# Patient Record
Sex: FEMALE | Race: BLACK OR AFRICAN AMERICAN | NOT HISPANIC OR LATINO | Employment: FULL TIME
[De-identification: names, ages, dates, MRNs, and addresses within clinical notes are randomized per-mention and may not be internally consistent; named-entity substitution may affect disease eponyms.]

---

## 2017-06-10 ENCOUNTER — HEALTH MAINTENANCE LETTER (OUTPATIENT)
Age: 47
End: 2017-06-10

## 2018-03-27 ENCOUNTER — HOSPITAL ENCOUNTER (INPATIENT)
Facility: CLINIC | Age: 48
LOS: 2 days | Discharge: HOME | End: 2018-03-29
Attending: PSYCHIATRY & NEUROLOGY | Admitting: PSYCHIATRY & NEUROLOGY
Payer: COMMERCIAL

## 2018-03-27 ENCOUNTER — HOSPITAL ENCOUNTER (EMERGENCY)
Facility: CLINIC | Age: 48
Discharge: PSYCHIATRIC OR SUBSTANCE USE FACILITY | End: 2018-03-27
Attending: EMERGENCY MEDICINE | Admitting: EMERGENCY MEDICINE
Payer: COMMERCIAL

## 2018-03-27 VITALS
SYSTOLIC BLOOD PRESSURE: 160 MMHG | HEIGHT: 67 IN | OXYGEN SATURATION: 99 % | BODY MASS INDEX: 28.88 KG/M2 | WEIGHT: 184 LBS | RESPIRATION RATE: 16 BRPM | HEART RATE: 101 BPM | TEMPERATURE: 98.3 F | DIASTOLIC BLOOD PRESSURE: 98 MMHG

## 2018-03-27 DIAGNOSIS — F32.A DEPRESSION, UNSPECIFIED DEPRESSION TYPE: ICD-10-CM

## 2018-03-27 DIAGNOSIS — R45.851 SUICIDAL IDEATION: ICD-10-CM

## 2018-03-27 DIAGNOSIS — F41.9 ANXIETY: Primary | ICD-10-CM

## 2018-03-27 DIAGNOSIS — F19.10 POLYSUBSTANCE ABUSE (H): ICD-10-CM

## 2018-03-27 LAB
AMPHETAMINES UR QL SCN: POSITIVE
BARBITURATES UR QL: NEGATIVE
BENZODIAZ UR QL: NEGATIVE
CANNABINOIDS UR QL SCN: NEGATIVE
COCAINE UR QL: POSITIVE
HCG UR QL: NEGATIVE
OPIATES UR QL SCN: NEGATIVE
PCP UR QL SCN: NEGATIVE

## 2018-03-27 PROCEDURE — 25000132 ZZH RX MED GY IP 250 OP 250 PS 637: Performed by: NURSE PRACTITIONER

## 2018-03-27 PROCEDURE — 12400007 ZZH R&B MH INTERMEDIATE UMMC

## 2018-03-27 PROCEDURE — 80307 DRUG TEST PRSMV CHEM ANLYZR: CPT | Performed by: EMERGENCY MEDICINE

## 2018-03-27 PROCEDURE — 25000132 ZZH RX MED GY IP 250 OP 250 PS 637: Mod: GY | Performed by: EMERGENCY MEDICINE

## 2018-03-27 PROCEDURE — A9270 NON-COVERED ITEM OR SERVICE: HCPCS | Mod: GY | Performed by: EMERGENCY MEDICINE

## 2018-03-27 PROCEDURE — 90791 PSYCH DIAGNOSTIC EVALUATION: CPT

## 2018-03-27 PROCEDURE — 99285 EMERGENCY DEPT VISIT HI MDM: CPT | Mod: 25

## 2018-03-27 PROCEDURE — 81025 URINE PREGNANCY TEST: CPT | Performed by: EMERGENCY MEDICINE

## 2018-03-27 RX ORDER — OLANZAPINE 10 MG/2ML
10 INJECTION, POWDER, FOR SOLUTION INTRAMUSCULAR
Status: DISCONTINUED | OUTPATIENT
Start: 2018-03-27 | End: 2018-03-29 | Stop reason: HOSPADM

## 2018-03-27 RX ORDER — B-COMPLEX WITH VITAMIN C
1 TABLET ORAL DAILY
Status: ON HOLD | COMMUNITY
End: 2021-12-13

## 2018-03-27 RX ORDER — ACETAMINOPHEN 325 MG/1
650 TABLET ORAL EVERY 4 HOURS PRN
Status: DISCONTINUED | OUTPATIENT
Start: 2018-03-27 | End: 2018-03-29 | Stop reason: HOSPADM

## 2018-03-27 RX ORDER — HYDROCHLOROTHIAZIDE 25 MG/1
25 TABLET ORAL DAILY
Status: DISCONTINUED | OUTPATIENT
Start: 2018-03-28 | End: 2018-03-29 | Stop reason: HOSPADM

## 2018-03-27 RX ORDER — LORATADINE 10 MG/1
10 TABLET ORAL DAILY
Status: DISCONTINUED | OUTPATIENT
Start: 2018-03-28 | End: 2018-03-29 | Stop reason: HOSPADM

## 2018-03-27 RX ORDER — ALUMINA, MAGNESIA, AND SIMETHICONE 2400; 2400; 240 MG/30ML; MG/30ML; MG/30ML
30 SUSPENSION ORAL EVERY 4 HOURS PRN
Status: DISCONTINUED | OUTPATIENT
Start: 2018-03-27 | End: 2018-03-29 | Stop reason: HOSPADM

## 2018-03-27 RX ORDER — BUPROPION HYDROCHLORIDE 300 MG/1
300 TABLET ORAL DAILY
Status: DISCONTINUED | OUTPATIENT
Start: 2018-03-27 | End: 2018-03-27 | Stop reason: HOSPADM

## 2018-03-27 RX ORDER — LORATADINE 10 MG/1
10 TABLET ORAL DAILY
Status: ON HOLD | COMMUNITY
End: 2018-04-12

## 2018-03-27 RX ORDER — MULTIVITAMIN,THERAPEUTIC
1 TABLET ORAL DAILY
Status: DISCONTINUED | OUTPATIENT
Start: 2018-03-28 | End: 2018-03-29 | Stop reason: HOSPADM

## 2018-03-27 RX ORDER — TRAZODONE HYDROCHLORIDE 50 MG/1
50 TABLET, FILM COATED ORAL
Status: DISCONTINUED | OUTPATIENT
Start: 2018-03-27 | End: 2018-03-29 | Stop reason: HOSPADM

## 2018-03-27 RX ORDER — BISACODYL 10 MG
10 SUPPOSITORY, RECTAL RECTAL DAILY PRN
Status: DISCONTINUED | OUTPATIENT
Start: 2018-03-27 | End: 2018-03-29 | Stop reason: HOSPADM

## 2018-03-27 RX ORDER — BUPROPION HYDROCHLORIDE 150 MG/1
150 TABLET ORAL EVERY MORNING
Status: DISCONTINUED | OUTPATIENT
Start: 2018-03-28 | End: 2018-03-29 | Stop reason: HOSPADM

## 2018-03-27 RX ORDER — FERROUS SULFATE 325(65) MG
325 TABLET ORAL
Status: ON HOLD | COMMUNITY
End: 2018-04-12

## 2018-03-27 RX ORDER — OLANZAPINE 10 MG/1
10 TABLET ORAL
Status: DISCONTINUED | OUTPATIENT
Start: 2018-03-27 | End: 2018-03-29 | Stop reason: HOSPADM

## 2018-03-27 RX ORDER — FERROUS SULFATE 325(65) MG
325 TABLET ORAL
Status: DISCONTINUED | OUTPATIENT
Start: 2018-03-28 | End: 2018-03-29 | Stop reason: HOSPADM

## 2018-03-27 RX ORDER — ACETAMINOPHEN 500 MG
1000 TABLET ORAL ONCE
Status: COMPLETED | OUTPATIENT
Start: 2018-03-27 | End: 2018-03-27

## 2018-03-27 RX ORDER — HYDROXYZINE HYDROCHLORIDE 25 MG/1
25-50 TABLET, FILM COATED ORAL EVERY 4 HOURS PRN
Status: DISCONTINUED | OUTPATIENT
Start: 2018-03-27 | End: 2018-03-29 | Stop reason: HOSPADM

## 2018-03-27 RX ADMIN — BUPROPION HYDROCHLORIDE 300 MG: 300 TABLET, FILM COATED, EXTENDED RELEASE ORAL at 13:55

## 2018-03-27 RX ADMIN — FLUOXETINE 20 MG: 20 CAPSULE ORAL at 13:55

## 2018-03-27 RX ADMIN — HYDROXYZINE HYDROCHLORIDE 50 MG: 25 TABLET ORAL at 20:50

## 2018-03-27 RX ADMIN — ACETAMINOPHEN 1000 MG: 500 TABLET, FILM COATED ORAL at 12:55

## 2018-03-27 ASSESSMENT — ACTIVITIES OF DAILY LIVING (ADL)
BATHING: 0 - INDEPENDENT
FALL_HISTORY_WITHIN_LAST_SIX_MONTHS: NO
COGNITION: 0 - NO COGNITION ISSUES REPORTED
DRESS: 0 - INDEPENDENT
AMBULATION: 0-->INDEPENDENT
TOILETING: 0 - INDEPENDENT
TRANSFERRING: 0-->INDEPENDENT
TOILETING: 0-->INDEPENDENT
DRESS: 0-->INDEPENDENT
BATHING: 0-->INDEPENDENT
COMMUNICATION: 0 - UNDERSTANDS/COMMUNICATES WITHOUT DIFFICULTY
RETIRED_COMMUNICATION: 0-->UNDERSTANDS/COMMUNICATES WITHOUT DIFFICULTY
AMBULATION: 0 - INDEPENDENT
SWALLOWING: 0-->SWALLOWS FOODS/LIQUIDS WITHOUT DIFFICULTY
RETIRED_EATING: 0-->INDEPENDENT
EATING: 0 - INDEPENDENT
TRANSFERRING: 0 - INDEPENDENT
SWALLOWING: 0 - SWALLOWS FOODS/LIQUIDS WITHOUT DIFFICULTY

## 2018-03-27 ASSESSMENT — ENCOUNTER SYMPTOMS
NERVOUS/ANXIOUS: 1
DYSPHORIC MOOD: 1
HEADACHES: 1

## 2018-03-27 NOTE — PHARMACY-ADMISSION MEDICATION HISTORY
Admission medication history interview status for the 3/27/2018  admission is complete. See EPIC admission navigator for prior to admission medications     Medication history source reliability:Moderate    Actions taken by pharmacist (provider contacted, etc):None     Additional medication history information not noted on PTA med list :None    Medication reconciliation/reorder completed by provider prior to medication history? No    Time spent in this activity: 25 min    Prior to Admission medications    Medication Sig Last Dose Taking? Auth Provider   VITAMIN D, CHOLECALCIFEROL, PO Take 5,000 Units by mouth daily Past Week at Unknown time Yes Unknown, Entered By History   ferrous sulfate (IRON) 325 (65 FE) MG tablet Take 325 mg by mouth daily (with breakfast) Past Week at Unknown time Yes Unknown, Entered By History   B Complex-Biotin-FA (VITAMIN B COMPLEX) TABS Take 1 tablet by mouth daily Past Week at Unknown time Yes Unknown, Entered By History   Multiple Vitamins-Minerals (ONE-A-DAY 50 PLUS PO) Take 1 tablet by mouth daily Past Week at Unknown time Yes Unknown, Entered By History   WHEY PROTEIN PO Take 60 g by mouth daily Past Week at Unknown time Yes Unknown, Entered By History   beclomethasone (QVAR) 40 MCG/ACT Inhaler Inhale 2 puffs into the lungs 2 times daily 3/26/2018 at Unknown time Yes Unknown, Entered By History   loratadine (CLARITIN) 10 MG tablet Take 10 mg by mouth daily Past Week at Unknown time Yes Unknown, Entered By History   HYDROCHLOROTHIAZIDE PO Take 25 mg by mouth daily Past Week at Unknown time Yes Unknown, Entered By History   Phentermine HCl (ADIPEX-P PO) Take 19 mg by mouth every morning (before breakfast) 1/2 of 37.5mg tablet Past Week at Unknown time Yes Unknown, Entered By History   FLUoxetine (PROZAC) 20 MG capsule Take 80 mg by mouth daily  Past Week at Unknown time Yes Rupinder Seymour, NP   acetaminophen (TYLENOL) 325 MG tablet Take 2 tablets by mouth. 650 mg q 8 hours as needed  max 4000 mg in 24 hours Past Week at Unknown time Yes Rupinder Seymour, NP   buPROPion (WELLBUTRIN XL) 300 MG 24 hr tablet Take 450 mg by mouth every morning  Past Week at Unknown time Yes Rupinder Seymour, NP   EPINEPHrine (EPIPEN) 0.3 MG/0.3ML injection Inject 0.3 mLs into the muscle once as needed for anaphylaxis. More than a month at Unknown time  Rupinder Seymour, NP

## 2018-03-27 NOTE — IP AVS SNAPSHOT
30    [address]    [place] [address]    Phone:  [phone]                                       After Visit Summary   3/27/2018    Winifred Bashir    MRN: [id number]           After Visit Summary Signature Page     I have received my discharge instructions, and my questions have been answered. I have discussed any challenges I see with this plan with the nurse or doctor.    ..........................................................................................................................................  Patient/Patient Representative Signature      ..........................................................................................................................................  Patient Representative Print Name and Relationship to Patient    ..................................................               ................................................  Date                                            Time    ..........................................................................................................................................  Reviewed by Signature/Title    ...................................................              ..............................................  Date                                                            Time

## 2018-03-27 NOTE — IP AVS SNAPSHOT
MRN:[id number]                      After Visit Summary   3/27/2018    Winifred Bashir    MRN: [id number]           Thank you!     Thank you for choosing [facility] for your care. Our goal is always to provide you with excellent care.        Patient Information     Date Of Birth          1970        Designated Caregiver       Most Recent Value    Caregiver    Will someone help with your care after discharge? no      About your hospital stay     You were admitted on:  March 27, 2018 You last received care in the:  [facility] 30[facility]    You were discharged on:  March 29, 2018       Who to Call     For medical emergencies, please call 911.  For non-urgent questions about your medical care, please call your primary care provider or clinic, [phone]          Attending Provider     Provider Osei Miguel MD Psychiatry       Primary Care Provider Office Phone # Fax #    Angela Guillen [phone]      Further instructions from your care team        Behavioral Discharge Planning and Instructions      Summary:  You were admitted on 3/27/2018  due to Depression, Anxiety and suicidal ideation.  You were treated by Dr. Osei Tristan MD and discharged on 03/29/2018 from [facility].      Principal Diagnosis:   1.  Major depressive disorder, recurrent, severe.   2.  Generalized anxiety disorder.   3.  Hypertension.     Health Care Follow-up Appointments:   1. Medication Management Appointment:  Date/Time: Thursday, April 12th, 2018 @ 10:20am with 9:20am arrival time.  Provider: Ana Paula Gonzalez CNP    2. Individual Therapy Appointment:  Date/Time: Tuesday, April 24th, 2018 @ 1:00pm  Provider: Radha    The location for your upcoming appointments:  [facility]  [place] [facility] [address], [address]    [place], [place]  Phone: [phone]                                                                                                                                                                                                                                                            The Health Unit Coordinator has faxed these discharge instructions to fax: [phone].    3. Gambling Addiction Treatment:  You reported that you completed your assessment with Christopher from [facility] about 2 weeks ago.  You contacted [facility] today and asked that Christopher give you a follow-up call regarding inpatient treatment opportunity.    [facility]  Phone: [phone]    4. Health Professionals Service Program:  You self-reported on 3/29/18 to hospitals regarding your current hospitalization, gambling addiction and substances found in your utox.  You also completed an KATY for hospitals and [facility] prior to your discharge.  You have arranged a plan with the  at hospitals who you will continue to work with.    Health Professionals Service Program (hospitals)  [address]   [place] [address]   Phone: [phone]   Fax: [phone]  Attend all scheduled appointments with your outpatient providers. Call at least 24 hours in advance if you need to reschedule an appointment to ensure continued access to your outpatient providers.   Major Treatments, Procedures and Findings:  You were provided with: a psychiatric assessment, assessed for medical stability, medication evaluation and/or management, group therapy, individual therapy, milieu management and medical interventions.  You also self reported to hospitals regarding your most recent substance use and gambling addiction.  You have created a plan with them and will continue to work with them per recommendation.      Symptoms to Report: feeling more aggressive, increased confusion, losing more sleep, mood getting worse or thoughts of suicide    Early warning signs can include: increased depression or anxiety sleep disturbances increased thoughts or behaviors of suicide or self-harm  increased unusual thinking, such as paranoia or  hearing voices    Safety and Wellness:  Take all medicines as directed.  Make no changes unless your doctor suggests them.      Follow treatment recommendations.  Refrain from alcohol and non-prescribed drugs.  If there is a concern for safety, call 911.    Resources:   [facility] Mental Health Crisis Response [facility]: - [phone]  Crisis Stabilization Services are available for [place], [place] at this phone number. - [phone]  Urgent Care for Adult Mental Health ([facility] and [facility])   [address] [place] [address] - [facility] [place] - [phone], also is a 24/7 Mobile Crisis Team and Crisis Phone Line   Monday - Friday 8:00 a.m. to 9:00 p.m.   Saturday - Sunday 11:00 a.m. to 3:00 p.m.   [facility] provides a safe, supportive environment for crisis stabilization and promoting personal responsibility for [facility] and [facility] residents with mental illness.  [address] [place] [address]  Phone: [phone]  Fax: [phone]  [facility] Emergency Room: Phone:[phone].    Crisis Connection 24/7 Community Call Center: [phone]  Phone: [phone] outside the [facility]: [phone]  www.crisis.org   Hours: 24 hours/day, 7 days/week  Services: Free and confidential telephone counseling provided by trained volunteers supervised by professional staff. Early intervention and brief supportive counseling for callers with issues of depression, stress and anxiety, domestic violence, parent/child conflicts, family issues, loneliness, grief and loss, suicidal ideation and chronic mental illness.    [employer]  You and your family would benefit from the support groups at [employer]- [place] Chapter.   www.namihelps.org  www.namihelpsyouth.org    The [employer] - [facility] has a parent support and educator  "staff - Noman Lee - that can be a support for your parents. There are also many classes that are available to you and your family.  Noman can be reached at [phone] xt 106 or through e-mail at [email or web address].    The treatment team has appreciated the opportunity to work with you.     Winifred,  please take care and make your recovery a daily recovery.   If you have any questions or concerns our unit number is [phone].  You may be receiving a follow-up phone call within the next three days from a representative from behavioral [employer].    You have identified the best phone number to reach you as [phone].        Pending Results     No orders found from 3/25/2018 to 3/28/2018.            Admission Information     Date & Time Provider Department Dept. Phone    3/27/2018 Osei Tristan MD UR 30NR [phone]      Your Vitals Were     Blood Pressure Pulse Temperature Respirations Height Weight    149/91 (BP Location: Right arm) 85 97.8  F (36.6  C) (Oral) 16 1.702 m (5' 7\") 89.8 kg (198 lb)    BMI (Body Mass Index)                   31.01 kg/m2           MyChart Information     [facility] gives you secure access to your electronic health record. If you see a primary care provider, you can also send messages to your care team and make appointments. If you have questions, please call your primary care clinic.  If you do not have a primary care provider, please call [phone] and they will assist you.        Care EveryWhere ID     This is your Care EveryWhere ID. This could be used by other organizations to access your [facility] medical records  [id number]        Equal Access to Services     [facility] Center[facility]MY : Hadruby Flores, teresa barcenas, brendan persaud. So [facility] [phone].    ATENCIÓN: Si habla español, tiene a kimbrough disposición servicios gratuitos de asistencia lingüística. Llame al [phone].    We comply with applicable federal " civil rights laws and [place] laws. We do not discriminate on the basis of race, color, national origin, age, disability, sex, sexual orientation, or gender identity.               Review of your medicines      START taking        Dose / Directions    hydrOXYzine 25 MG tablet   Commonly known as:  ATARAX   Used for:  Anxiety        Dose:  25-50 mg   Take 1-2 tablets (25-50 mg) by mouth 3 times daily as needed for anxiety   Quantity:  90 tablet   Refills:  0         CONTINUE these medicines which have NOT CHANGED        Dose / Directions    beclomethasone 40 MCG/ACT Inhaler   Commonly known as:  QVAR        Dose:  2 puff   Inhale 2 puffs into the lungs 2 times daily   Refills:  0       buPROPion 300 MG 24 hr tablet   Commonly known as:  WELLBUTRIN XL        Dose:  450 mg   Take 450 mg by mouth every morning   Quantity:  30 tablet   Refills:  1       EPINEPHrine 0.3 MG/0.3ML injection   Commonly known as:  EPIPEN        Dose:  0.3 mg   Inject 0.3 mLs into the muscle once as needed for anaphylaxis.   Quantity:  2 each   Refills:  3       ferrous sulfate 325 (65 FE) MG tablet   Commonly known as:  IRON        Dose:  325 mg   Take 325 mg by mouth daily (with breakfast)   Refills:  0       HYDROCHLOROTHIAZIDE PO        Dose:  25 mg   Take 25 mg by mouth daily   Refills:  0       loratadine 10 MG tablet   Commonly known as:  CLARITIN        Dose:  10 mg   Take 10 mg by mouth daily   Refills:  0       ONE-A-DAY 50 PLUS PO        Dose:  1 tablet   Take 1 tablet by mouth daily   Refills:  0       PROZAC 20 MG capsule   Generic drug:  FLUoxetine        Dose:  80 mg   Take 80 mg by mouth daily   Quantity:  90 capsule   Refills:  0       TYLENOL 325 MG tablet   Generic drug:  acetaminophen        Dose:  2 tablet   Take 2 tablets by mouth. 650 mg q 8 hours as needed max 4000 mg in 24 hours   Quantity:  100 tablet   Refills:  0       vitamin B complex Tabs        Dose:  1 tablet   Take 1 tablet by mouth daily   Refills:  0        VITAMIN D (CHOLECALCIFEROL) PO        Dose:  5000 Units   Take 5,000 Units by mouth daily   Refills:  0            Where to get your medicines      These medications were sent to [facility]/pharmacy #[id number] - [place], [place] - [address]  [address], [facility] [address]     Phone:  [phone]     hydrOXYzine 25 MG tablet                Protect others around you: Learn how to safely use, store and throw away your medicines at www.disposemymeds.org.             Medication List: This is a list of all your medications and when to take them. Check marks below indicate your daily home schedule. Keep this list as a reference.      Medications           Morning Afternoon Evening Bedtime As Needed    beclomethasone 40 MCG/ACT Inhaler   Commonly known as:  QVAR   Inhale 2 puffs into the lungs 2 times daily                                buPROPion 300 MG 24 hr tablet   Commonly known as:  WELLBUTRIN XL   Take 450 mg by mouth every morning   Last time this was given:  150 mg on 3/29/2018  8:30 AM                                EPINEPHrine 0.3 MG/0.3ML injection   Commonly known as:  EPIPEN   Inject 0.3 mLs into the muscle once as needed for anaphylaxis.                                ferrous sulfate 325 (65 FE) MG tablet   Commonly known as:  IRON   Take 325 mg by mouth daily (with breakfast)   Last time this was given:  325 mg on 3/29/2018  8:29 AM                                HYDROCHLOROTHIAZIDE PO   Take 25 mg by mouth daily   Last time this was given:  25 mg on 3/29/2018  8:30 AM                                hydrOXYzine 25 MG tablet   Commonly known as:  ATARAX   Take 1-2 tablets (25-50 mg) by mouth 3 times daily as needed for anxiety   Last time this was given:  50 mg on 3/29/2018  1:40 PM                                loratadine 10 MG tablet   Commonly known as:  CLARITIN   Take 10 mg by mouth daily   Last time this was given:  10 mg on 3/29/2018  8:30 AM                                ONE-A-DAY 50  PLUS PO   Take 1 tablet by mouth daily                                PROZAC 20 MG capsule   Take 80 mg by mouth daily   Last time this was given:  40 mg on 3/29/2018  8:30 AM   Generic drug:  FLUoxetine                                TYLENOL 325 MG tablet   Take 2 tablets by mouth. 650 mg q 8 hours as needed max 4000 mg in 24 hours   Generic drug:  acetaminophen                                vitamin B complex Tabs   Take 1 tablet by mouth daily                                VITAMIN D (CHOLECALCIFEROL) PO   Take 5,000 Units by mouth daily   Last time this was given:  5,000 Units on 3/29/2018  8:29 AM
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
## 2018-03-27 NOTE — ED PROVIDER NOTES
Addendum Note: Bed placed for the patient prior to sign out to my colleague. Findings and plan explained to the patient. Patient will be transferred to [facility] via EMS under the care of Dr. Tristan, who will admit the patient to a psychatric bed for further monitoring, evaluation, and treatment.        Sheila Muhammad MD  03/27/18 3907

## 2018-03-28 LAB
ALBUMIN SERPL-MCNC: 3.1 G/DL (ref 3.4–5)
ALP SERPL-CCNC: 93 U/L (ref 40–150)
ALT SERPL W P-5'-P-CCNC: 26 U/L (ref 0–50)
ANION GAP SERPL CALCULATED.3IONS-SCNC: 9 MMOL/L (ref 3–14)
AST SERPL W P-5'-P-CCNC: 18 U/L (ref 0–45)
BASOPHILS # BLD AUTO: 0 10E9/L (ref 0–0.2)
BASOPHILS NFR BLD AUTO: 0.3 %
BILIRUB SERPL-MCNC: 0.6 MG/DL (ref 0.2–1.3)
BUN SERPL-MCNC: 12 MG/DL (ref 7–30)
CALCIUM SERPL-MCNC: 8.5 MG/DL (ref 8.5–10.1)
CHLORIDE SERPL-SCNC: 105 MMOL/L (ref 94–109)
CHOLEST SERPL-MCNC: 216 MG/DL
CO2 SERPL-SCNC: 26 MMOL/L (ref 20–32)
CREAT SERPL-MCNC: 0.7 MG/DL (ref 0.52–1.04)
DIFFERENTIAL METHOD BLD: ABNORMAL
EOSINOPHIL # BLD AUTO: 0.1 10E9/L (ref 0–0.7)
EOSINOPHIL NFR BLD AUTO: 1.8 %
ERYTHROCYTE [DISTWIDTH] IN BLOOD BY AUTOMATED COUNT: 15.1 % (ref 10–15)
GFR SERPL CREATININE-BSD FRML MDRD: 90 ML/MIN/1.7M2
GLUCOSE SERPL-MCNC: 83 MG/DL (ref 70–99)
HCT VFR BLD AUTO: 37.6 % (ref 35–47)
HDLC SERPL-MCNC: 46 MG/DL
HGB BLD-MCNC: 12 G/DL (ref 11.7–15.7)
IMM GRANULOCYTES # BLD: 0 10E9/L (ref 0–0.4)
IMM GRANULOCYTES NFR BLD: 0.1 %
LDLC SERPL CALC-MCNC: 112 MG/DL
LYMPHOCYTES # BLD AUTO: 2.5 10E9/L (ref 0.8–5.3)
LYMPHOCYTES NFR BLD AUTO: 34.9 %
MCH RBC QN AUTO: 26.7 PG (ref 26.5–33)
MCHC RBC AUTO-ENTMCNC: 31.9 G/DL (ref 31.5–36.5)
MCV RBC AUTO: 84 FL (ref 78–100)
MONOCYTES # BLD AUTO: 0.5 10E9/L (ref 0–1.3)
MONOCYTES NFR BLD AUTO: 7.3 %
NEUTROPHILS # BLD AUTO: 4 10E9/L (ref 1.6–8.3)
NEUTROPHILS NFR BLD AUTO: 55.6 %
NONHDLC SERPL-MCNC: 170 MG/DL
NRBC # BLD AUTO: 0 10*3/UL
NRBC BLD AUTO-RTO: 0 /100
PLATELET # BLD AUTO: 327 10E9/L (ref 150–450)
POTASSIUM SERPL-SCNC: 3.5 MMOL/L (ref 3.4–5.3)
PROT SERPL-MCNC: 7.2 G/DL (ref 6.8–8.8)
RBC # BLD AUTO: 4.49 10E12/L (ref 3.8–5.2)
SODIUM SERPL-SCNC: 140 MMOL/L (ref 133–144)
TRIGL SERPL-MCNC: 288 MG/DL
TSH SERPL DL<=0.005 MIU/L-ACNC: 0.56 MU/L (ref 0.4–4)
WBC # BLD AUTO: 7.2 10E9/L (ref 4–11)

## 2018-03-28 PROCEDURE — 80053 COMPREHEN METABOLIC PANEL: CPT | Performed by: NURSE PRACTITIONER

## 2018-03-28 PROCEDURE — 84443 ASSAY THYROID STIM HORMONE: CPT | Performed by: NURSE PRACTITIONER

## 2018-03-28 PROCEDURE — 36415 COLL VENOUS BLD VENIPUNCTURE: CPT | Performed by: NURSE PRACTITIONER

## 2018-03-28 PROCEDURE — 85025 COMPLETE CBC W/AUTO DIFF WBC: CPT | Performed by: NURSE PRACTITIONER

## 2018-03-28 PROCEDURE — 25000132 ZZH RX MED GY IP 250 OP 250 PS 637: Performed by: NURSE PRACTITIONER

## 2018-03-28 PROCEDURE — 80061 LIPID PANEL: CPT | Performed by: NURSE PRACTITIONER

## 2018-03-28 PROCEDURE — 12400007 ZZH R&B MH INTERMEDIATE UMMC

## 2018-03-28 PROCEDURE — 99223 1ST HOSP IP/OBS HIGH 75: CPT | Mod: AI | Performed by: PSYCHIATRY & NEUROLOGY

## 2018-03-28 RX ADMIN — LORATADINE 10 MG: 10 TABLET ORAL at 08:22

## 2018-03-28 RX ADMIN — HYDROXYZINE HYDROCHLORIDE 50 MG: 25 TABLET ORAL at 16:32

## 2018-03-28 RX ADMIN — HYDROXYZINE HYDROCHLORIDE 50 MG: 25 TABLET ORAL at 08:39

## 2018-03-28 RX ADMIN — VITAMIN D, TAB 1000IU (100/BT) 5000 UNITS: 25 TAB at 08:21

## 2018-03-28 RX ADMIN — FLUOXETINE 40 MG: 20 CAPSULE ORAL at 08:21

## 2018-03-28 RX ADMIN — THERA TABS 1 TABLET: TAB at 08:21

## 2018-03-28 RX ADMIN — FERROUS SULFATE TAB 325 MG (65 MG ELEMENTAL FE) 325 MG: 325 (65 FE) TAB at 08:22

## 2018-03-28 RX ADMIN — BUPROPION HYDROCHLORIDE 150 MG: 150 TABLET, FILM COATED, EXTENDED RELEASE ORAL at 08:22

## 2018-03-28 RX ADMIN — B-COMPLEX W/ C & FOLIC ACID TAB 1 TABLET: TAB at 08:22

## 2018-03-28 RX ADMIN — FLUTICASONE FUROATE 1 PUFF: 100 POWDER RESPIRATORY (INHALATION) at 08:23

## 2018-03-28 RX ADMIN — HYDROCHLOROTHIAZIDE 25 MG: 25 TABLET ORAL at 08:22

## 2018-03-28 ASSESSMENT — ACTIVITIES OF DAILY LIVING (ADL)
DRESS: INDEPENDENT
ORAL_HYGIENE: INDEPENDENT
DRESS: INDEPENDENT
LAUNDRY: WITH SUPERVISION
ORAL_HYGIENE: INDEPENDENT
GROOMING: INDEPENDENT
LAUNDRY: UNABLE TO COMPLETE
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
## 2018-03-28 NOTE — PROGRESS NOTES
SPIRITUAL HEALTH SERVICES  SPIRITUAL ASSESSMENT Progress Note  [facility] ([facility] Station 30    REFERRAL SOURCE: I tried to visit pt Winifred per [facility]  but on my both visit attempts pt was asleep. Pt is new to the unit and so tired. I let the pt sleep and left the room without disturbing. However, I informed my visit attempts to the pt nurse.    PLAN: I will remain open to provide spiritual care for the pt as needed.    Darell Hassan M.Div. ([place]), M.Th., D.Min., [facility]  Staff   Pager [phone]

## 2018-03-28 NOTE — PLAN OF CARE
"Problem: Overarching Goals (Adult)  Goal: Adheres to Safety Considerations for Self and Others    Intervention: Develop/Maintain Individualized Safety Plan  Patient denied active suicidal thoughts at this time, but she still wishes to be dead, described as \"wishing to just disappear. Stated: \"I feel numb\", \"I can't make really any smart decisions now\". Patient contracted for safety on the unit. Mood is depressed and anxious. Thoughts are linear and logical. Speech clear and organized. Patient denied hallucinations or delusions/paranoia, no history reported.        "
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
## 2018-03-28 NOTE — PROGRESS NOTES
03/28/18 1505   Behavioral Health   Hallucinations denies / not responding to hallucinations   Thinking poor concentration   Orientation person: oriented;place: oriented;date: oriented;time: oriented   Memory baseline memory   Insight poor   Judgement impaired   Eye Contact at examiner   Affect blunted, flat   Mood mood is calm   Physical Appearance/Attire attire appropriate to age and situation   Hygiene well groomed   Suicidality other (see comments)  (None stated or observed)   Change in Protective Factors? No   Enviromental Risk Factors None   Self Injury other (see comment)  (None stated or observed)   Activity isolative;withdrawn   Speech clear;coherent   Medication Sensitivity no stated side effects;no observed side effects   Psychomotor / Gait balanced;steady   Safety   Suicidality Status 15   Activities of Daily Living   Hygiene/Grooming independent   Oral Hygiene independent   Dress independent   Laundry unable to complete   Room Organization independent     Pt. Was isolative and withdrawn to her room for most of the shift. She came out for meals and would linger around for few moments afterwards before leaving. She was quiet and mostly kept to herself but was nice when engaged.

## 2018-03-28 NOTE — PROGRESS NOTES
Behavioral Health  Note   Behavioral Health  Spirituality Group Note     Unit 30    Name: Winifred Bashir    YOB: 1970   MRN: [id number]    Age: 47 year old     Patient attended -led group, which included discussion of spirituality, coping with illness and building resilience.   Patient attended group for 1.0 hrs.   The patient actively participated in group discussion     Darell [facility]  Staff    Page [phone]

## 2018-03-28 NOTE — PROGRESS NOTES
"SPIRITUAL HEALTH SERVICES Progress Note  [facility] ([facility]) Station 30       DATA:    I tried again to visit pt [facility] for the third time and this time I get a chance to talk with the pt. Pt came to attend the spirituality group and she was active during the group discussions. Pt said, \"I have some gambling problem and that will cost me my marriage to be divorce. I didn't speak with my  for a week. I want to call him and will tell him where I am right now. I want to save my marriage by changing my behavior, if I get a chente from my  but I don't think so will give me my chente. I get depressed and confused, so my friend brought me here to gather myself and calm down physically, emotionally and spiritually. I do have hope things might be ok after that, if don't I am ready even for the worst.\" I tried to calm down the pt through group meditation and individual meditation. We talked about her past attitude and character towards gambling. I share some method of coping mechanism.        INTERVENTION:    I reflectively listen the pt what she have in her mind and give her some comfort through the word of God. At the end of our conversation I offered her a prayer.       OUTCOME:    Pt appreciated by being here and felt saving her life, before something bad happen into her life. Pt was active in the group discussion. Pt regretted and take responsibly for the things she had done, both in her marriage and her personal life. Pt wanted to fix the problem she created into her life and the marriage she live in until a week ago.       PLAN:    I will remain open to provide spiritual care for the pt as needed.                                                                                                                                             Darell Hassan M.Div. ([place]), M.Th., D.Min., [facility]  Staff   Pager [phone]    "

## 2018-03-28 NOTE — PROGRESS NOTES
03/27/18 2002   Patient Belongings   Did you bring any home meds/supplements to the hospital?  Yes   Patient Belongings bracelet;earings   Disposition of Belongings [facility];Sent to security per site process   Belongings Search Yes   Clothing Search Yes   Second Staff Phyllis   Security envelope [id number]  Visa debit [employer]  Debit visa [employer]  Check book [employer] [phone]    Security Envelope [id number]: x4 $100, x1 $20     Locker 8  Cell phone  One plastic bag - phone case - pants with string - red bag contents bractlet and ear rings- black wallet with .35 and m[place] id  One plastic bag - jacket  Multi color suit case - 1.00, apple watch with ,  Ipad, flat iron, brush,toiletries bag- contents metal finger nail kit, hair accessories, deodorant, jacket with strings, brown wallet with insurance cards and  .50. Black toiletries bag hair accessories electric toothbrush and deodorant.      .A               Admission:  I am responsible for any personal items that are not sent to the safe or pharmacy.  [facility] is not responsible for loss, theft or damage of any property in my possession.    Signature:  _________________________________ Date: _______  Time: _____                                              Staff Signature:  ____________________________ Date: ________  Time: _____      2nd Staff person, if patient is unable/unwilling to sign:    Signature: ________________________________ Date: ________  Time: _____     Discharge:  [facility] has returned all of my personal belongings:    Signature: _________________________________ Date: ________  Time: _____                                          Staff Signature:  ____________________________ Date: ________  Time: _____

## 2018-03-29 VITALS
HEART RATE: 85 BPM | TEMPERATURE: 97.8 F | DIASTOLIC BLOOD PRESSURE: 91 MMHG | RESPIRATION RATE: 16 BRPM | HEIGHT: 67 IN | WEIGHT: 198 LBS | BODY MASS INDEX: 31.08 KG/M2 | SYSTOLIC BLOOD PRESSURE: 149 MMHG

## 2018-03-29 PROCEDURE — 90853 GROUP PSYCHOTHERAPY: CPT

## 2018-03-29 PROCEDURE — 99239 HOSP IP/OBS DSCHRG MGMT >30: CPT | Performed by: PSYCHIATRY & NEUROLOGY

## 2018-03-29 PROCEDURE — 25000132 ZZH RX MED GY IP 250 OP 250 PS 637: Performed by: NURSE PRACTITIONER

## 2018-03-29 RX ORDER — HYDROXYZINE HYDROCHLORIDE 25 MG/1
25-50 TABLET, FILM COATED ORAL 3 TIMES DAILY PRN
Qty: 90 TABLET | Refills: 0 | Status: ON HOLD | OUTPATIENT
Start: 2018-03-29 | End: 2018-04-12

## 2018-03-29 RX ADMIN — FLUTICASONE FUROATE 1 PUFF: 100 POWDER RESPIRATORY (INHALATION) at 08:30

## 2018-03-29 RX ADMIN — HYDROCHLOROTHIAZIDE 25 MG: 25 TABLET ORAL at 08:30

## 2018-03-29 RX ADMIN — FERROUS SULFATE TAB 325 MG (65 MG ELEMENTAL FE) 325 MG: 325 (65 FE) TAB at 08:29

## 2018-03-29 RX ADMIN — THERA TABS 1 TABLET: TAB at 08:29

## 2018-03-29 RX ADMIN — B-COMPLEX W/ C & FOLIC ACID TAB 1 TABLET: TAB at 08:30

## 2018-03-29 RX ADMIN — HYDROXYZINE HYDROCHLORIDE 50 MG: 25 TABLET ORAL at 08:50

## 2018-03-29 RX ADMIN — LORATADINE 10 MG: 10 TABLET ORAL at 08:30

## 2018-03-29 RX ADMIN — HYDROXYZINE HYDROCHLORIDE 50 MG: 25 TABLET ORAL at 13:40

## 2018-03-29 RX ADMIN — FLUOXETINE 40 MG: 20 CAPSULE ORAL at 08:30

## 2018-03-29 RX ADMIN — VITAMIN D, TAB 1000IU (100/BT) 5000 UNITS: 25 TAB at 08:29

## 2018-03-29 RX ADMIN — BUPROPION HYDROCHLORIDE 150 MG: 150 TABLET, FILM COATED, EXTENDED RELEASE ORAL at 08:30

## 2018-03-29 NOTE — PROGRESS NOTES
[facility] and gambling addiction.  Pt also signed a release of Info prior to discharging.  Pt has follow-up appts scheduled at [facility] [place].  Informed pt to rescheduled the appts if she gets admitted into treatment prior to they are scheduled.       Pt reported that her  had left her children and neighbor's children at home as he was planning to attend work.  Pt felt very uncomfortable about this plan and requested to discharge today.  Pt did have a friend  her children and bring them to her home so that they would not be left in the home alone.

## 2018-03-29 NOTE — DISCHARGE SUMMARY
[facility], [place]  Department of Psychiatry    DATE OF ADMISSION:  3/27/2018    DATE OF DISCHARGE:  March 29, 2018    DISCHARGE DIAGNOSES:   1.  Major depressive disorder, recurrent, severe.   2.  Generalized anxiety disorder.   3.  Hypertension.     HOSPITAL COURSE: (Refer to H&P, progress notes, and consult notes for details)    The patient was admitted to our Behavioral Health Unit under a 72 hour hold for depressed mood and suicidal thoughts.  She was willing to continue treatment voluntarily and so her 72 hour hold was discontinued.   Recent noncompliance with prior effective medications was noted as a component of what precipitated her recent mood decompensation.  Prozac and Wellbutrin were both restarted to address mood and anxiety symptoms.  She utilize Vistaril as needed for anxiety.  Her mood gradually improved and she no longer reported experiencing suicidal thoughts.  During her hospital course, she attended groups and was appropriately social with peers and staff.  She ate her meals, took her medications, slept well, attended to self-care needs and hygiene appropriately, and did not display any significant neurovegetative symptoms of a depressive disorder.  Noting these improvements, she requested to be discharged home from the hospital.  She met with our  to obtain outpatient referrals to help maintain mood stability.  The patient was also interested in receiving resources for treatment of a possible gambling disorder.  Her care was then transitioned to outpatient providers.    Noting that her initial urine drug screen was positive for cocaine and amphetamines, a chemical health assessment was considered however the patient was not interested reporting that she did not have clear memory of her recent usage of illicit substances and attributed any likelihood of a 1 time use as being related to a moment of alcohol intoxication and poor judgment.  She did not  display any alcohol withdrawal symptoms during the course of her hospitalization.  Her liver enzymes were normal and chronic heavy usage of alcohol was not suspected or reported.    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  The patient was encouraged to self-report to her licensing board prior to discharge from the hospital.  Refer to our 's noes for additional details.     Other interventions received during his hospitalization included:   Psychosocial treatments were addressed with groups, social work consult, and supportive milieu provided by staff.    CONDITION AT DISCHARGE:  Improved.  The patients acute suicide risk is low due to the following factors:  improved mood/anxiety symptoms.  Denies suicidal ideations. Denies psychotic symptoms.  Not actively intoxicated and plans to abstain from illicit substances and alcohol.  Denies access to guns.  Denies feeling hopeless or helpless. At the time of discharge Winifred Bashir was determined to not be an immediate danger to herself or others. The patient's acute risk will be higher if noncompliant with treatment plan, medications, follow-up or using illicit substances or alcohol.  These findings along with the risks of noncompliance with medications and treatment plan, which could potentially cause decompensation and increase the risk for suicide, were discussed with the patient.  The patients chronic suicide risk is moderate given the following factors:diagnosis of MDD, Denied a family history of suicide.  Preventative factors include: social supports, stable housing, employment, children     MENTAL STATUS EXAMINATION AT TIME OF DISCHARGE:  The patient is 47 year old  female who appears their  stated age and is appropriately dressed with good hygiene.  Calm and cooperative with the interview questions.  No psychomotor abnormalities are noted. Eye contact is appropriate. Speech has normal rate, tone, latency and volume and is not pushed or pressured. Mood is euthymic and affect is full and appropriate.  The patient does not seem overtly depressed, anxious, manic or irritable.  Thought process is linear, logical and future oriented.  Thought process is not tangential, circumstantial or disorganized.  Thought content is not significant for apperant paranoia, delusions, ideas of reference or grandiosity.  The patient denies suicidal and homicidal ideations as well as auditory and visual hallucinations.  Insight and judgment are fair.  Cognition appears intact to interviewing including orientation, recent and remote memory, fund of knowledge, use of language, attention span and concentration.  Muscle strength, tone and gait appear normal on visual inspection.      DISPOSITION:  The patient is discharged home     FOLLOWUP APPOINTMENTS:  ( per social workers notes and after visit summary)  1.  Medication management through [facility] on April 12  2.  Individual psychotherapy through [facility] April 24  3.  The patient self-reported on March 29 to the health professional service program  4.  She was provided with resources for gambling addiction treatment    DISCHARGE MEDICATIONS:   Current Discharge Medication List      START taking these medications    Details   hydrOXYzine (ATARAX) 25 MG tablet Take 1-2 tablets (25-50 mg) by mouth 3 times daily as needed for anxiety  Qty: 90 tablet, Refills: 0    Associated Diagnoses: Anxiety         CONTINUE these medications which have NOT CHANGED    Details   VITAMIN D, CHOLECALCIFEROL, PO Take 5,000 Units by mouth daily      ferrous sulfate (IRON) 325 (65 FE) MG tablet Take 325 mg by mouth daily (with breakfast)      B Complex-Biotin-FA (VITAMIN B  COMPLEX) TABS Take 1 tablet by mouth daily      Multiple Vitamins-Minerals (ONE-A-DAY 50 PLUS PO) Take 1 tablet by mouth daily      beclomethasone (QVAR) 40 MCG/ACT Inhaler Inhale 2 puffs into the lungs 2 times daily      loratadine (CLARITIN) 10 MG tablet Take 10 mg by mouth daily      HYDROCHLOROTHIAZIDE PO Take 25 mg by mouth daily      FLUoxetine (PROZAC) 20 MG capsule Take 80 mg by mouth daily   Qty: 90 capsule      acetaminophen (TYLENOL) 325 MG tablet Take 2 tablets by mouth. 650 mg q 8 hours as needed max 4000 mg in 24 hours  Qty: 100 tablet, Refills: 0      EPINEPHrine (EPIPEN) 0.3 MG/0.3ML injection Inject 0.3 mLs into the muscle once as needed for anaphylaxis.  Qty: 2 each, Refills: 3      buPROPion (WELLBUTRIN XL) 300 MG 24 hr tablet Take 450 mg by mouth every morning   Qty: 30 tablet, Refills: 1              LABORATORY RESULTS: (past 14 days)  Recent Results (from the past 336 hour(s))   Drug abuse screen urine    Collection Time: 03/27/18  3:23 PM   Result Value Ref Range    Amphetamine Qual Urine Positive (A) NEG^Negative    Barbiturates Qual Urine Negative NEG^Negative    Benzodiazepine Qual Urine Negative NEG^Negative    Cannabinoids Qual Urine Negative NEG^Negative    Cocaine Qual Urine Positive (A) NEG^Negative    Opiates Qualitative Urine Negative NEG^Negative    PCP Qual Urine Negative NEG^Negative   HCG qualitative urine    Collection Time: 03/27/18  3:23 PM   Result Value Ref Range    HCG Qual Urine Negative NEG^Negative   CBC with platelets differential    Collection Time: 03/28/18  7:47 AM   Result Value Ref Range    WBC 7.2 4.0 - 11.0 10e9/L    RBC Count 4.49 3.8 - 5.2 10e12/L    Hemoglobin 12.0 11.7 - 15.7 g/dL    Hematocrit 37.6 35.0 - 47.0 %    MCV 84 78 - 100 fl    MCH 26.7 26.5 - 33.0 pg    MCHC 31.9 31.5 - 36.5 g/dL    RDW 15.1 (H) 10.0 - 15.0 %    Platelet Count 327 150 - 450 10e9/L    Diff Method Automated Method     % Neutrophils 55.6 %    % Lymphocytes 34.9 %    % Monocytes 7.3 %     % Eosinophils 1.8 %    % Basophils 0.3 %    % Immature Granulocytes 0.1 %    Nucleated RBCs 0 0 /100    Absolute Neutrophil 4.0 1.6 - 8.3 10e9/L    Absolute Lymphocytes 2.5 0.8 - 5.3 10e9/L    Absolute Monocytes 0.5 0.0 - 1.3 10e9/L    Absolute Eosinophils 0.1 0.0 - 0.7 10e9/L    Absolute Basophils 0.0 0.0 - 0.2 10e9/L    Abs Immature Granulocytes 0.0 0 - 0.4 10e9/L    Absolute Nucleated RBC 0.0    Comprehensive metabolic panel    Collection Time: 03/28/18  7:47 AM   Result Value Ref Range    Sodium 140 133 - 144 mmol/L    Potassium 3.5 3.4 - 5.3 mmol/L    Chloride 105 94 - 109 mmol/L    Carbon Dioxide 26 20 - 32 mmol/L    Anion Gap 9 3 - 14 mmol/L    Glucose 83 70 - 99 mg/dL    Urea Nitrogen 12 7 - 30 mg/dL    Creatinine 0.70 0.52 - 1.04 mg/dL    GFR Estimate 90 >60 mL/min/1.7m2    GFR Estimate If Black >90 >60 mL/min/1.7m2    Calcium 8.5 8.5 - 10.1 mg/dL    Bilirubin Total 0.6 0.2 - 1.3 mg/dL    Albumin 3.1 (L) 3.4 - 5.0 g/dL    Protein Total 7.2 6.8 - 8.8 g/dL    Alkaline Phosphatase 93 40 - 150 U/L    ALT 26 0 - 50 U/L    AST 18 0 - 45 U/L   TSH with free T4 reflex and/or T3 as indicated    Collection Time: 03/28/18  7:47 AM   Result Value Ref Range    TSH 0.56 0.40 - 4.00 mU/L   Lipid panel    Collection Time: 03/28/18  7:47 AM   Result Value Ref Range    Cholesterol 216 (H) <200 mg/dL    Triglycerides 288 (H) <150 mg/dL    HDL Cholesterol 46 (L) >49 mg/dL    LDL Cholesterol Calculated 112 (H) <100 mg/dL    Non HDL Cholesterol 170 (H) <130 mg/dL       >30 minutes was spent on this discharge to allow for reviewing the patient's response to treatment, reviewing plan of care, education on medications and diagnosis, and conducting a risk assessment.

## 2018-03-29 NOTE — DISCHARGE INSTRUCTIONS
Behavioral Discharge Planning and Instructions      Summary:  You were admitted on 3/27/2018  due to Depression, Anxiety and suicidal ideation.  You were treated by Dr. Osei Tristan MD and discharged on 03/29/2018 from [facility].      Principal Diagnosis:   1.  Major depressive disorder, recurrent, severe.   2.  Generalized anxiety disorder.   3.  Hypertension.     Health Care Follow-up Appointments:   1. Medication Management Appointment:  Date/Time: Thursday, April 12th, 2018 @ 10:20am with 9:20am arrival time.  Provider: Ana Paula Gonzalez CNP    2. Individual Therapy Appointment:  Date/Time: Tuesday, April 24th, 2018 @ 1:00pm  Provider: Radha    The location for your upcoming appointments:  [facility]  [place] [facility] [place]  [address] [place] [address], [address]    [place], [place]  Phone: [phone]                                                                                                                                                                                                                                                           The Health Unit Coordinator has faxed these discharge instructions to fax: [phone].    3. Gambling Addiction Treatment:  You reported that you completed your assessment with Christopher from [facility] about 2 weeks ago.  You contacted [facility] today and asked that Christopher give you a follow-up call regarding inpatient treatment opportunity.    [facility]  Phone: [phone]    4. Health Professionals Service Program:  You self-reported on 3/29/18 to [facility] regarding your current hospitalization, gambling addiction and substances found in your utox.  You also completed an KATY for [facility] and [facility] prior to your discharge.  You have arranged a plan with the  at [facility] who you will continue to work with.    Health Professionals Service Program ([facility])  [address]   [place] [address]   Phone: [phone]   Fax: [phone]  Attend  all scheduled appointments with your outpatient providers. Call at least 24 hours in advance if you need to reschedule an appointment to ensure continued access to your outpatient providers.   Major Treatments, Procedures and Findings:  You were provided with: a psychiatric assessment, assessed for medical stability, medication evaluation and/or management, group therapy, individual therapy, milieu management and medical interventions.  You also self reported to [facility] regarding your most recent substance use and gambling addiction.  You have created a plan with them and will continue to work with them per recommendation.      Symptoms to Report: feeling more aggressive, increased confusion, losing more sleep, mood getting worse or thoughts of suicide    Early warning signs can include: increased depression or anxiety sleep disturbances increased thoughts or behaviors of suicide or self-harm  increased unusual thinking, such as paranoia or hearing voices    Safety and Wellness:  Take all medicines as directed.  Make no changes unless your doctor suggests them.      Follow treatment recommendations.  Refrain from alcohol and non-prescribed drugs.  If there is a concern for safety, call 911.    Resources:   [facility] Mental Health Crisis Response [facility]: - [phone]  Crisis Stabilization Services are available for [facility] [place] at this phone number. - [phone]  Urgent Care for Adult Mental Health ([facility] and [facility])   [address] [place] [address] - [facility] [place] - [phone], also is a 24/7 Mobile Crisis Team and Crisis Phone Line   Monday - Friday 8:00 a.m. to 9:00 p.m.   Saturday - Sunday 11:00 a.m. to 3:00 p.m.   [facility] provides a safe, supportive environment for crisis stabilization and promoting personal responsibility for [facility] and [facility] residents with mental illness.  [address] [place]  [address]  Phone: [phone]  Fax: [phone]  [facility] Emergency Room: Phone:[phone].    Crisis Connection 24/7 [facility] Call Center: [phone]  Phone: [phone] outside the [facility] area: [phone]  [email or web address]   Hours: 24 hours/day, 7 days/week  Services: Free and confidential telephone counseling provided by trained volunteers supervised by professional staff. Early intervention and brief supportive counseling for callers with issues of depression, stress and anxiety, domestic violence, parent/child conflicts, family issues, loneliness, grief and loss, suicidal ideation and chronic mental illness.    [facility]  You and your family would benefit from the support groups at [facility].   www.[facility].org  www.[facility].org    The [place] [facility] has a parent support and educator staff - Noman Mullins - that can be a support for your parents. There are also many classes that are available to you and your family.  Noman can be reached at [phone] xt 106 or through e-mail at [email or web address].    The treatment team has appreciated the opportunity to work with you.     Winifred,  please take care and make your recovery a daily recovery.   If you have any questions or concerns our unit number is [phone].  You may be receiving a follow-up phone call within the next three days from a representative from behavioral health.    You have identified the best phone number to reach you as [phone].

## 2018-03-29 NOTE — PLAN OF CARE
Problem: Mood Impairment (Depressive Signs/Symptoms) (Adult)  Goal: Improved Mood Symptoms (Depressive Signs/Symptoms)  Outcome: Improving  Patient states feeling ready to move on.  She is feeling optimistic that she will be able  pull it together. Brighter affect mood less anxious.  She denies feeling suicidal and states feeling safe for discharge.  She will be discharged today with a discharge plan in place.

## 2018-03-30 NOTE — PROGRESS NOTES
Late Entry:    Occupational Therapy Initial Note:    Pt attended 1/3 occupational therapy groups today.  During group, pt initiated interaction with staff and peers and was appropriate.  Pt's affect was appropriate to group and pt was willing to accept feedback from group.Pt was able to clearly identify current stressors for her ( depression, marital issues, ) and reports she is ready for things for her to change.  She reports feeling better rested after th hospitalization and ready to deal with issues outside of the hospital.  Pt demonstrated good insight and some aprpriate coping strategies.  Pt reports she will be discharged today.

## 2018-04-08 ENCOUNTER — APPOINTMENT (OUTPATIENT)
Dept: CT IMAGING | Facility: CLINIC | Age: 48
End: 2018-04-08
Attending: EMERGENCY MEDICINE
Payer: COMMERCIAL

## 2018-04-08 ENCOUNTER — TELEPHONE (OUTPATIENT)
Dept: BEHAVIORAL HEALTH | Facility: CLINIC | Age: 48
End: 2018-04-08

## 2018-04-08 ENCOUNTER — HOSPITAL ENCOUNTER (INPATIENT)
Facility: CLINIC | Age: 48
LOS: 5 days | Discharge: PSYCHIATRIC OR SUBSTANCE USE FACILITY | End: 2018-04-13
Attending: EMERGENCY MEDICINE | Admitting: PSYCHIATRY & NEUROLOGY
Payer: COMMERCIAL

## 2018-04-08 DIAGNOSIS — J45.909 ASTHMA, UNSPECIFIED ASTHMA SEVERITY, UNSPECIFIED WHETHER COMPLICATED, UNSPECIFIED WHETHER PERSISTENT: Primary | ICD-10-CM

## 2018-04-08 DIAGNOSIS — E61.1 IRON DEFICIENCY: ICD-10-CM

## 2018-04-08 DIAGNOSIS — G47.00 INSOMNIA, UNSPECIFIED TYPE: ICD-10-CM
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
## 2018-04-08 DIAGNOSIS — E55.9 VITAMIN D DEFICIENCY: ICD-10-CM

## 2018-04-08 DIAGNOSIS — F33.1 MODERATE EPISODE OF RECURRENT MAJOR DEPRESSIVE DISORDER (H): ICD-10-CM

## 2018-04-08 DIAGNOSIS — I10 BENIGN ESSENTIAL HYPERTENSION: ICD-10-CM

## 2018-04-08 DIAGNOSIS — K59.00 CONSTIPATION, UNSPECIFIED CONSTIPATION TYPE: ICD-10-CM

## 2018-04-08 DIAGNOSIS — E56.9 VITAMIN DEFICIENCY: ICD-10-CM

## 2018-04-08 DIAGNOSIS — T78.40XD ALLERGIC REACTION, SUBSEQUENT ENCOUNTER: ICD-10-CM

## 2018-04-08 DIAGNOSIS — R45.851 SUICIDAL IDEATION: ICD-10-CM

## 2018-04-08 DIAGNOSIS — M62.838 SPASM OF MUSCLE: ICD-10-CM

## 2018-04-08 DIAGNOSIS — S00.03XA SCALP HEMATOMA, INITIAL ENCOUNTER: ICD-10-CM

## 2018-04-08 DIAGNOSIS — F41.9 ANXIETY: ICD-10-CM

## 2018-04-08 PROBLEM — F32.A DEPRESSION: Status: ACTIVE | Noted: 2018-04-08

## 2018-04-08 LAB
ALCOHOL BREATH TEST: 0 (ref 0–0.01)
AMPHETAMINES UR QL SCN: NEGATIVE
BARBITURATES UR QL: NEGATIVE
BENZODIAZ UR QL: NEGATIVE
CANNABINOIDS UR QL SCN: NEGATIVE
COCAINE UR QL: POSITIVE
ETHANOL UR QL SCN: POSITIVE
HCG UR QL: NEGATIVE
OPIATES UR QL SCN: NEGATIVE

## 2018-04-08 PROCEDURE — 12400007 ZZH R&B MH INTERMEDIATE UMMC

## 2018-04-08 PROCEDURE — 80307 DRUG TEST PRSMV CHEM ANLYZR: CPT | Performed by: FAMILY MEDICINE

## 2018-04-08 PROCEDURE — 90791 PSYCH DIAGNOSTIC EVALUATION: CPT

## 2018-04-08 PROCEDURE — 99285 EMERGENCY DEPT VISIT HI MDM: CPT | Mod: 25 | Performed by: EMERGENCY MEDICINE

## 2018-04-08 PROCEDURE — 82075 ASSAY OF BREATH ETHANOL: CPT | Performed by: EMERGENCY MEDICINE

## 2018-04-08 PROCEDURE — 25000132 ZZH RX MED GY IP 250 OP 250 PS 637: Performed by: EMERGENCY MEDICINE

## 2018-04-08 PROCEDURE — 81025 URINE PREGNANCY TEST: CPT | Performed by: FAMILY MEDICINE

## 2018-04-08 PROCEDURE — 99285 EMERGENCY DEPT VISIT HI MDM: CPT | Mod: Z6 | Performed by: EMERGENCY MEDICINE

## 2018-04-08 PROCEDURE — 25000132 ZZH RX MED GY IP 250 OP 250 PS 637: Performed by: PSYCHIATRY & NEUROLOGY

## 2018-04-08 PROCEDURE — 80320 DRUG SCREEN QUANTALCOHOLS: CPT | Performed by: FAMILY MEDICINE

## 2018-04-08 PROCEDURE — 70450 CT HEAD/BRAIN W/O DYE: CPT

## 2018-04-08 RX ORDER — EPINEPHRINE 0.3 MG/.3ML
0.3 INJECTION SUBCUTANEOUS
Status: DISCONTINUED | OUTPATIENT
Start: 2018-04-08 | End: 2018-04-08

## 2018-04-08 RX ORDER — HYDROCHLOROTHIAZIDE 12.5 MG/1
25 CAPSULE ORAL DAILY
Status: DISCONTINUED | OUTPATIENT
Start: 2018-04-08 | End: 2018-04-13 | Stop reason: HOSPADM

## 2018-04-08 RX ORDER — HYDROXYZINE HYDROCHLORIDE 50 MG/1
50 TABLET, FILM COATED ORAL 3 TIMES DAILY PRN
Status: DISCONTINUED | OUTPATIENT
Start: 2018-04-08 | End: 2018-04-09

## 2018-04-08 RX ORDER — HYDROXYZINE HYDROCHLORIDE 50 MG/1
50 TABLET, FILM COATED ORAL 3 TIMES DAILY PRN
Status: DISCONTINUED | OUTPATIENT
Start: 2018-04-08 | End: 2018-04-08

## 2018-04-08 RX ORDER — FERROUS SULFATE 325(65) MG
325 TABLET ORAL DAILY
Status: DISCONTINUED | OUTPATIENT
Start: 2018-04-08 | End: 2018-04-13 | Stop reason: HOSPADM

## 2018-04-08 RX ORDER — LORATADINE 10 MG/1
10 TABLET ORAL DAILY
Status: DISCONTINUED | OUTPATIENT
Start: 2018-04-08 | End: 2018-04-13 | Stop reason: HOSPADM

## 2018-04-08 RX ORDER — ALUMINA, MAGNESIA, AND SIMETHICONE 2400; 2400; 240 MG/30ML; MG/30ML; MG/30ML
30 SUSPENSION ORAL EVERY 4 HOURS PRN
Status: DISCONTINUED | OUTPATIENT
Start: 2018-04-08 | End: 2018-04-13 | Stop reason: HOSPADM

## 2018-04-08 RX ORDER — ACETAMINOPHEN 325 MG/1
650 TABLET ORAL EVERY 4 HOURS PRN
Status: DISCONTINUED | OUTPATIENT
Start: 2018-04-08 | End: 2018-04-13 | Stop reason: HOSPADM

## 2018-04-08 RX ORDER — TRAZODONE HYDROCHLORIDE 50 MG/1
50 TABLET, FILM COATED ORAL
Status: DISCONTINUED | OUTPATIENT
Start: 2018-04-08 | End: 2018-04-13 | Stop reason: HOSPADM

## 2018-04-08 RX ORDER — BISACODYL 10 MG
10 SUPPOSITORY, RECTAL RECTAL DAILY PRN
Status: DISCONTINUED | OUTPATIENT
Start: 2018-04-08 | End: 2018-04-13 | Stop reason: HOSPADM

## 2018-04-08 RX ORDER — MULTIPLE VITAMINS W/ MINERALS TAB 9MG-400MCG
1 TAB ORAL DAILY
Status: DISCONTINUED | OUTPATIENT
Start: 2018-04-08 | End: 2018-04-13 | Stop reason: HOSPADM

## 2018-04-08 RX ORDER — ACETAMINOPHEN 500 MG
1000 TABLET ORAL ONCE
Status: COMPLETED | OUTPATIENT
Start: 2018-04-08 | End: 2018-04-08

## 2018-04-08 RX ORDER — CYCLOBENZAPRINE HCL 10 MG
10 TABLET ORAL 3 TIMES DAILY PRN
Status: DISCONTINUED | OUTPATIENT
Start: 2018-04-08 | End: 2018-04-13 | Stop reason: HOSPADM

## 2018-04-08 RX ORDER — DIAZEPAM 5 MG
5 TABLET ORAL ONCE
Status: COMPLETED | OUTPATIENT
Start: 2018-04-08 | End: 2018-04-08

## 2018-04-08 RX ADMIN — HYDROCHLOROTHIAZIDE 25 MG: 12.5 CAPSULE ORAL at 14:27

## 2018-04-08 RX ADMIN — FLUTICASONE FUROATE 1 PUFF: 100 POWDER RESPIRATORY (INHALATION) at 14:40

## 2018-04-08 RX ADMIN — VITAMIN D, TAB 1000IU (100/BT) 5000 UNITS: 25 TAB at 14:27

## 2018-04-08 RX ADMIN — FLUOXETINE 80 MG: 20 CAPSULE ORAL at 14:27

## 2018-04-08 RX ADMIN — DIAZEPAM 5 MG: 5 TABLET ORAL at 10:26

## 2018-04-08 RX ADMIN — B-COMPLEX W/ C & FOLIC ACID TAB 1 TABLET: TAB at 14:27

## 2018-04-08 RX ADMIN — ACETAMINOPHEN 1000 MG: 500 TABLET, FILM COATED ORAL at 10:26

## 2018-04-08 RX ADMIN — MULTIPLE VITAMINS W/ MINERALS TAB 1 TABLET: TAB at 14:27

## 2018-04-08 RX ADMIN — HYDROXYZINE HYDROCHLORIDE 50 MG: 50 TABLET, FILM COATED ORAL at 20:04

## 2018-04-08 RX ADMIN — BUPROPION HYDROCHLORIDE 450 MG: 150 TABLET, EXTENDED RELEASE ORAL at 14:27

## 2018-04-08 RX ADMIN — FERROUS SULFATE TAB 325 MG (65 MG ELEMENTAL FE) 325 MG: 325 (65 FE) TAB at 14:27

## 2018-04-08 RX ADMIN — HYDROXYZINE HYDROCHLORIDE 50 MG: 50 TABLET, FILM COATED ORAL at 14:27

## 2018-04-08 RX ADMIN — LORATADINE 10 MG: 10 TABLET ORAL at 14:27

## 2018-04-08 ASSESSMENT — ENCOUNTER SYMPTOMS
WOUND: 1
VOMITING: 0
NUMBNESS: 0
SHORTNESS OF BREATH: 0
FEVER: 0
DYSURIA: 0
DYSPHORIC MOOD: 1
HEADACHES: 1
WEAKNESS: 0
ABDOMINAL PAIN: 0

## 2018-04-08 ASSESSMENT — ACTIVITIES OF DAILY LIVING (ADL)
RETIRED_COMMUNICATION: 0-->UNDERSTANDS/COMMUNICATES WITHOUT DIFFICULTY
DRESS: 0-->INDEPENDENT
TRANSFERRING: 0-->INDEPENDENT
RETIRED_EATING: 0-->INDEPENDENT
TOILETING: 0-->INDEPENDENT
FALL_HISTORY_WITHIN_LAST_SIX_MONTHS: NO
COGNITION: 0 - NO COGNITION ISSUES REPORTED
LAUNDRY: WITH SUPERVISION
AMBULATION: 0-->INDEPENDENT
DRESS: SCRUBS (BEHAVIORAL HEALTH);INDEPENDENT
SWALLOWING: 0-->SWALLOWS FOODS/LIQUIDS WITHOUT DIFFICULTY
ORAL_HYGIENE: INDEPENDENT
BATHING: 0-->INDEPENDENT
GROOMING: INDEPENDENT

## 2018-04-08 NOTE — IP AVS SNAPSHOT
MRN:[id number]                      After Visit Summary   4/8/2018    Winifred Bashir    MRN: [id number]           Thank you!     Thank you for choosing [facility] for your care. Our goal is always to provide you with excellent care.        Patient Information     Date Of Birth          1970        Designated Caregiver       Most Recent Value    Caregiver    Will someone help with your care after discharge? yes    Name of designated caregiver     Phone number of caregiver [phone]    Caregiver address [address] [place] [address]      About your hospital stay     You were admitted on:  April 8, 2018 You last received care in the:  [facility]    You were discharged on:  April 13, 2018       Who to Call     For medical emergencies, please call 911.  For non-urgent questions about your medical care, please call your primary care provider or clinic, [phone]          Attending Provider     Provider Specialty    Jerri Mckeon MD Emergency Medicine    Tono Flores MD Psychiatry       Primary Care Provider Office Phone # Fax #    Angela Guillen [phone]      Follow-up Appointments     Follow Up ([facility])       Follow up with primary care provider, Angela Guillen, within 7 days of discharge for hospital follow- up, continued management of dyslipidemia.  The following labs/tests are recommended: Lipid panel once abstinent from alcohol.      Appointments on [facility] and/or [facility] (with [facility] or [facility] provider or service). Call [phone] if you haven't heard regarding these appointments within 7 days of discharge.                  Further instructions from your care team        Behavioral Discharge Planning and Instructions      Summary:  You were admitted on 4/8/2018 due to .  You were treated by Dr. Tono Flores MD and discharged on  from [facility] to      Principal Diagnosis: Major Depressive Disorder        Health Care Follow-up  Appointments:   1. Medication Management Appointment: [phone]  Date/Time: Please call Muna and Yahaira to schedule an appointment when you get closer to discharging from [facility].  Provider: Ana Paula Gonzalez CNP     2. Individual Therapy Appointment: [phone]  Date/Time: Tuesday, April 24th, 2018 @ 1:00pm  Provider: Radha     The location for your upcoming appointments:  [facility]  [place] Office [address], [address]    [place], [place]  Phone: [phone]                                                                                                                                                                                                                                                           The Health Unit Coordinator has faxed these discharge instructions to fax: [phone].     3. Gambling Addiction Treatment:  You reported that you completed your assessment with Christopher from [facility] about 2 weeks ago.  You contacted [facility] today and asked that Christopher give you a follow-up call regarding inpatient treatment opportunity.     [facility]  Phone: [phone]     4. Health Professionals Service Program:  You self-reported on 3/29/18 to hospitals regarding your current hospitalization, gambling addiction and substances found in your utox.  You also completed an KATY for hospitals and [facility] prior to your discharge.  You have arranged a plan with the  at hospitals who you will continue to work with.     Health Professionals Service Program (hospitals)  [address], [facility] [address]   [place] [address]   Phone: [phone]   Fax: [phone]    Attend all scheduled appointments with your outpatient providers. Call at least 24 hours in advance if you need to reschedule an appointment to ensure continued access to your outpatient providers.   Major Treatments, Procedures and Findings:  You were provided with: a psychiatric assessment and medication evaluation  "and/or management    Symptoms to Report: feeling more aggressive, increased confusion, losing more sleep, mood getting worse or thoughts of suicide    Early warning signs can include: increased depression or anxiety sleep disturbances increased thoughts or behaviors of suicide or self-harm  increased unusual thinking, such as paranoia or hearing voices    Safety and Wellness:  Take all medicines as directed.  Make no changes unless your doctor suggests them.      Follow treatment recommendations.  Refrain from alcohol and non-prescribed drugs.  If there is a concern for safety, call 911.    Resources:   Crisis Intervention: 455.846.9186 or 467-108-4377 (TTY: 525.882.8938).  Call anytime for help.  National [place] on Mental Illness (www.[place].desirae.org): 214.128.2085 or 216-868-9076.  [facility] Crisis Response - Adult [phone]    The treatment team has appreciated the opportunity to work with you.     If you have any questions or concerns our unit number is [phone].        Pending Results     No orders found from 4/6/2018 to 4/9/2018.            Admission Information     Date & Time Provider Department Dept. Phone    4/8/2018 Tono Flores MD [facility] [phone]      Your Vitals Were     Blood Pressure Pulse Temperature Respirations Height Weight    130/73 89 98.2  F (36.8  C) (Oral) 18 1.702 m (5' 7\") 88 kg (194 lb)    Last Period Pulse Oximetry BMI (Body Mass Index)             03/27/2018 99% 30.38 kg/m2         [facility] Information     [facility] gives you secure access to your electronic health record. If you see a primary care provider, you can also send messages to your care team and make appointments. If you have questions, please call your primary care clinic.  If you do not have a primary care provider, please call [phone] and they will assist you.        Care EveryWhere ID     This is your Care EveryWhere ID. This could be used by other organizations to access your [facility] medical " records  [id number]        Equal Access to Services     NAYLETICIA BARAK : Hadii tarik frye carlinepaco Sojudeali, waaxda luqadaha, qaybta kafengranjit jones, brendan cooperjaironsilviano seymour. So [facility] [phone].    ATENCIÓN: Si habla español, tiene a kimbrough disposición servicios gratuitos de asistencia lingüística. Llame al [phone].    We comply with applicable federal civil rights laws and [place] laws. We do not discriminate on the basis of race, color, national origin, age, disability, sex, sexual orientation, or gender identity.               Review of your medicines      START taking        Dose / Directions    bisacodyl 10 MG Suppository   Commonly known as:  DULCOLAX   Used for:  Constipation, unspecified constipation type        Dose:  10 mg   Place 1 suppository (10 mg) rectally daily as needed for constipation   Quantity:  30 suppository   Refills:  0       fluticasone furoate 100 MCG/ACT Aepb inhalation powder   Commonly known as:  ARNUITY ELLIPTA   Used for:  Asthma, unspecified asthma severity, unspecified whether complicated, unspecified whether persistent        Dose:  1 puff   Inhale 1 puff into the lungs daily   Quantity:  14 each   Refills:  0       magnesium hydroxide 400 MG/5ML suspension   Commonly known as:  MILK OF MAGNESIA   Used for:  Constipation, unspecified constipation type        Dose:  30 mL   Take 30 mLs by mouth nightly as needed for constipation   Quantity:  105 mL   Refills:  0       traZODone 50 MG tablet   Commonly known as:  DESYREL   Used for:  Insomnia, unspecified type        Dose:  50 mg   Take 1 tablet (50 mg) by mouth nightly as needed for sleep   Quantity:  30 tablet   Refills:  1       vitamin B complex with vitamin C Tabs tablet   Used for:  Vitamin deficiency        Dose:  1 tablet   Take 1 tablet by mouth daily   Quantity:  30 tablet   Refills:  0         CONTINUE these medicines which may have CHANGED, or have new prescriptions. If we are uncertain of the size of  tablets/capsules you have at home, strength may be listed as something that might have changed.        Dose / Directions    BuPROPion HCl ER (XL) 450 MG Tb24   This may have changed:    - medication strength  - when to take this        Dose:  450 mg   Take 450 mg by mouth daily   Quantity:  30 tablet   Refills:  1       Cholecalciferol 5000 units Tabs   This may have changed:  medication strength   Used for:  Vitamin D deficiency        Dose:  5000 Units   Take 5,000 Units by mouth daily   Quantity:  30 tablet   Refills:  1       cyclobenzaprine 10 MG tablet   Commonly known as:  FLEXERIL   This may have changed:  medication strength   Used for:  Spasm of muscle        Dose:  10 mg   Take 1 tablet (10 mg) by mouth 3 times daily as needed for muscle spasms   Quantity:  90 tablet   Refills:  0       hydrochlorothiazide 12.5 MG capsule   Commonly known as:  MICROZIDE   This may have changed:  medication strength   Used for:  Benign essential hypertension        Dose:  25 mg   Take 2 capsules (25 mg) by mouth daily   Quantity:  30 capsule   Refills:  1         CONTINUE these medicines which have NOT CHANGED        Dose / Directions    beclomethasone 40 MCG/ACT Inhaler   Commonly known as:  QVAR   Used for:  Asthma, unspecified asthma severity, unspecified whether complicated, unspecified whether persistent        Dose:  2 puff   Inhale 2 puffs into the lungs 2 times daily   Quantity:  8.7 g   Refills:  1       EPINEPHrine 0.3 MG/0.3ML injection   Commonly known as:  EPIPEN        Dose:  0.3 mg   Inject 0.3 mLs into the muscle once as needed for anaphylaxis.   Quantity:  2 each   Refills:  3       ferrous sulfate 325 (65 Fe) MG tablet   Commonly known as:  IRON   Used for:  Iron deficiency        Dose:  325 mg   Take 1 tablet (325 mg) by mouth daily (with breakfast)   Quantity:  30 tablet   Refills:  1       FLUoxetine 20 MG capsule   Commonly known as:  PROZAC        Dose:  80 mg   Take 4 capsules (80 mg) by mouth daily    Quantity:  120 capsule   Refills:  1       hydrOXYzine 25 MG tablet   Commonly known as:  ATARAX   Used for:  Anxiety        Dose:  25-50 mg   Take 1-2 tablets (25-50 mg) by mouth 3 times daily as needed for anxiety   Quantity:  90 tablet   Refills:  1       loratadine 10 MG tablet   Commonly known as:  CLARITIN   Used for:  Allergic reaction, subsequent encounter        Dose:  10 mg   Take 1 tablet (10 mg) by mouth daily   Quantity:  30 tablet   Refills:  1       ONE-A-DAY 50 PLUS PO        Dose:  1 tablet   Take 1 tablet by mouth daily   Refills:  0       TYLENOL 325 MG tablet   Generic drug:  acetaminophen        Dose:  2 tablet   Take 2 tablets by mouth. 650 mg q 8 hours as needed max 4000 mg in 24 hours   Quantity:  100 tablet   Refills:  0       vitamin B complex Tabs        Dose:  1 tablet   Take 1 tablet by mouth daily   Refills:  0            Where to get your medicines      These medications were sent to [facility] [place] [address]  [address] [address]     Phone:  [phone]     beclomethasone 40 MCG/ACT Inhaler    bisacodyl 10 MG Suppository    BuPROPion HCl ER (XL) 450 MG Tb24    Cholecalciferol 5000 units Tabs    cyclobenzaprine 10 MG tablet    ferrous sulfate 325 (65 Fe) MG tablet    FLUoxetine 20 MG capsule    fluticasone furoate 100 MCG/ACT Aepb inhalation powder    hydrochlorothiazide 12.5 MG capsule    hydrOXYzine 25 MG tablet    loratadine 10 MG tablet    magnesium hydroxide 400 MG/5ML suspension    traZODone 50 MG tablet    vitamin B complex with vitamin C Tabs tablet                Protect others around you: Learn how to safely use, store and throw away your medicines at www.disposemymeds.org.             Medication List: This is a list of all your medications and when to take them. Check marks below indicate your daily home schedule. Keep this list as a reference.      Medications           Morning Afternoon Evening Bedtime As  Needed    beclomethasone 40 MCG/ACT Inhaler   Commonly known as:  QVAR   Inhale 2 puffs into the lungs 2 times daily                                bisacodyl 10 MG Suppository   Commonly known as:  DULCOLAX   Place 1 suppository (10 mg) rectally daily as needed for constipation                                BuPROPion HCl ER (XL) 450 MG Tb24   Take 450 mg by mouth daily   Last time this was given:  450 mg on 4/13/2018  8:53 AM                                Cholecalciferol 5000 units Tabs   Take 5,000 Units by mouth daily   Last time this was given:  5,000 Units on 4/13/2018  8:53 AM                                cyclobenzaprine 10 MG tablet   Commonly known as:  FLEXERIL   Take 1 tablet (10 mg) by mouth 3 times daily as needed for muscle spasms   Last time this was given:  10 mg on 4/13/2018  5:05 AM                                EPINEPHrine 0.3 MG/0.3ML injection   Commonly known as:  EPIPEN   Inject 0.3 mLs into the muscle once as needed for anaphylaxis.                                ferrous sulfate 325 (65 Fe) MG tablet   Commonly known as:  IRON   Take 1 tablet (325 mg) by mouth daily (with breakfast)   Last time this was given:  325 mg on 4/13/2018  8:53 AM                                FLUoxetine 20 MG capsule   Commonly known as:  PROZAC   Take 4 capsules (80 mg) by mouth daily   Last time this was given:  80 mg on 4/13/2018  8:53 AM                                fluticasone furoate 100 MCG/ACT Aepb inhalation powder   Commonly known as:  ARNUITY ELLIPTA   Inhale 1 puff into the lungs daily   Last time this was given:  1 puff on 4/13/2018  8:56 AM                                hydrochlorothiazide 12.5 MG capsule   Commonly known as:  MICROZIDE   Take 2 capsules (25 mg) by mouth daily   Last time this was given:  25 mg on 4/12/2018 10:58 AM                                hydrOXYzine 25 MG tablet   Commonly known as:  ATARAX   Take 1-2 tablets (25-50 mg) by mouth 3 times daily as needed for anxiety    Last time this was given:  50 mg on 4/13/2018  5:05 AM                                loratadine 10 MG tablet   Commonly known as:  CLARITIN   Take 1 tablet (10 mg) by mouth daily   Last time this was given:  10 mg on 4/13/2018  8:53 AM                                magnesium hydroxide 400 MG/5ML suspension   Commonly known as:  MILK OF MAGNESIA   Take 30 mLs by mouth nightly as needed for constipation                                ONE-A-DAY 50 PLUS PO   Take 1 tablet by mouth daily   Last time this was given:  1 tablet on 4/13/2018  8:53 AM                                traZODone 50 MG tablet   Commonly known as:  DESYREL   Take 1 tablet (50 mg) by mouth nightly as needed for sleep   Last time this was given:  50 mg on 4/12/2018  6:33 PM                                TYLENOL 325 MG tablet   Take 2 tablets by mouth. 650 mg q 8 hours as needed max 4000 mg in 24 hours   Last time this was given:  650 mg on 4/13/2018  5:05 AM   Generic drug:  acetaminophen                                vitamin B complex Tabs   Take 1 tablet by mouth daily                                vitamin B complex with vitamin C Tabs tablet   Take 1 tablet by mouth daily   Last time this was given:  1 tablet on 4/13/2018  8:53 AM
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
## 2018-04-08 NOTE — IP AVS SNAPSHOT
[address] [address]    Phone:  [phone]                                       After Visit Summary   4/8/2018    Winifred Bashir    MRN: [id number]           After Visit Summary Signature Page     I have received my discharge instructions, and my questions have been answered. I have discussed any challenges I see with this plan with the nurse or doctor.    ..........................................................................................................................................  Patient/Patient Representative Signature      ..........................................................................................................................................  Patient Representative Print Name and Relationship to Patient    ..................................................               ................................................  Date                                            Time    ..........................................................................................................................................  Reviewed by Signature/Title    ...................................................              ..............................................  Date                                                            Time
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
## 2018-04-08 NOTE — PROGRESS NOTES
04/08/18 1550   Patient Belongings   Did you bring any home meds/supplements to the hospital?  No   Patient Belongings cell phone/electronics;clothing;purse;ring;suitcase;earings;glasses   Disposition of Belongings Kept with patient;Locker   Belongings Search Yes   Clothing Search Yes   Second Staff Leslee, RN   suitcase- 3 bras, 5 pairs of socks, 7 pants, 5  Pairs of underwear, deodorant, 2 lotions, 9 shirts, 3 sweatshirts    Locker-iphone, black jacket, socks, black t shirt, braclet    On patient- underwear, earrings, rings    Purse- brush, comb, sunglasses in case, , wallet, smart watch, lighter, lip gloss, coins    SECURITY- [id number]- check book, 13.00 cash, [employer] 3662    A               Admission:  I am responsible for any personal items that are not sent to the safe or pharmacy.  [facility] is not responsible for loss, theft or damage of any property in my possession.    Signature:  _________________________________ Date: _______  Time: _____                                              Staff Signature:  ____________________________ Date: ________  Time: _____      2nd Staff person, if patient is unable/unwilling to sign:    Signature: ________________________________ Date: ________  Time: _____     Discharge:  [facility] has returned all of my personal belongings:    Signature: _________________________________ Date: ________  Time: _____                                          Staff Signature:  ____________________________ Date: ________  Time: _____

## 2018-04-09 LAB — TSH SERPL DL<=0.005 MIU/L-ACNC: 1.04 MU/L (ref 0.4–4)

## 2018-04-09 PROCEDURE — 25000132 ZZH RX MED GY IP 250 OP 250 PS 637: Performed by: PHYSICIAN ASSISTANT

## 2018-04-09 PROCEDURE — 84443 ASSAY THYROID STIM HORMONE: CPT | Performed by: PSYCHIATRY & NEUROLOGY

## 2018-04-09 PROCEDURE — 25000132 ZZH RX MED GY IP 250 OP 250 PS 637: Performed by: PSYCHIATRY & NEUROLOGY

## 2018-04-09 PROCEDURE — 99231 SBSQ HOSP IP/OBS SF/LOW 25: CPT | Performed by: PHYSICIAN ASSISTANT

## 2018-04-09 PROCEDURE — 36415 COLL VENOUS BLD VENIPUNCTURE: CPT | Performed by: PSYCHIATRY & NEUROLOGY

## 2018-04-09 PROCEDURE — 99223 1ST HOSP IP/OBS HIGH 75: CPT | Mod: AI | Performed by: PSYCHIATRY & NEUROLOGY

## 2018-04-09 PROCEDURE — 12400007 ZZH R&B MH INTERMEDIATE UMMC

## 2018-04-09 RX ORDER — LORATADINE 10 MG/1
10 TABLET ORAL DAILY
Status: DISCONTINUED | OUTPATIENT
Start: 2018-04-09 | End: 2018-04-09

## 2018-04-09 RX ORDER — B-COMPLEX WITH VITAMIN C
1 TABLET ORAL DAILY
Status: DISCONTINUED | OUTPATIENT
Start: 2018-04-09 | End: 2018-04-09

## 2018-04-09 RX ORDER — HYDROXYZINE HYDROCHLORIDE 25 MG/1
25-50 TABLET, FILM COATED ORAL 3 TIMES DAILY PRN
Status: DISCONTINUED | OUTPATIENT
Start: 2018-04-09 | End: 2018-04-13 | Stop reason: HOSPADM

## 2018-04-09 RX ORDER — ACETAMINOPHEN 325 MG/1
650 TABLET ORAL EVERY 6 HOURS PRN
Status: DISCONTINUED | OUTPATIENT
Start: 2018-04-09 | End: 2018-04-09

## 2018-04-09 RX ORDER — HYDROCHLOROTHIAZIDE 25 MG/1
25 TABLET ORAL DAILY
Status: DISCONTINUED | OUTPATIENT
Start: 2018-04-09 | End: 2018-04-09

## 2018-04-09 RX ORDER — DOCOSANOL 100 MG/G
CREAM TOPICAL
Status: DISCONTINUED | OUTPATIENT
Start: 2018-04-09 | End: 2018-04-09

## 2018-04-09 RX ORDER — FERROUS SULFATE 325(65) MG
325 TABLET ORAL
Status: DISCONTINUED | OUTPATIENT
Start: 2018-04-10 | End: 2018-04-09

## 2018-04-09 RX ORDER — DOCOSANOL 100 MG/G
CREAM TOPICAL
Status: DISCONTINUED | OUTPATIENT
Start: 2018-04-09 | End: 2018-04-13 | Stop reason: HOSPADM

## 2018-04-09 RX ORDER — CYCLOBENZAPRINE HCL 10 MG
10 TABLET ORAL 3 TIMES DAILY PRN
Status: DISCONTINUED | OUTPATIENT
Start: 2018-04-09 | End: 2018-04-09

## 2018-04-09 RX ADMIN — DOCOSANOL: 100 CREAM TOPICAL at 18:15

## 2018-04-09 RX ADMIN — HYDROXYZINE HYDROCHLORIDE 50 MG: 50 TABLET, FILM COATED ORAL at 09:37

## 2018-04-09 RX ADMIN — FLUOXETINE 80 MG: 20 CAPSULE ORAL at 09:26

## 2018-04-09 RX ADMIN — HYDROXYZINE HYDROCHLORIDE 50 MG: 50 TABLET, FILM COATED ORAL at 18:15

## 2018-04-09 RX ADMIN — B-COMPLEX W/ C & FOLIC ACID TAB 1 TABLET: TAB at 09:27

## 2018-04-09 RX ADMIN — VITAMIN D, TAB 1000IU (100/BT) 5000 UNITS: 25 TAB at 09:26

## 2018-04-09 RX ADMIN — HYDROCHLOROTHIAZIDE 25 MG: 12.5 CAPSULE ORAL at 09:26

## 2018-04-09 RX ADMIN — MULTIPLE VITAMINS W/ MINERALS TAB 1 TABLET: TAB at 09:26

## 2018-04-09 RX ADMIN — FERROUS SULFATE TAB 325 MG (65 MG ELEMENTAL FE) 325 MG: 325 (65 FE) TAB at 09:26

## 2018-04-09 RX ADMIN — LORATADINE 10 MG: 10 TABLET ORAL at 09:27

## 2018-04-09 RX ADMIN — BUPROPION HYDROCHLORIDE 450 MG: 150 TABLET, EXTENDED RELEASE ORAL at 09:26

## 2018-04-09 RX ADMIN — FLUTICASONE FUROATE 1 PUFF: 100 POWDER RESPIRATORY (INHALATION) at 09:25

## 2018-04-09 ASSESSMENT — ACTIVITIES OF DAILY LIVING (ADL)
LAUNDRY: WITH SUPERVISION
GROOMING: INDEPENDENT
ORAL_HYGIENE: INDEPENDENT
GROOMING: INDEPENDENT
LAUNDRY: WITH SUPERVISION
DRESS: INDEPENDENT
DRESS: INDEPENDENT
ORAL_HYGIENE: INDEPENDENT
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
## 2018-04-09 NOTE — CONSULTS
"Brief medicine note:    In brief, Winifred Bashir is a 47 year old female with a past medical history of bipolar disorder, PTSD, Anxiety, Depression admitted to station 20N for suicidal ideation, medicine was consulted for lesion on right superior oral labia w/ concern for HSV.    The patient notes she has never had an HSV lesion in the past. She notes she has been  12 years, had unprotected oral sex approximately 6 days ago, next day noted irritation, itching and burning of right upper lip and developed a bump. She notes she has been rubbing at it, it was initially quite painful, but now she notes it has scabbed over and is less painful. She doesn't recall seeing any pustular fluid or clear fluid in the lesion, but notes she wasn't looking at it. She denies any lesions elsewhere, no genital lesions. No h/o HSV per report. She endorses recent life stress to include divorce & marital issues, gambling addiction. She denies any recent URI symptoms (fevers, chills, rhinorrhea, sore throat, nasal congestion). No lesions on hands or feet. She denies IVDU.     /76 (BP Location: Left arm)  Pulse 93  Temp 98.8  F (37.1  C)  Resp 16  Ht 1.702 m (5' 7\")  Wt 88 kg (194 lb)  LMP 03/27/2018  SpO2 99%  Breastfeeding? No  BMI 30.38 kg/m2  GEN: Awake, alert, sitting comfortably in mileu  HEENT: Right superolateral oral labia w/ encrusted vesicular appearing lesions, no surrounding edema or e/o infection, no e/o oral ulcerations or lesions    #Herpes labialis: Outside of treatment window, improved pain, but prefers topical ointment over oral acyclovir.   -Abreva 5 times daily for 5 days or until lesion healed  -Counseled if recurrence, PCP may consider oral anti virals for acute flares or for suppression if multiple recurrences    #Dyslipidemia:  (175 at OP appt 10/9/2017), Tchol 216 (264),  (216). TF likely increased from alcohol abuse.   -Repeat w/ PCP on DC once abstinent from alcohol, continue " weight loss clinic visits and OP f/u    RENÉE Jordan    Time Spent on this Encounter   I spent 15 minutes on the unit/floor managing the care of Winifred Bashir. Over 50% of my time was spent counseling the patient and/or coordinating care regarding services listed in this note.
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
## 2018-04-10 PROCEDURE — 25000132 ZZH RX MED GY IP 250 OP 250 PS 637: Performed by: PSYCHIATRY & NEUROLOGY

## 2018-04-10 PROCEDURE — 12400007 ZZH R&B MH INTERMEDIATE UMMC

## 2018-04-10 RX ADMIN — CYCLOBENZAPRINE HYDROCHLORIDE 10 MG: 10 TABLET, FILM COATED ORAL at 13:03

## 2018-04-10 RX ADMIN — HYDROXYZINE HYDROCHLORIDE 50 MG: 25 TABLET, FILM COATED ORAL at 16:07

## 2018-04-10 RX ADMIN — LORATADINE 10 MG: 10 TABLET ORAL at 09:07

## 2018-04-10 RX ADMIN — FERROUS SULFATE TAB 325 MG (65 MG ELEMENTAL FE) 325 MG: 325 (65 FE) TAB at 09:11

## 2018-04-10 RX ADMIN — HYDROXYZINE HYDROCHLORIDE 50 MG: 25 TABLET, FILM COATED ORAL at 20:50

## 2018-04-10 RX ADMIN — HYDROXYZINE HYDROCHLORIDE 50 MG: 25 TABLET, FILM COATED ORAL at 09:07

## 2018-04-10 RX ADMIN — DOCOSANOL: 100 CREAM TOPICAL at 13:52

## 2018-04-10 RX ADMIN — DOCOSANOL: 100 CREAM TOPICAL at 11:00

## 2018-04-10 RX ADMIN — DOCOSANOL: 100 CREAM TOPICAL at 09:06

## 2018-04-10 RX ADMIN — VITAMIN D, TAB 1000IU (100/BT) 5000 UNITS: 25 TAB at 08:58

## 2018-04-10 RX ADMIN — CYCLOBENZAPRINE HYDROCHLORIDE 10 MG: 10 TABLET, FILM COATED ORAL at 20:50

## 2018-04-10 RX ADMIN — FLUOXETINE 80 MG: 20 CAPSULE ORAL at 09:08

## 2018-04-10 RX ADMIN — MULTIPLE VITAMINS W/ MINERALS TAB 1 TABLET: TAB at 09:09

## 2018-04-10 RX ADMIN — B-COMPLEX W/ C & FOLIC ACID TAB 1 TABLET: TAB at 08:58

## 2018-04-10 RX ADMIN — HYDROCHLOROTHIAZIDE 25 MG: 12.5 CAPSULE ORAL at 09:10

## 2018-04-10 RX ADMIN — FLUTICASONE FUROATE 1 PUFF: 100 POWDER RESPIRATORY (INHALATION) at 09:02

## 2018-04-10 RX ADMIN — BUPROPION HYDROCHLORIDE 450 MG: 150 TABLET, EXTENDED RELEASE ORAL at 09:02

## 2018-04-10 ASSESSMENT — ACTIVITIES OF DAILY LIVING (ADL)
ORAL_HYGIENE: INDEPENDENT
ORAL_HYGIENE: INDEPENDENT
LAUNDRY: WITH SUPERVISION
GROOMING: INDEPENDENT
HYGIENE/GROOMING: HANDWASHING;SHOWER;INDEPENDENT
DRESS: INDEPENDENT
DRESS: STREET CLOTHES;INDEPENDENT
LAUNDRY: WITH SUPERVISION

## 2018-04-10 NOTE — PROGRESS NOTES
"   04/10/18 1200   Behavioral Health   Hallucinations denies / not responding to hallucinations   Thinking distractable   Orientation person: oriented;place: oriented   Memory baseline memory   Insight poor   Judgement impaired   Eye Contact at examiner   Affect full range affect   Mood mood is calm   Physical Appearance/Attire neat   Hygiene well groomed   Suicidality other (see comments)  (refused to check in with this staff )   Self Injury other (see comment)  (refused to check in with this staff )   Elopement (none reported or observed )   Activity other (see comment)  (visible in the milieu and socialized with others )   Speech clear;coherent   Activities of Daily Living   Hygiene/Grooming independent   Oral Hygiene independent   Dress independent   Laundry with supervision   Room Organization independent       Pt refused to check in with this staff \" no not right now thank you.\"   Two hours later this writer asked pt if she would like to check in now \" I'll just say I'm good that's my check in.\"  "

## 2018-04-10 NOTE — PROGRESS NOTES
Writer spoke with pt regarding discharge planning. She is willing to have a R25 assessment for placement. We discussed [facility] and [facility].     Writer left message with Mackenzie reyes [facility] for R25 assessment after pt signed KATY.     [facility]isi will be out at 10:00am to complete R25.
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
## 2018-04-10 NOTE — PROGRESS NOTES
Patient was in milieu much of shift and was pleasant and social with peers and staff.   Patient was dressed in street clothes and had showered.  Patient denies SI/SIB.  Patient continues to be a little guarded with writer about her feeling and emotions.  Patient states she feels depressed but unable/unwilling to state where she is at on a scale of 1-10.  Patient went to [facility] and appeared to be sleeping at approximately 2115.      04/09/18 2202   Behavioral Health   Hallucinations denies / not responding to hallucinations   Thinking distractable   Orientation person: oriented;place: oriented;date: oriented;time: oriented   Memory baseline memory   Insight poor   Judgement impaired   Eye Contact at examiner   Affect full range affect   Mood mood is calm   Physical Appearance/Attire attire appropriate to age and situation   Hygiene well groomed   Suicidality other (see comments)  (denies)   1. Wish to be Dead No   2. Non-Specific Active Suicidal Thoughts  No   Self Injury other (see comment)  (denies)   Elopement (none)   Activity other (see comment)  (in milieu and social with select peers)   Speech clear;coherent   Medication Sensitivity no stated side effects   Psychomotor / Gait balanced;steady   Psycho Education   Type of Intervention 1:1 intervention   Response participates, initiates socially appropriate   Hours 0.5   Activities of Daily Living   Hygiene/Grooming independent   Oral Hygiene independent   Dress independent   Laundry with supervision   Room Organization independent   Behavioral Health Interventions   Depression maintain safe secure environment   Social and Therapeutic Interventions (Depression) encourage socialization with peers

## 2018-04-10 NOTE — PROGRESS NOTES
"/76 (BP Location: Left arm)  Pulse 93  Temp 98.8  F (37.1  C)  Resp 16  Ht 1.702 m (5' 7\")  Wt 88 kg (194 lb)  LMP 03/27/2018  SpO2 99%  Breastfeeding? No  BMI 30.38 kg/m2    Pt isolated for the majority of this shift. She spent most of her time sleeping in her room. When this writer checked-in with pt, she stated, \"I need something for anxiety.\" Pt given prn hydroxyzine. Pt went to her room to nap, so unable to assess if this medication relieved her anxiety. When visible, pt displays a flat and blunted affect, answering with very short sentences.    No additional concerns at this time. Will continue to assess and offer support.  "

## 2018-04-11 PROCEDURE — 25000132 ZZH RX MED GY IP 250 OP 250 PS 637: Performed by: PSYCHIATRY & NEUROLOGY

## 2018-04-11 PROCEDURE — 12400007 ZZH R&B MH INTERMEDIATE UMMC

## 2018-04-11 PROCEDURE — 99231 SBSQ HOSP IP/OBS SF/LOW 25: CPT | Performed by: PSYCHIATRY & NEUROLOGY

## 2018-04-11 RX ADMIN — HYDROCHLOROTHIAZIDE 25 MG: 12.5 CAPSULE ORAL at 09:06

## 2018-04-11 RX ADMIN — FLUOXETINE 80 MG: 20 CAPSULE ORAL at 09:06

## 2018-04-11 RX ADMIN — BUPROPION HYDROCHLORIDE 450 MG: 150 TABLET, EXTENDED RELEASE ORAL at 09:05

## 2018-04-11 RX ADMIN — FERROUS SULFATE TAB 325 MG (65 MG ELEMENTAL FE) 325 MG: 325 (65 FE) TAB at 09:06

## 2018-04-11 RX ADMIN — MULTIPLE VITAMINS W/ MINERALS TAB 1 TABLET: TAB at 09:06

## 2018-04-11 RX ADMIN — VITAMIN D, TAB 1000IU (100/BT) 5000 UNITS: 25 TAB at 09:06

## 2018-04-11 RX ADMIN — B-COMPLEX W/ C & FOLIC ACID TAB 1 TABLET: TAB at 09:05

## 2018-04-11 RX ADMIN — FLUTICASONE FUROATE 1 PUFF: 100 POWDER RESPIRATORY (INHALATION) at 09:06

## 2018-04-11 RX ADMIN — HYDROXYZINE HYDROCHLORIDE 50 MG: 25 TABLET, FILM COATED ORAL at 08:11

## 2018-04-11 RX ADMIN — DOCOSANOL: 100 CREAM TOPICAL at 17:44

## 2018-04-11 RX ADMIN — CYCLOBENZAPRINE HYDROCHLORIDE 10 MG: 10 TABLET, FILM COATED ORAL at 08:11

## 2018-04-11 RX ADMIN — LORATADINE 10 MG: 10 TABLET ORAL at 09:06

## 2018-04-11 RX ADMIN — HYDROXYZINE HYDROCHLORIDE 50 MG: 25 TABLET, FILM COATED ORAL at 17:46

## 2018-04-11 RX ADMIN — DOCOSANOL: 100 CREAM TOPICAL at 21:25

## 2018-04-11 RX ADMIN — TRAZODONE HYDROCHLORIDE 50 MG: 50 TABLET ORAL at 20:32

## 2018-04-11 ASSESSMENT — ACTIVITIES OF DAILY LIVING (ADL)
GROOMING: INDEPENDENT
DRESS: INDEPENDENT
LAUNDRY: WITH SUPERVISION
ORAL_HYGIENE: INDEPENDENT
ORAL_HYGIENE: INDEPENDENT
GROOMING: INDEPENDENT
DRESS: INDEPENDENT

## 2018-04-11 NOTE — PROGRESS NOTES
04/11/18 1334   Behavioral Health   Hallucinations denies / not responding to hallucinations   Thinking poor concentration   Orientation person, disoriented;place, disoriented;date, disoriented   Memory baseline memory   Insight insight appropriate to situation   Judgement intact   Eye Contact at examiner   Affect blunted, flat   Mood depressed;mood is calm   Physical Appearance/Attire attire appropriate to age and situation   Hygiene well groomed   Suicidality other (see comments)  (Denies)   1. Wish to be Dead No   2. Non-Specific Active Suicidal Thoughts  No   Self Injury other (see comment)  (Denies)   Elopement (Nothing to report)   Activity isolative;withdrawn  (Social with peers in morning and gunnar;s)   Speech clear;coherent   Medication Sensitivity no observed side effects   Psychomotor / Gait steady;balanced   Psycho Education   Type of Intervention 1:1 intervention   Response participates, initiates socially appropriate   Hours 0.5   Treatment Detail (Check In)   Activities of Daily Living   Hygiene/Grooming independent   Oral Hygiene independent   Dress independent   Room Organization independent   Behavioral Health Interventions   Depression maintain safe secure environment;provide emotional support;establish therapeutic relationship   Social and Therapeutic Interventions (Depression) encourage participation in therapeutic groups and milieu activities;encourage socialization with peers   Patient was out of her room for community meeting and attended the first topic group of the morning.  She was an active participant and although a flat affect, she was pleasant to work with.  The patient then when to her room and took a nap until lunch time and was isolating in her room.  The patient reports she was feeling tired and needed some time to her self.  The patient had a goal to make some phone calls during this shift and completed that goal.  The patient denies SI and SiB, but report prior to coming to the  [facility] she was having chronic SI.

## 2018-04-11 NOTE — PROGRESS NOTES
"INITIAL OT ATTENDANCE:   Attended 1.0  Of 3 OT groups today.   Pt self initiated idea for group session - specifically a topic group focused on \"fear\".    OTR facilitated session with emphasis on reframing fear experiences.   Pt identified fear (loss of lifestyle due to divorce) and opportunity to grow from the experience in a positive way (enjoy a new home without husbands \"passive aggressiveness\" and tense environment)  - she also accepted feedback from peers.    Overall, pt presented as guarded, but once she began talking she opened up re: details of her life and application of group topics to life circumstances.     For note taking, self exploration and mental health management pt was provided with blank journal and loaned a unit copy of:  \"Don't Let your Emotions Run Your Life\" by Ankur Ortega MA, a workbook focused on DBT skills to improve identification and expression of feelings and self management of depression and anxiety.  \"     Pt has been given a Self Assessment form, explained the purpose of including them in their treatment plan and offered options for meeting their needs. Pt identified reason for hospitalization (drugs, alcohol, gambling, marital problems), minimal coping skills (talking to someone, relaxation), GOOD supports outside the hospital (family, friends).    Pt verbalized understanding of symptoms on function. Pt identified experiencing the following symptoms, selected from checklist provided:     Feelings - sadness, anxiety / fear, despair, overwhelmed,      Thoughts -  suicidal,    Behaviors -  compulsive behaviors,  victim of abuse or crime,      Pt identifies as need area:    Improve self-esteem/confidence  Improve decision making  Self care / personal health  Improve time management  Improve management of anxiety   Manage stress     OT PLAN:  Encourage ongoing attendance to support pt self-stated goals. Provide opportunities for education, reinforcement and practice of positive " coping skills, engage in graded opportunities for success, and provide ongoing assessment as needed.

## 2018-04-11 NOTE — PROGRESS NOTES
"[facility], [place]   Psychiatric Progress Note        Interim History:   The patient's care was discussed with the treatment team during the daily team meeting and/or staff's chart notes were reviewed.  Staff report patient reported feeling more stable, went to groups and socialized with some peers. She denied presence of Suicidal ideation, had Rule 25 today am and said that she had made a decision to go to [facility]  treatment program. There were no symptoms of alcohol withdrawal.          Medications:       docosanol   Topical 5x Daily     cholecalciferol  5,000 Units Oral Daily     ferrous sulfate  325 mg Oral Daily     multivitamin, therapeutic with minerals  1 tablet Oral Daily     loratadine  10 mg Oral Daily     fluticasone furoate  1 puff Inhalation Daily     FLUoxetine  80 mg Oral Daily     vitamin B complex with vitamin C  1 tablet Oral Daily     buPROPion  450 mg Oral Daily     hydrochlorothiazide  25 mg Oral Daily          Allergies:     Allergies   Allergen Reactions     Fish-Derived Products Shortness Of Breath and Nausea and Vomiting     Nkda [No Known Drug Allergies]           Labs:   No results found for this or any previous visit (from the past 24 hour(s)).       Psychiatric Examination:     /78  Pulse 91  Temp 98.4  F (36.9  C) (Oral)  Resp 18  Ht 1.702 m (5' 7\")  Wt 87.5 kg (193 lb)  LMP 03/27/2018  SpO2 99%  Breastfeeding? No  BMI 30.23 kg/m2  Weight is 193 lbs 0 oz  Body mass index is 30.23 kg/(m^2).  Orthostatic Vitals       Most Recent      Sitting Orthostatic /68 04/11 0805    Sitting Orthostatic Pulse (bpm) 84 04/11 0805    Standing Orthostatic /65 04/11 0805    Standing Orthostatic Pulse (bpm) 87 04/11 0805            Appearance: awake, alert, adequately groomed and dressed in hospital scrubs  Attitude:  cooperative  Eye Contact:  good  Mood:  anxious  Affect:  appropriate and in normal range  Speech:  clear, coherent  Psychomotor " Behavior:  no evidence of tardive dyskinesia, dystonia, or tics  Throught Process:  logical and linear  Associations:  no loose associations  Thought Content:  no evidence of suicidal ideation or homicidal ideation and no evidence of psychotic thought  Insight:  fair  Judgement:  fair  Oriented to:  time, person, and place  Attention Span and Concentration:  intact  Recent and Remote Memory:  intact    Clinical Global Impressions  First:     Most recent:       # Pain Assessment:  Current Pain Score 4/11/2018   Patient currently in pain? yes   Pain score (0-10) -   Pain location -   Pain descriptors -   \[facility] [place] [facility]\"" pain level was assessed and she currently denies pain.             Precautions:     Behavioral Orders   Procedures     Code 1 - Restrict to Unit     Routine Programming     As clinically indicated     Status 15     Every 15 minutes.     Suicide precautions     Patients on Suicide Precautions should have a Combination Diet ordered that includes a Diet selection(s) AND a Behavioral Tray selection for Safe Tray - with utensils, or Safe Tray - NO utensils       Withdrawal precautions          DIagnoses:   1.  Major depressive disorder, recurrent, moderate severity.   2.  Generalized anxiety disorder.   3.  Hypertension.     4.  Unclear if at this point in time the patient already meets criteria for alcohol use disorder and stimulant use disorder.  See discussion above.  She reported relapsing after a long period of sobriety.          Plan:   Patient tolerates her meds well, reports more stable mood. Will discontinue alcohol withdrawal protocol. Will continue to work on placement to [facility].

## 2018-04-11 NOTE — PROGRESS NOTES
04/10/18 2242   Behavioral Health   Hallucinations denies / not responding to hallucinations   Thinking poor concentration   Orientation person: oriented;place: oriented;date: oriented;time: oriented   Memory baseline memory   Insight poor   Judgement impaired   Eye Contact at examiner   Affect blunted, flat   Mood depressed;anxious;mood is calm   Physical Appearance/Attire attire appropriate to age and situation   Hygiene neglected grooming - unclean body, hair, teeth   Suicidality other (see comments)  (Denies)   1. Wish to be Dead No   2. Non-Specific Active Suicidal Thoughts  No   3. Active Sucidal Ideation with any Methods (Not Plan) Without Intent to Act  No   4. Active Suicidal Ideation with Some Intent to Act, Without Specific Plan  No   5. Active Suicidal Ideation with Specific Plan and Intent  No   Self Injury other (see comment)  (None Observed)   Elopement (Denies)   Activity isolative;withdrawn   Speech clear;coherent   Medication Sensitivity no stated side effects;no observed side effects   Psychomotor / Gait balanced;steady   Coping/Psychosocial   Verbalized Emotional State anxiety;depression   Activities of Daily Living   Hygiene/Grooming handwashing;shower;independent   Oral Hygiene independent   Dress street clothes;independent   Laundry with supervision   Room Organization independent   Behavioral Health Interventions   Depression maintain safe secure environment;assist patient in developing safety plan;assist patient in following safety plan;encourage participation / independence with adls;provide emotional support;establish therapeutic relationship;assist with developing and utilizing healthy coping strategies;build upon strengths   Social and Therapeutic Interventions (Depression) encourage participation in therapeutic groups and milieu activities;encourage effective boundaries with peers;encourage socialization with peers

## 2018-04-12 PROCEDURE — 99231 SBSQ HOSP IP/OBS SF/LOW 25: CPT | Performed by: PSYCHIATRY & NEUROLOGY

## 2018-04-12 PROCEDURE — 90853 GROUP PSYCHOTHERAPY: CPT

## 2018-04-12 PROCEDURE — 25000132 ZZH RX MED GY IP 250 OP 250 PS 637: Performed by: PSYCHIATRY & NEUROLOGY

## 2018-04-12 PROCEDURE — 12400007 ZZH R&B MH INTERMEDIATE UMMC

## 2018-04-12 RX ORDER — TRAZODONE HYDROCHLORIDE 50 MG/1
50 TABLET, FILM COATED ORAL
Qty: 30 TABLET | Refills: 1 | Status: ON HOLD | OUTPATIENT
Start: 2018-04-12 | End: 2018-06-13

## 2018-04-12 RX ORDER — HYDROCHLOROTHIAZIDE 12.5 MG/1
25 CAPSULE ORAL DAILY
Qty: 30 CAPSULE | Refills: 1 | Status: ON HOLD | OUTPATIENT
Start: 2018-04-13 | End: 2018-06-13

## 2018-04-12 RX ORDER — FERROUS SULFATE 325(65) MG
325 TABLET ORAL
Qty: 30 TABLET | Refills: 1 | Status: ON HOLD | OUTPATIENT
Start: 2018-04-12 | End: 2018-06-13

## 2018-04-12 RX ORDER — BUPROPION HYDROCHLORIDE 450 MG/1
450 TABLET, FILM COATED, EXTENDED RELEASE ORAL DAILY
Qty: 30 TABLET | Refills: 1 | Status: ON HOLD | OUTPATIENT
Start: 2018-04-13 | End: 2018-06-13

## 2018-04-12 RX ORDER — LORATADINE 10 MG/1
10 TABLET ORAL DAILY
Qty: 30 TABLET | Refills: 1 | Status: ON HOLD | OUTPATIENT
Start: 2018-04-12 | End: 2018-06-13

## 2018-04-12 RX ORDER — BISACODYL 10 MG
10 SUPPOSITORY, RECTAL RECTAL DAILY PRN
Qty: 30 SUPPOSITORY | Refills: 0 | Status: SHIPPED | OUTPATIENT
Start: 2018-04-12 | End: 2018-06-07

## 2018-04-12 RX ORDER — CYCLOBENZAPRINE HCL 10 MG
10 TABLET ORAL 3 TIMES DAILY PRN
Qty: 90 TABLET | Refills: 0 | Status: ON HOLD | OUTPATIENT
Start: 2018-04-12 | End: 2018-06-13

## 2018-04-12 RX ORDER — HYDROXYZINE HYDROCHLORIDE 25 MG/1
25-50 TABLET, FILM COATED ORAL 3 TIMES DAILY PRN
Qty: 90 TABLET | Refills: 1 | Status: ON HOLD | OUTPATIENT
Start: 2018-04-12 | End: 2018-06-13

## 2018-04-12 RX ADMIN — FLUTICASONE FUROATE 1 PUFF: 100 POWDER RESPIRATORY (INHALATION) at 08:22

## 2018-04-12 RX ADMIN — TRAZODONE HYDROCHLORIDE 50 MG: 50 TABLET ORAL at 18:33

## 2018-04-12 RX ADMIN — HYDROXYZINE HYDROCHLORIDE 50 MG: 25 TABLET, FILM COATED ORAL at 08:22

## 2018-04-12 RX ADMIN — BUPROPION HYDROCHLORIDE 450 MG: 150 TABLET, EXTENDED RELEASE ORAL at 10:57

## 2018-04-12 RX ADMIN — LORATADINE 10 MG: 10 TABLET ORAL at 10:58

## 2018-04-12 RX ADMIN — CYCLOBENZAPRINE HYDROCHLORIDE 10 MG: 10 TABLET, FILM COATED ORAL at 14:58

## 2018-04-12 RX ADMIN — CYCLOBENZAPRINE HYDROCHLORIDE 10 MG: 10 TABLET, FILM COATED ORAL at 18:32

## 2018-04-12 RX ADMIN — B-COMPLEX W/ C & FOLIC ACID TAB 1 TABLET: TAB at 10:58

## 2018-04-12 RX ADMIN — FERROUS SULFATE TAB 325 MG (65 MG ELEMENTAL FE) 325 MG: 325 (65 FE) TAB at 10:58

## 2018-04-12 RX ADMIN — CYCLOBENZAPRINE HYDROCHLORIDE 10 MG: 10 TABLET, FILM COATED ORAL at 08:22

## 2018-04-12 RX ADMIN — MULTIPLE VITAMINS W/ MINERALS TAB 1 TABLET: TAB at 10:58

## 2018-04-12 RX ADMIN — VITAMIN D, TAB 1000IU (100/BT) 5000 UNITS: 25 TAB at 10:57

## 2018-04-12 RX ADMIN — HYDROXYZINE HYDROCHLORIDE 50 MG: 25 TABLET, FILM COATED ORAL at 14:58

## 2018-04-12 RX ADMIN — DOCOSANOL: 100 CREAM TOPICAL at 10:59

## 2018-04-12 RX ADMIN — FLUOXETINE 80 MG: 20 CAPSULE ORAL at 10:58

## 2018-04-12 RX ADMIN — HYDROCHLOROTHIAZIDE 25 MG: 12.5 CAPSULE ORAL at 10:58

## 2018-04-12 RX ADMIN — DOCOSANOL: 100 CREAM TOPICAL at 14:59

## 2018-04-12 ASSESSMENT — ACTIVITIES OF DAILY LIVING (ADL)
GROOMING: INDEPENDENT
ORAL_HYGIENE: INDEPENDENT
DRESS: STREET CLOTHES;INDEPENDENT
LAUNDRY: UNABLE TO COMPLETE

## 2018-04-12 NOTE — PROGRESS NOTES
Sammi from [facility] called for records and then to speak with pt. Sammi needs to check with insurance to see if there is a co-pay. They are looking at a Saturday admission.
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
## 2018-04-12 NOTE — PROGRESS NOTES
04/11/18 2100   Behavioral Health   Hallucinations denies / not responding to hallucinations   Thinking distractable   Orientation person: oriented;place: oriented   Memory baseline memory   Insight poor   Judgement impaired   Eye Contact at examiner   Affect full range affect   Mood mood is calm   Physical Appearance/Attire neat   Hygiene well groomed   Suicidality other (see comments)  (refused to check in with this staff )   Self Injury other (see comment)  (refused to check in with this writer )   Elopement (None reported or observed )   Activity other (see comment)  (attended group and was visible in the milieu)   Speech clear;coherent   Activities of Daily Living   Hygiene/Grooming independent   Oral Hygiene independent   Dress independent   Laundry with supervision   Room Organization independent       Pt refused to check in with this staff.  Pt seems calm, ate supper, attended group and was visible in the milieu.

## 2018-04-12 NOTE — PROGRESS NOTES
"[facility], [place]   Psychiatric Progress Note        Interim History:   The patient's care was discussed with the treatment team during the daily team meeting and/or staff's chart notes were reviewed.  Staff report patient reported feeling more stable, went to groups and socialized with some peers. She denied presence of Suicidal ideation, reported feeling hopeful for future. She was accepted to [facility] and may go there as soon as tomorrow. She has been making preparations for that. She didn't have any symptoms of alcohol withdrawal, medication side effects.         Medications:       docosanol   Topical 5x Daily     cholecalciferol  5,000 Units Oral Daily     ferrous sulfate  325 mg Oral Daily     multivitamin, therapeutic with minerals  1 tablet Oral Daily     loratadine  10 mg Oral Daily     fluticasone furoate  1 puff Inhalation Daily     FLUoxetine  80 mg Oral Daily     vitamin B complex with vitamin C  1 tablet Oral Daily     buPROPion  450 mg Oral Daily     hydrochlorothiazide  25 mg Oral Daily          Allergies:     Allergies   Allergen Reactions     Fish-Derived Products Shortness Of Breath and Nausea and Vomiting     Nkda [No Known Drug Allergies]           Labs:   No results found for this or any previous visit (from the past 24 hour(s)).       Psychiatric Examination:     /73  Pulse 89  Temp 99.5  F (37.5  C) (Oral)  Resp 16  Ht 1.702 m (5' 7\")  Wt 88 kg (194 lb)  LMP 03/27/2018  SpO2 99%  Breastfeeding? No  BMI 30.38 kg/m2  Weight is 194 lbs 0 oz  Body mass index is 30.38 kg/(m^2).                      Sitting Orthostatic BP: 130/73         Standing Orthostatic BP: 117/66       Appearance: awake, alert, adequately groomed and dressed in hospital scrubs  Attitude:  cooperative  Eye Contact:  good  Mood: less anxious  Affect:  appropriate and in normal range  Speech:  clear, coherent  Psychomotor Behavior:  no evidence of tardive dyskinesia, " dystonia, or tics  Throught Process:  logical and linear  Associations:  no loose associations  Thought Content:  no evidence of suicidal ideation or homicidal ideation and no evidence of psychotic thought  Insight:  fair  Judgement:  fair  Oriented to:  time, person, and place  Attention Span and Concentration:  intact  Recent and Remote Memory:  intact    Clinical Global Impressions  First:     Most recent:       # Pain Assessment:  Current Pain Score 4/11/2018   Patient currently in pain? yes   Pain score (0-10) -   Pain location -   Pain descriptors -   Winifred s pain level was assessed and she currently denies pain.             Precautions:     Behavioral Orders   Procedures     Code 1 - Restrict to Unit     Routine Programming     As clinically indicated     Status 15     Every 15 minutes.     Suicide precautions     Patients on Suicide Precautions should have a Combination Diet ordered that includes a Diet selection(s) AND a Behavioral Tray selection for Safe Tray - with utensils, or Safe Tray - NO utensils            DIagnoses:   1.  Major depressive disorder, recurrent, moderate severity.   2.  Generalized anxiety disorder.   3.  Hypertension.     4.  Unclear if at this point in time the patient already meets criteria for alcohol use disorder and stimulant use disorder.  See discussion above.  She reported relapsing after a long period of sobriety.          Plan:   Patient tolerates her meds well, reports more stable mood. Will discontinue alcohol withdrawal protocol. Will continue to work on placement to [facility], she may go there as early as tomorrow.

## 2018-04-13 VITALS
BODY MASS INDEX: 30.45 KG/M2 | RESPIRATION RATE: 18 BRPM | HEART RATE: 89 BPM | HEIGHT: 67 IN | DIASTOLIC BLOOD PRESSURE: 73 MMHG | TEMPERATURE: 98.2 F | WEIGHT: 194 LBS | SYSTOLIC BLOOD PRESSURE: 130 MMHG | OXYGEN SATURATION: 99 %

## 2018-04-13 PROCEDURE — 99238 HOSP IP/OBS DSCHRG MGMT 30/<: CPT | Performed by: PSYCHIATRY & NEUROLOGY

## 2018-04-13 PROCEDURE — 25000132 ZZH RX MED GY IP 250 OP 250 PS 637: Performed by: PSYCHIATRY & NEUROLOGY

## 2018-04-13 PROCEDURE — 99207 ZZC CDG-CODE INCORRECT PER BILLING BASED ON TIME: CPT | Performed by: PSYCHIATRY & NEUROLOGY

## 2018-04-13 RX ADMIN — FLUTICASONE FUROATE 1 PUFF: 100 POWDER RESPIRATORY (INHALATION) at 08:56

## 2018-04-13 RX ADMIN — ACETAMINOPHEN 650 MG: 325 TABLET ORAL at 05:05

## 2018-04-13 RX ADMIN — FLUOXETINE 80 MG: 20 CAPSULE ORAL at 08:53

## 2018-04-13 RX ADMIN — LORATADINE 10 MG: 10 TABLET ORAL at 08:53

## 2018-04-13 RX ADMIN — CYCLOBENZAPRINE HYDROCHLORIDE 10 MG: 10 TABLET, FILM COATED ORAL at 05:05

## 2018-04-13 RX ADMIN — FERROUS SULFATE TAB 325 MG (65 MG ELEMENTAL FE) 325 MG: 325 (65 FE) TAB at 08:53

## 2018-04-13 RX ADMIN — VITAMIN D, TAB 1000IU (100/BT) 5000 UNITS: 25 TAB at 08:53

## 2018-04-13 RX ADMIN — HYDROCHLOROTHIAZIDE 25 MG: 12.5 CAPSULE ORAL at 08:00

## 2018-04-13 RX ADMIN — HYDROXYZINE HYDROCHLORIDE 50 MG: 25 TABLET, FILM COATED ORAL at 05:05

## 2018-04-13 RX ADMIN — BUPROPION HYDROCHLORIDE 450 MG: 150 TABLET, EXTENDED RELEASE ORAL at 08:53

## 2018-04-13 RX ADMIN — CYCLOBENZAPRINE HYDROCHLORIDE 10 MG: 10 TABLET, FILM COATED ORAL at 11:26

## 2018-04-13 RX ADMIN — HYDROXYZINE HYDROCHLORIDE 50 MG: 25 TABLET, FILM COATED ORAL at 11:28

## 2018-04-13 RX ADMIN — MULTIPLE VITAMINS W/ MINERALS TAB 1 TABLET: TAB at 08:53

## 2018-04-13 RX ADMIN — DOCOSANOL: 100 CREAM TOPICAL at 11:27

## 2018-04-13 RX ADMIN — B-COMPLEX W/ C & FOLIC ACID TAB 1 TABLET: TAB at 08:53

## 2018-04-13 ASSESSMENT — ACTIVITIES OF DAILY LIVING (ADL)
ORAL_HYGIENE: INDEPENDENT
DRESS: INDEPENDENT
GROOMING: INDEPENDENT

## 2018-04-13 NOTE — DISCHARGE INSTRUCTIONS
Behavioral Discharge Planning and Instructions      Summary:  You were admitted on 4/8/2018 due to .  You were treated by Dr. Tono Flores MD and discharged on  from [facility] to      Principal Diagnosis: Major Depressive Disorder        Health Care Follow-up Appointments:   1. Medication Management Appointment: [phone]  Date/Time: Please call Muna and Yahaira to schedule an appointment when you get closer to discharging from [facility].  Provider: Ana Paula Gonzalez CNP     2. Individual Therapy Appointment: [phone]  Date/Time: Tuesday, April 24th, 2018 @ 1:00pm  Provider: Radha     The location for your upcoming appointments:  [facility]  [place] [facility] [place]  [address], [address]    [place], [place]  Phone: [phone]                                                                                                                                                                                                                                                           The Health Unit Coordinator has faxed these discharge instructions to fax: [phone].     3. Gambling Addiction Treatment:  You reported that you completed your assessment with Christopher from [facility] about 2 weeks ago.  You contacted [facility] today and asked that Christopher give you a follow-up call regarding inpatient treatment opportunity.     [facility]  Phone: [phone]     4. Health Professionals Service Program:  You self-reported on 3/29/18 to [facility] regarding your current hospitalization, gambling addiction and substances found in your utox.  You also completed an KATY for [facility] and [facility] prior to your discharge.  You have arranged a plan with the  at [facility] who you will continue to work with.     [facility] ([facility])  [address]   [place] [address]   Phone: [phone]   Fax: [phone]    Attend all scheduled appointments with your outpatient  providers. Call at least 24 hours in advance if you need to reschedule an appointment to ensure continued access to your outpatient providers.   Major Treatments, Procedures and Findings:  You were provided with: a psychiatric assessment and medication evaluation and/or management    Symptoms to Report: feeling more aggressive, increased confusion, losing more sleep, mood getting worse or thoughts of suicide    Early warning signs can include: increased depression or anxiety sleep disturbances increased thoughts or behaviors of suicide or self-harm  increased unusual thinking, such as paranoia or hearing voices    Safety and Wellness:  Take all medicines as directed.  Make no changes unless your doctor suggests them.      Follow treatment recommendations.  Refrain from alcohol and non-prescribed drugs.  If there is a concern for safety, call 911.    Resources:   Crisis Intervention: 259.576.4942 or 364-580-2944 (TTY: 311.936.7697).  Call anytime for help.  [place] [employer] [place]ness (www.[place].desirae.org): 953.875.2719 or 702-515-3807.  [facility] Crisis Response - Adult [phone]    The treatment team has appreciated the opportunity to work with you.     If you have any questions or concerns our unit number is [phone].

## 2018-04-13 NOTE — PROGRESS NOTES
04/13/18 1000   Behavioral Health   Hallucinations denies / not responding to hallucinations   Thinking poor concentration   Orientation person: oriented;place: oriented;date: oriented   Memory baseline memory   Insight insight appropriate to situation   Judgement impaired   Eye Contact (In bed not facing staff)   Affect blunted, flat   Mood mood is calm   Physical Appearance/Attire attire appropriate to age and situation   Hygiene well groomed   Suicidality other (see comments)  (Denies)   1. Wish to be Dead No   2. Non-Specific Active Suicidal Thoughts  No   Self Injury other (see comment)  (Denies)   Elopement (Nothing to report)   Activity isolative;withdrawn   Speech clear;coherent   Medication Sensitivity no observed side effects   Psychomotor / Gait balanced;steady   Psycho Education   Type of Intervention 1:1 intervention   Response participates, initiates socially appropriate   Hours 0.5   Treatment Detail (Check In)   Activities of Daily Living   Hygiene/Grooming independent   Oral Hygiene independent   Dress independent   Room Organization independent   Behavioral Health Interventions   Depression maintain safe secure environment;other (see comment)  (Talk about readiness for CD treatment)   Social and Therapeutic Interventions (Depression) (Patient preping for discharge)   Patient was in her room most of the morning.  The patient did come out for breakfast, but was only minimally social with peers.  The patient had several phone calls in the morning that appeared to be with her support systems out of the hospital.  The phone calls appeared to go well, but the patient appeared moderately emotional after the calls.  The patient is set to discharge during this shift for CD treatment and reports feeling ready for that move.  The patient denies any SI or SiB.  The patient was withdrawn, but reports she is tired this morning and wants to be rested for when she goes to treatment.

## 2018-04-13 NOTE — PROGRESS NOTES
Pt discharged at 13:40 to [facility]. Pt picked up by bus and staff from tx center. All belongings and 30 day supply of meds sent with pt. PT denies SI or SIB. PT looking forward to treatment and hopes to return to work.  Signed med list sent with pt also.
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
## 2018-06-07 ENCOUNTER — HOSPITAL ENCOUNTER (INPATIENT)
Facility: CLINIC | Age: 48
LOS: 6 days | Discharge: PSYCHIATRIC OR SUBSTANCE USE FACILITY | End: 2018-06-14
Attending: PSYCHIATRY & NEUROLOGY | Admitting: PSYCHIATRY & NEUROLOGY
Payer: COMMERCIAL

## 2018-06-07 DIAGNOSIS — G47.00 INSOMNIA, UNSPECIFIED TYPE: ICD-10-CM

## 2018-06-07 DIAGNOSIS — T14.91XA SUICIDE ATTEMPT (H): ICD-10-CM

## 2018-06-07 DIAGNOSIS — T78.40XD ALLERGIC REACTION, SUBSEQUENT ENCOUNTER: ICD-10-CM

## 2018-06-07 DIAGNOSIS — F33.1 MODERATE EPISODE OF RECURRENT MAJOR DEPRESSIVE DISORDER (H): ICD-10-CM

## 2018-06-07 DIAGNOSIS — K59.00 CONSTIPATION, UNSPECIFIED CONSTIPATION TYPE: ICD-10-CM

## 2018-06-07 DIAGNOSIS — R45.851 SUICIDAL IDEATION: ICD-10-CM

## 2018-06-07 DIAGNOSIS — M62.838 SPASM OF MUSCLE: ICD-10-CM

## 2018-06-07 DIAGNOSIS — I10 BENIGN ESSENTIAL HYPERTENSION: ICD-10-CM

## 2018-06-07 DIAGNOSIS — F41.9 ANXIETY: ICD-10-CM

## 2018-06-07 DIAGNOSIS — F33.1 MAJOR DEPRESSIVE DISORDER, RECURRENT EPISODE, MODERATE (H): ICD-10-CM

## 2018-06-07 DIAGNOSIS — J45.909 ASTHMA, UNSPECIFIED ASTHMA SEVERITY, UNSPECIFIED WHETHER COMPLICATED, UNSPECIFIED WHETHER PERSISTENT: ICD-10-CM

## 2018-06-07 DIAGNOSIS — E61.1 IRON DEFICIENCY: ICD-10-CM

## 2018-06-07 DIAGNOSIS — E55.9 VITAMIN D DEFICIENCY: ICD-10-CM

## 2018-06-07 LAB
ALBUMIN SERPL-MCNC: 3.5 G/DL (ref 3.4–5)
ALP SERPL-CCNC: 79 U/L (ref 40–150)
ALT SERPL W P-5'-P-CCNC: 38 U/L (ref 0–50)
ANION GAP SERPL CALCULATED.3IONS-SCNC: 12 MMOL/L (ref 3–14)
APAP SERPL-MCNC: <2 MG/L (ref 10–20)
AST SERPL W P-5'-P-CCNC: 17 U/L (ref 0–45)
BASOPHILS # BLD AUTO: 0 10E9/L (ref 0–0.2)
BASOPHILS NFR BLD AUTO: 0.3 %
BILIRUB SERPL-MCNC: 0.2 MG/DL (ref 0.2–1.3)
BUN SERPL-MCNC: 17 MG/DL (ref 7–30)
CALCIUM SERPL-MCNC: 8.6 MG/DL (ref 8.5–10.1)
CHLORIDE SERPL-SCNC: 105 MMOL/L (ref 94–109)
CO2 SERPL-SCNC: 25 MMOL/L (ref 20–32)
CREAT SERPL-MCNC: 0.82 MG/DL (ref 0.52–1.04)
DIFFERENTIAL METHOD BLD: NORMAL
EOSINOPHIL # BLD AUTO: 0.1 10E9/L (ref 0–0.7)
EOSINOPHIL NFR BLD AUTO: 0.7 %
ERYTHROCYTE [DISTWIDTH] IN BLOOD BY AUTOMATED COUNT: 13.9 % (ref 10–15)
GFR SERPL CREATININE-BSD FRML MDRD: 74 ML/MIN/1.7M2
GLUCOSE SERPL-MCNC: 81 MG/DL (ref 70–99)
HCT VFR BLD AUTO: 39 % (ref 35–47)
HGB BLD-MCNC: 12.7 G/DL (ref 11.7–15.7)
IMM GRANULOCYTES # BLD: 0 10E9/L (ref 0–0.4)
IMM GRANULOCYTES NFR BLD: 0.3 %
LYMPHOCYTES # BLD AUTO: 3.7 10E9/L (ref 0.8–5.3)
LYMPHOCYTES NFR BLD AUTO: 52.1 %
MCH RBC QN AUTO: 26.7 PG (ref 26.5–33)
MCHC RBC AUTO-ENTMCNC: 32.6 G/DL (ref 31.5–36.5)
MCV RBC AUTO: 82 FL (ref 78–100)
MONOCYTES # BLD AUTO: 0.5 10E9/L (ref 0–1.3)
MONOCYTES NFR BLD AUTO: 6.7 %
NEUTROPHILS # BLD AUTO: 2.8 10E9/L (ref 1.6–8.3)
NEUTROPHILS NFR BLD AUTO: 39.9 %
NRBC # BLD AUTO: 0 10*3/UL
NRBC BLD AUTO-RTO: 0 /100
PLATELET # BLD AUTO: 325 10E9/L (ref 150–450)
POTASSIUM SERPL-SCNC: 3.2 MMOL/L (ref 3.4–5.3)
PROT SERPL-MCNC: 7.7 G/DL (ref 6.8–8.8)
RBC # BLD AUTO: 4.76 10E12/L (ref 3.8–5.2)
SALICYLATES SERPL-MCNC: 2 MG/DL
SODIUM SERPL-SCNC: 142 MMOL/L (ref 133–144)
TSH SERPL DL<=0.005 MIU/L-ACNC: 1.02 MU/L (ref 0.4–4)
WBC # BLD AUTO: 7 10E9/L (ref 4–11)

## 2018-06-07 PROCEDURE — 80053 COMPREHEN METABOLIC PANEL: CPT | Performed by: PSYCHIATRY & NEUROLOGY

## 2018-06-07 PROCEDURE — 84443 ASSAY THYROID STIM HORMONE: CPT | Performed by: PSYCHIATRY & NEUROLOGY

## 2018-06-07 PROCEDURE — 90791 PSYCH DIAGNOSTIC EVALUATION: CPT

## 2018-06-07 PROCEDURE — 80329 ANALGESICS NON-OPIOID 1 OR 2: CPT | Performed by: PSYCHIATRY & NEUROLOGY

## 2018-06-07 PROCEDURE — 80320 DRUG SCREEN QUANTALCOHOLS: CPT | Performed by: PSYCHIATRY & NEUROLOGY

## 2018-06-07 PROCEDURE — 25000132 ZZH RX MED GY IP 250 OP 250 PS 637: Performed by: PSYCHIATRY & NEUROLOGY

## 2018-06-07 PROCEDURE — 85025 COMPLETE CBC W/AUTO DIFF WBC: CPT | Performed by: PSYCHIATRY & NEUROLOGY

## 2018-06-07 PROCEDURE — 80307 DRUG TEST PRSMV CHEM ANLYZR: CPT | Performed by: PSYCHIATRY & NEUROLOGY

## 2018-06-07 PROCEDURE — 99285 EMERGENCY DEPT VISIT HI MDM: CPT | Mod: 25 | Performed by: PSYCHIATRY & NEUROLOGY

## 2018-06-07 PROCEDURE — 99285 EMERGENCY DEPT VISIT HI MDM: CPT | Mod: Z6 | Performed by: PSYCHIATRY & NEUROLOGY

## 2018-06-07 RX ORDER — CLONIDINE HYDROCHLORIDE 0.1 MG/1
0.1 TABLET ORAL ONCE
Status: COMPLETED | OUTPATIENT
Start: 2018-06-07 | End: 2018-06-07

## 2018-06-07 RX ORDER — QUETIAPINE FUMARATE 25 MG/1
25-50 TABLET, FILM COATED ORAL 3 TIMES DAILY PRN
Status: ON HOLD | COMMUNITY
End: 2018-06-13

## 2018-06-07 RX ORDER — POTASSIUM CHLORIDE 1.5 G/1.58G
20 POWDER, FOR SOLUTION ORAL ONCE
Status: COMPLETED | OUTPATIENT
Start: 2018-06-07 | End: 2018-06-07

## 2018-06-07 RX ADMIN — POTASSIUM CHLORIDE 20 MEQ: 1.5 POWDER, FOR SOLUTION ORAL at 19:07

## 2018-06-07 RX ADMIN — CLONIDINE HYDROCHLORIDE 0.1 MG: 0.1 TABLET ORAL at 18:42

## 2018-06-07 ASSESSMENT — ENCOUNTER SYMPTOMS
EYES NEGATIVE: 1
GASTROINTESTINAL NEGATIVE: 1
ENDOCRINE NEGATIVE: 1
NERVOUS/ANXIOUS: 1
SLEEP DISTURBANCE: 1
ACTIVITY CHANGE: 1
RESPIRATORY NEGATIVE: 1
DECREASED CONCENTRATION: 1
CARDIOVASCULAR NEGATIVE: 1
HALLUCINATIONS: 0
NEUROLOGICAL NEGATIVE: 1
MUSCULOSKELETAL NEGATIVE: 1
HEMATOLOGIC/LYMPHATIC NEGATIVE: 1
HYPERACTIVE: 0

## 2018-06-07 NOTE — IP AVS SNAPSHOT
MRN:[id number]                      After Visit Summary   6/7/2018    Winifred Bashir    MRN: [id number]           Thank you!     Thank you for choosing [facility] for your care. Our goal is always to provide you with excellent care.        Patient Information     Date Of Birth          1970        Designated Caregiver       Most Recent Value    Caregiver    Will someone help with your care after discharge? no      About your hospital stay     You were admitted on:  June 8, 2018 You last received care in the:  [facility]    You were discharged on:  June 14, 2018       Who to Call     For medical emergencies, please call 911.  For non-urgent questions about your medical care, please call your primary care provider or clinic, [phone]          Attending Provider     Provider Specialty    Martin Hsu MD Psychiatry    [facility]       Primary Care Provider Office Phone # Fax #    Angela Guillen [phone]      Additional Services     Medication Therapy Management Referral       MTM referral reason            antidepressants: 3 or more prescribed     This service is designed to help you get the most from your medications.  A specially trained pharmacist will work closely with you and your doctors  to solve any problems related to your medications and to help you get the   best results from taking them.      The Medication Therapy Management staff will call you to schedule an appointment.                  Further instructions from your care team        Behavioral Discharge Planning and Instructions      Summary:  You were admitted on 6/7/2018  due to Depression, Anxiety, substance  Use Disorder and Suicide Attempt.  You were treated by Dr. Tono Flores MD and [facility] on 6/14/[facility]      Principal Diagnosis:   1.  Major depressive disorder, recurrent, moderate severity.   2.  Generalized  "anxiety disorder.     Health Care Follow-up Appointments:   You are being discharged to [facility].     You are voluntarily involved with [facility]. Your  is [facility]. Phone: [phone] Fax: [phone]   Attend all scheduled appointments with your outpatient providers. Call at least 24 hours in advance if you need to reschedule an appointment to ensure continued access to your outpatient providers.   Major Treatments, Procedures and Findings:  You were provided with: a psychiatric assessment, assessed for medical stability, medication evaluation and/or management, CD evaluation/assessment and milieu management    Symptoms to Report: increased confusion, increased depression, urges to use, thoughts of self or other harm    Early warning signs can include: increased depression or anxiety sleep disturbances increased thoughts or behaviors of suicide or self-harm  increased unusual thinking, such as paranoia or hearing voices    Safety and Wellness:  Take all medicines as directed.  Make no changes unless your doctor suggests them.      Follow treatment recommendations.  Refrain from alcohol and non-prescribed drugs.  If there is a concern for safety, call 911.    Resources:   Crisis Intervention: 673.935.2774 or 072-048-7230 (TTY: 702.129.7457).  Call anytime for help.  National [place] on Mental Illness (www.mn.desirae.org): 427.287.2001 or 582-259-5314.  Alcoholics Anonymous (www.alcoholics-anonymous.org): Check your phone book for your local chapter.  Suicide Awareness Voices of Education (SAVE) (www.save.org): 593-079-HMQE (8268)  National Suicide Prevention Line (www.mentalhealthmn.org): 594-192-GWNL (6292)  Mental Health Consumer/Survivor Network of [place] (www.mhcsn.net): 449.357.8443 or 449-408-8298  Self- Management and Recovery Training., SMART-- Toll free: 879.159.9691  www.Cloud.CM.[employer]  Text 4 Life: txt \"LIFE\" to [address] for immediate support and crisis intervention    The " "treatment team has appreciated the opportunity to work with you.     If you have any questions or concerns our unit number is [phone]          Pending Results     No orders found from 6/5/2018 to 6/8/2018.            Statement of Approval     Ordered          06/13/18 1620  I have reviewed and agree with all the recommendations and orders detailed in this document.  EFFECTIVE NOW     Approved and electronically signed by:  Tono Flores MD             Admission Information     Date & Time Provider Department Dept. Phone    6/7/2018 Tono Flores MD [facility] [phone]      Your Vitals Were     Blood Pressure Pulse Temperature Respirations Height Weight    107/74 78 99.2  F (37.3  C) 16 1.702 m (5' 7\") 88.1 kg (194 lb 3.2 oz)    Last Period Pulse Oximetry BMI (Body Mass Index)             05/17/2018 98% 30.42 kg/m2         [email or web address] Information     [facility] gives you secure access to your electronic health record. If you see a primary care provider, you can also send messages to your care team and make appointments. If you have questions, please call your primary care clinic.  If you do not have a primary care provider, please call [phone] and they will assist you.        Care EveryWhere ID     This is your Care EveryWhere ID. This could be used by other organizations to access your [facility] medical records  [id number]        Equal Access to Services     IRIS CANCINO : Alyx Flores, warenetta barcenas, qaybta brendan diop. So [facility] [phone].    ATENCIÓN: Si habla español, tiene a kimbrough disposición servicios gratuitos de asistencia lingüística. Llame al [phone].    We comply with applicable federal civil rights laws and [place] laws. We do not discriminate on the basis of race, color, national origin, age, disability, sex, sexual orientation, or gender identity.               Review of your medicines      START taking     "    Dose / Directions    docusate sodium 100 MG capsule   Commonly known as:  COLACE        Dose:  100 mg   Take 1 capsule (100 mg) by mouth 2 times daily   Quantity:  60 capsule   Refills:  1       polyethylene glycol powder   Commonly known as:  MIRALAX        Dose:  1 capful   Take 17 g (1 capful) by mouth daily   Quantity:  510 g   Refills:  1       sennosides 8.6 MG tablet   Commonly known as:  SENOKOT        Dose:  1 tablet   Take 1 tablet by mouth 2 times daily as needed for constipation   Quantity:  60 each   Refills:  1         CONTINUE these medicines which have NOT CHANGED        Dose / Directions    beclomethasone 40 MCG/ACT Inhaler   Commonly known as:  QVAR   Used for:  Asthma, unspecified asthma severity, unspecified whether complicated, unspecified whether persistent        Dose:  2 puff   Inhale 2 puffs into the lungs 2 times daily   Quantity:  8.7 g   Refills:  1       BuPROPion HCl ER (XL) 450 MG Tb24   Used for:  Moderate episode of recurrent major depressive disorder (H)        Dose:  450 mg   Take 450 mg by mouth daily   Quantity:  30 tablet   Refills:  1       Cholecalciferol 5000 units Tabs   Used for:  Vitamin D deficiency        Dose:  5000 Units   Take 5,000 Units by mouth daily   Quantity:  30 tablet   Refills:  1       cyclobenzaprine 10 MG tablet   Commonly known as:  FLEXERIL   Used for:  Spasm of muscle        Dose:  10 mg   Take 1 tablet (10 mg) by mouth 3 times daily as needed for muscle spasms   Quantity:  90 tablet   Refills:  0       ferrous sulfate 325 (65 Fe) MG tablet   Commonly known as:  IRON   Used for:  Iron deficiency        Dose:  325 mg   Take 1 tablet (325 mg) by mouth daily (with breakfast)   Quantity:  30 tablet   Refills:  1       FLUoxetine 20 MG capsule   Commonly known as:  PROZAC   Used for:  Moderate episode of recurrent major depressive disorder (H)        Dose:  80 mg   Take 4 capsules (80 mg) by mouth daily   Quantity:  120 capsule   Refills:  1        hydrochlorothiazide 12.5 MG capsule   Commonly known as:  MICROZIDE   Used for:  Benign essential hypertension        Dose:  25 mg   Take 2 capsules (25 mg) by mouth daily   Quantity:  60 capsule   Refills:  1       hydrOXYzine 25 MG tablet   Commonly known as:  ATARAX   Used for:  Anxiety        Dose:  25-50 mg   Take 1-2 tablets (25-50 mg) by mouth 3 times daily as needed for anxiety   Quantity:  90 tablet   Refills:  1       loratadine 10 MG tablet   Commonly known as:  CLARITIN   Used for:  Allergic reaction, subsequent encounter        Dose:  10 mg   Take 1 tablet (10 mg) by mouth daily   Quantity:  30 tablet   Refills:  1       magnesium hydroxide 400 MG/5ML suspension   Commonly known as:  MILK OF MAGNESIA   Used for:  Constipation, unspecified constipation type        Dose:  30 mL   Take 30 mLs by mouth nightly as needed for constipation   Quantity:  105 mL   Refills:  0       ONE-A-DAY 50 PLUS PO        Dose:  1 tablet   Take 1 tablet by mouth daily   Refills:  0       QUEtiapine 25 MG tablet   Commonly known as:  SEROquel        Dose:  25-50 mg   Take 1-2 tablets (25-50 mg) by mouth 3 times daily as needed (anxiety)   Quantity:  60 tablet   Refills:  1       traZODone 50 MG tablet   Commonly known as:  DESYREL   Used for:  Insomnia, unspecified type        Dose:  50 mg   Take 1 tablet (50 mg) by mouth nightly as needed for sleep   Quantity:  30 tablet   Refills:  1       TYLENOL 325 MG tablet   Generic drug:  acetaminophen        Dose:  650 mg   Take 650 mg by mouth every 8 hours as needed for mild pain max 4000 mg in 24 hours   Quantity:  100 tablet   Refills:  0       vitamin B complex Tabs        Dose:  1 tablet   Take 1 tablet by mouth daily   Refills:  0         STOP taking     EPINEPHrine 0.3 MG/0.3ML injection   Commonly known as:  EPIPEN                Where to get your medicines      These medications were sent to [place], [place] - [address]  [address], [facility] [address]     Phone:  [phone]     beclomethasone 40 MCG/ACT Inhaler    BuPROPion HCl ER (XL) 450 MG Tb24    Cholecalciferol 5000 units Tabs    cyclobenzaprine 10 MG tablet    docusate sodium 100 MG capsule    ferrous sulfate 325 (65 Fe) MG tablet    FLUoxetine 20 MG capsule    hydrochlorothiazide 12.5 MG capsule    hydrOXYzine 25 MG tablet    loratadine 10 MG tablet    magnesium hydroxide 400 MG/5ML suspension    polyethylene glycol powder    QUEtiapine 25 MG tablet    sennosides 8.6 MG tablet    traZODone 50 MG tablet                Protect others around you: Learn how to safely use, store and throw away your medicines at www.disposemymeds.org.             Medication List: This is a list of all your medications and when to take them. Check marks below indicate your daily home schedule. Keep this list as a reference.      Medications           Morning Afternoon Evening Bedtime As Needed    beclomethasone 40 MCG/ACT Inhaler   Commonly known as:  QVAR   Inhale 2 puffs into the lungs 2 times daily                                BuPROPion HCl ER (XL) 450 MG Tb24   Take 450 mg by mouth daily   Last time this was given:  450 mg on 6/14/2018  8:55 AM                                Cholecalciferol 5000 units Tabs   Take 5,000 Units by mouth daily                                cyclobenzaprine 10 MG tablet   Commonly known as:  FLEXERIL   Take 1 tablet (10 mg) by mouth 3 times daily as needed for muscle spasms   Last time this was given:  10 mg on 6/10/2018  9:45 AM                                docusate sodium 100 MG capsule   Commonly known as:  COLACE   Take 1 capsule (100 mg) by mouth 2 times daily   Last time this was given:  100 mg on 6/14/2018  8:55 AM                                ferrous sulfate 325 (65 Fe) MG tablet   Commonly known as:  IRON   Take 1 tablet (325 mg) by mouth daily (with breakfast)   Last time this was given:  325 mg on 6/14/2018  8:55 AM                                 FLUoxetine 20 MG capsule   Commonly known as:  PROZAC   Take 4 capsules (80 mg) by mouth daily   Last time this was given:  80 mg on 6/14/2018  8:55 AM                                hydrochlorothiazide 12.5 MG capsule   Commonly known as:  MICROZIDE   Take 2 capsules (25 mg) by mouth daily   Last time this was given:  25 mg on 6/14/2018  8:56 AM                                hydrOXYzine 25 MG tablet   Commonly known as:  ATARAX   Take 1-2 tablets (25-50 mg) by mouth 3 times daily as needed for anxiety   Last time this was given:  50 mg on 6/13/2018  7:20 PM                                loratadine 10 MG tablet   Commonly known as:  CLARITIN   Take 1 tablet (10 mg) by mouth daily   Last time this was given:  10 mg on 6/14/2018  8:56 AM                                magnesium hydroxide 400 MG/5ML suspension   Commonly known as:  MILK OF MAGNESIA   Take 30 mLs by mouth nightly as needed for constipation                                ONE-A-DAY 50 PLUS PO   Take 1 tablet by mouth daily                                polyethylene glycol powder   Commonly known as:  MIRALAX   Take 17 g (1 capful) by mouth daily                                QUEtiapine 25 MG tablet   Commonly known as:  SEROquel   Take 1-2 tablets (25-50 mg) by mouth 3 times daily as needed (anxiety)   Last time this was given:  50 mg on 6/13/2018  9:42 PM                                sennosides 8.6 MG tablet   Commonly known as:  SENOKOT   Take 1 tablet by mouth 2 times daily as needed for constipation                                traZODone 50 MG tablet   Commonly known as:  DESYREL   Take 1 tablet (50 mg) by mouth nightly as needed for sleep   Last time this was given:  50 mg on 6/13/2018  9:42 PM                                TYLENOL 325 MG tablet   Take 650 mg by mouth every 8 hours as needed for mild pain max 4000 mg in 24 hours   Generic drug:  acetaminophen                                vitamin B complex Tabs   Take 1 tablet by mouth  daily

## 2018-06-07 NOTE — ED NOTES
I have performed an in person assessment of the patient. Based on this assessment the patient no longer requires a one on one attendant at this point in time.    Martin Hsu MD  6:05 PM  June 7, 2018           Martin Hsu MD  06/07/18 0289

## 2018-06-07 NOTE — ED PROVIDER NOTES
History     Chief Complaint   Patient presents with     Suicide Attempt     took an OD of benadryl, mucinex, beer, wine last Tuesday     The history is provided by the patient.     Winifred Bashir is a 48 year old female who is here via EMS due to continued suicidal thoughts. Patient has history of being hospitalized here 2 months ago. She has history of chemical dependence. She was discharged to treatment where she completed 28 days. She is in the middle of a divorce and the stress of  to one relapse. She managed to find a place to stay with a cousin who was good with her kids but the cousin unexpectedly  1 week ago. She then relapsed again 2 days ago. She felt hopeless, suicidal and had taken 3/4 bottle of benadryl, mucinex, beer and wine. Patient did not seek medical attention. She reports stumbling around in a daze/haze yesterday. She felt she cleared from her ingestion last evening and felt well to attend cousin's  this morning. Patient continued to feel depressed and suicidal after the  and decided to come here. She would like admission for safety and stabilization. Patient denies using other drugs. She has no acute medical concerns presently. There is no thought disorder.    Please see DEC Crisis Assessment on 18 in [facility] for further details.    PERSONAL MEDICAL HISTORY  Past Medical History:   Diagnosis Date     Bipolar disorder, unspecified      Mild intermittent asthma      Osteoarthritis      Posttraumatic stress disorder      Surve[facility]     PAST SURGICAL HISTORY  Past Surgical History:   Procedure Laterality Date     C/SECTION, LOW TRANSVERSE      , Low Transverse     SURGICAL HISTORY OF -       tubal ligation     TUBAL LIGATION       FAMILY HISTORY  Family History   Problem Relation Age of Onset     Hypertension Mother      Hypertension Maternal Grandmother      CEREBROVASCULAR  DISEASE Maternal Grandmother      DIABETES Maternal Aunt      Musculoskeletal Disorder Maternal Aunt      Lupus     Asthma No family hx of      C.A.D. No family hx of      Breast Cancer No family hx of      Cancer - colorectal No family hx of      SOCIAL HISTORY  Social History   Substance Use Topics     Smoking status: Current Some Day Smoker     Smokeless tobacco: Former User      Comment: quit 2003     Alcohol use Yes      Comment: drank last Tuesday     MEDICATIONS  No current facility-administered medications for this encounter.      Current Outpatient Prescriptions   Medication     B Complex-Biotin-FA (VITAMIN B COMPLEX) TABS     beclomethasone (QVAR) 40 MCG/ACT Inhaler     buPROPion 450 MG TB24     cholecalciferol 5000 units TABS     cyclobenzaprine (FLEXERIL) 10 MG tablet     ferrous sulfate (IRON) 325 (65 Fe) MG tablet     FLUoxetine (PROZAC) 20 MG capsule     fluticasone furoate (ARNUITY ELLIPTA) 100 MCG/ACT AEPB inhalation powder     hydrochlorothiazide (MICROZIDE) 12.5 MG capsule     hydrOXYzine (ATARAX) 25 MG tablet     loratadine (CLARITIN) 10 MG tablet     Multiple Vitamins-Minerals (ONE-A-DAY 50 PLUS PO)     vitamin B complex with vitamin C (STRESS TAB) TABS tablet     acetaminophen (TYLENOL) 325 MG tablet     bisacodyl (DULCOLAX) 10 MG Suppository     EPINEPHrine (EPIPEN) 0.3 MG/0.3ML injection     magnesium hydroxide (MILK OF MAGNESIA) 400 MG/5ML suspension     traZODone (DESYREL) 50 MG tablet     ALLERGIES  Allergies   Allergen Reactions     Fish-Derived Products Shortness Of Breath and Nausea and Vomiting     Nkda [No Known Drug Allergies]          I have reviewed the Medications, Allergies, Past Medical and Surgical History, and Social History in the Epic system.    Review of Systems   Constitutional: Positive for activity change.   HENT: Negative.    Eyes: Negative.    Respiratory: Negative.    Cardiovascular: Negative.    Gastrointestinal: Negative.    Endocrine: Negative.    Genitourinary:  Negative.    Musculoskeletal: Negative.    Skin: Negative.    Neurological: Negative.    Hematological: Negative.    Psychiatric/Behavioral: Positive for decreased concentration, sleep disturbance and suicidal ideas. Negative for hallucinations. The patient is nervous/anxious. The patient is not hyperactive.    All other systems reviewed and are negative.      Physical Exam   BP: 143/67  Pulse: 74  Temp: 98.3  F (36.8  C)  Resp: 16  Weight: 88.3 kg (194 lb 9 oz)  SpO2: 98 %      Physical Exam   Constitutional: She appears well-developed and well-nourished.   HENT:   Head: Normocephalic.   Eyes: Pupils are equal, round, and reactive to light.   Neck: Normal range of motion.   Cardiovascular: Normal rate.    Pulmonary/Chest: Effort normal.   Abdominal: Soft.   Musculoskeletal: Normal range of motion.   Neurological: She is alert.   Skin: Skin is warm.   Psychiatric: Her speech is normal. Judgment normal. Her mood appears anxious. She is withdrawn. She is not agitated, not aggressive, not hyperactive, not actively hallucinating and not combative. Thought content is not paranoid and not delusional. Cognition and memory are normal. She exhibits a depressed mood. She expresses suicidal ideation. She expresses no homicidal ideation. She expresses suicidal plans.   Nursing note and vitals reviewed.      ED Course     ED Course     Procedures    Labs Ordered and Resulted from Time of ED Arrival Up to the Time of Departure from the ED - No data to display         Assessments & Plan (with Medical Decision Making)   Patient with recurrent MDD who recently attempted suicide by ingestion and alcohol abuse. She cleared from her ingestion past 24 hours but still feels suicidal. She will be admitted for stabilization. She is voluntary. Patient is medically cleared.    I have reviewed the nursing notes.    I have reviewed the findings, diagnosis, plan and need for follow up with the patient.    New Prescriptions    No medications on  file       Final diagnoses:   Major depressive disorder, recurrent episode, moderate (H)   Suicide attempt   Suicidal ideation       6/7/2018   [facility], [place], EMERGENCY DEPARTMENT     Martin Hsu MD  06/07/18 5308

## 2018-06-07 NOTE — IP AVS SNAPSHOT
[address]    [place] [address]    Phone:  [phone]                                       After Visit Summary   6/7/2018    Winifred Bashir    MRN: [id number]           After Visit Summary Signature Page     I have received my discharge instructions, and my questions have been answered. I have discussed any challenges I see with this plan with the nurse or doctor.    ..........................................................................................................................................  Patient/Patient Representative Signature      ..........................................................................................................................................  Patient Representative Print Name and Relationship to Patient    ..................................................               ................................................  Date                                            Time    ..........................................................................................................................................  Reviewed by Signature/Title    ...................................................              ..............................................  Date                                                            Time

## 2018-06-08 LAB
AMPHETAMINES UR QL SCN: NEGATIVE
BARBITURATES UR QL: NEGATIVE
BENZODIAZ UR QL: NEGATIVE
CANNABINOIDS UR QL SCN: POSITIVE
COCAINE UR QL: POSITIVE
ETHANOL UR QL SCN: NEGATIVE
OPIATES UR QL SCN: NEGATIVE

## 2018-06-08 PROCEDURE — 25000132 ZZH RX MED GY IP 250 OP 250 PS 637: Performed by: PSYCHIATRY & NEUROLOGY

## 2018-06-08 PROCEDURE — 99221 1ST HOSP IP/OBS SF/LOW 40: CPT | Mod: AI | Performed by: PSYCHIATRY & NEUROLOGY

## 2018-06-08 PROCEDURE — 12400007 ZZH R&B MH INTERMEDIATE UMMC

## 2018-06-08 RX ORDER — FERROUS SULFATE 325(65) MG
325 TABLET ORAL
Status: DISCONTINUED | OUTPATIENT
Start: 2018-06-09 | End: 2018-06-14 | Stop reason: HOSPADM

## 2018-06-08 RX ORDER — HYDROXYZINE HYDROCHLORIDE 25 MG/1
25-50 TABLET, FILM COATED ORAL 3 TIMES DAILY PRN
Status: DISCONTINUED | OUTPATIENT
Start: 2018-06-08 | End: 2018-06-08

## 2018-06-08 RX ORDER — ACETAMINOPHEN 325 MG/1
650 TABLET ORAL EVERY 8 HOURS PRN
Status: DISCONTINUED | OUTPATIENT
Start: 2018-06-08 | End: 2018-06-14 | Stop reason: HOSPADM

## 2018-06-08 RX ORDER — OLANZAPINE 10 MG/2ML
10 INJECTION, POWDER, FOR SOLUTION INTRAMUSCULAR
Status: DISCONTINUED | OUTPATIENT
Start: 2018-06-08 | End: 2018-06-14 | Stop reason: HOSPADM

## 2018-06-08 RX ORDER — CYCLOBENZAPRINE HCL 10 MG
10 TABLET ORAL 3 TIMES DAILY PRN
Status: DISCONTINUED | OUTPATIENT
Start: 2018-06-08 | End: 2018-06-14 | Stop reason: HOSPADM

## 2018-06-08 RX ORDER — QUETIAPINE FUMARATE 25 MG/1
25-50 TABLET, FILM COATED ORAL 3 TIMES DAILY PRN
Status: DISCONTINUED | OUTPATIENT
Start: 2018-06-08 | End: 2018-06-14 | Stop reason: HOSPADM

## 2018-06-08 RX ORDER — HYDROCHLOROTHIAZIDE 12.5 MG/1
25 CAPSULE ORAL DAILY
Status: DISCONTINUED | OUTPATIENT
Start: 2018-06-08 | End: 2018-06-14 | Stop reason: HOSPADM

## 2018-06-08 RX ORDER — HYDROXYZINE HYDROCHLORIDE 25 MG/1
25 TABLET, FILM COATED ORAL EVERY 4 HOURS PRN
Status: DISCONTINUED | OUTPATIENT
Start: 2018-06-08 | End: 2018-06-08

## 2018-06-08 RX ORDER — TRAZODONE HYDROCHLORIDE 50 MG/1
50 TABLET, FILM COATED ORAL
Status: DISCONTINUED | OUTPATIENT
Start: 2018-06-08 | End: 2018-06-14 | Stop reason: HOSPADM

## 2018-06-08 RX ORDER — OLANZAPINE 10 MG/1
10 TABLET ORAL
Status: DISCONTINUED | OUTPATIENT
Start: 2018-06-08 | End: 2018-06-14 | Stop reason: HOSPADM

## 2018-06-08 RX ORDER — HYDROXYZINE HYDROCHLORIDE 50 MG/1
50 TABLET, FILM COATED ORAL EVERY 4 HOURS PRN
Status: DISCONTINUED | OUTPATIENT
Start: 2018-06-08 | End: 2018-06-14 | Stop reason: HOSPADM

## 2018-06-08 RX ORDER — LORATADINE 10 MG/1
10 TABLET ORAL DAILY
Status: DISCONTINUED | OUTPATIENT
Start: 2018-06-08 | End: 2018-06-14 | Stop reason: HOSPADM

## 2018-06-08 RX ADMIN — HYDROXYZINE HYDROCHLORIDE 50 MG: 50 TABLET, FILM COATED ORAL at 14:53

## 2018-06-08 RX ADMIN — CYCLOBENZAPRINE HYDROCHLORIDE 10 MG: 10 TABLET, FILM COATED ORAL at 14:53

## 2018-06-08 RX ADMIN — CHOLECALCIFEROL CAP 125 MCG (5000 UNIT) 5000 UNITS: 125 CAP at 14:56

## 2018-06-08 RX ADMIN — LORATADINE 10 MG: 10 TABLET ORAL at 14:57

## 2018-06-08 RX ADMIN — FLUOXETINE 80 MG: 20 CAPSULE ORAL at 14:56

## 2018-06-08 RX ADMIN — B-COMPLEX W/ C & FOLIC ACID TAB 1 TABLET: TAB at 15:01

## 2018-06-08 RX ADMIN — BUPROPION HYDROCHLORIDE 450 MG: 300 TABLET, FILM COATED, EXTENDED RELEASE ORAL at 14:55

## 2018-06-08 RX ADMIN — HYDROXYZINE HYDROCHLORIDE 25 MG: 25 TABLET ORAL at 10:41

## 2018-06-08 ASSESSMENT — ACTIVITIES OF DAILY LIVING (ADL)
LAUNDRY: WITH SUPERVISION
ORAL_HYGIENE: INDEPENDENT
DRESS: SCRUBS (BEHAVIORAL HEALTH);INDEPENDENT
GROOMING: INDEPENDENT
GROOMING: INDEPENDENT
DRESS: INDEPENDENT
ORAL_HYGIENE: INDEPENDENT
LAUNDRY: WITH SUPERVISION
DRESS: INDEPENDENT
LAUNDRY: WITH SUPERVISION
ORAL_HYGIENE: INDEPENDENT
GROOMING: HANDWASHING;INDEPENDENT

## 2018-06-08 NOTE — PROGRESS NOTES
PATIENT BELONGINGS:     Items with Patient:  -dress, slipper shoes, 2 T-shirts, 2 bras, jeans, assorted underwear, stretchy pants, long sleeved shirt with zippers, glasses.    Items in Patient Locker:  -Green Tote Bag: cell phone, charge cord and wall adapter, purple lanyard with metal clips, bracelet and earrings, sandals with long string straps  -large blue tote: smaller mesh tote bag with assorted toiletries, cleansing wipes, electric toothbrush, comb, metal hair clips, thirties.    Items sent to security:  -cash ($40.00)  -assorted medications (see RN's notes)    ---No Cash, No Credit/Debit Cards, and No Medications (other than noted above) at the time of admission.    A               Admission:  I am responsible for any personal items that are not sent to the safe or pharmacy.  [facility] is not responsible for loss, theft or damage of any property in my possession.    Signature:  _________________________________ Date: _______  Time: _____                                              Staff Signature:  ____________________________ Date: ________  Time: _____      2nd Staff person, if patient is unable/unwilling to sign:    Signature: ________________________________ Date: ________  Time: _____     Discharge:  [facility] has returned all of my personal belongings:    Signature: _________________________________ Date: ________  Time: _____                                          Staff Signature:  ____________________________ Date: ________  Time: _____         Medications were sent to Security: Envelopes #[id number], #[id number], #[id number], #[id number], #[id number], #[id number],#[id number].

## 2018-06-08 NOTE — PROGRESS NOTES
Patient took prn Hydroxyzine 25 mg earlier in shift, then took 50 mg at the end of shift when it was increased. She also took Flexeril prn as ordered. Patient reports high anxiety. She currently denies suicidal thoughts.

## 2018-06-08 NOTE — PLAN OF CARE
Problem: Patient Care Overview  Goal: Team Discussion  Team Plan:   BEHAVIORAL TEAM DISCUSSION    Participants: Provider: MD Mary  CTC: Beverly Lowery MS,LP   Nurse: Kimberley Lo RN     Progress: Pt newly admitted secondary to overdose. Pt has relapsed on alcohol, cocaine and pot. Pt is voluntarily involved with HPSP; requested CTC contact them regarding new admission.  Continued Stay Criteria/Rationale: Pt reported SI, has ongoing situational stressors and recent relapse  Medical/Physical: see chart  Precautions:   Behavioral Orders   Procedures     Code 1 - Restrict to Unit     Routine Programming     As clinically indicated     Status 15     Every 15 minutes.     Plan: The patient will continue to meet w/ the treatment team for assessment and treatment planning, CTC will continue to work w/ for discharge planning.    Rationale for change in precautions or plan: pt to remain on unit for observation and stabilization
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
## 2018-06-08 NOTE — PROGRESS NOTES
Pt requested that CTC call [facility]; states her  is Mavis. Signed KATY. CTC called, [phone] out for the day; [facility] spoke with Sheila. Sheila would like the H&P faxed to [phone]

## 2018-06-08 NOTE — PROGRESS NOTES
"Pt was visible all shift meeting with her team, making phone calls and filling out paperwork . Pt was served with divorce paperwork today. Reported feeling \"very depressed and anxious\", rated her depression at a 7 and anxiety at a 10, denies SI/SIB thoughts. No behavioral concerns.        06/08/18 1427   Behavioral Health   Hallucinations denies / not responding to hallucinations   Thinking poor concentration   Orientation time: oriented;place: oriented;date: oriented;person: oriented   Memory baseline memory   Insight insight appropriate to situation   Judgement intact   Eye Contact at examiner   Affect full range affect   Mood depressed;anxious;irritable   Physical Appearance/Attire attire appropriate to age and situation   Hygiene other (see comment)  (adequate)   Suicidality other (see comments)  (Pt denies)   1. Wish to be Dead No   2. Non-Specific Active Suicidal Thoughts  No   Self Injury other (see comment)  (Pt denies)   Activity other (see comment)  (Visible making calls and meeting with team members. )   Speech clear   Medication Sensitivity no stated side effects;no observed side effects   Psychomotor / Gait balanced;steady   Psycho Education   Type of Intervention 1:1 intervention   Response participates, initiates socially appropriate   Hours 0.5   Treatment Detail (check in )   Activities of Daily Living   Hygiene/Grooming independent   Oral Hygiene independent   Dress independent   Laundry with supervision   Room Organization independent   Activity   Activity Assistance Provided independent     "

## 2018-06-08 NOTE — PLAN OF CARE
Problem: Depression (Adult,Obstetrics,Pediatric)  Goal: Identify Related Risk Factors and Signs and Symptoms  Related risk factors and signs and symptoms are identified upon initiation of Human Response Clinical Practice Guideline (CPG).   Patient admitted to [facility] from [facility], ED, Voluntary,sad and  Depressed, cooperative with admission process. Stressors are recent death of cousin, and relationship issues with ,says she is afraid of him and is currently going thru divorce.  She says she was sober for many years and recently relapsed on alcohol, cocaine, and cannabis, (Utox positive cannabis and cocaine). She says she overdosed on Tuesday, taking pills, and cough syrup, which made her sleepy, she reported falling down about 5 times after the overdose. She happened to tell someone about what she had done and was brought in to [place] ED for help, she also has guns in her home. She says she would like to find a place for herself and children. Pt endorses suicidal ideation and says she does not feel safe at home. Pt has had previous psych admissions and treatment at [facility].
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
## 2018-06-08 NOTE — PHARMACY-ADMISSION MEDICATION HISTORY
Admission medication history interview status for the 6/7/2018 admission is complete. See Epic admission navigator for allergy information, pharmacy and prior to admission medications.     Medication history interview sources:  Patient, Epic electronic medical records (discharged from [facility] on 04/13/18), [facility] ([place]; [phone])    Changes made to PTA medication list (reason)  Added: quetiapine (per [facility] pharmacy - on hold and never filled)  Deleted: bisacodyl, Arnuity Ellipta (formulary substitution for Qvar but patient not using at home)  Changed: none    Additional medication history information (including reliability of information, actions taken by pharmacist): The patient was reliable historian and able to discuss her medications without difficulty. She reported she has not taken her medications in approximately one week. The patient reported the only change to her medication was the addition of Seroquel which per the outpatient [facility] pharmacist is on hold and has never been picked up.      Prior to Admission medications    Medication Sig Last Dose Taking? Auth Provider   acetaminophen (TYLENOL) 325 MG tablet Take 650 mg by mouth every 8 hours as needed for mild pain max 4000 mg in 24 hours unknown Yes Rupinder Seymour, NP   B Complex-Biotin-FA (VITAMIN B COMPLEX) TABS Take 1 tablet by mouth daily about 1 week ago at Unknown time Yes Unknown, Entered By History   beclomethasone (QVAR) 40 MCG/ACT Inhaler Inhale 2 puffs into the lungs 2 times daily about 1 week ago at Unknown time Yes Toon Flores MD   buPROPion 450 MG TB24 Take 450 mg by mouth daily about 1 week ago at Unknown time Yes Tono Flores MD   cholecalciferol 5000 units TABS Take 5,000 Units by mouth daily about 1 week ago at Unknown time Yes Tono Flores MD   cyclobenzaprine (FLEXERIL) 10 MG tablet Take 1 tablet (10 mg) by mouth 3 times daily as needed for muscle spasms unknown at Unknown time Yes Sandra  Tono RIZVI MD   EPINEPHrine (EPIPEN) 0.3 MG/0.3ML injection Inject 0.3 mLs into the muscle once as needed for anaphylaxis. unknown Yes Rupinder Seymour, NP   ferrous sulfate (IRON) 325 (65 Fe) MG tablet Take 1 tablet (325 mg) by mouth daily (with breakfast) about 1 week ago at Unknown time Yes Tono Flores MD   FLUoxetine (PROZAC) 20 MG capsule Take 4 capsules (80 mg) by mouth daily about 1 week ago at Unknown time Yes Tono Flores MD   hydrochlorothiazide (MICROZIDE) 12.5 MG capsule Take 2 capsules (25 mg) by mouth daily about 1 week ago at Unknown time Yes Tono Flores MD   hydrOXYzine (ATARAX) 25 MG tablet Take 1-2 tablets (25-50 mg) by mouth 3 times daily as needed for anxiety unknoen at Unknown time Yes Tono Flores MD   loratadine (CLARITIN) 10 MG tablet Take 1 tablet (10 mg) by mouth daily about 1 week ago at Unknown time Yes Tono Flores MD   magnesium hydroxide (MILK OF MAGNESIA) 400 MG/5ML suspension Take 30 mLs by mouth nightly as needed for constipation unknwon Yes Tono Flores MD   Multiple Vitamins-Minerals (ONE-A-DAY 50 PLUS PO) Take 1 tablet by mouth daily about 1 week ago at Unknown time Yes Unknown, Entered By History   QUEtiapine (SEROQUEL) 25 MG tablet Take 25-50 mg by mouth 3 times daily as needed (anxiety)  unknown Yes Unknown, Entered By History   traZODone (DESYREL) 50 MG tablet Take 1 tablet (50 mg) by mouth nightly as needed for sleep unknown Yes Tono Flores MD         Medication history completed by:   Rupinder Rivers, PharmD, [facility]  Behavioral ER Pharmacist  [phone]

## 2018-06-09 PROCEDURE — 12400007 ZZH R&B MH INTERMEDIATE UMMC

## 2018-06-09 PROCEDURE — 25000132 ZZH RX MED GY IP 250 OP 250 PS 637: Performed by: PSYCHIATRY & NEUROLOGY

## 2018-06-09 RX ADMIN — LORATADINE 10 MG: 10 TABLET ORAL at 11:07

## 2018-06-09 RX ADMIN — HYDROCHLOROTHIAZIDE 25 MG: 12.5 CAPSULE ORAL at 11:07

## 2018-06-09 RX ADMIN — HYDROXYZINE HYDROCHLORIDE 50 MG: 50 TABLET, FILM COATED ORAL at 11:06

## 2018-06-09 RX ADMIN — HYDROXYZINE HYDROCHLORIDE 50 MG: 50 TABLET, FILM COATED ORAL at 16:22

## 2018-06-09 RX ADMIN — FLUOXETINE 80 MG: 20 CAPSULE ORAL at 11:06

## 2018-06-09 RX ADMIN — CYCLOBENZAPRINE HYDROCHLORIDE 10 MG: 10 TABLET, FILM COATED ORAL at 13:03

## 2018-06-09 RX ADMIN — QUETIAPINE FUMARATE 50 MG: 25 TABLET ORAL at 17:04

## 2018-06-09 RX ADMIN — FLUTICASONE FUROATE 1 PUFF: 100 POWDER RESPIRATORY (INHALATION) at 11:09

## 2018-06-09 RX ADMIN — BUPROPION HYDROCHLORIDE 450 MG: 300 TABLET, FILM COATED, EXTENDED RELEASE ORAL at 11:06

## 2018-06-09 RX ADMIN — FERROUS SULFATE TAB 325 MG (65 MG ELEMENTAL FE) 325 MG: 325 (65 FE) TAB at 11:07

## 2018-06-09 RX ADMIN — CHOLECALCIFEROL CAP 125 MCG (5000 UNIT) 5000 UNITS: 125 CAP at 11:06

## 2018-06-09 RX ADMIN — QUETIAPINE FUMARATE 50 MG: 25 TABLET ORAL at 03:46

## 2018-06-09 RX ADMIN — B-COMPLEX W/ C & FOLIC ACID TAB 1 TABLET: TAB at 11:07

## 2018-06-09 ASSESSMENT — ACTIVITIES OF DAILY LIVING (ADL)
DRESS: STREET CLOTHES
ORAL_HYGIENE: INDEPENDENT
LAUNDRY: WITH SUPERVISION
ORAL_HYGIENE: INDEPENDENT
DRESS: INDEPENDENT
GROOMING: INDEPENDENT
GROOMING: SHOWER;HANDWASHING

## 2018-06-09 NOTE — PROGRESS NOTES
Pt awakened at approximately 03:45, requested and given Seroquel 50 mg for anxiety, slept approximately 7 hours.

## 2018-06-09 NOTE — PROGRESS NOTES
"   06/09/18 1412   Behavioral Health   Hallucinations denies / not responding to hallucinations   Thinking poor concentration   Orientation person: oriented;place: oriented;date: oriented;time: oriented   Memory baseline memory   Insight poor   Judgement impaired   Eye Contact at examiner   Affect blunted, flat   Mood anxious;depressed   Physical Appearance/Attire neat   Hygiene well groomed   Suicidality other (see comments)  (denied)   1. Wish to be Dead No   2. Non-Specific Active Suicidal Thoughts  No   Self Injury other (see comment)  (denied)   Elopement (none observed)   Activity isolative   Speech coherent;clear   Medication Sensitivity no stated side effects;no observed side effects   Psychomotor / Gait balanced;steady   Psycho Education   Type of Intervention 1:1 intervention   Response participates, initiates socially appropriate   Hours 0.5   Treatment Detail check in    Activities of Daily Living   Hygiene/Grooming shower;handwashing   Oral Hygiene independent   Dress street clothes   Room Organization independent   Activity   Activity Assistance Provided independent     Pt was isolative in her room for most of the shift. Pt goal today is just to \"stay focus and be able to talk with my kids\" Pt admit being depressed and anxious because \"my  not letting me talk to my kids\". Pt denied any SI/SIB this morning. \"Pt stated my mood is way better than before\" Overall, pt was pleasant in the milieu with staff and peers.   "

## 2018-06-09 NOTE — PROGRESS NOTES
Pt spend all the time during this shift sleeping/napping. Pt only came out of her room for super and want back to her room to sleep.      06/08/18 2100   Behavioral Health   Hallucinations denies / not responding to hallucinations   Thinking poor concentration   Orientation person: oriented;place: oriented;date: oriented;time: oriented   Memory baseline memory   Insight insight appropriate to situation   Judgement impaired   Eye Contact at examiner   Affect blunted, flat   Mood depressed   Physical Appearance/Attire attire appropriate to age and situation   Hygiene other (see comment)  (fair)   Suicidality other (see comments)  (non stated)   Self Injury other (see comment)  (none stated)   Activity isolative;withdrawn   Speech clear;coherent   Medication Sensitivity no stated side effects   Psychomotor / Gait balanced;steady   Psycho Education   Type of Intervention 1:1 intervention   Response unavailable  (sleeping/napping)   Activities of Daily Living   Hygiene/Grooming independent   Oral Hygiene independent   Dress independent   Laundry with supervision   Room Organization independent   Activity   Activity Assistance Provided independent

## 2018-06-10 PROCEDURE — 12400007 ZZH R&B MH INTERMEDIATE UMMC

## 2018-06-10 PROCEDURE — 25000132 ZZH RX MED GY IP 250 OP 250 PS 637: Performed by: PSYCHIATRY & NEUROLOGY

## 2018-06-10 RX ADMIN — FLUOXETINE 80 MG: 20 CAPSULE ORAL at 09:45

## 2018-06-10 RX ADMIN — FERROUS SULFATE TAB 325 MG (65 MG ELEMENTAL FE) 325 MG: 325 (65 FE) TAB at 09:45

## 2018-06-10 RX ADMIN — CYCLOBENZAPRINE HYDROCHLORIDE 10 MG: 10 TABLET, FILM COATED ORAL at 09:45

## 2018-06-10 RX ADMIN — CHOLECALCIFEROL CAP 125 MCG (5000 UNIT) 5000 UNITS: 125 CAP at 09:45

## 2018-06-10 RX ADMIN — B-COMPLEX W/ C & FOLIC ACID TAB 1 TABLET: TAB at 09:54

## 2018-06-10 RX ADMIN — BUPROPION HYDROCHLORIDE 450 MG: 300 TABLET, FILM COATED, EXTENDED RELEASE ORAL at 09:45

## 2018-06-10 RX ADMIN — HYDROXYZINE HYDROCHLORIDE 50 MG: 50 TABLET, FILM COATED ORAL at 19:47

## 2018-06-10 RX ADMIN — HYDROCHLOROTHIAZIDE 25 MG: 12.5 CAPSULE ORAL at 09:45

## 2018-06-10 RX ADMIN — QUETIAPINE FUMARATE 50 MG: 25 TABLET ORAL at 09:45

## 2018-06-10 RX ADMIN — LORATADINE 10 MG: 10 TABLET ORAL at 09:46

## 2018-06-10 RX ADMIN — TRAZODONE HYDROCHLORIDE 50 MG: 50 TABLET ORAL at 22:13

## 2018-06-10 RX ADMIN — CYCLOBENZAPRINE HYDROCHLORIDE 10 MG: 10 TABLET, FILM COATED ORAL at 01:54

## 2018-06-10 RX ADMIN — QUETIAPINE FUMARATE 50 MG: 25 TABLET ORAL at 22:13

## 2018-06-10 RX ADMIN — HYDROXYZINE HYDROCHLORIDE 50 MG: 50 TABLET, FILM COATED ORAL at 01:54

## 2018-06-10 ASSESSMENT — ACTIVITIES OF DAILY LIVING (ADL)
DRESS: STREET CLOTHES
GROOMING: INDEPENDENT
ORAL_HYGIENE: INDEPENDENT
LAUNDRY: WITH SUPERVISION

## 2018-06-10 NOTE — PLAN OF CARE
"Problem: Depression (Adult,Obstetrics,Pediatric)  Goal: Identify Related Risk Factors and Signs and Symptoms  Related risk factors and signs and symptoms are identified upon initiation of Human Response Clinical Practice Guideline (CPG).     RN 48 hour assessment:  Patient endorses a lot of anxiety. She asked for prn Seroquel and Flexeril this morning. Patient stated that she spoke to her 11 year old daughter this morning on the daughter's phone. Patient said that her  would not let her speak to her 7 year old daughter today because \"he said he talked to my drug dealer today.\" Patient said that her  is going to make it difficult for her to see her children and is building a case against her because of her substance abuse. He has called the police and CPS on patient before. Patient said this is why she is so anxious. \"I just don't want to lose my kids.\" Patient said she is going to [place]. \"I don't want to go but I have to.\"       "

## 2018-06-10 NOTE — PROGRESS NOTES
Pt.was isolative and withdrawn to her room this shift. She only came out for medications and supper. Stated she was very anxious, sad, and depressed. Denied SI/SIB. Denied hallucinations. No medication adverse side effects observed or reported this shift. Will continue to monitor.

## 2018-06-10 NOTE — PROGRESS NOTES
Pt requested anxiety medication HydrOXYzine 50 and Flexiril 25 mg for muscle spasms, slept approximately 5.5 hours.

## 2018-06-11 PROCEDURE — 97150 GROUP THERAPEUTIC PROCEDURES: CPT | Mod: GO

## 2018-06-11 PROCEDURE — 25000132 ZZH RX MED GY IP 250 OP 250 PS 637: Performed by: PSYCHIATRY & NEUROLOGY

## 2018-06-11 PROCEDURE — 12400007 ZZH R&B MH INTERMEDIATE UMMC

## 2018-06-11 PROCEDURE — 99232 SBSQ HOSP IP/OBS MODERATE 35: CPT | Performed by: PSYCHIATRY & NEUROLOGY

## 2018-06-11 RX ORDER — DOCUSATE SODIUM 100 MG/1
100 CAPSULE, LIQUID FILLED ORAL 2 TIMES DAILY
Status: DISCONTINUED | OUTPATIENT
Start: 2018-06-11 | End: 2018-06-14 | Stop reason: HOSPADM

## 2018-06-11 RX ORDER — SENNOSIDES 8.6 MG
8.6 TABLET ORAL 2 TIMES DAILY PRN
Status: DISCONTINUED | OUTPATIENT
Start: 2018-06-11 | End: 2018-06-14 | Stop reason: HOSPADM

## 2018-06-11 RX ADMIN — DOCUSATE SODIUM 100 MG: 100 CAPSULE, LIQUID FILLED ORAL at 20:48

## 2018-06-11 RX ADMIN — HYDROXYZINE HYDROCHLORIDE 50 MG: 50 TABLET, FILM COATED ORAL at 09:30

## 2018-06-11 RX ADMIN — HYDROXYZINE HYDROCHLORIDE 50 MG: 50 TABLET, FILM COATED ORAL at 16:40

## 2018-06-11 RX ADMIN — QUETIAPINE FUMARATE 25 MG: 25 TABLET ORAL at 04:45

## 2018-06-11 RX ADMIN — HYDROXYZINE HYDROCHLORIDE 50 MG: 50 TABLET, FILM COATED ORAL at 04:43

## 2018-06-11 RX ADMIN — FLUOXETINE 80 MG: 20 CAPSULE ORAL at 09:28

## 2018-06-11 RX ADMIN — TRAZODONE HYDROCHLORIDE 50 MG: 50 TABLET ORAL at 20:48

## 2018-06-11 RX ADMIN — FERROUS SULFATE TAB 325 MG (65 MG ELEMENTAL FE) 325 MG: 325 (65 FE) TAB at 09:25

## 2018-06-11 RX ADMIN — LORATADINE 10 MG: 10 TABLET ORAL at 09:25

## 2018-06-11 RX ADMIN — CHOLECALCIFEROL CAP 125 MCG (5000 UNIT) 5000 UNITS: 125 CAP at 09:25

## 2018-06-11 RX ADMIN — HYDROCHLOROTHIAZIDE 25 MG: 12.5 CAPSULE ORAL at 09:25

## 2018-06-11 RX ADMIN — QUETIAPINE FUMARATE 50 MG: 25 TABLET ORAL at 15:02

## 2018-06-11 RX ADMIN — BUPROPION HYDROCHLORIDE 450 MG: 300 TABLET, FILM COATED, EXTENDED RELEASE ORAL at 09:24

## 2018-06-11 RX ADMIN — QUETIAPINE FUMARATE 25 MG: 25 TABLET ORAL at 20:48

## 2018-06-11 RX ADMIN — B-COMPLEX W/ C & FOLIC ACID TAB 1 TABLET: TAB at 09:25

## 2018-06-11 RX ADMIN — FLUTICASONE FUROATE 1 PUFF: 100 POWDER RESPIRATORY (INHALATION) at 09:26

## 2018-06-11 ASSESSMENT — ACTIVITIES OF DAILY LIVING (ADL)
DRESS: STREET CLOTHES
LAUNDRY: WITH SUPERVISION
DRESS: STREET CLOTHES
ORAL_HYGIENE: INDEPENDENT
ORAL_HYGIENE: INDEPENDENT
GROOMING: INDEPENDENT
LAUNDRY: WITH SUPERVISION
GROOMING: INDEPENDENT

## 2018-06-11 NOTE — PROGRESS NOTES
Call from Yvonne at [facility], [phone]  Fax: [phone]. Will need notes. PT signed KATY, notes sent.

## 2018-06-11 NOTE — PROGRESS NOTES
Winifred had a good evening, she asked to get number out of her phone as she is working on dealing with her divorce. Told the writer she is going back to [employer], was anxious this evening because she found out from her daughter that her  had reported her to Child Protective Services. Denies SI and SIB.            06/10/18 3020   Behavioral Health   Hallucinations denies / not responding to hallucinations   Thinking intact   Orientation person: oriented;place: oriented;date: oriented;time: oriented   Insight insight appropriate to situation;insight appropriate to events   Judgement intact   Eye Contact at examiner   Affect sad;tense   Mood shame/guilt;anxious   Physical Appearance/Attire disheveled;appears stated age;attire appropriate to age and situation   Hygiene well groomed   1. Wish to be Dead No   2. Non-Specific Active Suicidal Thoughts  No

## 2018-06-11 NOTE — PROGRESS NOTES
Pt appeared to sleep approximately 5.5 hours requested and given Seroquel 25mg and HydrOXYzine 50 mg for anxiety.

## 2018-06-11 NOTE — PLAN OF CARE
Problem: Depression (Adult,Obstetrics,Pediatric)  Intervention: Monitor/Manage Signs of Depression    INITIAL OT ATTENDANCE: Attended.50 of 2.0 possible groups this morning. Had attempted to attend full group, but was called out and unable to participate until last half hour. Pleasant and cooperative. Motivated to task. Followed direction. Pt needs Self Assessment, discuss and plan with patient to meet needs. .Needs more observation.

## 2018-06-11 NOTE — PROGRESS NOTES
Pt spent much time on phone trying to line up financial resources. Her  has taken control of the money due to her gambling debt. She was served divorce papers on the unit days ago. Her  has been telling her children & family, etc negative things about her. Pt has gotten a  to deal with some of the legal issues. She denies si/sib at this time.      06/11/18 1500   Behavioral Health   Hallucinations denies / not responding to hallucinations   Thinking poor concentration;intact   Orientation person: oriented;place: oriented   Memory baseline memory   Insight poor   Judgement impaired   Eye Contact at examiner   Affect blunted, flat   Mood anxious   Hygiene well groomed   Suicidality other (see comments)  (none)   1. Wish to be Dead No   2. Non-Specific Active Suicidal Thoughts  No   Self Injury other (see comment)  (not now)   Activity other (see comment)  (on phone trying to get finacial resources)   Speech clear;coherent   Coping/Psychosocial   Verbalized Emotional State anxiety;frustration   Plan Of Care Reviewed With patient   Psycho Education   Type of Intervention 1:1 intervention   Response participates with encouragement   Hours 0.5   Treatment Detail check in   Activities of Daily Living   Hygiene/Grooming independent   Oral Hygiene independent   Dress street clothes   Laundry with supervision   Room Organization independent        06/11/18 1500   Behavioral Health   Hallucinations denies / not responding to hallucinations   Thinking poor concentration;intact   Orientation person: oriented;place: oriented   Memory baseline memory   Insight poor   Judgement impaired   Eye Contact at examiner   Affect blunted, flat   Mood anxious   Hygiene well groomed   Suicidality other (see comments)  (none)   1. Wish to be Dead No   2. Non-Specific Active Suicidal Thoughts  No   Self Injury other (see comment)  (not now)   Activity other (see comment)  (on phone trying to get finacial resources)   Speech  clear;coherent   Coping/Psychosocial   Verbalized Emotional State anxiety;frustration   Plan Of Care Reviewed With patient   Psycho Education   Type of Intervention 1:1 intervention   Response participates with encouragement   Hours 0.5   Treatment Detail check in   Activities of Daily Living   Hygiene/Grooming independent   Oral Hygiene independent   Dress street clothes   Laundry with supervision   Room Organization independent

## 2018-06-11 NOTE — PROGRESS NOTES
"[facility] [place]   Psychiatric Progress Note        Interim History:   The patient's care was discussed with the treatment team during the daily team meeting and/or staff's chart notes were reviewed.  Staff report patient went to some groups, denied presence of Suicidal ideation and, all together, reported feeling somewhat better and more stable than prior to her admission. She had a chance to talk to her  after  served her with a petition for divorce, said that now she felt better because she had a plan of action. Still [facility] program in [place]. Denies Suicidal ideation. Complained of constipation.         Medications:       BuPROPion HCl ER (XL)  450 mg Oral Daily     cholecalciferol  5,000 Units Oral Daily     ferrous sulfate  325 mg Oral Daily with breakfast     FLUoxetine  80 mg Oral Daily     fluticasone furoate  1 puff Inhalation Daily     hydrochlorothiazide  25 mg Oral Daily     loratadine  10 mg Oral Daily     vitamin B complex with vitamin C  1 tablet Oral Daily          Allergies:     Allergies   Allergen Reactions     Fish-Derived Products Shortness Of Breath and Nausea and Vomiting     Nkda [No Known Drug Allergies]           Labs:   No results found for this or any previous visit (from the past 24 hour(s)).       Psychiatric Examination:     /52  Pulse 73  Temp 98.5  F (36.9  C) (Oral)  Resp 17  Ht 1.702 m (5' 7\")  Wt 87.7 kg (193 lb 6.4 oz)  LMP 05/17/2018  SpO2 98%  BMI 30.29 kg/m2  Weight is 193 lbs 6.4 oz  Body mass index is 30.29 kg/(m^2).  Orthostatic Vitals       Most Recent      Sitting Orthostatic /71 06/11 0700    Sitting Orthostatic Pulse (bpm) 86 06/11 0700    Standing Orthostatic /75 06/11 0700    Standing Orthostatic Pulse (bpm) 91 06/11 0700            Appearance: awake, alert and adequately groomed  Attitude:  cooperative  Eye Contact:  good  Mood:  anxious and better  Affect:  " appropriate and in normal range  Speech:  clear, coherent  Psychomotor Behavior:  no evidence of tardive dyskinesia, dystonia, or tics  Throught Process:  logical and linear  Associations:  no loose associations  Thought Content:  no evidence of suicidal ideation or homicidal ideation and no evidence of psychotic thought  Insight:  fair  Judgement:  fair  Oriented to:  time, person, and place  Attention Span and Concentration:  fair  Recent and Remote Memory:  fair    Clinical Global Impressions  First:  Considering your total clinical experience with this particular patient population, how severe are the patient's symptoms at this time?: 6 (06/08/18 1425)  Compared to the patient's condition at the START of treatment, this patient's condition is:: 4 (06/08/18 1425)  Most recent:  Considering your total clinical experience with this particular patient population, how severe are the patient's symptoms at this time?: 6 (06/08/18 1425)  Compared to the patient's condition at the START of treatment, this patient's condition is:: 4 (06/08/18 1425)    # Pain Assessment:  Current Pain Score 6/11/2018   Patient currently in pain? no   Pain score (0-10) -   Pain location -   Pain descriptors -   Winifred s pain level was assessed and she currently denies pain.             Precautions:     Behavioral Orders   Procedures     Code 1 - Restrict to Unit     Routine Programming     As clinically indicated     Status 15     Every 15 minutes.          DIagnoses:     1.  Major depressive disorder, recurrent, moderate severity.   2.  Generalized anxiety disorder.   3.  Hypertension.    4. The patient probably meets criteria for alcohol use disorder and stimulant use disorder.          Plan:   Will start on Senokot and Colace per patient's request. CTC will help to arrange transfer to [facility]. Because of high risk for relapse/readmission, minimal social support patient needs to stay hospitalized.

## 2018-06-11 NOTE — PROGRESS NOTES
VM from staff at [facility]; pt will be seen today at noon    VM from Mavis at [facility], [phone]. Would like to fax pt some papers. CTC called back, left message with fax number.

## 2018-06-12 PROCEDURE — 90853 GROUP PSYCHOTHERAPY: CPT

## 2018-06-12 PROCEDURE — 25000132 ZZH RX MED GY IP 250 OP 250 PS 637: Performed by: PSYCHIATRY & NEUROLOGY

## 2018-06-12 PROCEDURE — 12400007 ZZH R&B MH INTERMEDIATE UMMC

## 2018-06-12 RX ADMIN — FERROUS SULFATE TAB 325 MG (65 MG ELEMENTAL FE) 325 MG: 325 (65 FE) TAB at 09:05

## 2018-06-12 RX ADMIN — LORATADINE 10 MG: 10 TABLET ORAL at 09:04

## 2018-06-12 RX ADMIN — DOCUSATE SODIUM 100 MG: 100 CAPSULE, LIQUID FILLED ORAL at 09:05

## 2018-06-12 RX ADMIN — FLUTICASONE FUROATE 1 PUFF: 100 POWDER RESPIRATORY (INHALATION) at 09:09

## 2018-06-12 RX ADMIN — B-COMPLEX W/ C & FOLIC ACID TAB 1 TABLET: TAB at 09:05

## 2018-06-12 RX ADMIN — QUETIAPINE FUMARATE 50 MG: 25 TABLET ORAL at 21:46

## 2018-06-12 RX ADMIN — TRAZODONE HYDROCHLORIDE 50 MG: 50 TABLET ORAL at 21:46

## 2018-06-12 RX ADMIN — BUPROPION HYDROCHLORIDE 450 MG: 300 TABLET, FILM COATED, EXTENDED RELEASE ORAL at 09:04

## 2018-06-12 RX ADMIN — DOCUSATE SODIUM 100 MG: 100 CAPSULE, LIQUID FILLED ORAL at 21:46

## 2018-06-12 RX ADMIN — HYDROCHLOROTHIAZIDE 25 MG: 12.5 CAPSULE ORAL at 09:04

## 2018-06-12 RX ADMIN — QUETIAPINE FUMARATE 50 MG: 25 TABLET ORAL at 14:00

## 2018-06-12 RX ADMIN — CHOLECALCIFEROL CAP 125 MCG (5000 UNIT) 5000 UNITS: 125 CAP at 09:05

## 2018-06-12 RX ADMIN — FLUOXETINE 80 MG: 20 CAPSULE ORAL at 09:04

## 2018-06-12 ASSESSMENT — ACTIVITIES OF DAILY LIVING (ADL)
LAUNDRY: WITH SUPERVISION
GROOMING: INDEPENDENT
DRESS: INDEPENDENT
GROOMING: INDEPENDENT
ORAL_HYGIENE: INDEPENDENT
DRESS: STREET CLOTHES
ORAL_HYGIENE: INDEPENDENT

## 2018-06-12 NOTE — PLAN OF CARE
"Problem: Depression (Adult,Obstetrics,Pediatric)  Goal: Identify Related Risk Factors and Signs and Symptoms  Related risk factors and signs and symptoms are identified upon initiation of Human Response Clinical Practice Guideline (CPG).   48 hour nursing assessment:  Pt evaluation continues. Assessed mood, anxiety, thoughts, and behavior. Is progressing towards goals. Encourage participation in groups and developing healthy coping skills.     Pt has been visible in the milieu attended and participated in groups today.  Pleasant on approach and mood is calm.  Pt denies all mental health symptoms today and reports feeling \"hopeful\", is looking forward to going back to treatment in [place] this Thursday.  Pt denies having any thoughts to hurt herself.   Will continue to assess.      "

## 2018-06-12 NOTE — PROGRESS NOTES
Met with pt to discuss discharge. Discussed Thursday as potential date.    [facility]vonne at [facility], [phone]. Left message that pt will be ready to go to treatment Thursday.    Call back from Yvonne. Thursday will work, will need to get insurance approval. They will provide transportation

## 2018-06-13 PROCEDURE — 99232 SBSQ HOSP IP/OBS MODERATE 35: CPT | Performed by: PSYCHIATRY & NEUROLOGY

## 2018-06-13 PROCEDURE — 25000132 ZZH RX MED GY IP 250 OP 250 PS 637: Performed by: PSYCHIATRY & NEUROLOGY

## 2018-06-13 PROCEDURE — 12400007 ZZH R&B MH INTERMEDIATE UMMC

## 2018-06-13 RX ORDER — TRAZODONE HYDROCHLORIDE 50 MG/1
50 TABLET, FILM COATED ORAL
Qty: 30 TABLET | Refills: 1 | Status: ON HOLD | OUTPATIENT
Start: 2018-06-13 | End: 2021-12-13

## 2018-06-13 RX ORDER — SENNOSIDES 8.6 MG
1 TABLET ORAL 2 TIMES DAILY PRN
Qty: 60 EACH | Refills: 1 | Status: ON HOLD | OUTPATIENT
Start: 2018-06-13 | End: 2021-12-13

## 2018-06-13 RX ORDER — FERROUS SULFATE 325(65) MG
325 TABLET ORAL
Qty: 30 TABLET | Refills: 1 | Status: ON HOLD | OUTPATIENT
Start: 2018-06-13 | End: 2021-12-13

## 2018-06-13 RX ORDER — DOCUSATE SODIUM 100 MG/1
100 CAPSULE, LIQUID FILLED ORAL 2 TIMES DAILY
Qty: 60 CAPSULE | Refills: 1 | Status: ON HOLD | OUTPATIENT
Start: 2018-06-13 | End: 2021-12-13

## 2018-06-13 RX ORDER — POLYETHYLENE GLYCOL 3350 17 G/17G
1 POWDER, FOR SOLUTION ORAL DAILY
Qty: 510 G | Refills: 1 | Status: ON HOLD | OUTPATIENT
Start: 2018-06-13 | End: 2021-12-13

## 2018-06-13 RX ORDER — QUETIAPINE FUMARATE 25 MG/1
25-50 TABLET, FILM COATED ORAL 3 TIMES DAILY PRN
Qty: 60 TABLET | Refills: 1 | Status: ON HOLD | OUTPATIENT
Start: 2018-06-13 | End: 2021-12-13

## 2018-06-13 RX ORDER — HYDROCHLOROTHIAZIDE 12.5 MG/1
25 CAPSULE ORAL DAILY
Qty: 60 CAPSULE | Refills: 1 | Status: ON HOLD | OUTPATIENT
Start: 2018-06-13 | End: 2021-12-13

## 2018-06-13 RX ORDER — CYCLOBENZAPRINE HCL 10 MG
10 TABLET ORAL 3 TIMES DAILY PRN
Qty: 90 TABLET | Refills: 0 | Status: ON HOLD | OUTPATIENT
Start: 2018-06-13 | End: 2021-12-13

## 2018-06-13 RX ORDER — LORATADINE 10 MG/1
10 TABLET ORAL DAILY
Qty: 30 TABLET | Refills: 1 | Status: ON HOLD | OUTPATIENT
Start: 2018-06-13 | End: 2021-12-15

## 2018-06-13 RX ORDER — HYDROXYZINE HYDROCHLORIDE 25 MG/1
25-50 TABLET, FILM COATED ORAL 3 TIMES DAILY PRN
Qty: 90 TABLET | Refills: 1 | Status: ON HOLD | OUTPATIENT
Start: 2018-06-13 | End: 2021-12-15

## 2018-06-13 RX ORDER — BUPROPION HYDROCHLORIDE 450 MG/1
450 TABLET, FILM COATED, EXTENDED RELEASE ORAL DAILY
Qty: 30 TABLET | Refills: 1 | Status: ON HOLD | OUTPATIENT
Start: 2018-06-13 | End: 2021-12-13

## 2018-06-13 RX ADMIN — CHOLECALCIFEROL CAP 125 MCG (5000 UNIT) 5000 UNITS: 125 CAP at 07:45

## 2018-06-13 RX ADMIN — HYDROCHLOROTHIAZIDE 25 MG: 12.5 CAPSULE ORAL at 07:43

## 2018-06-13 RX ADMIN — LORATADINE 10 MG: 10 TABLET ORAL at 07:43

## 2018-06-13 RX ADMIN — B-COMPLEX W/ C & FOLIC ACID TAB 1 TABLET: TAB at 07:45

## 2018-06-13 RX ADMIN — TRAZODONE HYDROCHLORIDE 50 MG: 50 TABLET ORAL at 21:42

## 2018-06-13 RX ADMIN — FERROUS SULFATE TAB 325 MG (65 MG ELEMENTAL FE) 325 MG: 325 (65 FE) TAB at 07:45

## 2018-06-13 RX ADMIN — HYDROXYZINE HYDROCHLORIDE 50 MG: 50 TABLET, FILM COATED ORAL at 19:20

## 2018-06-13 RX ADMIN — DOCUSATE SODIUM 100 MG: 100 CAPSULE, LIQUID FILLED ORAL at 07:43

## 2018-06-13 RX ADMIN — QUETIAPINE FUMARATE 50 MG: 25 TABLET ORAL at 21:42

## 2018-06-13 RX ADMIN — DOCUSATE SODIUM 100 MG: 100 CAPSULE, LIQUID FILLED ORAL at 19:20

## 2018-06-13 RX ADMIN — FLUOXETINE 80 MG: 20 CAPSULE ORAL at 07:43

## 2018-06-13 RX ADMIN — BUPROPION HYDROCHLORIDE 450 MG: 300 TABLET, FILM COATED, EXTENDED RELEASE ORAL at 07:43

## 2018-06-13 RX ADMIN — HYDROXYZINE HYDROCHLORIDE 50 MG: 50 TABLET, FILM COATED ORAL at 07:41

## 2018-06-13 RX ADMIN — FLUTICASONE FUROATE 1 PUFF: 100 POWDER RESPIRATORY (INHALATION) at 07:44

## 2018-06-13 ASSESSMENT — ACTIVITIES OF DAILY LIVING (ADL)
GROOMING: INDEPENDENT
DRESS: INDEPENDENT
LAUNDRY: WITH SUPERVISION
GROOMING: HANDWASHING;SHOWER;INDEPENDENT
ORAL_HYGIENE: INDEPENDENT
DRESS: STREET CLOTHES;INDEPENDENT

## 2018-06-13 NOTE — PLAN OF CARE
Problem: Depression (Adult,Obstetrics,Pediatric)  Goal: Improved/Stable Mood  Patient will demonstrate the desired outcomes by discharge/transition of care.   Attended 1 of 3 OT groups offered. Reported she missed morning groups as she needed to deal with divorce issues. Attentive and focused x 45 minutes. Contributed to discussion focused on health and wellness by discussing sx's she was experiencing, the cause for exacerbation of sx's and positive coping she is aware of and effective in using when she is stable. Made special note that when in crisis one is unable to problem solve plan and execute rational plan of action effectively. Did contribute by sharing her positive coping skill knowledge and resource awareness. Identified learning new resource and took note of them and took a First Call for Help card offered to the group. Thankful for the resources offered and given during the group.

## 2018-06-13 NOTE — PROGRESS NOTES
"[facility], [place]   Psychiatric Progress Note        Interim History:   The patient's care was discussed with the treatment team during the daily team meeting and/or staff's chart notes were reviewed.  Staff report patient went to groups, denied presence of Suicidal ideation and, overall, reported feeling more positive and hopeful about her future. She still complains of constipation, asked for Miralax. Looks forward to going to [email or web address] tomorrow.          Medications:       BuPROPion HCl ER (XL)  450 mg Oral Daily     cholecalciferol  5,000 Units Oral Daily     docusate sodium  100 mg Oral BID     ferrous sulfate  325 mg Oral Daily with breakfast     FLUoxetine  80 mg Oral Daily     fluticasone furoate  1 puff Inhalation Daily     hydrochlorothiazide  25 mg Oral Daily     loratadine  10 mg Oral Daily     vitamin B complex with vitamin C  1 tablet Oral Daily          Allergies:     Allergies   Allergen Reactions     Fish-Derived Products Shortness Of Breath and Nausea and Vomiting     Nkda [No Known Drug Allergies]           Labs:   No results found for this or any previous visit (from the past 24 hour(s)).       Psychiatric Examination:     /74  Pulse 78  Temp 98.5  F (36.9  C)  Resp 16  Ht 1.702 m (5' 7\")  Wt 88.2 kg (194 lb 8 oz)  LMP 05/17/2018  SpO2 98%  BMI 30.46 kg/m2  Weight is 194 lbs 8 oz  Body mass index is 30.46 kg/(m^2).                    Sitting Orthostatic BP: 107/74         Standing Orthostatic BP: 124/76     Appearance: awake, alert and adequately groomed  Attitude:  cooperative  Eye Contact:  good  Mood:  anxious and better  Affect:  appropriate and in normal range  Speech:  clear, coherent  Psychomotor Behavior:  no evidence of tardive dyskinesia, dystonia, or tics  Throught Process:  logical and linear  Associations:  no loose associations  Thought Content:  no evidence of suicidal ideation or homicidal ideation and no evidence of psychotic " thought  Insight:  fair  Judgement:  fair  Oriented to:  time, person, and place  Attention Span and Concentration:  fair  Recent and Remote Memory:  fair    Clinical Global Impressions  First:  Considering your total clinical experience with this particular patient population, how severe are the patient's symptoms at this time?: 6 (06/08/18 1425)  Compared to the patient's condition at the START of treatment, this patient's condition is:: 4 (06/08/18 1425)  Most recent:  Considering your total clinical experience with this particular patient population, how severe are the patient's symptoms at this time?: 6 (06/08/18 1425)  Compared to the patient's condition at the START of treatment, this patient's condition is:: 4 (06/08/18 1425)    # Pain Assessment:  Current Pain Score 6/13/2018   Patient currently in pain? no   Pain score (0-10) -   Pain location -   Pain descriptors -   Winifred s pain level was assessed and she currently denies pain.             Precautions:     Behavioral Orders   Procedures     Code 1 - Restrict to Unit     Routine Programming     As clinically indicated     Status 15     Every 15 minutes.          DIagnoses:     1.  Major depressive disorder, recurrent, moderate severity.   2.  Generalized anxiety disorder.   3.  Hypertension.    4. The patient probably meets criteria for alcohol use disorder and stimulant use disorder.          Plan:   Will start on Miralax per patient's request. Will be discharged to [facility] program tomorrow.

## 2018-06-13 NOTE — DISCHARGE INSTRUCTIONS
Behavioral Discharge Planning and Instructions      Summary:  You were admitted on 6/7/2018  due to Depression, Anxiety, substance  Use Disorder and Suicide Attempt.  You were treated by Dr. Tono Flores MD and discharged on 6/14/2018 from [facility] Treatment [facility]      Principal Diagnosis:   1.  Major depressive disorder, recurrent, moderate severity.   2.  Generalized anxiety disorder.     Health Care Follow-up Appointments:   You are being discharged to [facility].     You are voluntarily involved with [facility]. Your  is Mavis. Phone: [phone] Fax: [phone]   Attend all scheduled appointments with your outpatient providers. Call at least 24 hours in advance if you need to reschedule an appointment to ensure continued access to your outpatient providers.   Major Treatments, Procedures and Findings:  You were provided with: a psychiatric assessment, assessed for medical stability, medication evaluation and/or management, CD evaluation/assessment and milieu management    Symptoms to Report: increased confusion, increased depression, urges to use, thoughts of self or other harm    Early warning signs can include: increased depression or anxiety sleep disturbances increased thoughts or behaviors of suicide or self-harm  increased unusual thinking, such as paranoia or hearing voices    Safety and Wellness:  Take all medicines as directed.  Make no changes unless your doctor suggests them.      Follow treatment recommendations.  Refrain from alcohol and non-prescribed drugs.  If there is a concern for safety, call 911.    Resources:   Crisis Intervention: 458.485.8373 or 109-261-9969 (TTY: 911.217.7270).  Call anytime for help.  National [place] on Mental Illness (www.mn.desirae.org): 572.381.2253 or 825-751-0710.  Alcoholics Anonymous (www.alcoholics-anonymous.org): Check your phone book for your local chapter.  Suicide Awareness Voices of  "Education (SAVE) (www.save.org): 246-752-HPNQ (7283)  National Suicide Prevention Line (www.mentalhealthmn.org): 900-785-CTIE (0393)  Mental Health Consumer/Survivor Network of [place] (www.mhcsn.net): 535-042-5427 or 640-827-0663  Self- Management and Recovery Training., [employer]-- Toll free: 166.606.4244  www.thereNow.[employer]  Text 4 Life: txt \"LIFE\" to 22320 for immediate support and crisis intervention    The treatment team has appreciated the opportunity to work with you.     If you have any questions or concerns our unit number is [phone]        "

## 2018-06-13 NOTE — PLAN OF CARE
Problem: Depression (Adult,Obstetrics,Pediatric)  Goal: Improved/Stable Mood  Patient will demonstrate the desired outcomes by discharge/transition of care.   Outcome: Improving  Patient denied depression and suicidal ideation but endorsed anxiety after she was allowed to check status of bills on line and found that  had shut off her phone and returned her car to the dealership where she had purchased it.  She received PRN Seroquel at  for sleep and anxiety.  Patient hopes to go to [facility] as soon as insurance is approved and a bed is available.  She denied pain or other physical problems.

## 2018-06-14 VITALS
HEART RATE: 78 BPM | SYSTOLIC BLOOD PRESSURE: 107 MMHG | TEMPERATURE: 99.2 F | DIASTOLIC BLOOD PRESSURE: 74 MMHG | RESPIRATION RATE: 16 BRPM | HEIGHT: 67 IN | BODY MASS INDEX: 30.48 KG/M2 | OXYGEN SATURATION: 98 % | WEIGHT: 194.2 LBS

## 2018-06-14 PROCEDURE — 25000132 ZZH RX MED GY IP 250 OP 250 PS 637: Performed by: PSYCHIATRY & NEUROLOGY

## 2018-06-14 PROCEDURE — 99238 HOSP IP/OBS DSCHRG MGMT 30/<: CPT | Performed by: PSYCHIATRY & NEUROLOGY

## 2018-06-14 RX ADMIN — BUPROPION HYDROCHLORIDE 450 MG: 300 TABLET, FILM COATED, EXTENDED RELEASE ORAL at 08:55

## 2018-06-14 RX ADMIN — FLUOXETINE 80 MG: 20 CAPSULE ORAL at 08:55

## 2018-06-14 RX ADMIN — FERROUS SULFATE TAB 325 MG (65 MG ELEMENTAL FE) 325 MG: 325 (65 FE) TAB at 08:55

## 2018-06-14 RX ADMIN — B-COMPLEX W/ C & FOLIC ACID TAB 1 TABLET: TAB at 08:56

## 2018-06-14 RX ADMIN — LORATADINE 10 MG: 10 TABLET ORAL at 08:56

## 2018-06-14 RX ADMIN — DOCUSATE SODIUM 100 MG: 100 CAPSULE, LIQUID FILLED ORAL at 08:55

## 2018-06-14 RX ADMIN — HYDROCHLOROTHIAZIDE 25 MG: 12.5 CAPSULE ORAL at 08:56

## 2018-06-14 RX ADMIN — CHOLECALCIFEROL CAP 125 MCG (5000 UNIT) 5000 UNITS: 125 CAP at 08:55

## 2018-06-14 RX ADMIN — FLUTICASONE FUROATE 1 PUFF: 100 POWDER RESPIRATORY (INHALATION) at 08:55

## 2018-06-14 ASSESSMENT — ACTIVITIES OF DAILY LIVING (ADL)
ORAL_HYGIENE: INDEPENDENT
GROOMING: INDEPENDENT
DRESS: STREET CLOTHES;INDEPENDENT

## 2018-06-14 NOTE — PLAN OF CARE
"Problem: Depression (Adult,Obstetrics,Pediatric)  Goal: Identify Related Risk Factors and Signs and Symptoms  Related risk factors and signs and symptoms are identified upon initiation of Human Response Clinical Practice Guideline (CPG).   Outcome: Adequate for Discharge Date Met: 06/14/18  \"I'm better, I'm more optimistic. I'm in the planning mode, I know what needs to be done\". Depression has improved and is minimal, anxiety has slightly improved. \"I'm processing things much better\". Is able to focus and concentrate better. Attends 1/3 of the groups and is social with peers. Read over discharge instructions with patient and answered questions. Was discharged at 1320 with staff from [facility].       "

## 2018-06-14 NOTE — PROGRESS NOTES
Patient was in good spirits this evening, used computer in access emails without issue, is preparing to leave tomorrow afternoon. Will d/c to [place]. Denies SI and SIB, enthusiastic about discharging.            06/13/18 2993   Behavioral Health   Hallucinations denies / not responding to hallucinations   Thinking intact   Orientation person: oriented;place: oriented;date: oriented;time: oriented   Memory baseline memory   Insight insight appropriate to situation;insight appropriate to events   Judgement intact   Eye Contact at examiner   Affect full range affect   Mood mood is calm   Physical Appearance/Attire appears stated age;attire appropriate to age and situation   Hygiene well groomed   1. Wish to be Dead No   2. Non-Specific Active Suicidal Thoughts  No

## 2018-06-15 ENCOUNTER — TELEPHONE (OUTPATIENT)
Dept: PHARMACY | Facility: OTHER | Age: 48
End: 2018-06-15

## 2018-06-15 NOTE — TELEPHONE ENCOUNTER
MTM referral from: Transitions of Care (recent hospital discharge or ED visit)    MTM referral outreach attempt #1 on On[place] 15, 2018 at 7:52 AM      Outcome: Patient is not interested at this time because she was d/c to a treatment program, will route to MTM Pharmacist/Provider as an FYI. Thank you for the referral.     Shamar Thrasher, MTM Coordinator
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
## 2019-07-03 ENCOUNTER — MEDICAL CORRESPONDENCE (OUTPATIENT)
Dept: HEALTH INFORMATION MANAGEMENT | Facility: CLINIC | Age: 49
End: 2019-07-03

## 2019-07-03 ENCOUNTER — ANCILLARY PROCEDURE (OUTPATIENT)
Dept: GENERAL RADIOLOGY | Facility: CLINIC | Age: 49
End: 2019-07-03
Attending: SURGERY
Payer: COMMERCIAL

## 2019-07-03 DIAGNOSIS — R76.11 MANTOUX: POSITIVE: ICD-10-CM

## 2019-07-03 PROCEDURE — 71046 X-RAY EXAM CHEST 2 VIEWS: CPT | Mod: FY

## 2019-09-30 ENCOUNTER — HEALTH MAINTENANCE LETTER (OUTPATIENT)
Age: 49
End: 2019-09-30

## 2020-01-03 ENCOUNTER — OFFICE VISIT - HEALTHEAST (OUTPATIENT)
Dept: BEHAVIORAL HEALTH | Facility: CLINIC | Age: 50
End: 2020-01-03

## 2020-01-03 DIAGNOSIS — F63.0 GAMBLING DISORDER, PERSISTENT, SEVERE: ICD-10-CM

## 2020-01-03 DIAGNOSIS — F41.1 GENERALIZED ANXIETY DISORDER: ICD-10-CM

## 2020-01-03 DIAGNOSIS — F10.20 SEVERE ALCOHOL USE DISORDER (H): ICD-10-CM

## 2020-01-03 DIAGNOSIS — Z72.6 GAMBLING: ICD-10-CM

## 2020-01-03 DIAGNOSIS — F15.20 METHAMPHETAMINE USE DISORDER, SEVERE (H): ICD-10-CM

## 2020-01-03 DIAGNOSIS — F33.1 MODERATE EPISODE OF RECURRENT MAJOR DEPRESSIVE DISORDER (H): ICD-10-CM

## 2020-01-03 DIAGNOSIS — F10.20 MODERATE ALCOHOL USE DISORDER (H): ICD-10-CM

## 2020-01-03 LAB
AMPHETAMINES UR QL SCN: NORMAL
BARBITURATES UR QL: NORMAL
BENZODIAZ UR QL: NORMAL
CANNABINOIDS UR QL SCN: NORMAL
COCAINE UR QL: NORMAL
CREAT UR-MCNC: 151.7 MG/DL
METHADONE UR QL SCN: NORMAL
OPIATES UR QL SCN: NORMAL
OXYCODONE UR QL: NORMAL
PCP UR QL SCN: NORMAL

## 2020-01-03 ASSESSMENT — ANXIETY QUESTIONNAIRES
4. TROUBLE RELAXING: SEVERAL DAYS
1. FEELING NERVOUS, ANXIOUS, OR ON EDGE: MORE THAN HALF THE DAYS
2. NOT BEING ABLE TO STOP OR CONTROL WORRYING: SEVERAL DAYS
6. BECOMING EASILY ANNOYED OR IRRITABLE: MORE THAN HALF THE DAYS
GAD7 TOTAL SCORE: 10
5. BEING SO RESTLESS THAT IT IS HARD TO SIT STILL: SEVERAL DAYS
7. FEELING AFRAID AS IF SOMETHING AWFUL MIGHT HAPPEN: MORE THAN HALF THE DAYS
3. WORRYING TOO MUCH ABOUT DIFFERENT THINGS: SEVERAL DAYS

## 2020-01-03 ASSESSMENT — PATIENT HEALTH QUESTIONNAIRE - PHQ9: SUM OF ALL RESPONSES TO PHQ QUESTIONS 1-9: 7

## 2020-01-03 ASSESSMENT — MIFFLIN-ST. JEOR: SCORE: 1478.84

## 2020-01-06 ENCOUNTER — COMMUNICATION - HEALTHEAST (OUTPATIENT)
Dept: BEHAVIORAL HEALTH | Facility: CLINIC | Age: 50
End: 2020-01-06

## 2020-01-06 DIAGNOSIS — F15.20 METHAMPHETAMINE USE DISORDER, SEVERE (H): ICD-10-CM

## 2020-01-06 DIAGNOSIS — Z72.6 GAMBLING: ICD-10-CM

## 2020-01-06 DIAGNOSIS — F10.20 SEVERE ALCOHOL USE DISORDER (H): ICD-10-CM

## 2020-01-06 LAB
ETHYL GLUCURONIDE UR CFM-MCNC: <100 NG/ML
ETHYL SULFATE UR CFM-MCNC: <100 NG/ML

## 2020-01-20 ENCOUNTER — AMBULATORY - HEALTHEAST (OUTPATIENT)
Dept: BEHAVIORAL HEALTH | Facility: CLINIC | Age: 50
End: 2020-01-20

## 2020-01-20 DIAGNOSIS — F10.20 SEVERE ALCOHOL USE DISORDER (H): ICD-10-CM

## 2020-01-20 DIAGNOSIS — F15.20 METHAMPHETAMINE USE DISORDER, SEVERE (H): ICD-10-CM

## 2020-01-20 DIAGNOSIS — Z72.6 GAMBLING: ICD-10-CM

## 2020-01-20 LAB
AMPHETAMINES UR QL SCN: NORMAL
BARBITURATES UR QL: NORMAL
BENZODIAZ UR QL: NORMAL
CANNABINOIDS UR QL SCN: NORMAL
COCAINE UR QL: NORMAL
CREAT UR-MCNC: 192.3 MG/DL
METHADONE UR QL SCN: NORMAL
OPIATES UR QL SCN: NORMAL
OXYCODONE UR QL: NORMAL
PCP UR QL SCN: NORMAL

## 2020-01-20 ASSESSMENT — PATIENT HEALTH QUESTIONNAIRE - PHQ9: SUM OF ALL RESPONSES TO PHQ QUESTIONS 1-9: 8

## 2020-01-20 ASSESSMENT — ANXIETY QUESTIONNAIRES
5. BEING SO RESTLESS THAT IT IS HARD TO SIT STILL: NOT AT ALL
3. WORRYING TOO MUCH ABOUT DIFFERENT THINGS: SEVERAL DAYS
IF YOU CHECKED OFF ANY PROBLEMS ON THIS QUESTIONNAIRE, HOW DIFFICULT HAVE THESE PROBLEMS MADE IT FOR YOU TO DO YOUR WORK, TAKE CARE OF THINGS AT HOME, OR GET ALONG WITH OTHER PEOPLE: SOMEWHAT DIFFICULT
4. TROUBLE RELAXING: SEVERAL DAYS
6. BECOMING EASILY ANNOYED OR IRRITABLE: SEVERAL DAYS
2. NOT BEING ABLE TO STOP OR CONTROL WORRYING: SEVERAL DAYS
GAD7 TOTAL SCORE: 7
7. FEELING AFRAID AS IF SOMETHING AWFUL MIGHT HAPPEN: SEVERAL DAYS
1. FEELING NERVOUS, ANXIOUS, OR ON EDGE: MORE THAN HALF THE DAYS

## 2020-01-20 ASSESSMENT — MIFFLIN-ST. JEOR: SCORE: 1492.45

## 2020-01-23 LAB
ETHYL GLUCURONIDE UR CFM-MCNC: <100 NG/ML
ETHYL SULFATE UR CFM-MCNC: <100 NG/ML

## 2020-02-10 ENCOUNTER — AMBULATORY - HEALTHEAST (OUTPATIENT)
Dept: BEHAVIORAL HEALTH | Facility: CLINIC | Age: 50
End: 2020-02-10

## 2020-02-10 DIAGNOSIS — F15.20 METHAMPHETAMINE USE DISORDER, SEVERE (H): ICD-10-CM

## 2020-02-10 DIAGNOSIS — Z72.6 GAMBLING: ICD-10-CM

## 2020-02-10 DIAGNOSIS — F10.20 SEVERE ALCOHOL USE DISORDER (H): ICD-10-CM

## 2020-02-10 LAB
AMPHETAMINES UR QL SCN: NORMAL
BARBITURATES UR QL: NORMAL
BENZODIAZ UR QL: NORMAL
CANNABINOIDS UR QL SCN: NORMAL
COCAINE UR QL: NORMAL
CREAT UR-MCNC: 95 MG/DL
METHADONE UR QL SCN: NORMAL
OPIATES UR QL SCN: NORMAL
OXYCODONE UR QL: NORMAL
PCP UR QL SCN: NORMAL

## 2020-02-12 LAB
ETHYL GLUCURONIDE UR CFM-MCNC: <100 NG/ML
ETHYL SULFATE UR CFM-MCNC: <100 NG/ML

## 2020-03-02 ENCOUNTER — AMBULATORY - HEALTHEAST (OUTPATIENT)
Dept: BEHAVIORAL HEALTH | Facility: CLINIC | Age: 50
End: 2020-03-02

## 2020-03-02 DIAGNOSIS — Z72.6 GAMBLING: ICD-10-CM

## 2020-03-02 DIAGNOSIS — F15.20 METHAMPHETAMINE USE DISORDER, SEVERE (H): ICD-10-CM

## 2020-03-02 DIAGNOSIS — F10.20 SEVERE ALCOHOL USE DISORDER (H): ICD-10-CM

## 2020-03-02 LAB
AMPHETAMINES UR QL SCN: NORMAL
BARBITURATES UR QL: NORMAL
BENZODIAZ UR QL: NORMAL
CANNABINOIDS UR QL SCN: NORMAL
COCAINE UR QL: NORMAL
CREAT UR-MCNC: 42.1 MG/DL
METHADONE UR QL SCN: NORMAL
OPIATES UR QL SCN: NORMAL
OXYCODONE UR QL: NORMAL
PCP UR QL SCN: NORMAL

## 2020-03-02 ASSESSMENT — MIFFLIN-ST. JEOR: SCORE: 1487.92

## 2020-03-05 LAB
ETHYL GLUCURONIDE UR CFM-MCNC: <100 NG/ML
ETHYL SULFATE UR CFM-MCNC: <100 NG/ML

## 2020-03-23 ENCOUNTER — COMMUNICATION - HEALTHEAST (OUTPATIENT)
Dept: BEHAVIORAL HEALTH | Facility: CLINIC | Age: 50
End: 2020-03-23

## 2020-03-24 ENCOUNTER — AMBULATORY - HEALTHEAST (OUTPATIENT)
Dept: BEHAVIORAL HEALTH | Facility: CLINIC | Age: 50
End: 2020-03-24

## 2020-03-24 DIAGNOSIS — Z72.6 GAMBLING: ICD-10-CM

## 2020-03-24 DIAGNOSIS — F15.20 METHAMPHETAMINE USE DISORDER, SEVERE (H): ICD-10-CM

## 2020-03-24 DIAGNOSIS — F10.20 SEVERE ALCOHOL USE DISORDER (H): ICD-10-CM

## 2020-03-24 LAB
AMPHETAMINES UR QL SCN: NORMAL
BARBITURATES UR QL: NORMAL
BENZODIAZ UR QL: NORMAL
CANNABINOIDS UR QL SCN: NORMAL
COCAINE UR QL: NORMAL
CREAT UR-MCNC: 230.3 MG/DL
METHADONE UR QL SCN: NORMAL
OPIATES UR QL SCN: NORMAL
OXYCODONE UR QL: NORMAL
PCP UR QL SCN: NORMAL

## 2020-03-26 ENCOUNTER — OFFICE VISIT - HEALTHEAST (OUTPATIENT)
Dept: BEHAVIORAL HEALTH | Facility: CLINIC | Age: 50
End: 2020-03-26

## 2020-03-26 DIAGNOSIS — F17.200 NICOTINE USE DISORDER: ICD-10-CM

## 2020-03-27 LAB
ETHYL GLUCURONIDE UR CFM-MCNC: <100 NG/ML
ETHYL SULFATE UR CFM-MCNC: <100 NG/ML

## 2020-04-15 ENCOUNTER — AMBULATORY - HEALTHEAST (OUTPATIENT)
Dept: BEHAVIORAL HEALTH | Facility: CLINIC | Age: 50
End: 2020-04-15

## 2020-04-15 DIAGNOSIS — F10.20 SEVERE ALCOHOL USE DISORDER (H): ICD-10-CM

## 2020-04-15 DIAGNOSIS — Z72.6 GAMBLING: ICD-10-CM

## 2020-04-15 DIAGNOSIS — F15.20 METHAMPHETAMINE USE DISORDER, SEVERE (H): ICD-10-CM

## 2020-04-15 LAB
AMPHETAMINES UR QL SCN: NORMAL
BARBITURATES UR QL: NORMAL
BENZODIAZ UR QL: NORMAL
CANNABINOIDS UR QL SCN: NORMAL
COCAINE UR QL: NORMAL
CREAT UR-MCNC: 68.1 MG/DL
METHADONE UR QL SCN: NORMAL
OPIATES UR QL SCN: NORMAL
OXYCODONE UR QL: NORMAL
PCP UR QL SCN: NORMAL

## 2020-04-15 ASSESSMENT — MIFFLIN-ST. JEOR: SCORE: 1452.76

## 2020-04-16 ENCOUNTER — OFFICE VISIT - HEALTHEAST (OUTPATIENT)
Dept: BEHAVIORAL HEALTH | Facility: CLINIC | Age: 50
End: 2020-04-16

## 2020-04-16 DIAGNOSIS — F10.20 SEVERE ALCOHOL USE DISORDER (H): ICD-10-CM

## 2020-04-16 DIAGNOSIS — F17.200 NICOTINE USE DISORDER: ICD-10-CM

## 2020-04-16 DIAGNOSIS — Z72.6 GAMBLING: ICD-10-CM

## 2020-04-16 ASSESSMENT — ANXIETY QUESTIONNAIRES
6. BECOMING EASILY ANNOYED OR IRRITABLE: SEVERAL DAYS
GAD7 TOTAL SCORE: 7
5. BEING SO RESTLESS THAT IT IS HARD TO SIT STILL: SEVERAL DAYS
7. FEELING AFRAID AS IF SOMETHING AWFUL MIGHT HAPPEN: SEVERAL DAYS
4. TROUBLE RELAXING: SEVERAL DAYS
2. NOT BEING ABLE TO STOP OR CONTROL WORRYING: SEVERAL DAYS
3. WORRYING TOO MUCH ABOUT DIFFERENT THINGS: SEVERAL DAYS
1. FEELING NERVOUS, ANXIOUS, OR ON EDGE: SEVERAL DAYS

## 2020-04-16 ASSESSMENT — PATIENT HEALTH QUESTIONNAIRE - PHQ9: SUM OF ALL RESPONSES TO PHQ QUESTIONS 1-9: 3

## 2020-04-18 LAB
ETHYL GLUCURONIDE UR CFM-MCNC: <100 NG/ML
ETHYL SULFATE UR CFM-MCNC: <100 NG/ML

## 2020-05-04 ENCOUNTER — AMBULATORY - HEALTHEAST (OUTPATIENT)
Dept: BEHAVIORAL HEALTH | Facility: CLINIC | Age: 50
End: 2020-05-04

## 2020-05-04 DIAGNOSIS — F15.20 METHAMPHETAMINE USE DISORDER, SEVERE (H): ICD-10-CM

## 2020-05-04 DIAGNOSIS — Z72.6 GAMBLING: ICD-10-CM

## 2020-05-04 DIAGNOSIS — F10.20 SEVERE ALCOHOL USE DISORDER (H): ICD-10-CM

## 2020-05-04 LAB
AMPHETAMINES UR QL SCN: NORMAL
BARBITURATES UR QL: NORMAL
BENZODIAZ UR QL: NORMAL
CANNABINOIDS UR QL SCN: NORMAL
COCAINE UR QL: NORMAL
CREAT UR-MCNC: 210.7 MG/DL
METHADONE UR QL SCN: NORMAL
OPIATES UR QL SCN: NORMAL
OXYCODONE UR QL: NORMAL
PCP UR QL SCN: NORMAL

## 2020-05-07 LAB
ETHYL GLUCURONIDE UR CFM-MCNC: <100 NG/ML
ETHYL SULFATE UR CFM-MCNC: <100 NG/ML

## 2020-05-28 ENCOUNTER — OFFICE VISIT - HEALTHEAST (OUTPATIENT)
Dept: BEHAVIORAL HEALTH | Facility: CLINIC | Age: 50
End: 2020-05-28

## 2020-05-28 DIAGNOSIS — F33.1 MODERATE EPISODE OF RECURRENT MAJOR DEPRESSIVE DISORDER (H): ICD-10-CM

## 2020-05-28 DIAGNOSIS — F41.1 GENERALIZED ANXIETY DISORDER: ICD-10-CM

## 2020-05-28 DIAGNOSIS — F10.20 MODERATE ALCOHOL USE DISORDER (H): ICD-10-CM

## 2020-05-29 ENCOUNTER — COMMUNICATION - HEALTHEAST (OUTPATIENT)
Dept: BEHAVIORAL HEALTH | Facility: CLINIC | Age: 50
End: 2020-05-29

## 2020-06-02 ENCOUNTER — AMBULATORY - HEALTHEAST (OUTPATIENT)
Dept: BEHAVIORAL HEALTH | Facility: CLINIC | Age: 50
End: 2020-06-02

## 2020-06-02 DIAGNOSIS — Z72.6 GAMBLING: ICD-10-CM

## 2020-06-02 DIAGNOSIS — F15.20 METHAMPHETAMINE USE DISORDER, SEVERE (H): ICD-10-CM

## 2020-06-02 DIAGNOSIS — F10.20 SEVERE ALCOHOL USE DISORDER (H): ICD-10-CM

## 2020-06-02 LAB
AMPHETAMINES UR QL SCN: NORMAL
BARBITURATES UR QL: NORMAL
BENZODIAZ UR QL: NORMAL
CANNABINOIDS UR QL SCN: NORMAL
COCAINE UR QL: NORMAL
CREAT UR-MCNC: 193.5 MG/DL
METHADONE UR QL SCN: NORMAL
OPIATES UR QL SCN: NORMAL
OXYCODONE UR QL: NORMAL
PCP UR QL SCN: NORMAL

## 2020-06-05 LAB
ETHYL GLUCURONIDE UR CFM-MCNC: <100 NG/ML
ETHYL SULFATE UR CFM-MCNC: <100 NG/ML

## 2020-06-22 ENCOUNTER — AMBULATORY - HEALTHEAST (OUTPATIENT)
Dept: BEHAVIORAL HEALTH | Facility: CLINIC | Age: 50
End: 2020-06-22

## 2020-06-22 DIAGNOSIS — Z72.6 GAMBLING: ICD-10-CM

## 2020-06-22 DIAGNOSIS — F15.20 METHAMPHETAMINE USE DISORDER, SEVERE (H): ICD-10-CM

## 2020-06-22 DIAGNOSIS — F10.20 SEVERE ALCOHOL USE DISORDER (H): ICD-10-CM

## 2020-06-23 LAB
AMPHETAMINES UR QL SCN: NORMAL
BARBITURATES UR QL: NORMAL
BENZODIAZ UR QL: NORMAL
CANNABINOIDS UR QL SCN: NORMAL
COCAINE UR QL: NORMAL
CREAT UR-MCNC: 209.2 MG/DL
METHADONE UR QL SCN: NORMAL
OPIATES UR QL SCN: NORMAL
OXYCODONE UR QL: NORMAL
PCP UR QL SCN: NORMAL

## 2020-06-28 LAB
ETHYL GLUCURONIDE UR CFM-MCNC: <100 NG/ML
ETHYL SULFATE UR CFM-MCNC: <100 NG/ML

## 2020-07-02 ENCOUNTER — COMMUNICATION - HEALTHEAST (OUTPATIENT)
Dept: BEHAVIORAL HEALTH | Facility: CLINIC | Age: 50
End: 2020-07-02

## 2020-07-24 ENCOUNTER — AMBULATORY - HEALTHEAST (OUTPATIENT)
Dept: BEHAVIORAL HEALTH | Facility: CLINIC | Age: 50
End: 2020-07-24

## 2020-07-24 DIAGNOSIS — Z72.6 GAMBLING: ICD-10-CM

## 2020-07-24 DIAGNOSIS — F15.20 METHAMPHETAMINE USE DISORDER, SEVERE (H): ICD-10-CM

## 2020-07-24 DIAGNOSIS — F10.20 SEVERE ALCOHOL USE DISORDER (H): ICD-10-CM

## 2020-07-24 LAB
AMPHETAMINES UR QL SCN: NORMAL
BARBITURATES UR QL: NORMAL
BENZODIAZ UR QL: NORMAL
CANNABINOIDS UR QL SCN: NORMAL
COCAINE UR QL: NORMAL
CREAT UR-MCNC: 273.5 MG/DL
METHADONE UR QL SCN: NORMAL
OPIATES UR QL SCN: NORMAL
OXYCODONE UR QL: NORMAL
PCP UR QL SCN: NORMAL

## 2020-07-24 ASSESSMENT — MIFFLIN-ST. JEOR: SCORE: 1443.69

## 2020-07-31 LAB
ETHYL GLUCURONIDE UR CFM-MCNC: <100 NG/ML
ETHYL SULFATE UR CFM-MCNC: <100 NG/ML

## 2020-08-03 ENCOUNTER — COMMUNICATION - HEALTHEAST (OUTPATIENT)
Dept: BEHAVIORAL HEALTH | Facility: HOSPITAL | Age: 50
End: 2020-08-03

## 2020-08-03 ENCOUNTER — OFFICE VISIT - HEALTHEAST (OUTPATIENT)
Dept: BEHAVIORAL HEALTH | Facility: CLINIC | Age: 50
End: 2020-08-03

## 2020-08-03 DIAGNOSIS — F63.0 GAMBLING DISORDER, PERSISTENT, SEVERE: ICD-10-CM

## 2020-08-03 DIAGNOSIS — F15.20 METHAMPHETAMINE USE DISORDER, SEVERE (H): ICD-10-CM

## 2020-08-03 DIAGNOSIS — F10.20 MODERATE ALCOHOL USE DISORDER (H): ICD-10-CM

## 2020-08-03 ASSESSMENT — ANXIETY QUESTIONNAIRES
7. FEELING AFRAID AS IF SOMETHING AWFUL MIGHT HAPPEN: SEVERAL DAYS
6. BECOMING EASILY ANNOYED OR IRRITABLE: SEVERAL DAYS
GAD7 TOTAL SCORE: 6
5. BEING SO RESTLESS THAT IT IS HARD TO SIT STILL: NOT AT ALL
1. FEELING NERVOUS, ANXIOUS, OR ON EDGE: SEVERAL DAYS
2. NOT BEING ABLE TO STOP OR CONTROL WORRYING: SEVERAL DAYS
3. WORRYING TOO MUCH ABOUT DIFFERENT THINGS: SEVERAL DAYS
4. TROUBLE RELAXING: SEVERAL DAYS

## 2020-08-03 ASSESSMENT — PATIENT HEALTH QUESTIONNAIRE - PHQ9: SUM OF ALL RESPONSES TO PHQ QUESTIONS 1-9: 2

## 2020-08-06 ENCOUNTER — COMMUNICATION - HEALTHEAST (OUTPATIENT)
Dept: BEHAVIORAL HEALTH | Facility: CLINIC | Age: 50
End: 2020-08-06

## 2020-08-14 ENCOUNTER — AMBULATORY - HEALTHEAST (OUTPATIENT)
Dept: BEHAVIORAL HEALTH | Facility: CLINIC | Age: 50
End: 2020-08-14

## 2020-08-14 DIAGNOSIS — Z72.6 GAMBLING: ICD-10-CM

## 2020-08-14 DIAGNOSIS — F15.20 METHAMPHETAMINE USE DISORDER, SEVERE (H): ICD-10-CM

## 2020-08-14 DIAGNOSIS — F10.20 SEVERE ALCOHOL USE DISORDER (H): ICD-10-CM

## 2020-08-14 LAB
AMPHETAMINES UR QL SCN: NORMAL
BARBITURATES UR QL: NORMAL
BENZODIAZ UR QL: NORMAL
CANNABINOIDS UR QL SCN: NORMAL
COCAINE UR QL: NORMAL
CREAT UR-MCNC: 133.2 MG/DL
METHADONE UR QL SCN: NORMAL
OPIATES UR QL SCN: NORMAL
OXYCODONE UR QL: NORMAL
PCP UR QL SCN: NORMAL

## 2020-08-19 LAB
ETHYL GLUCURONIDE UR CFM-MCNC: <100 NG/ML
ETHYL SULFATE UR CFM-MCNC: <100 NG/ML

## 2020-08-28 ENCOUNTER — OFFICE VISIT - HEALTHEAST (OUTPATIENT)
Dept: BEHAVIORAL HEALTH | Facility: CLINIC | Age: 50
End: 2020-08-28

## 2020-08-28 DIAGNOSIS — F41.1 GENERALIZED ANXIETY DISORDER: ICD-10-CM

## 2020-08-28 DIAGNOSIS — F15.20 METHAMPHETAMINE USE DISORDER, SEVERE (H): ICD-10-CM

## 2020-08-28 DIAGNOSIS — F10.20 SEVERE ALCOHOL USE DISORDER (H): ICD-10-CM

## 2020-08-28 DIAGNOSIS — F63.0 GAMBLING DISORDER, PERSISTENT, SEVERE: ICD-10-CM

## 2020-08-28 ASSESSMENT — ANXIETY QUESTIONNAIRES
GAD7 TOTAL SCORE: 0
1. FEELING NERVOUS, ANXIOUS, OR ON EDGE: NOT AT ALL
4. TROUBLE RELAXING: NOT AT ALL
2. NOT BEING ABLE TO STOP OR CONTROL WORRYING: NOT AT ALL
3. WORRYING TOO MUCH ABOUT DIFFERENT THINGS: NOT AT ALL
7. FEELING AFRAID AS IF SOMETHING AWFUL MIGHT HAPPEN: NOT AT ALL
6. BECOMING EASILY ANNOYED OR IRRITABLE: NOT AT ALL
5. BEING SO RESTLESS THAT IT IS HARD TO SIT STILL: NOT AT ALL

## 2020-08-28 ASSESSMENT — PATIENT HEALTH QUESTIONNAIRE - PHQ9: SUM OF ALL RESPONSES TO PHQ QUESTIONS 1-9: 7

## 2020-09-04 ENCOUNTER — AMBULATORY - HEALTHEAST (OUTPATIENT)
Dept: BEHAVIORAL HEALTH | Facility: CLINIC | Age: 50
End: 2020-09-04

## 2020-09-04 DIAGNOSIS — F63.0 GAMBLING DISORDER, PERSISTENT, SEVERE: ICD-10-CM

## 2020-09-04 LAB
AMPHETAMINES UR QL SCN: NORMAL
BARBITURATES UR QL: NORMAL
BENZODIAZ UR QL: NORMAL
CANNABINOIDS UR QL SCN: NORMAL
COCAINE UR QL: NORMAL
CREAT UR-MCNC: 381.7 MG/DL
METHADONE UR QL SCN: NORMAL
OPIATES UR QL SCN: NORMAL
OXYCODONE UR QL: NORMAL
PCP UR QL SCN: NORMAL

## 2020-09-10 LAB
ETHYL GLUCURONIDE UR CFM-MCNC: <100 NG/ML
ETHYL SULFATE UR CFM-MCNC: <100 NG/ML

## 2020-09-25 ENCOUNTER — OFFICE VISIT - HEALTHEAST (OUTPATIENT)
Dept: BEHAVIORAL HEALTH | Facility: CLINIC | Age: 50
End: 2020-09-25

## 2020-09-25 DIAGNOSIS — F10.20 SEVERE ALCOHOL USE DISORDER (H): ICD-10-CM

## 2020-09-25 DIAGNOSIS — F33.1 MODERATE EPISODE OF RECURRENT MAJOR DEPRESSIVE DISORDER (H): ICD-10-CM

## 2020-09-25 DIAGNOSIS — F15.20 METHAMPHETAMINE USE DISORDER, SEVERE (H): ICD-10-CM

## 2020-09-25 DIAGNOSIS — F41.1 GENERALIZED ANXIETY DISORDER: ICD-10-CM

## 2020-09-25 DIAGNOSIS — F63.0 GAMBLING DISORDER, PERSISTENT, SEVERE: ICD-10-CM

## 2020-09-25 ASSESSMENT — ANXIETY QUESTIONNAIRES
2. NOT BEING ABLE TO STOP OR CONTROL WORRYING: SEVERAL DAYS
7. FEELING AFRAID AS IF SOMETHING AWFUL MIGHT HAPPEN: NOT AT ALL
1. FEELING NERVOUS, ANXIOUS, OR ON EDGE: SEVERAL DAYS
GAD7 TOTAL SCORE: 7
5. BEING SO RESTLESS THAT IT IS HARD TO SIT STILL: NOT AT ALL
3. WORRYING TOO MUCH ABOUT DIFFERENT THINGS: MORE THAN HALF THE DAYS
6. BECOMING EASILY ANNOYED OR IRRITABLE: SEVERAL DAYS
4. TROUBLE RELAXING: MORE THAN HALF THE DAYS
IF YOU CHECKED OFF ANY PROBLEMS ON THIS QUESTIONNAIRE, HOW DIFFICULT HAVE THESE PROBLEMS MADE IT FOR YOU TO DO YOUR WORK, TAKE CARE OF THINGS AT HOME, OR GET ALONG WITH OTHER PEOPLE: SOMEWHAT DIFFICULT

## 2020-09-25 ASSESSMENT — PATIENT HEALTH QUESTIONNAIRE - PHQ9: SUM OF ALL RESPONSES TO PHQ QUESTIONS 1-9: 5

## 2020-10-05 ENCOUNTER — AMBULATORY - HEALTHEAST (OUTPATIENT)
Dept: BEHAVIORAL HEALTH | Facility: CLINIC | Age: 50
End: 2020-10-05

## 2020-10-05 DIAGNOSIS — F63.0 GAMBLING DISORDER, PERSISTENT, SEVERE: ICD-10-CM

## 2020-10-05 LAB
AMPHETAMINES UR QL SCN: NORMAL
BARBITURATES UR QL: NORMAL
BENZODIAZ UR QL: NORMAL
CANNABINOIDS UR QL SCN: NORMAL
COCAINE UR QL: NORMAL
CREAT UR-MCNC: 160 MG/DL
METHADONE UR QL SCN: NORMAL
OPIATES UR QL SCN: NORMAL
OXYCODONE UR QL: NORMAL
PCP UR QL SCN: NORMAL

## 2020-10-05 ASSESSMENT — MIFFLIN-ST. JEOR: SCORE: 1457.84

## 2020-10-08 ENCOUNTER — COMMUNICATION - HEALTHEAST (OUTPATIENT)
Dept: BEHAVIORAL HEALTH | Facility: CLINIC | Age: 50
End: 2020-10-08

## 2020-10-08 LAB
ETHYL GLUCURONIDE UR CFM-MCNC: <100 NG/ML
ETHYL SULFATE UR CFM-MCNC: <100 NG/ML

## 2020-10-26 ENCOUNTER — COMMUNICATION - HEALTHEAST (OUTPATIENT)
Dept: BEHAVIORAL HEALTH | Facility: CLINIC | Age: 50
End: 2020-10-26

## 2020-11-02 ENCOUNTER — AMBULATORY - HEALTHEAST (OUTPATIENT)
Dept: BEHAVIORAL HEALTH | Facility: CLINIC | Age: 50
End: 2020-11-02

## 2020-11-02 DIAGNOSIS — F63.0 GAMBLING DISORDER, PERSISTENT, SEVERE: ICD-10-CM

## 2020-11-02 LAB
AMPHETAMINES UR QL SCN: NORMAL
BARBITURATES UR QL: NORMAL
BENZODIAZ UR QL: NORMAL
CANNABINOIDS UR QL SCN: NORMAL
COCAINE UR QL: NORMAL
CREAT UR-MCNC: 135.6 MG/DL
METHADONE UR QL SCN: NORMAL
OPIATES UR QL SCN: NORMAL
OXYCODONE UR QL: NORMAL
PCP UR QL SCN: NORMAL

## 2020-11-05 LAB
ETHYL GLUCURONIDE UR CFM-MCNC: <100 NG/ML
ETHYL SULFATE UR CFM-MCNC: <100 NG/ML

## 2020-11-06 ENCOUNTER — COMMUNICATION - HEALTHEAST (OUTPATIENT)
Dept: BEHAVIORAL HEALTH | Facility: HOSPITAL | Age: 50
End: 2020-11-06

## 2020-11-17 ENCOUNTER — OFFICE VISIT - HEALTHEAST (OUTPATIENT)
Dept: BEHAVIORAL HEALTH | Facility: CLINIC | Age: 50
End: 2020-11-17

## 2020-11-17 ENCOUNTER — COMMUNICATION - HEALTHEAST (OUTPATIENT)
Dept: BEHAVIORAL HEALTH | Facility: CLINIC | Age: 50
End: 2020-11-17

## 2020-11-17 DIAGNOSIS — F17.200 NICOTINE USE DISORDER: ICD-10-CM

## 2020-11-17 DIAGNOSIS — F10.20 MODERATE ALCOHOL USE DISORDER (H): ICD-10-CM

## 2020-11-17 DIAGNOSIS — F63.0 GAMBLING DISORDER, PERSISTENT, SEVERE: ICD-10-CM

## 2020-11-17 DIAGNOSIS — F15.20 METHAMPHETAMINE USE DISORDER, SEVERE (H): ICD-10-CM

## 2020-11-17 ASSESSMENT — ANXIETY QUESTIONNAIRES
2. NOT BEING ABLE TO STOP OR CONTROL WORRYING: SEVERAL DAYS
GAD7 TOTAL SCORE: 6
3. WORRYING TOO MUCH ABOUT DIFFERENT THINGS: SEVERAL DAYS
4. TROUBLE RELAXING: SEVERAL DAYS
5. BEING SO RESTLESS THAT IT IS HARD TO SIT STILL: NOT AT ALL
6. BECOMING EASILY ANNOYED OR IRRITABLE: SEVERAL DAYS
7. FEELING AFRAID AS IF SOMETHING AWFUL MIGHT HAPPEN: NOT AT ALL
1. FEELING NERVOUS, ANXIOUS, OR ON EDGE: MORE THAN HALF THE DAYS
IF YOU CHECKED OFF ANY PROBLEMS ON THIS QUESTIONNAIRE, HOW DIFFICULT HAVE THESE PROBLEMS MADE IT FOR YOU TO DO YOUR WORK, TAKE CARE OF THINGS AT HOME, OR GET ALONG WITH OTHER PEOPLE: SOMEWHAT DIFFICULT

## 2020-11-17 ASSESSMENT — PATIENT HEALTH QUESTIONNAIRE - PHQ9: SUM OF ALL RESPONSES TO PHQ QUESTIONS 1-9: 9

## 2020-11-23 ENCOUNTER — AMBULATORY - HEALTHEAST (OUTPATIENT)
Dept: BEHAVIORAL HEALTH | Facility: CLINIC | Age: 50
End: 2020-11-23

## 2020-11-23 DIAGNOSIS — F10.20 MODERATE ALCOHOL USE DISORDER (H): ICD-10-CM

## 2020-11-23 LAB
AMPHETAMINES UR QL SCN: NORMAL
BARBITURATES UR QL: NORMAL
BENZODIAZ UR QL: NORMAL
CANNABINOIDS UR QL SCN: NORMAL
COCAINE UR QL: NORMAL
CREAT UR-MCNC: 103.7 MG/DL
METHADONE UR QL SCN: NORMAL
OPIATES UR QL SCN: NORMAL
OXYCODONE UR QL: NORMAL
PCP UR QL SCN: NORMAL

## 2020-11-26 LAB
ETHYL GLUCURONIDE UR CFM-MCNC: <100 NG/ML
ETHYL SULFATE UR CFM-MCNC: <100 NG/ML

## 2020-12-14 ENCOUNTER — AMBULATORY - HEALTHEAST (OUTPATIENT)
Dept: BEHAVIORAL HEALTH | Facility: CLINIC | Age: 50
End: 2020-12-14

## 2020-12-14 DIAGNOSIS — F10.20 MODERATE ALCOHOL USE DISORDER (H): ICD-10-CM

## 2020-12-14 LAB
AMPHETAMINES UR QL SCN: NORMAL
BARBITURATES UR QL: NORMAL
BENZODIAZ UR QL: NORMAL
CANNABINOIDS UR QL SCN: NORMAL
COCAINE UR QL: NORMAL
CREAT UR-MCNC: 201.2 MG/DL
METHADONE UR QL SCN: NORMAL
OPIATES UR QL SCN: NORMAL
OXYCODONE UR QL: NORMAL
PCP UR QL SCN: NORMAL

## 2020-12-20 LAB
ETHYL GLUCURONIDE UR CFM-MCNC: <100 NG/ML
ETHYL SULFATE UR CFM-MCNC: <100 NG/ML

## 2020-12-28 ENCOUNTER — COMMUNICATION - HEALTHEAST (OUTPATIENT)
Dept: BEHAVIORAL HEALTH | Facility: CLINIC | Age: 50
End: 2020-12-28

## 2021-01-04 ENCOUNTER — AMBULATORY - HEALTHEAST (OUTPATIENT)
Dept: BEHAVIORAL HEALTH | Facility: CLINIC | Age: 51
End: 2021-01-04

## 2021-01-04 DIAGNOSIS — F10.20 MODERATE ALCOHOL USE DISORDER (H): ICD-10-CM

## 2021-01-04 LAB
AMPHETAMINES UR QL SCN: NORMAL
BARBITURATES UR QL: NORMAL
BENZODIAZ UR QL: NORMAL
CANNABINOIDS UR QL SCN: NORMAL
COCAINE UR QL: NORMAL
CREAT UR-MCNC: 207.5 MG/DL
METHADONE UR QL SCN: NORMAL
OPIATES UR QL SCN: NORMAL
OXYCODONE UR QL: NORMAL
PCP UR QL SCN: NORMAL

## 2021-01-04 ASSESSMENT — MIFFLIN-ST. JEOR: SCORE: 1483.38

## 2021-01-07 ENCOUNTER — COMMUNICATION - HEALTHEAST (OUTPATIENT)
Dept: BEHAVIORAL HEALTH | Facility: CLINIC | Age: 51
End: 2021-01-07

## 2021-01-07 LAB
ETHYL GLUCURONIDE UR CFM-MCNC: <100 NG/ML
ETHYL SULFATE UR CFM-MCNC: <100 NG/ML

## 2021-01-15 ENCOUNTER — HEALTH MAINTENANCE LETTER (OUTPATIENT)
Age: 51
End: 2021-01-15

## 2021-01-29 ENCOUNTER — AMBULATORY - HEALTHEAST (OUTPATIENT)
Dept: BEHAVIORAL HEALTH | Facility: CLINIC | Age: 51
End: 2021-01-29

## 2021-01-29 DIAGNOSIS — F10.20 MODERATE ALCOHOL USE DISORDER (H): ICD-10-CM

## 2021-01-29 LAB
AMPHETAMINES UR QL SCN: NORMAL
BARBITURATES UR QL: NORMAL
BENZODIAZ UR QL: NORMAL
CANNABINOIDS UR QL SCN: NORMAL
COCAINE UR QL: NORMAL
CREAT UR-MCNC: 213.3 MG/DL
METHADONE UR QL SCN: NORMAL
OPIATES UR QL SCN: NORMAL
OXYCODONE UR QL: NORMAL
PCP UR QL SCN: NORMAL

## 2021-01-31 LAB
ETHYL GLUCURONIDE UR CFM-MCNC: <100 NG/ML
ETHYL SULFATE UR CFM-MCNC: <100 NG/ML

## 2021-02-16 ENCOUNTER — COMMUNICATION - HEALTHEAST (OUTPATIENT)
Dept: BEHAVIORAL HEALTH | Facility: CLINIC | Age: 51
End: 2021-02-16

## 2021-02-19 ENCOUNTER — AMBULATORY - HEALTHEAST (OUTPATIENT)
Dept: BEHAVIORAL HEALTH | Facility: CLINIC | Age: 51
End: 2021-02-19

## 2021-02-19 DIAGNOSIS — F10.20 MODERATE ALCOHOL USE DISORDER (H): ICD-10-CM

## 2021-02-19 LAB
AMPHETAMINES UR QL SCN: NORMAL
BARBITURATES UR QL: NORMAL
BENZODIAZ UR QL: NORMAL
CANNABINOIDS UR QL SCN: NORMAL
COCAINE UR QL: NORMAL
CREAT UR-MCNC: 85.2 MG/DL
METHADONE UR QL SCN: NORMAL
OPIATES UR QL SCN: NORMAL
OXYCODONE UR QL: NORMAL
PCP UR QL SCN: NORMAL

## 2021-02-24 LAB
ETHYL GLUCURONIDE UR CFM-MCNC: <100 NG/ML
ETHYL SULFATE UR CFM-MCNC: <100 NG/ML

## 2021-02-25 ENCOUNTER — OFFICE VISIT - HEALTHEAST (OUTPATIENT)
Dept: BEHAVIORAL HEALTH | Facility: CLINIC | Age: 51
End: 2021-02-25

## 2021-02-25 DIAGNOSIS — K59.09 OTHER CONSTIPATION: ICD-10-CM

## 2021-02-25 DIAGNOSIS — F17.200 NICOTINE USE DISORDER: ICD-10-CM

## 2021-02-25 DIAGNOSIS — F10.20 SEVERE ALCOHOL USE DISORDER (H): ICD-10-CM

## 2021-02-25 DIAGNOSIS — F63.0 GAMBLING DISORDER, SEVERE: ICD-10-CM

## 2021-02-25 DIAGNOSIS — F15.20 METHAMPHETAMINE USE DISORDER, SEVERE (H): ICD-10-CM

## 2021-03-12 ENCOUNTER — AMBULATORY - HEALTHEAST (OUTPATIENT)
Dept: BEHAVIORAL HEALTH | Facility: CLINIC | Age: 51
End: 2021-03-12

## 2021-03-12 DIAGNOSIS — F10.20 MODERATE ALCOHOL USE DISORDER (H): ICD-10-CM

## 2021-03-12 LAB
AMPHETAMINES UR QL SCN: NORMAL
BARBITURATES UR QL: NORMAL
BENZODIAZ UR QL: NORMAL
CANNABINOIDS UR QL SCN: NORMAL
COCAINE UR QL: NORMAL
CREAT UR-MCNC: 143 MG/DL
METHADONE UR QL SCN: NORMAL
OPIATES UR QL SCN: NORMAL
OXYCODONE UR QL: NORMAL
PCP UR QL SCN: NORMAL

## 2021-03-15 ENCOUNTER — AMBULATORY - HEALTHEAST (OUTPATIENT)
Dept: BEHAVIORAL HEALTH | Facility: CLINIC | Age: 51
End: 2021-03-15

## 2021-03-15 DIAGNOSIS — F10.20 MODERATE ALCOHOL USE DISORDER (H): ICD-10-CM

## 2021-03-15 LAB
ETHYL GLUCURONIDE UR CFM-MCNC: <100 NG/ML
ETHYL SULFATE UR CFM-MCNC: <100 NG/ML

## 2021-04-02 ENCOUNTER — AMBULATORY - HEALTHEAST (OUTPATIENT)
Dept: BEHAVIORAL HEALTH | Facility: CLINIC | Age: 51
End: 2021-04-02

## 2021-04-02 DIAGNOSIS — F10.20 MODERATE ALCOHOL USE DISORDER (H): ICD-10-CM

## 2021-04-02 LAB
AMPHETAMINES UR QL SCN: NORMAL
BARBITURATES UR QL: NORMAL
BENZODIAZ UR QL: NORMAL
CANNABINOIDS UR QL SCN: NORMAL
COCAINE UR QL: NORMAL
CREAT UR-MCNC: 163.7 MG/DL
METHADONE UR QL SCN: NORMAL
OPIATES UR QL SCN: NORMAL
OXYCODONE UR QL: NORMAL
PCP UR QL SCN: NORMAL

## 2021-04-06 LAB
ETHYL GLUCURONIDE UR CFM-MCNC: <100 NG/ML
ETHYL SULFATE UR CFM-MCNC: <100 NG/ML

## 2021-04-26 ENCOUNTER — AMBULATORY - HEALTHEAST (OUTPATIENT)
Dept: BEHAVIORAL HEALTH | Facility: CLINIC | Age: 51
End: 2021-04-26

## 2021-04-26 DIAGNOSIS — F10.20 MODERATE ALCOHOL USE DISORDER (H): ICD-10-CM

## 2021-04-26 LAB
AMPHETAMINES UR QL SCN: NORMAL
BARBITURATES UR QL: NORMAL
BENZODIAZ UR QL: NORMAL
CANNABINOIDS UR QL SCN: NORMAL
COCAINE UR QL: NORMAL
CREAT UR-MCNC: 251.3 MG/DL
METHADONE UR QL SCN: NORMAL
OPIATES UR QL SCN: NORMAL
OXYCODONE UR QL: NORMAL
PCP UR QL SCN: NORMAL

## 2021-04-29 LAB
ETHYL GLUCURONIDE UR CFM-MCNC: 144 NG/ML
ETHYL SULFATE UR CFM-MCNC: <100 NG/ML

## 2021-05-06 ENCOUNTER — COMMUNICATION - HEALTHEAST (OUTPATIENT)
Dept: BEHAVIORAL HEALTH | Facility: CLINIC | Age: 51
End: 2021-05-06

## 2021-05-17 ENCOUNTER — RECORDS - HEALTHEAST (OUTPATIENT)
Dept: ADMINISTRATIVE | Facility: OTHER | Age: 51
End: 2021-05-17

## 2021-05-17 ENCOUNTER — AMBULATORY - HEALTHEAST (OUTPATIENT)
Dept: BEHAVIORAL HEALTH | Facility: CLINIC | Age: 51
End: 2021-05-17

## 2021-05-17 DIAGNOSIS — F10.20 MODERATE ALCOHOL USE DISORDER (H): ICD-10-CM

## 2021-05-17 LAB
AMPHETAMINES UR QL SCN: NORMAL
BARBITURATES UR QL: NORMAL
BENZODIAZ UR QL: NORMAL
CANNABINOIDS UR QL SCN: NORMAL
COCAINE UR QL: NORMAL
CREAT UR-MCNC: 63.7 MG/DL
METHADONE UR QL SCN: NORMAL
OPIATES UR QL SCN: NORMAL
OXYCODONE UR QL: NORMAL
PCP UR QL SCN: NORMAL

## 2021-05-17 ASSESSMENT — MIFFLIN-ST. JEOR: SCORE: 1479.98

## 2021-05-19 LAB
ETHYL GLUCURONIDE UR CFM-MCNC: <100 NG/ML
ETHYL SULFATE UR CFM-MCNC: <100 NG/ML

## 2021-05-25 ENCOUNTER — OFFICE VISIT - HEALTHEAST (OUTPATIENT)
Dept: BEHAVIORAL HEALTH | Facility: CLINIC | Age: 51
End: 2021-05-25

## 2021-05-25 ENCOUNTER — AMBULATORY - HEALTHEAST (OUTPATIENT)
Dept: BEHAVIORAL HEALTH | Facility: CLINIC | Age: 51
End: 2021-05-25

## 2021-05-25 DIAGNOSIS — F15.20 METHAMPHETAMINE USE DISORDER, SEVERE (H): ICD-10-CM

## 2021-05-25 DIAGNOSIS — F10.20 MODERATE ALCOHOL USE DISORDER (H): ICD-10-CM

## 2021-05-25 DIAGNOSIS — F63.0 GAMBLING DISORDER, SEVERE: ICD-10-CM

## 2021-05-25 DIAGNOSIS — F17.200 NICOTINE USE DISORDER: ICD-10-CM

## 2021-05-27 VITALS
DIASTOLIC BLOOD PRESSURE: 83 MMHG | WEIGHT: 181 LBS | SYSTOLIC BLOOD PRESSURE: 136 MMHG | HEIGHT: 66 IN | HEART RATE: 72 BPM | BODY MASS INDEX: 29.09 KG/M2

## 2021-05-27 VITALS — SYSTOLIC BLOOD PRESSURE: 124 MMHG | DIASTOLIC BLOOD PRESSURE: 82 MMHG

## 2021-05-27 VITALS
HEIGHT: 66 IN | DIASTOLIC BLOOD PRESSURE: 77 MMHG | HEART RATE: 80 BPM | SYSTOLIC BLOOD PRESSURE: 151 MMHG | TEMPERATURE: 98.7 F | BODY MASS INDEX: 28.77 KG/M2 | WEIGHT: 179 LBS

## 2021-05-27 VITALS — HEIGHT: 66 IN | BODY MASS INDEX: 30.18 KG/M2 | WEIGHT: 187 LBS | BODY MASS INDEX: 31.34 KG/M2

## 2021-05-28 ASSESSMENT — ANXIETY QUESTIONNAIRES
GAD7 TOTAL SCORE: 7
GAD7 TOTAL SCORE: 6
GAD7 TOTAL SCORE: 0
GAD7 TOTAL SCORE: 6
GAD7 TOTAL SCORE: 10

## 2021-06-04 NOTE — PROGRESS NOTES
Correct pharmacy verified with patient and confirmed in snapshot? [x] yes []no    Charge captured ? [x] yes  [] no    Medications Phoned  to Pharmacy [] yes [x]no  Name of Pharmacist:  List Medications, including dose, quantity and instructions      Medication Prescriptions given to patient   [] yes  [x] no   List the name of the drug the prescription was written for.       Medications ordered this visit were e-scribed.  Verified by order class [] yes  [x] no    Medication changes or discontinuations were communicated to patient's pharmacy: [] yes  [x] no    UA collected [x] yes  [] no    [place] Prescription Monitoring Program Reviewed? [x] yes  [] no    Referrals were made to: none     Future appointment was made: [x] yes  [] no    Dictation completed at time of chart check: [] yes  [x] no    I have checked the documentation for today s encounters and the above information has been reviewed and completed.

## 2021-06-04 NOTE — TELEPHONE ENCOUNTER
Ed from [facility] pharmacy Ph: [phone] called to clarify what to with the Vititrol injection prescription, and if we want it to be delivered to the facility? Told him to hold on until future directives from provider.    Seen on 1/3/20 for intake  Injection appt scheduled for 1/20/20 here at [facility]  Dictation from Friday not completed.

## 2021-06-05 NOTE — PROGRESS NOTES
Correct pharmacy verified with patient and confirmed in snapshot? [x] yes []no    Charge captured ? [x] yes  [] no    Medications Phoned  to Pharmacy [] yes [x]no  Name of Pharmacist:  List Medications, including dose, quantity and instructions      Medication Prescriptions given to patient   [] yes  [x] no   List the name of the drug the prescription was written for.       Medications ordered this visit were e-scribed.  Verified by order class [x] yes  [] no    Medication changes or discontinuations were communicated to patient's pharmacy: [] yes  [x] no    UA collected [x] yes  [] no    [place] Prescription Monitoring Program Reviewed? [x] yes  [] no    Referrals were made to:  none    Future appointment was made: [x] yes  [] no    Dictation completed at time of chart check: [] yes  [x] no    I have checked the documentation for today s encounters and the above information has been reviewed and completed.

## 2021-06-05 NOTE — PROGRESS NOTES
Pt is here for Vivitrol injection.    Has patient reviewed Vivitrol questions and reminders? Yes           Did patient drink alcohol in the past 7 days? no  Did patient use any type of opioid meds in the past 10 days? no  Was medical ID provided at appointment ? NA  Last injection given on 12/31/19.   Site LUOQ.    Status of last injection site: good    Medication Given:Vivitrol 380mg   Site: RUOQ  Tolerated: well  Reaction:none    Next injection appt on: 2/10/20  Next appt with provider on: 2/10/20

## 2021-06-05 NOTE — TELEPHONE ENCOUNTER
As per provider's directives, called and cancelled Vivitrol at the [facility] pharmacy - pt will be getting injections at the clinic.  Please sent a new Rx to in-house pharmacy

## 2021-06-06 NOTE — PROGRESS NOTES
Pt is here for Vivitrol injection. Pt admits that she's struggling a lot right now. She reports that today alone she's wanted to go to the casino twice, but was able to talk herself out of it. She was about 10 min late for her appt, but proud of herself for getting here. She started going to GA meetings last week and utilized the GA hotline which she found to be super helpful. However, her gambling urges are strong all day, everyday. She's wondering if there's something else she could be doing to help with this and then questioned oral naltrexone inbetween. Informed her message would be sent to provider.     Pt also got tearful and spoke about her impending divorce, how its a good thing, but how he's a trigger for her. Support and reassurance provided.     Has patient reviewed Vivitrol questions and reminders? Yes  Did patient drink alcohol in the past 7 days? No  Did patient use any type of opioid meds in the past 10 days? No  Was medical ID provided at appointment ? Previous appt, yes.    The Vivitrol Medication Guide was provided to the patient prior to administration.    Last injection given on 2/10/20.   Site LUOQ.    Status of last injection site: Felt it might have been too high. Reports bad urges after 10 days.     Medication Given: Vivitrol 380 mg IM    Site: RUOQ  Tolerated: Well  Reaction: No concerns.    Next injection appt on: 3/24/20  Next appt with provider on: 3/24/20

## 2021-06-06 NOTE — PROGRESS NOTES
Pt is here for Vivitrol injection.    Has patient reviewed Vivitrol questions and reminders? Yes  Did patient drink alcohol in the past 7 days? no  Did patient use any type of opioid meds in the past 10 days? no  Was medical ID provided at appointment ? NA  Last injection given on RUOQ  Site 1/20/20.    Status of last injection site: good    Medication Given: Vivitrol 380 mg   Site: LUOQ  Tolerated: well  Reaction:none    Next injection appt on: 3/2/20  Next appt with provider on: none scheduled

## 2021-06-06 NOTE — PATIENT INSTRUCTIONS - HE
Continue Medications as ordered.  No alcohol or unprescribed drug use.  No driving, if sedated.  Come to the Emergency Room if not feeling safe.  Call the clinic with any questions [phone].    When should you contact your healthcare provider?  A fever develops after the injection  There is a lump, pain, swelling, or bruising where the injection was given that does not go away.    You should seek immediate care or call 911 if shortness of breath or mouth, face, or lips swells after the injection is given    YOU SHOULD CARRY OR WEAR MEDICAL ID STATING THAT YOU ARE USING THIS DRUG SO THAT APPROPRIATE TREATMENT CAN BE GIVEN IN A MEDICAL EMERGENCY

## 2021-06-07 NOTE — TELEPHONE ENCOUNTER
Patient was asked per Dr. Brunner's directive if she would like to change to oral medication instead of the vivitrol injection.  Patient did not want to change to oral.

## 2021-06-07 NOTE — PATIENT INSTRUCTIONS - HE
Continue Medications as ordered.  No alcohol or unprescribed drug use.  No driving, if sedated.  Come to the Emergency Room if not feeling safe.  Call the clinic with any questions [phone].    When should you contact your healthcare provider?  A fever develops after the injection  There is a lump, pain, swelling, or bruising where the injection was given that does not go away.    You should seek immediate care or call 911 if shortness of breath or mouth, face, or lips swells after the injection is given    YOU SHOULD CARRY OR WEAR MEDICAL ID STATING THAT YOU ARE USING THIS DRUG SO THAT APPROPRIATE TREATMENT CAN BE GIVEN IN A MEDICAL EMERGENCY

## 2021-06-07 NOTE — PATIENT INSTRUCTIONS - HE
Continue Medications as ordered.  No alcohol or unprescribed drug use.  No driving, if sedated.  Come to the Emergency Room if not feeling safe.  Call the clinic with any questions [phone].    When should you contact your healthcare provider?  A fever develops after the injection  There is a lump, pain, swelling, or bruising where the injection was given that does not go away.    You should seek immediate care or call 911 if shortness of breath or mouth, face, or lips swells after the injection is given    YOU SHOULD CARRY OR WEAR MEDICAL ID STATING THAT YOU ARE USING THIS DRUG SO THAT APPROPRIATE TREATMENT CAN BE GIVEN IN A MEDICAL EMERGENCY

## 2021-06-07 NOTE — PROGRESS NOTES
Pt is here for Vivitrol injection. Attending GA and AA. Given directives from Dr Brunner regarding use of Naltrexone. UA collected and sent to lab.    Has patient reviewed Vivitrol questions and reminders? yes  Did patient drink alcohol in the past 7 days? no  Did patient use any type of opioid meds in the past 10 days? no  Was medical ID provided at appointment ? None needed  Last injection given on 3/2/2020.   Site RUOQ.    Status of last injection site: normal    Medication Given:Vivitrol 380 mg IM   Site: LUOQ  Tolerated: well  Reaction:none    Next injection appt on: 3 weeks, patient calling back to reschedule.  Next appt with provider on: 3/26/2020 with Dr Brunner

## 2021-06-07 NOTE — PATIENT INSTRUCTIONS - HE
Continue Medications as ordered.  No alcohol or unprescribed drug use.  No driving, if sedated.  Come to the Emergency Room if not feeling safe.  Call the clinic with any questions [phone].  Follow up as directed, for your appointments, per your After Visit Summary Form.

## 2021-06-07 NOTE — PROGRESS NOTES
Pt is here for Vivitrol injection.    Has patient reviewed Vivitrol questions and reminders?no  Did patient drink alcohol in the past 7 days?  no If so, describe    Did patient use any type of opioid meds in the past 10 days? no  Was medical ID provided at appointment ? Patient declined  Last UDS performed on 4-15-20 and was: negative  Last ETG performed on yes and was:negative   reviewed? yes    Last injection given on 4-15-20.   Site LUOQ.    Status of last injection site: good    The Vivitrol Medication Guide was provided to the patient prior to administration.  Medication Given:vivitrol  Site: ROUQ  Tolerated: well  Reaction:none    Next injection appt on: 5-29-20  Next appt with provider on: 5-28-20

## 2021-06-08 NOTE — PATIENT INSTRUCTIONS - HE
Continue Medications as ordered.  No alcohol or unprescribed drug use.  No driving, if sedated.  Come to the Emergency Room if not feeling safe.  Call the clinic with any questions [phone].    When should you contact your healthcare provider?  A fever develops after the injection  There is a lump, pain, swelling, or bruising where the injection was given that does not go away.    You should seek immediate care or call 911 if shortness of breath or mouth, face, or lips swells after the injection is given    YOU SHOULD CARRY OR WEAR MEDICAL ID STATING THAT YOU ARE USING THIS DRUG SO THAT APPROPRIATE TREATMENT CAN BE GIVEN IN A MEDICAL EMERGENCY

## 2021-06-08 NOTE — PROGRESS NOTES
Pt is here for Vivitrol injection.    Has patient reviewed Vivitrol questions and reminders? YES  Did patient drink alcohol in the past 7 days? No If so, describe    Did patient use any type of opioid meds in the past 10 days? none  Was medical ID provided at appointment ? Don't need it  Last UDS performed on 5/4/2020 and was: neg  Last ETG performed on 5/4/2020 and was:neg   reviewed? Another Rn will insert   Urine collected? yes    Last injection given on 5/4/2020.   Site:ROUQ  Status of last injection site: no issue, sometimes pain due to the injection, soares er fine.      The Vivitrol Medication Guide was provided to the patient prior to administration.    Medication Given:Vivitrol 380 mg   Site: LOUQ  Tolerated: yes  Reaction:none    Next injection appt on: 6/22/2020  Next appt with provider on: 7/28/2020

## 2021-06-08 NOTE — TELEPHONE ENCOUNTER
Spoke to patient and she said she is having slight cravings, but would be okay to reschedule for next Tuesday 6-2-20 for her injection.  She also said she does have some oral naltrexone at home if she needs it.  Patient was encouraged to call the clinic if she has any issues or concerns that arise.

## 2021-06-09 NOTE — PATIENT INSTRUCTIONS - HE
Continue Medications as ordered.  No alcohol or unprescribed drug use.  No driving, if sedated.  Come to the Emergency Room if not feeling safe.  Call the clinic with any questions [phone].    When should you contact your healthcare provider?  A fever develops after the injection  There is a lump, pain, swelling, or bruising where the injection was given that does not go away.    You should seek immediate care or call 911 if shortness of breath or mouth, face, or lips swells after the injection is given    YOU SHOULD CARRY OR WEAR MEDICAL ID STATING THAT YOU ARE USING THIS DRUG SO THAT APPROPRIATE TREATMENT CAN BE GIVEN IN A MEDICAL EMERGENCY

## 2021-06-09 NOTE — PROGRESS NOTES
Pt is here for Vivitrol injection. No issues or concerns. Had vacation with children and sober friends. Active in virtual groups.    Has patient reviewed Vivitrol questions and reminders? yes  Did patient drink alcohol in the past 7 days? no If so, describe    Did patient use any type of opioid meds in the past 10 days? no  Was medical ID provided at appointment ? None needed  Last UDS performed on 6/22/20 and was: neg  Last ETG performed on 6/22/20 and was: neg   reviewed? yes  Urine collected? Yes, sent to lab   l  Last injection given on 6/22/20  Site RUOQ    Status of last injection site: normal    The Vivitrol Medication Guide was provided to the patient prior to administration.  Medication Given:Vivitrol 380 mg   Site: LUOQ  Tolerated: well  Reaction:none    Next injection appt on: 8/14/20  Next appt with provider on: 8/3/20

## 2021-06-09 NOTE — PATIENT INSTRUCTIONS - HE
Continue Medications as ordered.  No alcohol or unprescribed drug use.  No driving, if sedated.  Come to the Emergency Room if not feeling safe.  Call the clinic with any questions [phone].    When should you contact your healthcare provider?  A fever develops after the injection  There is a lump, pain, swelling, or bruising where the injection was given that does not go away.    You should seek immediate care or call 911 if shortness of breath or mouth, face, or lips swells after the injection is given    YOU SHOULD CARRY OR WEAR MEDICAL ID STATING THAT YOU ARE USING THIS DRUG SO THAT APPROPRIATE TREATMENT CAN BE GIVEN IN A MEDICAL EMERGENCY

## 2021-06-10 NOTE — TELEPHONE ENCOUNTER
Called and left message for patient to call back to let us know about insurance coverage or reschedule her injection appointment.

## 2021-06-10 NOTE — PATIENT INSTRUCTIONS - HE
Continue Medications as ordered.  No alcohol or unprescribed drug use.  No driving, if sedated.  Come to the Emergency Room if not feeling safe.  Call the clinic with any questions [phone].    When should you contact your healthcare provider?  A fever develops after the injection  There is a lump, pain, swelling, or bruising where the injection was given that does not go away.    You should seek immediate care or call 911 if shortness of breath or mouth, face, or lips swells after the injection is given    YOU SHOULD CARRY OR WEAR MEDICAL ID STATING THAT YOU ARE USING THIS DRUG SO THAT APPROPRIATE TREATMENT CAN BE GIVEN IN A MEDICAL EMERGENCY

## 2021-06-10 NOTE — TELEPHONE ENCOUNTER
Writer was doing insurance verification for injection clinic and noticed pt does not have current insurance coverage on file. LM with pt to call and either cancel injection appt on 8/14/20 or update us with new insurance information.

## 2021-06-10 NOTE — TELEPHONE ENCOUNTER
Spoke to Michelle Archer and she said patients [facility] termed [address]    Injection nurse called patient who stated she has MA back dated to [address]  Michelle called and given that information .      Michelle called back and no authorization needed.

## 2021-06-11 NOTE — PROGRESS NOTES
________________________________________  Medications Phoned  to Pharmacy [] yes [x]no  Name of Pharmacist:  List Medications, including dose, quantity and instructions    Medications ordered this visit were e-scribed.  Verified by order class [] yes  [x] no  Vivitrol order set updated and signed by Dr. Vyas  Medication changes or discontinuations were communicated to patient's pharmacy: [] yes  [x] no    Dictation completed at time of chart check: [] yes  [x] no    I have checked the documentation for today s encounters and the above information has been reviewed and completed.

## 2021-06-11 NOTE — PATIENT INSTRUCTIONS - HE
1. Continue with Vivitrol shots Q 3weeks   2. Follow up in [facility] in 3 months for meds management and review of plan of care.

## 2021-06-11 NOTE — PATIENT INSTRUCTIONS - HE
Continue Medications as ordered.  No alcohol or unprescribed drug use.  No driving, if sedated.  Come to the Emergency Room if not feeling safe.  Call the clinic with any questions [phone].    When should you contact your healthcare provider?  A fever develops after the injection  There is a lump, pain, swelling, or bruising where the injection was given that does not go away.    You should seek immediate care or call 911 if shortness of breath or mouth, face, or lips swells after the injection is given    YOU SHOULD CARRY OR WEAR MEDICAL ID STATING THAT YOU ARE USING THIS DRUG SO THAT APPROPRIATE TREATMENT CAN BE GIVEN IN A MEDICAL EMERGENCY

## 2021-06-12 NOTE — PATIENT INSTRUCTIONS - HE
Continue Medications as ordered.  No alcohol or unprescribed drug use.  No driving, if sedated.  Come to the Emergency Room if not feeling safe.  Call the clinic with any questions [phone].    When should you contact your healthcare provider?  A fever develops after the injection  There is a lump, pain, swelling, or bruising where the injection was given that does not go away.    You should seek immediate care or call 911 if shortness of breath or mouth, face, or lips swells after the injection is given    YOU SHOULD CARRY OR WEAR MEDICAL ID STATING THAT YOU ARE USING THIS DRUG SO THAT APPROPRIATE TREATMENT CAN BE GIVEN IN A MEDICAL EMERGENCY

## 2021-06-12 NOTE — PATIENT INSTRUCTIONS - HE
Continue Medications as ordered.  No alcohol or unprescribed drug use.  No driving, if sedated.  Come to the Emergency Room if not feeling safe.  Call the clinic with any questions [phone].    When should you contact your healthcare provider?  A fever develops after the injection  There is a lump, pain, swelling, or bruising where the injection was given that does not go away.    You should seek immediate care or call 911 if shortness of breath or mouth, face, or lips swells after the injection is given    YOU SHOULD CARRY OR WEAR MEDICAL ID STATING THAT YOU ARE USING THIS DRUG SO THAT APPROPRIATE TREATMENT CAN BE GIVEN IN A MEDICAL EMERGENCY

## 2021-06-12 NOTE — TELEPHONE ENCOUNTER
LEFT  FOR PATIENT TO CALL AND SCHEDULE AN APPOINTMENT WITH DR. CEDENO FOR 11- PER DR CUTLER INSTRUCTIONS BEFORE SHE LEFT

## 2021-06-12 NOTE — TELEPHONE ENCOUNTER
Reason for call:  Other Patient called regarding (reason for call): appointment  Additional comments: Pt needs to reschedule her injections ASAP   Phone number to reach patient:  [phone]      Best Time:  any    Can we leave a detailed message on this number? yes

## 2021-06-13 NOTE — TELEPHONE ENCOUNTER
Pt was supposed to have a phone visit with Stacy today but missed the call.    Pt requests call back @ [phone]

## 2021-06-13 NOTE — PROGRESS NOTES
Addiction Medicine  Outpatient Visit  With Dr. Kumar    11/17/2020    Winifred Bashir, 1970.    This visit was conducted using [employer] video call platform, the patient was in her home and I was in my home office.  Start time 2:00p, end time 2:25p    Subjective   The patient is a 50 y.o. female   who presents for follow up regarding treatment for alcohol use disorder, stimulant use disorder, and gambling disorder.  Winifred has been a patient of this clinic since 01/2020 after having been referred from treatment with [facility], and last saw Dr. Vyas on 9/25/20.  Patient medication dose and name: naltrexone 380mg IM every 21 days.    Pt also continues on topiramate, gabapentin, bupropion, and fluoxetine prescribed by Dr. Aly, psychiatry with [facility].      At this time, patient would like to : stay same because she continues to find naltrexone at current dose/frequency beneficial for maintaining her sobriety.    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  Cravings have decreased since the initial experience in September, 2020.     Last naltrexone injection 11/2/20    Last UDS performed on 11/2/20 and was: negative  Last ETG performed on 11/2/20 and was: negative  First day of last menstrual period  10/19/20      Have you maintained sobriety from all substances?  If not what is your problem substance and frequency?  yes      What does patient currently fills his/her day with? 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  From 0 to 5 (zero being none) grade your craving. What is the craving  for?  Rare and apparently random for gambling or alcohol; stimulant cravings even less       Are you going to groups, if so where and how often? What group?  [facility] 2x/week; OP treatment weekly @ [facility]     Side effects:  No complaints     Do you follow with a primary care doctor? When was your last visit?   Yes; 8/19/20      Patient reports main support person is:  [facility] in recovery     Is patient currently experiencing depression or thoughts of self-harm?   denies     Is patient having trouble with functioning because of worrying about things?  no         Social History:  Patient lives with her daughters, in an her house, and is attending outpatient.  She is in the process of divorce from her .     Patient has some obstacles to maintain sobriety. 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       cyclobenzaprine (FLEXERIL) 10 MG tablet Take 10 mg by mouth.       FLUoxetine (PROZAC) 40 MG capsule Take 80 mg by mouth.       gabapentin (NEURONTIN) 600 MG tablet Take 600 mg by mouth 3 (three) times a day.        gabapentin (NEURONTIN) 800 MG tablet Take 800 mg by mouth.       hydroCHLOROthiazide (HYDRODIURIL) 25 MG tablet Take 25 mg by mouth.       loratadine (CLARITIN) 10 mg tablet Take 10 mg by mouth.       naltrexone (DEPADE) 50 mg tablet Take 1 tablet (50 mg total) by mouth daily as needed (opioid cravings). Use prn for cravings for the week prior to extended naltrexone injection. 30 tablet 0     naltrexone microspheres (VIVITROL) 380 mg SERR injection Inject 380 mg into the gluteal muscle every 21 days. [facility] administered             topiramate (TOPAMAX) 50 MG tablet        Current Facility-Administered Medications   Medication Dose Route Frequency Provider Last Rate Last Dose     docusate  sodium capsule 100 mg (COLACE)  100 mg Oral BID Brunner, Emily A, MD             Review of Systems:  Mood has been improving in the last 2 months.        Physical Examination:  LMP 10/19/2020   Physical Exam  Constitutional:       Appearance: Normal appearance.   Neurological:      Mental Status: She is alert.   Psychiatric:         Attention and Perception: Attention and perception normal.         Mood and Affect: Mood and affect normal.         Speech: Speech normal.         Behavior: Behavior is cooperative.         Cognition and Memory: Cognition and memory normal.      Comments: Insight and judgement are good       There were no signs suggesting medication toxicity or impairment due to drug or alcohol use.      Summary of Diagnostic Studies:  CMP 8/27/20 is normal; ast 13, alt 19      Assessment  Pt is stable; alcohol and stimulant use disorders in sustained remission, gambling disorder in early remission.      Diagnoses/Plan:  Continue naltrexone 380mg IM every 21 days, new order placed today.   Will order repeat liver transaminases in 3 months for medication management.   Continue other medications as prescribed by psychiatry and PCP  Continue OP treatment with [facility]  Continue mutual support groups  Return for Addiction Medicine follow up in 3 months, notify medical staff sooner if problems.     Winifred was seen today for follow-up and medication management.    Diagnoses and all orders for this visit:    Moderate alcohol use disorder (H)    Gambling disorder, persistent, severe    Methamphetamine use disorder, severe (H)    Nicotine use disorder         At least 30min, was spent in the care of this patient and >50% of this time was spent in face-to-face counseling and coordination of care    Shahrzad Kumar MD

## 2021-06-13 NOTE — PATIENT INSTRUCTIONS - HE
Continue Vivitrol injections every 21 days.   Continue MALENA treatment as recommended through [facility]  Continue other medications as prescribed by Dr. Aly, psychiatry with [facility], and your primary care provider.   Return to see Addiction Medicine in 3 months, notify staff if any problems before then.

## 2021-06-13 NOTE — PROGRESS NOTES
Pt is here for Vivitrol injection.    Has patient reviewed Vivitrol questions and reminders? yes  Did patient drink alcohol in the past 7 days? no If so, describe    Did patient use any type of opioid meds in the past 10 days? yes  Was medical ID provided at appointment ? yes  Last UDS performed on 11/23/2020 and was: negative  Last ETG performed on 11/23/2020 and was:negative   reviewed? No access to provider's   Urine collected? yes    Last injection given on 11/23/2020.   Site:RUOQ   Status of last injection site: site is ok per patient.     The Vivitrol Medication Guide was provided to the patient prior to administration.  Medication Given:Vivitrol 380 mg    Site: LOUQ  Tolerated: yes   Reaction:none    Next injection appt on: 1/4/2020  Next appt with provider on: 2/16/2020

## 2021-06-13 NOTE — PATIENT INSTRUCTIONS - HE
Continue Medications as ordered.  No alcohol or unprescribed drug use.  No driving, if sedated.  Come to the Emergency Room if not feeling safe.  Call the clinic with any questions [phone].    When should you contact your healthcare provider?  A fever develops after the injection  There is a lump, pain, swelling, or bruising where the injection was given that does not go away.    You should seek immediate care or call 911 if shortness of breath or mouth, face, or lips swells after the injection is given    YOU SHOULD CARRY OR WEAR MEDICAL ID STATING THAT YOU ARE USING THIS DRUG SO THAT APPROPRIATE TREATMENT CAN BE GIVEN IN A MEDICAL EMERGENCY

## 2021-06-13 NOTE — PATIENT INSTRUCTIONS - HE
Continue Medications as ordered.  No alcohol or unprescribed drug use.  No driving, if sedated.  Come to the Emergency Room if not feeling safe.  Call the clinic with any questions [phone].    When should you contact your healthcare provider?  A fever develops after the injection  There is a lump, pain, swelling, or bruising where the injection was given that does not go away.    You should seek immediate care or call 911 if shortness of breath or mouth, face, or lips swells after the injection is given    YOU SHOULD CARRY OR WEAR MEDICAL ID STATING THAT YOU ARE USING THIS DRUG SO THAT APPROPRIATE TREATMENT CAN BE GIVEN IN A MEDICAL EMERGENCY

## 2021-06-13 NOTE — PROGRESS NOTES
Pt is here for Vivitrol injection. Patient ran late for injection appt but clinic was able to accommodate. Attended recovery meetings, maintaining sobriety and absence of gambling. 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    Has patient reviewed Vivitrol questions and reminders? yes  Did patient drink alcohol in the past 7 days? no If so, describe    Did patient use any type of opioid meds in the past 10 days? no  Was medical ID provided at appointment ? None needed  Last UDS performed on 11/2/2020 and was: neg  Last ETG performed on 11/2/2020 and was: <100   reviewed? Provider has not granted access yet.  Urine collected? yes    Last injection given on 11/2/2020.   [place] [facility] [place]    Status of last injection site: no    The Vivitrol Medication Guide was provided to the patient prior to administration.  Medication Given:Vivitrol 380 mg   Site: RUOQ  Tolerated: well  Reaction:nomal    Next injection appt on: 12/14/2020  Next appt with provider on: 2/16/2020

## 2021-06-14 NOTE — TELEPHONE ENCOUNTER
Phone message left for patient offering Health Information Services number ([phone]) to make formal KATY requests of records. Patient has [employer], so is able to view/print lab results. Instructed to call back if she has any questions or needs assistance

## 2021-06-14 NOTE — PATIENT INSTRUCTIONS - HE
Continue Medications as ordered.  No alcohol or unprescribed drug use.  No driving, if sedated.  Come to the Emergency Room if not feeling safe.  Call the clinic with any questions [phone].    When should you contact your healthcare provider?  A fever develops after the injection  There is a lump, pain, swelling, or bruising where the injection was given that does not go away.    You should seek immediate care or call 911 if shortness of breath or mouth, face, or lips swells after the injection is given    YOU SHOULD CARRY OR WEAR MEDICAL ID STATING THAT YOU ARE USING THIS DRUG SO THAT APPROPRIATE TREATMENT CAN BE GIVEN IN A MEDICAL EMERGENCY

## 2021-06-14 NOTE — PATIENT INSTRUCTIONS - HE
Continue Medications as ordered.  No alcohol or unprescribed drug use.  No driving, if sedated.  Come to the Emergency Room if not feeling safe.  Call the clinic with any questions [phone].    When should you contact your healthcare provider?  A fever develops after the injection  There is a lump, pain, swelling, or bruising where the injection was given that does not go away.    You should seek immediate care or call 911 if shortness of breath or mouth, face, or lips swells after the injection is given    YOU SHOULD CARRY OR WEAR MEDICAL ID STATING THAT YOU ARE USING THIS DRUG SO THAT APPROPRIATE TREATMENT CAN BE GIVEN IN A MEDICAL EMERGENCY

## 2021-06-15 NOTE — PROGRESS NOTES
________________________________________  Medications Phoned  to Pharmacy [] yes [x]no  Name of Pharmacist:  List Medications, including dose, quantity and instructions    Medications ordered this visit were e-scribed.  Verified by order class [x] yes  [] no  nicotine polacrilex (COMMIT) 4 MG lozenge    Medication changes or discontinuations were communicated to patient's pharmacy: [] yes  [x] no    Dictation completed at time of chart check: [x] yes  [] no    I have checked the documentation for today s encounters and the above information has been reviewed and completed.

## 2021-06-15 NOTE — PROGRESS NOTES
Pt is here for Vivitrol injection. Patient is requesting Dr Kumar order Commit Lozenges (Reid), she started smoking again 3 weeks ago. No other issues, no relapses.    Has patient reviewed Vivitrol questions and reminders? yes  Did patient drink alcohol in the past 7 days? no If so, describe    Did patient use any type of opioid meds in the past 10 days? no  Was medical ID provided at appointment ? Not needed  Last UDS performed on 1/29/21 and was: neg  Last ETG performed on 1/29/21 and was: neg   reviewed? Not able to access  today  Urine collected? yes    Last injection given on 1/29/21.   Site LUOQ.    Status of last injection site: normal    The Vivitrol Medication Guide was provided to the patient prior to administration.  Medication Given:Vivitrol 380 mg IM   Site: RUOQ  Tolerated: well  Reaction:none    Next injection appt on: 3/12/21  Next appt with provider on: 2/25/21

## 2021-06-15 NOTE — TELEPHONE ENCOUNTER
Patient was supposed to meet with Dr Kumra for follow up today 2.16 @ 115, pt just cancelled appt via [employer] but did not reschedule with her, last seen in [facility] - still has appointment for injection 2.19 but hasnt made another appt with provider    Just wanted to let team know, in case we need to schedule anything specific for her in the future

## 2021-06-15 NOTE — PATIENT INSTRUCTIONS - HE
Continue Medications as ordered.  No alcohol or unprescribed drug use.  No driving, if sedated.  Come to the Emergency Room if not feeling safe.  Call the clinic with any questions [phone].    When should you contact your healthcare provider?  A fever develops after the injection  There is a lump, pain, swelling, or bruising where the injection was given that does not go away.    You should seek immediate care or call 911 if shortness of breath or mouth, face, or lips swells after the injection is given    YOU SHOULD CARRY OR WEAR MEDICAL ID STATING THAT YOU ARE USING THIS DRUG SO THAT APPROPRIATE TREATMENT CAN BE GIVEN IN A MEDICAL EMERGENCY

## 2021-06-15 NOTE — PATIENT INSTRUCTIONS - HE
Continue Medications as ordered.  No alcohol or unprescribed drug use.  No driving, if sedated.  Come to the Emergency Room if not feeling safe.  Call the clinic with any questions [phone].    When should you contact your healthcare provider?  A fever develops after the injection  There is a lump, pain, swelling, or bruising where the injection was given that does not go away.    You should seek immediate care or call 911 if shortness of breath or mouth, face, or lips swells after the injection is given    YOU SHOULD CARRY OR WEAR MEDICAL ID STATING THAT YOU ARE USING THIS DRUG SO THAT APPROPRIATE TREATMENT CAN BE GIVEN IN A MEDICAL EMERGENCY

## 2021-06-16 NOTE — PROGRESS NOTES
Pt is here for Vivitrol injection. Has appt with nursing board mid-April. 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    Has patient reviewed Vivitrol questions and reminders? yes  Did patient drink alcohol in the past 7 days? no If so, describe    Did patient use any type of opioid meds in the past 10 days? none  Was medical ID provided at appointment ? None needed  Last UDS performed on 3/12/21 and was: neg  Last ETG performed on 3/12/21 and was: neg   reviewed? Yes, no concerning entries, only Gabapentin  Urine collected? yes    Last injection given on 3/12/2021.   Site LUOQ.    Status of last injection site: normal    The Vivitrol Medication Guide was provided to the patient prior to administration.  Medication Given:Vivitrol 380 mg IM   Site: RUOQ  Tolerated: well  Reaction:none    Next injection appt on: 4/26/21  Next appt with provider on: 5/25/21 with Dr Kumar

## 2021-06-16 NOTE — PATIENT INSTRUCTIONS - HE
Continue Medications as ordered.  No alcohol or unprescribed drug use.  No driving, if sedated.  Come to the Emergency Room if not feeling safe.  Call the clinic with any questions [phone].    When should you contact your healthcare provider?  A fever develops after the injection  There is a lump, pain, swelling, or bruising where the injection was given that does not go away.    You should seek immediate care or call 911 if shortness of breath or mouth, face, or lips swells after the injection is given    YOU SHOULD CARRY OR WEAR MEDICAL ID STATING THAT YOU ARE USING THIS DRUG SO THAT APPROPRIATE TREATMENT CAN BE GIVEN IN A MEDICAL EMERGENCY

## 2021-06-17 NOTE — PATIENT INSTRUCTIONS - HE
Continue Medications as ordered.  No alcohol or unprescribed drug use.  No driving, if sedated.  Come to the Emergency Room if not feeling safe.  Call the clinic with any questions [phone].    When should you contact your healthcare provider?  A fever develops after the injection  There is a lump, pain, swelling, or bruising where the injection was given that does not go away.    You should seek immediate care or call 911 if shortness of breath or mouth, face, or lips swells after the injection is given    YOU SHOULD CARRY OR WEAR MEDICAL ID STATING THAT YOU ARE USING THIS DRUG SO THAT APPROPRIATE TREATMENT CAN BE GIVEN IN A MEDICAL EMERGENCY

## 2021-06-17 NOTE — PATIENT INSTRUCTIONS - HE
Continue Medications as ordered.  No alcohol or unprescribed drug use.  No driving, if sedated.  Come to the Emergency Room if not feeling safe.  Call the clinic with any questions [phone].    When should you contact your healthcare provider?  A fever develops after the injection  There is a lump, pain, swelling, or bruising where the injection was given that does not go away.    You should seek immediate care or call 911 if shortness of breath or mouth, face, or lips swells after the injection is given    YOU SHOULD CARRY OR WEAR MEDICAL ID STATING THAT YOU ARE USING THIS DRUG SO THAT APPROPRIATE TREATMENT CAN BE GIVEN IN A MEDICAL EMERGENCY

## 2021-06-17 NOTE — PROGRESS NOTES
"Pt is here for Vivitrol injection. Pt reports that things are \"stressful\" right now. Pt explained that the main cause of her stress is the divorce she has been trying to finalize for 3 years, but her  won't cooperate and sign the papers. 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 She was a bit tearful at times, but stated she's happy to be here to get her shot to keep moving forward. Pt praised for this.    Has patient reviewed Vivitrol questions and reminders? Yes  Did patient drink alcohol in the past 7 days? No   Did patient use any type of opioid meds in the past 10 days? No  Was medical ID provided at appointment ? Previously, yes  Last UDS performed on 4/2/21 and was: negative  Last ETG performed on 4/2/21 and was: negative   reviewed? N/A - pt receives Vivitrol for gambling disorder  Urine collected? Yes, sent to lab. Results pending.    Last injection given on 4/2/21.   Site RUOQ.    Status of last injection site: No concerns.    The Vivitrol Medication Guide was provided to the patient prior to administration.    Medication Given: Vivitrol 380 mg IM   Site: LUOQ  Tolerated: Well  Reaction: None    Next injection appt on: May 17, 2021  Next appt with provider on: May 25, 2021  "

## 2021-06-20 NOTE — LETTER
Letter by Ciara Tarango RN at      Author: Ciara Tarango RN Service: -- Author Type: --    Filed:  Encounter Date: 10/8/2020 Status: (Other)       October 9, 2020              Winifred GUO Bashir  [address] [address]      Dear Ms. Bashir:    Im writing to inform you that Vijaya Vyas MD, will be leaving [facility] on November 11, 2020.  We apologize for this disruption in your care and we are committed to supporting your treatment needs.  If you have follow-up appointments after November 11, 2020, we will connect you with another addiction medicine provider within our system.  For medication refills, please contact your pharmacy and they will connect with us to initiate the refill process.  To schedule an appointment with Dr. Vyas before November 11, 2020, please call   [facility] at [phone].  If you do not plan to return for ongoing medication management at this time, please let us know that as well, so we can note that in your medical record.  Again, we apologize for the inconvenience and look forward to continuing to provide you with the best possible care.    Sincerely,    Ciara Tarango CNP - Clinic Manager  [facility]

## 2021-06-20 NOTE — LETTER
Letter by Brunner, Emily A, MD at      Author: Brunner, Emily A, MD Service: -- Author Type: --    Filed:  Encounter Date: 8/3/2020 Status: (Other)         Winifred Bashir                                  August 3, 2020    Patient: Winifred Bashir   MR Number: [id number]   YOB: 1970   Date of Visit: 8/3/2020     To Whom It May Concern:    I have been caring for Winifred Bashir to me since 1/3/2020 for her history of gambling and alcohol use disorder. Per my understanding, Winifred has been following all recommendations and openly participating in her recovery process. 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     If you have questions, please do not hesitate to call me.    Sincerely,      Emily A Brunner, MD  Fellow in Addiction Medicine     CC  No Recipients

## 2021-06-20 NOTE — LETTER
Letter by Ciara Tarango RN at      Author: Ciara Tarango RN Service: -- Author Type: --    Filed:  Encounter Date: 7/2/2020 Status: (Other)       July 2, 2020          Winifred Bashir  [address] [address]      Dear Ms. Bashir:    Im writing to inform you that Emily Brunner, MD, will be leaving [facility] on August 28, 2020.   We apologize for this disruption in your care and we are committed to supporting your treatment needs.    If you have follow-up appointments after August 28, 2020, we will connect you with another addiction medicine provider within our system.     For medication refills, please contact your pharmacy and they will connect with us to initiate the refill process.  To schedule an appointment with Dr. Brunner before August 28, 2020 please call [facility] Access at [phone].    If you do not plan to return for ongoing medication management at this time, please let us know that as well, so we can note that in your medical record.   Again, we apologize for the inconvenience and look forward to continuing to provide you with the best possible care.    Sincerely,        Electronically signed by Ciara Tarango RN, CNP - Clinic Manager  [facility]

## 2021-06-21 NOTE — LETTER
Letter by Shahrzad Kumar MD at      Author: Shahrzad Kumar MD Service: -- Author Type: --    Filed:  Encounter Date: 5/6/2021 Status: (Other)       May 6, 2021              Winifred UGO Bashir  [address] [facility] [address]      Dear Ms. Bashir:    You currently have an appointment scheduled with Dr. Kumar on May 25, 2021 at 1:15 p.m.  We need to change this time to 1:00 p.m.; it is a video appointment and you can expect a telephone call first from her support staff at 12:45 p.m.    We hope that this change will work with your schedule.  If you need to reschedule, please call the clinic at [phone].    Thank you.      Sincerely,        Electronically signed by Shahrzad Kumar MD   [facility]  [address]  [place]  [address] [phone]

## 2021-06-21 NOTE — LETTER
Letter by Shahrzad Kumar MD at      Author: Shahrzad Kumar MD Service: -- Author Type: --    Filed:  Encounter Date: 1/7/2021 Status: (Other)       January 7, 2020              Winifred UGO Bashir  [address] [address]      Dear Ms. Bashir:    This letter is in regards to the injection appointment that you have on January 22, 2021 at   2:00 p.m.  Our Pharmacy representative has reviewed this and states it is too soon for this injection.    We have rescheduled this appointment to the following Friday, January 29, 2021 at   2:00 p.m.  If you need to reschedule this, please call the clinic at [phone] as soon as possible.  We have also left messages via telephone and [employer] regarding this.    Thank you.      Sincerely,        Electronically signed by Shahrzad Kumar MD   [facility]  [address]  [place]  [address] [phone]

## 2021-06-25 NOTE — PROGRESS NOTES
This video/telephone visit will be conducted via a call between you and your physician/provider. We have found that certain health care needs can be provided without the need for an in-person physical exam. This service lets us provide the care you need with a video /telephone conversation. If a prescription is necessary we can send it directly to your pharmacy. If lab work is needed we can place an order for that and you can then stop by a lab to have the test done at a later time.    Just as we bill insurance for in-person visits, we also bill insurance for video/telephone visits. If you have questions about your insurance coverage, we recommend that you speak with your insurance company.    Patient has given verbal consent for video/Telephone visit? yes  Patient would like the video visit invitation sent by: via [employer]  ESTEPHANIE/SUKHDEEP MALDONADO CMA    Patient verified allergies, medications and pharmacy via phone.  Patient states she is ready for visit.    ________________________________________  Medications Phoned  to Pharmacy [] yes [x]no  Name of Pharmacist:  List Medications, including dose, quantity and instructions    Medications ordered this visit were e-scribed.  Verified by order class [] yes  [x] no    Medication changes or discontinuations were communicated to patient's pharmacy: [] yes  [x] no    Dictation completed at time of chart check: [] yes  [x] no    I have checked the documentation for today s encounters and the above information has been reviewed and completed.

## 2021-06-25 NOTE — PROGRESS NOTES
CC/HPI:  Winifred Bashir is a 51 y.o. female with PMH HTN, anxiety, depression, gambling disorder, stimulant use disorder, and AUD who presents today for follow up.  As of 5/25/21, she is 21 months from last use of alcohol or other substances, and 12 months from last episode of gambling.      I last met with pt on 2/25/21.  Recommendations last visit were:  1. Severe alcohol use disorder (H)  Continue vivitrol q3 weeks; continue topiramate 50mg two times a day, continue gabapentin 800mg tid    2. Gambling disorder, severe  Continue Vivtrol q3 weeks, continue bupropion as prescribed by psychiatry, continue topiramate as prescribed by psychiatry    3. Nicotine use disorder  Encouraged desire and efforts to quit  - nicotine polacrilex (COMMIT) 4 MG lozenge; Apply 1 lozenge (4 mg total) to the mouth or throat as needed for smoking cessation.  Dispense: 200 lozenge; Refill: 5    4. Methamphetamine use disorder, severe (H)  Continue bupropion as prescribed by psychiatry, continue Vivitrol q3 weeks    5. Other constipation  - polyethylene glycol (GLYCOLAX) 17 gram/dose powder; Take 17 g by mouth daily.  Dispense: 850 g; Refill: 11  - docusate sodium (COLACE) 100 MG capsule; Take 1 capsule (100 mg total) by mouth 2 (two) times a day as needed for constipation.  Dispense: 180 capsule; Refill: 3  - senna (SENNA) 8.6 mg tablet; Take 1 tablet by mouth daily as needed for constipation.  Dispense: 30 tablet; Refill: 3      Schedule f/u in 3 months    Pt has continued to receive Vivitrol injections q3 weeks for AUD/gambling disorder through [facility], and sees outside psychiatry for other medications including topiramate 50mg two times a day for AUD/stimulant use disorder, bupropion 300mg/day for stimulant use disorder/depression, and gabapentin 800mg three times a day for AUD and anxiety.      Today, pt states she feels her medications and personal recovery work are continuing to work well for her.  She is taking  all medications as prescribed.  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  She continues with stress of her divorce process.  Overall states her mood has been stable despite challenges.        Objective  Physical Exam  Neurological:      Mental Status: She is alert and oriented to person, place, and time.   Psychiatric:         Attention and Perception: Attention and perception normal.         Mood and Affect: Mood and affect normal.         Speech: Speech normal.         Behavior: Behavior is cooperative.         Thought Content: Thought content normal.         Cognition and Memory: Cognition and memory normal.       Labs:  Recent Results (from the past 240 hour(s))   Ethyl Glucuronide and Ethyl Sulfate, Urine, Quantitative ([facility] ETG/S)   Result Value Ref Range    Ethyl Glucuronide, Urn, Quant <100 ng/mL    Ethyl Sulfate, Urn, Quant <100 ng/mL   DAM    (Drug Abuse 1+, Urine)   Result Value Ref Range    Amphetamines Screen Negative Screen Negative    Benzodiazepines Screen Negative Screen Negative    Opiates Screen Negative Screen Negative    Phencyclidine Screen Negative Screen Negative    THC Screen Negative Screen Negative    Barbiturates Screen Negative Screen Negative    Cocaine Metabolite Screen Negative Screen Negative    Methadone Screen Negative Screen Negative    Oxycodone Screen Negative Screen Negative    Creatinine, Urine 63.7 mg/dL     8/27/2020:  ALT/AST normal    Assessment and Plan:  1. Moderate alcohol use disorder (H)  Continue medications unchanged including XR naltrexone, discussed going to q4 week injections if pt chooses to.   Current Outpatient Medications   Medication Instructions     buPROPion (WELLBUTRIN XL) 450 mg, Oral     cyclobenzaprine (FLEXERIL) 10 mg, Oral     docusate sodium (COLACE) 100 mg, Oral, 2 times daily PRN     FLUoxetine (PROZAC) 80 mg, Oral     gabapentin (NEURONTIN) 800 mg, Oral     hydroCHLOROthiazide  (HYDRODIURIL) 25 mg, Oral     loratadine (CLARITIN) 10 mg, Oral     naltrexone microspheres (VIVITROL) 380 mg, Intramuscular, Every 21 days,  clinic administered     nicotine polacrilex (COMMIT) 4 mg, Mouth/Throat, As needed     polyethylene glycol (GLYCOLAX) 17 g, Oral, DAILY     senna (SENNA) 8.6 mg tablet 1 tablet, Oral, Daily PRN     topiramate (TOPAMAX) 50 MG tablet No dose, route, or frequency recorded.         2. Methamphetamine use disorder, severe (H)      3. Gambling disorder, severe      4. Nicotine use disorder      Return in about 2 months (around 7/25/2021) for using a video visit.     At least 30min spent in review of medical record, , interim events, symptoms, recommendations, follow up plan    Shahrzad Kumar MD

## 2021-06-28 NOTE — PROGRESS NOTES
Progress Notes by Marine Heart CMA at 3/26/2020  1:15 PM     Author: Marine Heart CMA Service: Addiction Care Author Type: Certified Medical Assistant    Filed: 3/27/2020 10:37 PM Encounter Date: 3/26/2020 Status: Signed    : Marine Heart CMA (Certified Medical Assistant)           Medications Phoned  to Pharmacy [] yes [x]no  Name of Pharmacist:  List Medications, including dose, quantity and instructions    Medications ordered this visit were e-scribed.  Verified by order class [x] yes  [] no  Commit    Medication changes or discontinuations were communicated to patient's pharmacy: [] yes  [x] no    Future appointment was made: [x] yes  [] No-injection only, will make the follow up with Brunner when comes in or call back at a later time.    Dictation completed at time of chart check: [] yes  [x] no    I have checked the documentation for todays encounters and the above information has been reviewed and completed.

## 2021-06-29 NOTE — PROGRESS NOTES
Progress Notes by Elizabeth Reynoso, RN at 6/22/2020  2:00 PM     Author: Elizabeth Reynoso RN Service: Behavioral Author Type: Registered Nurse    Filed: 6/22/2020  5:32 PM Encounter Date: 6/22/2020 Status: Signed    : Elizabeth Reynoso RN (Registered Nurse)       Pt is here for Vivitrol injection. Patient had one gambling episode, she was able to stop and leave. She contacted her sponsor and a friend and got to a meeting. She said she previously would not have been able to stop but would have had multiple episodes.  She thinks that the Vivitrol is helpful. Stressors are high with a divorce in August.    She is planning a trip out west with a longtime friend and their families. She will have to extend the 21 days for the Vivitrol so is planning to use the Depade to avert cravings. We reviewed the medication directions. She wants to make sure Dr Brunner advises her of how to take the Depade.    Has patient reviewed Vivitrol questions and reminders? yes  Did patient drink alcohol in the past 7 days? no If so, describe    Did patient use any type of opioid meds in the past 10 days? no  Was medical ID provided at appointment ? yes  Last UDS performed on 6/2 and was: neg  Last ETG performed on 6/2 and was: neg   reviewed? Yes (see below)  Urine collected? Yes sent to lab    Last injection given on 6/2/20.   Site LUOQ.    Status of last injection site: normal    The Vivitrol Medication Guide was provided to the patient prior to administration.  Medication Given:Vivitral   Site: RUOQ  Tolerated: well  Reaction:none    Next injection appt on: 7/24/2020  Next appt with provider on: 7/28/2020 with Dr Brunner

## 2021-06-29 NOTE — PROGRESS NOTES
Progress Notes by Marine Heart CMA at 5/28/2020  1:15 PM     Author: Marine Heart CMA Service: Addiction Care Author Type: Certified Medical Assistant    Filed: 5/31/2020 12:00 PM Encounter Date: 5/28/2020 Status: Signed    : Marine Heart CMA (Certified Medical Assistant)       This video/telephone visit will be conducted via a call between you and your physician/provider. We have found that certain health care needs can be provided without the need for an in-person physical exam. This service lets us provide the care you need with a video /telephone conversation. If a prescription is necessary we can send it directly to your pharmacy. If lab work is needed we can place an order for that and you can then stop by our lab to have the test done at a later time.   Just as we bill insurance for in-person visits, we also bill insurance for video/telephone visits. If you have questions about your insurance coverage, we recommend that you speak with your insurance company.   Patient has given verbal consent for video/Telephone visit? yes  ESTEPHANIE/SUKHDEEP MALDONADO    Patient verified allergies, medications and pharmacy via phone. Patient states she has another appointment at 1:30 and does not want to answer any questions, did not do PHQ and KELLEN. Patient states she is ready for visit.       ________________________________________  Medications Phoned  to Pharmacy [] yes [x]no  Name of Pharmacist:  List Medications, including dose, quantity and instructions    Medications ordered this visit were e-scribed.  Verified by order class [] yes  [x] no    Medication changes or discontinuations were communicated to patient's pharmacy: [] yes  [x] no    Dictation completed at time of chart check: [] yes  [x] no    I have checked the documentation for todays encounters and the above information has been reviewed and completed.

## 2021-06-29 NOTE — PROGRESS NOTES
Progress Notes by Lucila Cheatham LPN at 8/3/2020 11:45 AM     Author: Lucila Cheatham LPN Service: -- Author Type: Licensed Nurse    Filed: 8/3/2020  1:04 PM Encounter Date: 8/3/2020 Status: Signed    : Lucila Cheatham LPN (Licensed Nurse)       This video/telephone visit will be conducted via a call between you and your physician/provider. We have found that certain health care needs can be provided without the need for an in-person physical exam. This service lets us provide the care you need with a video /telephone conversation. If a prescription is necessary we can send it directly to your pharmacy. If lab work is needed we can place an order for that and you can then stop by our lab to have the test done at a later time.   Just as we bill insurance for in-person visits, we also bill insurance for video/telephone visits. If you have questions about your insurance coverage, we recommend that you speak with your insurance company.   Patient has given verbal consent for video/Telephone visit? yes  Patient would like the video visit invitation sent by: Text to cell phone: [phone]  or Send to email: ZOEY HUMMEL/LPN: REGINO NICHOLSON  Pt is not sure about ins, will call with updates. 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   Patient verified allergies, medications and pharmacy via phone. PHQ : 2 and KELLEN: 6 done verbally with writer. Patient states she  is ready for visit.

## 2021-06-29 NOTE — PROGRESS NOTES
"Progress Notes by Bonita Samaniego RN at 10/5/2020  1:00 PM     Author: Bonita Samaniego RN Service: -- Author Type: Registered Nurse    Filed: 10/30/2020 10:27 AM Encounter Date: 10/5/2020 Status: Signed    : Bonita Samaniego RN (Registered Nurse)       Pt is here for Vivitrol injection.    Has patient reviewed Vivitrol questions and reminders? Given and she stated that she does not need to review \"I am good on those\". I am not having any symptoms.    Did patient drink alcohol in the past 7 days? no   If so, describe    Did patient use any type of opioid meds in the past 10 days? No    Was medical ID provided at appointment ? \"I have it\".    Last UDS performed on 9/4/2020 and was:neg   Last ETG performed on 9/4/2020 and was: neg   reviewed? yes  Urine collected? yes    Last injection given on 9/4/2020.   Site LUOQ.    Status of last injection site: okay per patient    The Vivitrol Medication Guide was provided to the patient prior to administration.  Medication Given:Vivitrol 380   Site: ROUQ  Tolerated: yes  Reaction:none    Next injection appt on: 10/26/2020  Next appt with provider on: Stated provider said every 3 to 4 months-she is waiting due to provider leaving.              "

## 2021-06-29 NOTE — PROGRESS NOTES
Progress Notes by Taryn Estevez CMA at 9/25/2020 10:15 AM     Author: Taryn Estevez CMA Service: -- Author Type: Certified Medical Assistant    Filed: 9/25/2020  2:50 PM Encounter Date: 9/25/2020 Status: Signed    : Taryn Estevez CMA (Certified Medical Assistant)       This video/telephone visit will be conducted via a call between you and your physician/provider. We have found that certain health care needs can be provided without the need for an in-person physical exam. This service lets us provide the care you need with a video /telephone conversation. If a prescription is necessary we can send it directly to your pharmacy. If lab work is needed we can place an order for that and you can then stop by our lab to have the test done at a later time.   Just as we bill insurance for in-person visits, we also bill insurance for video/telephone visits. If you have questions about your insurance coverage, we recommend that you speak with your insurance company.   Patient has given verbal consent for video/Telephone visit? Yes  Patient would like the video visit invitation sent by: [employer], if connection issues, please call:  [phone]     ESTEPHANIE/SUKHDEEP GARDUNO CMA    Patient verified allergies, medications and pharmacy via phone. PHQ: 5 and KELLEN: 7 done verbally with writer. Patient states she is ready for visit.    [place]  reviewed prior to appt, please see embedded report below:

## 2021-06-29 NOTE — PROGRESS NOTES
Progress Notes by Lucila Cheatham LPN at 4/15/2020  1:30 PM     Author: Lucila Cheatham LPN Service: -- Author Type: Licensed Nurse    Filed: 4/15/2020  7:19 PM Encounter Date: 4/15/2020 Status: Signed    : Lucila Cheatham LPN (Licensed Nurse)       Pt is here for Vivitrol injection.    Has patient reviewed Vivitrol questions and reminders? No  Did patient drink alcohol in the past 7 days? no  Did patient use any type of opioid meds in the past 10 days? NO  Was medical ID provided at appointment ? No. Pt declined  Last injection given on 3/24/20  Site LUOQ  Status of last injection site: no c/o    UA collected   :      Medication Given: Vivitrol IM  Site: RUOQ  Tolerated: well  Reaction: none    Next injection appt on: 5/6/20  Next appt with provider on: 4/16/20

## 2021-06-29 NOTE — PROGRESS NOTES
Progress Notes by Bonita Samaniego, RN at 11/2/2020  1:30 PM     Author: Bonita Samaniego RN Service: -- Author Type: Registered Nurse    Filed: 11/2/2020  2:22 PM Encounter Date: 11/2/2020 Status: Signed    : Bonita Samaniego RN (Registered Nurse)       Pt is here for Vivitrol injection.    Has patient reviewed Vivitrol questions and reminders? yes  Did patient drink alcohol in the past 7 days? no If so, describe    Did patient use any type of opioid meds in the past 10 days? no  Was medical ID provided at appointment ? No- not needed.  Last UDS performed on 10/5/2020 and was: neg  Last ETG performed on 10/5/2020 and was:neg   reviewed? yes  Urine collected? yes    Last injection given on 10/5/2020.   Site ROUQ.    Status of last injection site: no concerns     The Vivitrol Medication Guide was provided to the patient prior to administration.  Medication Given:Vivitrol 380 mg   Site: LOUQ  Tolerated: yes  Reaction:none    Next injection appt on: 11/23/2020  Next appt with provider on: TBD due to provider leaving clinic.

## 2021-06-29 NOTE — PROGRESS NOTES
Progress Notes by Elizabeth Reynoso, RN at 9/4/2020  2:00 PM     Author: Elizabeth Reynoso, RN Service: Behavioral Author Type: Registered Nurse    Filed: 9/4/2020  3:09 PM Encounter Date: 9/4/2020 Status: Signed    : Elizabeth Reynoso, RN (Registered Nurse)       Pt is here for Vivitrol injection. Doing Virtual meetings, meets with sponsor. 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 Still having gambling urges. Reviewed Dr Vyas's directives for use of Depade.    Has patient reviewed Vivitrol questions and reminders? yes  Did patient drink alcohol in the past 7 days? no If so, describe    Did patient use any type of opioid meds in the past 10 days? no  Was medical ID provided at appointment ? Not needed  Last UDS performed on 8/14/20 and was: neg  Last ETG performed on 8/14/20 and was: <100   reviewed? yes  Urine collected? yes    Last injection given on 8/14/20.   Site RUOQ.    Status of last injection site: okay, sore for about a week    The Vivitrol Medication Guide was provided to the patient prior to administration.  Medication Given:Vivitrol 380 mg IM  Site: LUOQ  Tolerated: well  Reaction:none    Next injection appt on: 9/25/2020  Next appt with provider on: 9/25/2020 with Dr Asael WOODSON :

## 2021-06-29 NOTE — PROGRESS NOTES
Progress Notes by Lucila Cheatham LPN at 8/28/2020 11:15 AM     Author: Lucila Cheatham LPN Service: -- Author Type: Licensed Nurse    Filed: 9/2/2020  1:12 PM Encounter Date: 8/28/2020 Status: Signed    : Lucila Cheatham LPN (Licensed Nurse)       This video/telephone visit will be conducted via a call between you and your physician/provider. We have found that certain health care needs can be provided without the need for an in-person physical exam. This service lets us provide the care you need with a video /telephone conversation. If a prescription is necessary we can send it directly to your pharmacy. If lab work is needed we can place an order for that and you can then stop by our lab to have the test done at a later time.   Just as we bill insurance for in-person visits, we also bill insurance for video/telephone visits. If you have questions about your insurance coverage, we recommend that you speak with your insurance company.   Patient has given verbal consent for video/Telephone visit? yes  Patient would like the video visit invitation sent by: Text to cell phone: NA or Send to email: [employer]  If problems   CMA/LPN : SUKHDEEP NICHOLSON  She is a transfer from Dr. Brunner. Pt needs a new order set for Vivitrol injection due to provider transfer.   Patient verified allergies, medications and pharmacy via [employer]. PHQ : 7 and KELLEN: 0 done verbally with writer. Patient states she is ready for visit.

## 2021-06-29 NOTE — PROGRESS NOTES
Progress Notes by Marine Heart CMA at 4/16/2020 11:45 AM     Author: Marine Heart CMA Service: Addiction Care Author Type: Certified Medical Assistant    Filed: 4/17/2020 10:18 PM Encounter Date: 4/16/2020 Status: Signed    : Brunner, Emily A, MD (Physician)       This video/telephone visit will be conducted via a call between you and your physician/provider. We have found that certain health care needs can be provided without the need for an in-person physical exam. This service lets us provide the care you need with a video /telephone conversation. If a prescription is necessary we can send it directly to your pharmacy. If lab work is needed we can place an order for that and you can then stop by our lab to have the test done at a later time.   Just as we bill insurance for in-person visits, we also bill insurance for video/telephone visits. If you have questions about your insurance coverage, we recommend that you speak with your insurance company.   Patient has given verbal consent for video/Telephone visit? Yes  CMA/LPN Marine Heart    Patient verified allergies, medications and pharmacy via phone. PHQ : and KELLEN:  done verbally with writer. Patient states she is ready for visit.  PHQ-3  KELLEN-7        ________________________________________  Medications Phoned  to Pharmacy [] yes [x]no  Name of Pharmacist:  List Medications, including dose, quantity and instructions    Medications ordered this visit were e-scribed.  Verified by order class [] yes  [x] no    Medication changes or discontinuations were communicated to patient's pharmacy: [] yes  [x] no    Dictation completed at time of chart check: [] yes  [x] no    I have checked the documentation for todays encounters and the above information has been reviewed and completed.

## 2021-06-29 NOTE — PROGRESS NOTES
Progress Notes by Elizabeth Reynoso, RN at 8/14/2020  2:00 PM     Author: Elizabeth Reynoso, RN Service: Behavioral Author Type: Registered Nurse    Filed: 8/14/2020  3:56 PM Encounter Date: 8/14/2020 Status: Signed    : Elizabeth Reynoso, RN (Registered Nurse)       Pt is here for Vivitrol injection. Had insurance issues but was able to get injection today. Recently changed to [place]. 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 In the process of  her  and has many road blocks. She will have to hire an .  Thinks the q3 week schedule works well.    Has patient reviewed Vivitrol questions and reminders? yes  Did patient drink alcohol in the past 7 days? no If so, describe    Did patient use any type of opioid meds in the past 10 days? no  Was medical ID provided at appointment ? Not needed today  Last UDS performed on 7/24/20 and was: neg  Last ETG performed on 7/24/20 and was:  neg   reviewed? Yes, see below  Urine collected? Yes, sent to lab    Last injection given on 7/24/20.   Site LUOQ.    Status of last injection site: normal    The Vivitrol Medication Guide was provided to the patient prior to administration. Yes  Medication Given:Vivitrol 380 mg IM   Site: RUOQ  Tolerated: well  Reaction:none    Next injection appt on: 9/4/20  Next appt with provider on: 8/28/20 appt with Dr Vyas

## 2021-06-30 NOTE — PROGRESS NOTES
Progress Notes by Teri Singh, RN at 12/14/2020  1:30 PM     Author: Teri iSngh, RN Service: -- Author Type: Registered Nurse    Filed: 4/8/2021  8:25 AM Encounter Date: 12/14/2020 Status: Signed    : Teri Singh, RN (Registered Nurse)

## 2021-06-30 NOTE — PROGRESS NOTES
Progress Notes by Elizabeth Reynoso, RN at 3/12/2021  1:30 PM     Author: Elizabeth Reynoso RN Service: -- Author Type: Registered Nurse    Filed: 3/12/2021  2:45 PM Encounter Date: 3/12/2021 Status: Signed    : Elizabeth Reynoso RN (Registered Nurse)       Pt is here for Vivitrol injection. No gambling relapse. Working on getting nursing license back.    Has patient reviewed Vivitrol questions and reminders? yes  Did patient drink alcohol in the past 7 days? no If so, describe    Did patient use any type of opioid meds in the past 10 days? no  Was medical ID provided at appointment ? Not requested  Last UDS performed on 2/19/21 and was: neg  Last ETG performed on 2/19/21 and was: neg   reviewed? Yes, see below  Urine collected? yes    Last injection given on 2/19/21.   Site RUOQ.    Status of last injection site: normal    The Vivitrol Medication Guide was provided to the patient prior to administration.  Medication Given:Vivitrol 380 mg IM  Site: LUOQ  Tolerated: well  Reaction:none    Next injection appt on: 4/2/21  Next appt with provider on: 5/25/21

## 2021-06-30 NOTE — PROGRESS NOTES
Progress Notes by Teri Singh, RN at 1/4/2021  2:00 PM     Author: Teri Singh, RN Service: -- Author Type: Registered Nurse    Filed: 1/4/2021  2:22 PM Encounter Date: 1/4/2021 Status: Signed    : Teri Singh, RN (Registered Nurse)       Pt is here for Vivitrol injection. Reports things are going well and has no concerns.    Has patient reviewed Vivitrol questions and reminders? Yes  Did patient drink alcohol in the past 7 days? No   Did patient use any type of opioid meds in the past 10 days? No  Was medical ID provided at appointment ? Previous appt, yes.    Last UDS performed on 12/14/20 and was: negative  Last ETG performed on 12/14/20 and was: negative   reviewed? Yes, embedded below  Urine collected? Yes, sent to lab; results pending.    Last injection given on 12/14/20.   Site LUOQ.    Status of last injection site: No concerns.    The Vivitrol Medication Guide was provided to the patient prior to administration.  Medication Given: Vivitrol 380 mg IM   Site: RUOQ  Tolerated: Well  Reaction: No concerns    Next injection appt on: 1/22/21  Next appt with provider on: 2/16/21

## 2021-06-30 NOTE — PROGRESS NOTES
Progress Notes by Taryn Estevez CMA at 2/25/2021  3:45 PM     Author: Taryn Estevez CMA Service: -- Author Type: Certified Medical Assistant    Filed: 2/25/2021  6:33 PM Encounter Date: 2/25/2021 Status: Signed    : Taryn Estevez CMA (Certified Medical Assistant)       This video/telephone visit will be conducted via a call between you and your physician/provider. We have found that certain health care needs can be provided without the need for an in-person physical exam. This service lets us provide the care you need with a video /telephone conversation. If a prescription is necessary we can send it directly to your pharmacy. If lab work is needed we can place an order for that and you can then stop by our lab to have the test done at a later time.   Just as we bill insurance for in-person visits, we also bill insurance for video/telephone visits. If you have questions about your insurance coverage, we recommend that you speak with your insurance company.   Patient has given verbal consent for video/Telephone visit? Yes  Patient would like the video visit invitation sent by: [place], if connection issues, please call:  [phone]  ESTEPHANIE/SUKHDEEP GARDUNO CMA    Patient verified allergies, medications and pharmacy via phone.  Patient states she is ready for visit.    MN  reviewed prior to appt, please see embedded report below:

## 2021-06-30 NOTE — PROGRESS NOTES
Progress Notes by Taryn Estevez CMA at 11/17/2020  1:15 PM     Author: Taryn Estevez CMA Service: -- Author Type: Certified Medical Assistant    Filed: 11/17/2020  2:53 PM Encounter Date: 11/17/2020 Status: Signed    : Taryn Estevez CMA (Certified Medical Assistant)       This video/telephone visit will be conducted via a call between you and your physician/provider. We have found that certain health care needs can be provided without the need for an in-person physical exam. This service lets us provide the care you need with a video /telephone conversation. If a prescription is necessary we can send it directly to your pharmacy. If lab work is needed we can place an order for that and you can then stop by our lab to have the test done at a later time.   Just as we bill insurance for in-person visits, we also bill insurance for video/telephone visits. If you have questions about your insurance coverage, we recommend that you speak with your insurance company.   Patient has given verbal consent for video/Telephone visit? Yes  Patient would like the video visit invitation sent by:  Text to cell:  [phone]     ESTEPHANIE/SUKHDEEP GARDUNO CMA    Patient verified allergies, medications and pharmacy via phone. PHQ: 9 and KELLEN: 6 done verbally with writer. Patient states she is ready for visit.    MN  reviewed prior to appt, please see embedded report below:

## 2021-06-30 NOTE — PROGRESS NOTES
Progress Notes by Elizabeth Reynoso, RN at 1/29/2021  2:00 PM     Author: Elizabeth Reynoso RN Service: -- Author Type: Registered Nurse    Filed: 1/29/2021  3:03 PM Encounter Date: 1/29/2021 Status: Signed    : Elizabeth Reynoso RN (Registered Nurse)       Pt is here for Vivitrol injection. No issues or concerns today. 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 No gambling or urges.    Has patient reviewed Vivitrol questions and reminders? yes  Did patient drink alcohol in the past 7 days? no If so, describe    Did patient use any type of opioid meds in the past 10 days? no  Was medical ID provided at appointment ? Not needed at today's visit    Last UDS performed on 1/4/21 and was: neg  Last ETG performed on 1/4/21 and was: neg   reviewed? Yes, embedded below note  Urine collected? yes    Last injection given on 1/4/2021.   Site RUOQ.    Status of last injection site: normal    The Vivitrol Medication Guide was provided to the patient prior to administration.  Medication Given:Vvivitrol   Site: LUOQ  Tolerated: well  Reaction:none    Next injection appt on: 2/19/21  Next appt with provider on: 2/16/21

## 2021-06-30 NOTE — PROGRESS NOTES
Progress Notes by Bonita Samaniego RN at 5/17/2021  1:30 PM     Author: Bonita Samaniego RN Service: -- Author Type: Registered Nurse    Filed: 5/26/2021 10:33 AM Encounter Date: 5/17/2021 Status: Signed    : Bonita Samaniego RN (Registered Nurse)       Pt is here for Vivitrol injection.    Has patient reviewed Vivitrol questions and reminders? yes  Did patient drink alcohol in the past 7 days? no If so, describe    Did patient use any type of opioid meds in the past 10 days? no  Was medical ID provided at appointment ? None   Last UDS performed on 4/26/2021 and was: <100  Last ETG performed on 4/26/2021 and was: neg   reviewed? yes  Urine collected? yes    Last injection given on 4/26/2021.   Site LUOQ.    Status of last injection site: patient reported just fine.     The Vivitrol Medication Guide was provided to the patient prior to administration.  yes  Medication Given:Vivitrol 380 mg/vial   Site: RUOQ   Tolerated: yes  Reaction:none    Next injection appt on: 6/11/2021  Next appt with provider on: 5/25/2021

## 2021-07-03 NOTE — ADDENDUM NOTE
Addendum Note by Brunner, Emily A, MD at 3/2/2020  1:30 PM     Author: Brunner, Emily A, MD Service: -- Author Type: Physician    Filed: 3/2/2020  6:25 PM Encounter Date: 3/2/2020 Status: Signed    : Brunner, Emily A, MD (Physician)    Addended by: BRUNNER, EMILY A on: 3/2/2020 06:25 PM        Modules accepted: Orders, Level of Service

## 2021-07-14 ENCOUNTER — TELEPHONE (OUTPATIENT)
Dept: BEHAVIORAL HEALTH | Facility: CLINIC | Age: 51
End: 2021-07-14

## 2021-07-14 DIAGNOSIS — F63.0 GAMBLING DISORDER, SEVERE: ICD-10-CM

## 2021-07-14 DIAGNOSIS — F15.20 METHAMPHETAMINE USE DISORDER, SEVERE (H): ICD-10-CM

## 2021-07-14 DIAGNOSIS — F10.20 MODERATE ALCOHOL USE DISORDER (H): Primary | ICD-10-CM

## 2021-07-14 NOTE — TELEPHONE ENCOUNTER
Patient last injection was 5/16/21. [facility]. Patient is not scheduled for an injection at [facility]. Need provider advice regarding where the patient will receive the next injection and when it is appropriate to schedule.

## 2021-07-23 DIAGNOSIS — F10.20 MODERATE ALCOHOL USE DISORDER (H): Primary | ICD-10-CM

## 2021-07-23 NOTE — TELEPHONE ENCOUNTER
Shahrzad Kumar MD  You; [facility] 9 days ago   LV  She should continue to receive Vivitrol at [facility] for now.  We will make arrangements for her to transfer these to [facility] in the future if she chooses to do this.     Thanks,   Shahrzad    Message text

## 2021-07-26 ENCOUNTER — TELEPHONE (OUTPATIENT)
Dept: ADDICTION MEDICINE | Facility: CLINIC | Age: 51
End: 2021-07-26

## 2021-07-26 NOTE — TELEPHONE ENCOUNTER
Pt was a no show for Vivitrol injection today.   Left voice message regarding missed appointment. Encouraged to call the Clinic and reschedule.

## 2021-08-16 ENCOUNTER — TELEPHONE (OUTPATIENT)
Dept: BEHAVIORAL HEALTH | Facility: CLINIC | Age: 51
End: 2021-08-16

## 2021-08-16 NOTE — TELEPHONE ENCOUNTER
Writer called patient due to no show today at [facility], left message with clinic number and walk in hours

## 2021-10-24 ENCOUNTER — HEALTH MAINTENANCE LETTER (OUTPATIENT)
Age: 51
End: 2021-10-24

## 2021-12-12 ENCOUNTER — HOSPITAL ENCOUNTER (INPATIENT)
Facility: CLINIC | Age: 51
LOS: 2 days | Discharge: OTHER INSTITUTION | End: 2021-12-15
Attending: FAMILY MEDICINE | Admitting: PSYCHIATRY & NEUROLOGY
Payer: COMMERCIAL

## 2021-12-12 ENCOUNTER — TELEPHONE (OUTPATIENT)
Dept: BEHAVIORAL HEALTH | Facility: CLINIC | Age: 51
End: 2021-12-12

## 2021-12-12 DIAGNOSIS — E55.9 VITAMIN D DEFICIENCY: ICD-10-CM

## 2021-12-12 DIAGNOSIS — F10.20 MODERATE ALCOHOL USE DISORDER (H): Primary | ICD-10-CM

## 2021-12-12 DIAGNOSIS — Z20.822 COVID-19 RULED OUT BY LABORATORY TESTING: ICD-10-CM

## 2021-12-12 DIAGNOSIS — F10.239 ALCOHOL DEPENDENCE WITH WITHDRAWAL WITH COMPLICATION (H): ICD-10-CM

## 2021-12-12 DIAGNOSIS — F19.10 POLYSUBSTANCE ABUSE (H): ICD-10-CM

## 2021-12-12 DIAGNOSIS — F33.2 SEVERE EPISODE OF RECURRENT MAJOR DEPRESSIVE DISORDER, WITHOUT PSYCHOTIC FEATURES (H): ICD-10-CM

## 2021-12-12 DIAGNOSIS — R45.851 SUICIDAL IDEATION: ICD-10-CM

## 2021-12-12 DIAGNOSIS — T78.40XD ALLERGIC REACTION, SUBSEQUENT ENCOUNTER: ICD-10-CM

## 2021-12-12 LAB
ALBUMIN SERPL-MCNC: 3.5 G/DL (ref 3.4–5)
ALCOHOL BREATH TEST: 0 (ref 0–0.01)
ALP SERPL-CCNC: 102 U/L (ref 40–150)
ALT SERPL W P-5'-P-CCNC: 125 U/L (ref 0–50)
AMPHETAMINES UR QL SCN: ABNORMAL
ANION GAP SERPL CALCULATED.3IONS-SCNC: 6 MMOL/L (ref 3–14)
APTT PPP: 26 SECONDS (ref 22–38)
AST SERPL W P-5'-P-CCNC: 113 U/L (ref 0–45)
BARBITURATES UR QL: ABNORMAL
BENZODIAZ UR QL: ABNORMAL
BILIRUB SERPL-MCNC: 0.3 MG/DL (ref 0.2–1.3)
BUN SERPL-MCNC: 20 MG/DL (ref 7–30)
CALCIUM SERPL-MCNC: 9.7 MG/DL (ref 8.5–10.1)
CANNABINOIDS UR QL SCN: ABNORMAL
CHLORIDE BLD-SCNC: 106 MMOL/L (ref 94–109)
CO2 SERPL-SCNC: 27 MMOL/L (ref 20–32)
COCAINE UR QL: ABNORMAL
CREAT SERPL-MCNC: 0.93 MG/DL (ref 0.52–1.04)
ETHANOL SERPL-MCNC: <0.01 G/DL
GFR SERPL CREATININE-BSD FRML MDRD: 71 ML/MIN/1.73M2
GLUCOSE BLD-MCNC: 89 MG/DL (ref 70–99)
HCG UR QL: NEGATIVE
HOLD SPECIMEN: NORMAL
INR PPP: 0.94 (ref 0.86–1.14)
OPIATES UR QL SCN: ABNORMAL
POTASSIUM BLD-SCNC: 3.5 MMOL/L (ref 3.4–5.3)
PROT SERPL-MCNC: 7.9 G/DL (ref 6.8–8.8)
SARS-COV-2 RNA RESP QL NAA+PROBE: NEGATIVE
SODIUM SERPL-SCNC: 139 MMOL/L (ref 133–144)

## 2021-12-12 PROCEDURE — 81025 URINE PREGNANCY TEST: CPT | Performed by: FAMILY MEDICINE

## 2021-12-12 PROCEDURE — C9803 HOPD COVID-19 SPEC COLLECT: HCPCS | Performed by: FAMILY MEDICINE

## 2021-12-12 PROCEDURE — 82077 ASSAY SPEC XCP UR&BREATH IA: CPT | Performed by: FAMILY MEDICINE

## 2021-12-12 PROCEDURE — 99285 EMERGENCY DEPT VISIT HI MDM: CPT | Performed by: FAMILY MEDICINE

## 2021-12-12 PROCEDURE — 82075 ASSAY OF BREATH ETHANOL: CPT | Performed by: FAMILY MEDICINE

## 2021-12-12 PROCEDURE — 36415 COLL VENOUS BLD VENIPUNCTURE: CPT | Performed by: FAMILY MEDICINE

## 2021-12-12 PROCEDURE — 85610 PROTHROMBIN TIME: CPT | Performed by: FAMILY MEDICINE

## 2021-12-12 PROCEDURE — 80307 DRUG TEST PRSMV CHEM ANLYZR: CPT | Performed by: FAMILY MEDICINE

## 2021-12-12 PROCEDURE — 250N000013 HC RX MED GY IP 250 OP 250 PS 637: Performed by: FAMILY MEDICINE

## 2021-12-12 PROCEDURE — 85730 THROMBOPLASTIN TIME PARTIAL: CPT | Performed by: FAMILY MEDICINE

## 2021-12-12 PROCEDURE — 90791 PSYCH DIAGNOSTIC EVALUATION: CPT

## 2021-12-12 PROCEDURE — U0003 INFECTIOUS AGENT DETECTION BY NUCLEIC ACID (DNA OR RNA); SEVERE ACUTE RESPIRATORY SYNDROME CORONAVIRUS 2 (SARS-COV-2) (CORONAVIRUS DISEASE [COVID-19]), AMPLIFIED PROBE TECHNIQUE, MAKING USE OF HIGH THROUGHPUT TECHNOLOGIES AS DESCRIBED BY CMS-2020-01-R: HCPCS | Performed by: FAMILY MEDICINE

## 2021-12-12 PROCEDURE — 85025 COMPLETE CBC W/AUTO DIFF WBC: CPT | Performed by: FAMILY MEDICINE

## 2021-12-12 PROCEDURE — 80053 COMPREHEN METABOLIC PANEL: CPT | Performed by: FAMILY MEDICINE

## 2021-12-12 PROCEDURE — 99285 EMERGENCY DEPT VISIT HI MDM: CPT | Mod: 25 | Performed by: FAMILY MEDICINE

## 2021-12-12 RX ORDER — MULTIPLE VITAMINS W/ MINERALS TAB 9MG-400MCG
1 TAB ORAL DAILY
Status: DISCONTINUED | OUTPATIENT
Start: 2021-12-12 | End: 2021-12-13 | Stop reason: CLARIF

## 2021-12-12 RX ORDER — FOLIC ACID 1 MG/1
1 TABLET ORAL DAILY
Status: DISCONTINUED | OUTPATIENT
Start: 2021-12-12 | End: 2021-12-13 | Stop reason: CLARIF

## 2021-12-12 RX ORDER — POTASSIUM CHLORIDE 1.5 G/1.58G
20 POWDER, FOR SOLUTION ORAL ONCE
Status: COMPLETED | OUTPATIENT
Start: 2021-12-13 | End: 2021-12-13

## 2021-12-12 RX ORDER — LORAZEPAM 1 MG/1
1-4 TABLET ORAL EVERY 30 MIN PRN
Status: DISCONTINUED | OUTPATIENT
Start: 2021-12-12 | End: 2021-12-13 | Stop reason: CLARIF

## 2021-12-12 RX ADMIN — THIAMINE HCL TAB 100 MG 100 MG: 100 TAB at 18:57

## 2021-12-12 RX ADMIN — LORAZEPAM 2 MG: 1 TABLET ORAL at 18:50

## 2021-12-12 RX ADMIN — LORAZEPAM 2 MG: 1 TABLET ORAL at 19:55

## 2021-12-12 RX ADMIN — FOLIC ACID 1 MG: 1 TABLET ORAL at 18:57

## 2021-12-12 RX ADMIN — MULTIPLE VITAMINS W/ MINERALS TAB 1 TABLET: TAB at 18:57

## 2021-12-12 NOTE — ED PROVIDER NOTES
"    [facility] EMERGENCY DEPARTMENT ([facility])    12/12/21        History     Chief Complaint   Patient presents with     Depression     Suicidal     Drug / Alcohol Assessment     The history is provided by the patient and medical records.     Winifred Bashir is a 51 year old female with a history of polysubstance abuse, bipolar disorder, PTSD, and KELLEN who presents to the Emergency Department with depression. Patient reports a number of things have been piling up over time. She states things began with Markus Domingo and JABARIID and have been piling up since. She states during COVID she was not working and was home with her children and this was overwhelming. Patient reports she got a divorce, sold her home, moved into a new home, and started a job around the same time and could not handle this. She states moving was hard and being alone with her 2 children was difficult. She states she did not like her new job either. 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 Patient reports her house is currently a mess as she has no energy to take care of things. Patient reports she had a relapse as well and has been drinking alcohol and doing drugs. She reports using crack cocaine and \"large blunts.\" She is concerned the drugs have been laced as she has had lower abdominal soreness and heaviness as well as difficulty urinating and bruising. Patient reports drinking 1-1.75L alcohol daily. She last used both drugs and alcohol today. Patient reports she has not had alcohol withdrawal in the past but states she has never drank this much before. She denies history of seizures or DTs. She notes she gets anxious. Patient reports intermittent suicidal thoughts, states \"today not so much.\" No HI. Patient is not currently receiving any mental health services, states she was receiving therapy but not stopped this. Patient is vaccinated against COVID.    Past Medical History  Past Medical History: "   Diagnosis Date     Bipolar disorder, unspecified (H)      Gambling disorder, persistent, severe 2020     Generalized anxiety disorder 2020     Methamphetamine use disorder, severe (H) 2020     Mild intermittent asthma      Moderate alcohol use disorder (H) 2020     Osteoarthritis      Posttraumatic stress disorder      Surveillance of previously prescribed intrauterine contraceptive device 10/08    Mirena     Past Surgical History:   Procedure Laterality Date     C/SECTION, LOW TRANSVERSE      , Low Transverse     SURGICAL HISTORY OF -       tubal ligation     TUBAL LIGATION       acetaminophen (TYLENOL) 325 MG tablet  B Complex-Biotin-FA (VITAMIN B COMPLEX) TABS  beclomethasone (QVAR) 40 MCG/ACT Inhaler  BuPROPion HCl ER, XL, 450 MG TB24  Cholecalciferol 5000 units TABS  cyclobenzaprine (FLEXERIL) 10 MG tablet  docusate sodium (COLACE) 100 MG capsule  ferrous sulfate (IRON) 325 (65 Fe) MG tablet  FLUoxetine (PROZAC) 20 MG capsule  hydrochlorothiazide (MICROZIDE) 12.5 MG capsule  hydrOXYzine (ATARAX) 25 MG tablet  loratadine (CLARITIN) 10 MG tablet  magnesium hydroxide (MILK OF MAGNESIA) 400 MG/5ML suspension  Multiple Vitamins-Minerals (ONE-A-DAY 50 PLUS PO)  naltrexone (VIVITROL) 380 MG SUSR  polyethylene glycol (MIRALAX) powder  QUEtiapine (SEROQUEL) 25 MG tablet  sennosides (SENOKOT) 8.6 MG tablet  traZODone (DESYREL) 50 MG tablet      Allergies   Allergen Reactions     Fish-Derived Products Shortness Of Breath and Nausea and Vomiting     Nkda [No Known Drug Allergies]      Family History  Family History   Problem Relation Age of Onset     Hypertension Mother      Hypertension Maternal Grandmother      Cerebrovascular Disease Maternal Grandmother      Diabetes Maternal Aunt      Musculoskeletal Disorder Maternal Aunt         Lupus     Asthma No family hx of      C.A.D. No family hx of      Breast Cancer No family hx of      Cancer - colorectal No family hx of   "    Social History   Social History     Tobacco Use     Smoking status: Former Smoker     Types: Cigarettes, Cigarettes     Smokeless tobacco: Never Used     Tobacco comment: quit 2003   Substance Use Topics     Alcohol use: Not Currently     Comment: Alcoholic Drinks/day: sober since 8/25/19     Drug use: Not Currently     Types: Cocaine, Marijuana, Methamphetamines, \"Crack\" cocaine      Past medical history, past surgical history, medications, allergies, family history, and social history were reviewed with the patient. No additional pertinent items.       Review of Systems   ROS: 14 point ROS neg other than the symptoms noted above in the HPI.  A complete review of systems was performed with pertinent positives and negatives noted in the HPI, and all other systems negative.    Physical Exam   BP: (!) 147/81  Pulse: 84  Temp: 98.7  F (37.1  C)  Resp: 18  SpO2: 100 %  Physical Exam  Vitals and nursing note reviewed.   Constitutional:       General: She is not in acute distress.     Appearance: She is not diaphoretic.   HENT:      Head: Atraumatic.      Mouth/Throat:      Pharynx: No oropharyngeal exudate.   Eyes:      General: No scleral icterus.     Pupils: Pupils are equal, round, and reactive to light.   Cardiovascular:      Heart sounds: Normal heart sounds.   Pulmonary:      Effort: No respiratory distress.      Breath sounds: Normal breath sounds.   Abdominal:      General: Bowel sounds are normal.      Palpations: Abdomen is soft.      Tenderness: There is no abdominal tenderness.   Musculoskeletal:         General: No tenderness.   Skin:     General: Skin is warm.      Findings: No rash.   Psychiatric:         Attention and Perception: Attention normal.         Mood and Affect: Mood is depressed. Affect is tearful.         Speech: Speech normal.         Behavior: Behavior normal.         Thought Content: Thought content includes suicidal ideation. Thought content includes suicidal plan.         Cognition " and Memory: Cognition normal.         Judgment: Judgment normal.           ED Course   5:51 PM  The patient was seen and examined by Hugh Curry MD in [facility].      Procedures       The medical record was reviewed and interpreted.  Current labs reviewed and interpreted.  Previous labs reviewed and interpreted.  Mental Health Risk Assessment      PSS-3    Date and Time Over the past 2 weeks have you felt down, depressed, or hopeless? Over the past 2 weeks have you had thoughts of killing yourself? Have you ever attempted to kill yourself? When did this last happen? User   12/12/21 1745 yes yes yes more than 6 months ago FWS      C-SSRS ([place])    Date and Time Q1 Wished to be Dead (Past Month) Q2 Suicidal Thoughts (Past Month) Q3 Suicidal Thought Method Q4 Suicidal Intent without Specific Plan Q5 Suicide Intent with Specific Plan Q6 Suicide Behavior (Lifetime) Within the Past 3 Months? RETIRED: Level of Risk per Screen Screening Not Complete User   12/12/21 3233 yes yes yes no no yes -- -- -- FWS              Suicide assessment completed by mental health (D.E.C., LCSW, etc.)       Results for orders placed or performed during the hospital encounter of 12/12/21   HCG qualitative urine (UPT)     Status: Normal   Result Value Ref Range    hCG Urine Qualitative Negative Negative   Comprehensive metabolic panel     Status: Abnormal   Result Value Ref Range    Sodium 139 133 - 144 mmol/L    Potassium 3.5 3.4 - 5.3 mmol/L    Chloride 106 94 - 109 mmol/L    Carbon Dioxide (CO2) 27 20 - 32 mmol/L    Anion Gap 6 3 - 14 mmol/L    Urea Nitrogen 20 7 - 30 mg/dL    Creatinine 0.93 0.52 - 1.04 mg/dL    Calcium 9.7 8.5 - 10.1 mg/dL    Glucose 89 70 - 99 mg/dL    Alkaline Phosphatase 102 40 - 150 U/L     (H) 0 - 45 U/L     (H) 0 - 50 U/L    Protein Total 7.9 6.8 - 8.8 g/dL    Albumin 3.5 3.4 - 5.0 g/dL    Bilirubin Total 0.3 0.2 - 1.3 mg/dL    GFR Estimate 71 >60 mL/min/1.73m2   INR     Status: Normal   Result Value  Ref Range    INR 0.94 0.86 - 1.14   Partial thromboplastin time     Status: Normal   Result Value Ref Range    aPTT 26 22 - 38 Seconds   Ethyl Alcohol Level     Status: Normal   Result Value Ref Range    Alcohol ethyl <0.01 <=0.01 g/dL   Asymptomatic COVID-19 Virus (Coronavirus) by PCR Oropharynx     Status: Normal    Specimen: Oropharynx; Swab   Result Value Ref Range    SARS CoV2 PCR Negative Negative, Testing sent to reference lab. Results will be returned via unsolicited result    Narrative    Testing was performed using the Xpert Xpress SARS-CoV-2 Assay on the  Cepheid Gene-Xpert Instrument Systems. Additional information about  this Emergency Use Authorization (EUA) assay can be found via the Lab  Guide. This test should be ordered for the detection of SARS-CoV-2 in  individuals who meet SARS-CoV-2 clinical and/or epidemiological  criteria. Test performance is unknown in asymptomatic patients. This  test is for in vitro diagnostic use under the FDA EUA for  laboratories certified under CLIA to perform high complexity testing.  This test has not been FDA cleared or approved. A negative result  does not rule out the presence of PCR inhibitors in the specimen or  target RNA in concentration below the limit of detection for the  assay. The possibility of a false negative should be considered if  the patient's recent exposure or clinical presentation suggests  COVID-19. This test was validated by the [facility]. This laboratory is certified under  the Clinical Laboratory Improvement Amendments of 1988 (CLIA-88) as  qualified to perform high complexity laboratory testing.     Extra Purple Top Tube     Status: None   Result Value Ref Range    Hold Specimen JI    Drug abuse screen 1 urine (ED)     Status: Abnormal   Result Value Ref Range    Amphetamines Urine Screen Negative Screen Negative    Barbiturates Urine Screen Negative Screen Negative    Benzodiazepines  Urine Screen Negative Screen Negative    Cannabinoids Urine Screen Positive (A) Screen Negative    Cocaine Urine Screen Positive (A) Screen Negative    Opiates Urine Screen Negative Screen Negative   Alcohol breath test POCT     Status: Normal   Result Value Ref Range    Alcohol Breath Test 0.000 0.00 - 0.01   Urine Drugs of Abuse Screen     Status: Abnormal    Narrative    The following orders were created for panel order Urine Drugs of Abuse Screen.  Procedure                               Abnormality         Status                     ---------                               -----------         ------                     Drug abuse screen 1 urin...[[id number]]  Abnormal            Final result                 Please view results for these tests on the individual orders.   Extra Tube     Status: None    Narrative    The following orders were created for panel order Extra Tube.  Procedure                               Abnormality         Status                     ---------                               -----------         ------                     Extra Purple Top Tube[[id number]]                            Final result                 Please view results for these tests on the individual orders.     Medications   LORazepam (ATIVAN) tablet 1-4 mg (2 mg Oral Given 12/12/21 1955)   thiamine (B-1) tablet 100 mg (100 mg Oral Given 12/12/21 1857)   folic acid (FOLVITE) tablet 1 mg (1 mg Oral Given 12/12/21 1857)   multivitamin w/minerals (THERA-VIT-M) tablet 1 tablet (1 tablet Oral Given 12/12/21 1857)        Assessments & Plan (with Medical Decision Making)   A 51-year-old woman with a history of bipolar disorder, PTSD and polysubstance abuse as well as alcohol abuse and dependence.  Presenting today due to worsening depressive symptoms, feeling isolated, and sinks anxiety, and relapse substance use.  Admits to using marijuana cocaine and drinking up to a liter per day of alcohol.  The patient was also seen by the [facility]  , please refer to their extensive note/evaluation which was reviewed with me and is documented in EPIC on 12/12/2021 for further details.  In the emergency department she is tearful and anxious but cooperative.  Does not appear intoxicated.  Has mild symptoms of alcohol withdrawal which is being treated with MSSA protocol.  Her labs reveal mild transaminitis which is consistent with her history of drinking a liter per day, and her urine is positive for marijuana and cocaine.  She continues to endorse suicidal ideation and cannot contract for safety.  Will recommend inpatient admission.  Patient will need to be monitored for alcohol withdrawal while on the unit, as she does develop withdrawal symptoms including restlessness and tremors but has no history of DTs or seizures.  She appears medically stable and appropriate for inpatient mental health admission.    I have reviewed the nursing notes. I have reviewed the findings, diagnosis, plan and need for follow up with the patient.    New Prescriptions    No medications on file       Final diagnoses:   Severe episode of recurrent major depressive disorder, without psychotic features (H)   Suicidal ideation   Alcohol dependence with withdrawal with complication (H)   Polysubstance abuse (H)     I, Tricia Nassar, am serving as a trained medical scribe to document services personally performed by Hugh Curry MD, based on the provider's statements to me.      IHugh MD, was physically present and have reviewed and verified the accuracy of this note documented by Tricia Nassar.     --  Hugh Curry MD  [facility] EMERGENCY DEPARTMENT  12/12/2021     Hugh Curry MD  12/12/21 2100

## 2021-12-12 NOTE — ED TRIAGE NOTES
Pt states her last drink was earlier today. Pt is very alert, clear speech and amb without difficulty.

## 2021-12-12 NOTE — ED TRIAGE NOTES
Pt states having increasing depression and having suicidal thoughts.  Pt states she has been on a three mo binder of alcohol, crack and gambling.  Pt is very tearful and states she is also going through a divorce.

## 2021-12-13 PROBLEM — F10.239 ALCOHOL DEPENDENCE WITH WITHDRAWAL WITH COMPLICATION (H): Status: ACTIVE | Noted: 2021-12-13

## 2021-12-13 PROBLEM — F19.10 POLYSUBSTANCE ABUSE (H): Status: ACTIVE | Noted: 2021-12-13

## 2021-12-13 LAB
BASOPHILS # BLD AUTO: 0 10E3/UL (ref 0–0.2)
BASOPHILS NFR BLD AUTO: 1 %
CHOLEST SERPL-MCNC: 199 MG/DL
EOSINOPHIL # BLD AUTO: 0 10E3/UL (ref 0–0.7)
EOSINOPHIL NFR BLD AUTO: 0 %
ERYTHROCYTE [DISTWIDTH] IN BLOOD BY AUTOMATED COUNT: 14.1 % (ref 10–15)
FOLATE SERPL-MCNC: 18 NG/ML
GGT SERPL-CCNC: 44 U/L (ref 0–40)
HCT VFR BLD AUTO: 47.7 % (ref 35–47)
HDLC SERPL-MCNC: 60 MG/DL
HGB BLD-MCNC: 15.5 G/DL (ref 11.7–15.7)
IMM GRANULOCYTES # BLD: 0 10E3/UL
IMM GRANULOCYTES NFR BLD: 0 %
LDLC SERPL CALC-MCNC: 111 MG/DL
LYMPHOCYTES # BLD AUTO: 2.4 10E3/UL (ref 0.8–5.3)
LYMPHOCYTES NFR BLD AUTO: 49 %
MCH RBC QN AUTO: 27.8 PG (ref 26.5–33)
MCHC RBC AUTO-ENTMCNC: 32.5 G/DL (ref 31.5–36.5)
MCV RBC AUTO: 86 FL (ref 78–100)
MONOCYTES # BLD AUTO: 0.5 10E3/UL (ref 0–1.3)
MONOCYTES NFR BLD AUTO: 10 %
NEUTROPHILS # BLD AUTO: 2 10E3/UL (ref 1.6–8.3)
NEUTROPHILS NFR BLD AUTO: 40 %
NONHDLC SERPL-MCNC: 139 MG/DL
NRBC # BLD AUTO: 0 10E3/UL
NRBC BLD AUTO-RTO: 0 /100
PLATELET # BLD AUTO: 349 10E3/UL (ref 150–450)
RBC # BLD AUTO: 5.57 10E6/UL (ref 3.8–5.2)
TRIGL SERPL-MCNC: 140 MG/DL
TSH SERPL DL<=0.005 MIU/L-ACNC: 0.88 MU/L (ref 0.4–4)
VIT B12 SERPL-MCNC: 834 PG/ML (ref 193–986)
WBC # BLD AUTO: 4.9 10E3/UL (ref 4–11)

## 2021-12-13 PROCEDURE — 36415 COLL VENOUS BLD VENIPUNCTURE: CPT | Performed by: PSYCHIATRY & NEUROLOGY

## 2021-12-13 PROCEDURE — 82977 ASSAY OF GGT: CPT | Performed by: PSYCHIATRY & NEUROLOGY

## 2021-12-13 PROCEDURE — 128N000001 HC R&B CD/MH ADULT

## 2021-12-13 PROCEDURE — 99223 1ST HOSP IP/OBS HIGH 75: CPT | Mod: 95 | Performed by: PSYCHIATRY & NEUROLOGY

## 2021-12-13 PROCEDURE — 250N000013 HC RX MED GY IP 250 OP 250 PS 637: Performed by: FAMILY MEDICINE

## 2021-12-13 PROCEDURE — 80061 LIPID PANEL: CPT | Performed by: PSYCHIATRY & NEUROLOGY

## 2021-12-13 PROCEDURE — 84443 ASSAY THYROID STIM HORMONE: CPT | Performed by: PSYCHIATRY & NEUROLOGY

## 2021-12-13 PROCEDURE — 250N000013 HC RX MED GY IP 250 OP 250 PS 637: Performed by: PSYCHIATRY & NEUROLOGY

## 2021-12-13 PROCEDURE — HZ2ZZZZ DETOXIFICATION SERVICES FOR SUBSTANCE ABUSE TREATMENT: ICD-10-PCS | Performed by: PSYCHIATRY & NEUROLOGY

## 2021-12-13 PROCEDURE — 99222 1ST HOSP IP/OBS MODERATE 55: CPT | Performed by: PHYSICIAN ASSISTANT

## 2021-12-13 PROCEDURE — 82746 ASSAY OF FOLIC ACID SERUM: CPT | Performed by: PSYCHIATRY & NEUROLOGY

## 2021-12-13 PROCEDURE — 250N000011 HC RX IP 250 OP 636: Performed by: PSYCHIATRY & NEUROLOGY

## 2021-12-13 PROCEDURE — 82607 VITAMIN B-12: CPT | Performed by: PSYCHIATRY & NEUROLOGY

## 2021-12-13 PROCEDURE — 99207 PR CONSULT E&M CHANGED TO INITIAL LEVEL: CPT | Performed by: PHYSICIAN ASSISTANT

## 2021-12-13 RX ORDER — CITALOPRAM HYDROBROMIDE 20 MG/1
20 TABLET ORAL DAILY
Status: DISCONTINUED | OUTPATIENT
Start: 2021-12-13 | End: 2021-12-15 | Stop reason: HOSPADM

## 2021-12-13 RX ORDER — TOPIRAMATE 50 MG/1
50 TABLET, FILM COATED ORAL
Status: ON HOLD | COMMUNITY
End: 2021-12-15

## 2021-12-13 RX ORDER — HYDROCHLOROTHIAZIDE 25 MG/1
25 TABLET ORAL DAILY
Status: DISCONTINUED | OUTPATIENT
Start: 2021-12-13 | End: 2021-12-15 | Stop reason: HOSPADM

## 2021-12-13 RX ORDER — TOPIRAMATE 25 MG/1
25 TABLET, FILM COATED ORAL
Status: DISCONTINUED | OUTPATIENT
Start: 2021-12-13 | End: 2021-12-15 | Stop reason: HOSPADM

## 2021-12-13 RX ORDER — MULTIPLE VITAMINS W/ MINERALS TAB 9MG-400MCG
1 TAB ORAL DAILY
Status: DISCONTINUED | OUTPATIENT
Start: 2021-12-13 | End: 2021-12-15 | Stop reason: HOSPADM

## 2021-12-13 RX ORDER — LOPERAMIDE HCL 2 MG
2 CAPSULE ORAL 4 TIMES DAILY PRN
Status: DISCONTINUED | OUTPATIENT
Start: 2021-12-13 | End: 2021-12-15 | Stop reason: HOSPADM

## 2021-12-13 RX ORDER — AMOXICILLIN 250 MG
1 CAPSULE ORAL 2 TIMES DAILY PRN
Status: DISCONTINUED | OUTPATIENT
Start: 2021-12-13 | End: 2021-12-15 | Stop reason: HOSPADM

## 2021-12-13 RX ORDER — MAGNESIUM HYDROXIDE/ALUMINUM HYDROXICE/SIMETHICONE 120; 1200; 1200 MG/30ML; MG/30ML; MG/30ML
30 SUSPENSION ORAL EVERY 4 HOURS PRN
Status: DISCONTINUED | OUTPATIENT
Start: 2021-12-13 | End: 2021-12-15 | Stop reason: HOSPADM

## 2021-12-13 RX ORDER — BUPROPION HYDROCHLORIDE 150 MG/1
150 TABLET ORAL EVERY MORNING
Status: ON HOLD | COMMUNITY
End: 2021-12-15

## 2021-12-13 RX ORDER — HYDROCHLOROTHIAZIDE 25 MG/1
25 TABLET ORAL DAILY
Status: ON HOLD | COMMUNITY
End: 2021-12-15

## 2021-12-13 RX ORDER — HYDROXYZINE HYDROCHLORIDE 25 MG/1
25 TABLET, FILM COATED ORAL EVERY 4 HOURS PRN
Status: DISCONTINUED | OUTPATIENT
Start: 2021-12-13 | End: 2021-12-15 | Stop reason: HOSPADM

## 2021-12-13 RX ORDER — LORATADINE 10 MG/1
10 TABLET ORAL DAILY
Status: DISCONTINUED | OUTPATIENT
Start: 2021-12-13 | End: 2021-12-15 | Stop reason: HOSPADM

## 2021-12-13 RX ORDER — FLUOXETINE 40 MG/1
80 CAPSULE ORAL DAILY
Status: ON HOLD | COMMUNITY
End: 2021-12-15

## 2021-12-13 RX ORDER — TRAZODONE HYDROCHLORIDE 50 MG/1
50 TABLET, FILM COATED ORAL
Status: DISCONTINUED | OUTPATIENT
Start: 2021-12-13 | End: 2021-12-15 | Stop reason: HOSPADM

## 2021-12-13 RX ORDER — ALBUTEROL SULFATE 90 UG/1
2 AEROSOL, METERED RESPIRATORY (INHALATION) EVERY 4 HOURS PRN
Status: ON HOLD | COMMUNITY
End: 2021-12-15

## 2021-12-13 RX ORDER — DIAZEPAM 5 MG
5-20 TABLET ORAL EVERY 30 MIN PRN
Status: DISCONTINUED | OUTPATIENT
Start: 2021-12-13 | End: 2021-12-15 | Stop reason: HOSPADM

## 2021-12-13 RX ORDER — IBUPROFEN 600 MG/1
600 TABLET, FILM COATED ORAL EVERY 6 HOURS PRN
Status: DISCONTINUED | OUTPATIENT
Start: 2021-12-13 | End: 2021-12-15 | Stop reason: HOSPADM

## 2021-12-13 RX ORDER — TOPIRAMATE 50 MG/1
50 TABLET, FILM COATED ORAL
Status: DISCONTINUED | OUTPATIENT
Start: 2021-12-13 | End: 2021-12-13

## 2021-12-13 RX ORDER — FOLIC ACID 1 MG/1
1 TABLET ORAL DAILY
Status: DISCONTINUED | OUTPATIENT
Start: 2021-12-13 | End: 2021-12-15 | Stop reason: HOSPADM

## 2021-12-13 RX ORDER — ALBUTEROL SULFATE 90 UG/1
2 AEROSOL, METERED RESPIRATORY (INHALATION) EVERY 4 HOURS PRN
Status: DISCONTINUED | OUTPATIENT
Start: 2021-12-13 | End: 2021-12-15 | Stop reason: HOSPADM

## 2021-12-13 RX ORDER — HYDROXYZINE HYDROCHLORIDE 25 MG/1
25-50 TABLET, FILM COATED ORAL 3 TIMES DAILY PRN
Status: DISCONTINUED | OUTPATIENT
Start: 2021-12-13 | End: 2021-12-13

## 2021-12-13 RX ORDER — VITAMIN B COMPLEX
125 TABLET ORAL DAILY
Status: DISCONTINUED | OUTPATIENT
Start: 2021-12-13 | End: 2021-12-15 | Stop reason: HOSPADM

## 2021-12-13 RX ORDER — ONDANSETRON 4 MG/1
4 TABLET, ORALLY DISINTEGRATING ORAL EVERY 6 HOURS PRN
Status: DISCONTINUED | OUTPATIENT
Start: 2021-12-13 | End: 2021-12-15 | Stop reason: HOSPADM

## 2021-12-13 RX ORDER — BUPROPION HYDROCHLORIDE 300 MG/1
300 TABLET ORAL EVERY MORNING
Status: ON HOLD | COMMUNITY
End: 2021-12-15

## 2021-12-13 RX ADMIN — LORAZEPAM 1 MG: 1 TABLET ORAL at 03:39

## 2021-12-13 RX ADMIN — MULTIPLE VITAMINS W/ MINERALS TAB 1 TABLET: TAB at 08:53

## 2021-12-13 RX ADMIN — CHOLECALCIFEROL TAB 25 MCG (1000 UNIT) 125 MCG: 25 TAB at 12:43

## 2021-12-13 RX ADMIN — ONDANSETRON 4 MG: 4 TABLET, ORALLY DISINTEGRATING ORAL at 10:16

## 2021-12-13 RX ADMIN — TOPIRAMATE 25 MG: 25 TABLET, FILM COATED ORAL at 18:55

## 2021-12-13 RX ADMIN — FOLIC ACID 1 MG: 1 TABLET ORAL at 08:53

## 2021-12-13 RX ADMIN — THIAMINE HCL TAB 100 MG 100 MG: 100 TAB at 08:53

## 2021-12-13 RX ADMIN — TOPIRAMATE 25 MG: 25 TABLET, FILM COATED ORAL at 12:43

## 2021-12-13 RX ADMIN — POTASSIUM CHLORIDE 20 MEQ: 1.5 FOR SOLUTION ORAL at 03:39

## 2021-12-13 RX ADMIN — HYDROCHLOROTHIAZIDE 25 MG: 25 TABLET ORAL at 12:43

## 2021-12-13 RX ADMIN — LORATADINE 10 MG: 10 TABLET ORAL at 12:43

## 2021-12-13 RX ADMIN — CITALOPRAM HYDROBROMIDE 20 MG: 20 TABLET ORAL at 12:43

## 2021-12-13 ASSESSMENT — ACTIVITIES OF DAILY LIVING (ADL)
ORAL_HYGIENE: INDEPENDENT
DRESS: INDEPENDENT
HYGIENE/GROOMING: PROMPTS;INDEPENDENT
DRESS: INDEPENDENT;SCRUBS (BEHAVIORAL HEALTH)
LAUNDRY: WITH SUPERVISION
HYGIENE/GROOMING: INDEPENDENT
ORAL_HYGIENE: INDEPENDENT

## 2021-12-13 ASSESSMENT — MIFFLIN-ST. JEOR: SCORE: 1482.25

## 2021-12-13 NOTE — PLAN OF CARE
"Problem: Adult Inpatient Plan of Care  Goal: Plan of Care Review  Outcome: No Change  Flowsheets (Taken 12/13/2021 0919)  Plan of Care Reviewed With: patient     Problem: Alcohol Withdrawal  Goal: Alcohol Withdrawal Symptom Control  Outcome: No Change    Patient is up and visible in the milieu for breakfast, vital signs, and nurse check-in's. Patient appears sedated. Patient falling asleep sitting in the patient lounge. Patient closes eyes at times during interaction and conversation with this RN. Patient is ambulating slow, steady. Patient remains on Falls precautions. Patient is alert and oriented x 2, disoriented by date/time. Patient is not attending/participating in unit programming. Pt is not social with peers. Affect is blunted/flat, mood is anxious/depressed. Patient endorses SI thoughts only, with no current plan. Patient denies SIB. Pt denies auditory/visual hallucinations. Patient verbally contracts for safety on the unit, and states she will come to staff if she is feeling unsafe.     Pt's MSSA = 4 upon first am assessment. Patient does not appear tremulous or diaphoretic. Patient reports anxiety, and requested hydroxyzine 25mg PRN. At this time, this RN felt uncomfortable giving patient any PRN's due to patient sedation. Patient was understanding of this. Patient reports a good appetite, and poor sleep related to a late admission. Patient discharge plans pending. Rest, fluids, and food encouraged. Status 15 checks remain. Patient denies any unmet needs at this time.     Blood pressure 136/70, pulse 76, temperature 97.1  F (36.2  C), temperature source Temporal, resp. rate 16, height 1.702 m (5' 7\"), weight 83.5 kg (184 lb), SpO2 99 %, not currently breastfeeding.     Update:   Patient reported emesis x 1 to staff this am. Patient states that the AM vitamins this morning made her nauseated/feel sick. Patient requested/recieved Zofran 4mg ODT x 1 (See MAR). Patient continues to present as sedated, often " closing her eyes during nurse interaction. Dr. Kemal MD, notified of patient sedation and current status.     Patient MSSA at lunch = 5. Patient continues to show minimal withdrawal symptoms at this time. Patient advised to complete UA sample, cup provided to patient. Patient has not provided a sample at this time.

## 2021-12-13 NOTE — PHARMACY-ADMISSION MEDICATION HISTORY
Admission Medication History Completed by Pharmacy    See [facility] Admission Navigator for allergy information, preferred outpatient pharmacy and prior to admission medications.     Medication History Sources:     Prescription fill history via Epic Surescripts data    Chart Review    Additional Information:  PTA medication list updated based on fill history only. All medication directions consistent with fill records and but no medications have been filled since at least August 2021.   Pharmacist did not interview patient. Last doses and overall medication adherence is unknown.  Any over the counter products, vitamins or supplements may not be accurately reflected in the PTA medication list.    Prior to Admission medications    Medication Sig Last Dose     albuterol (PROAIR HFA/PROVENTIL HFA/VENTOLIN HFA) 108 (90 Base) MCG/ACT inhaler Inhale 2 puffs into the lungs every 4 hours as needed for shortness of breath / dyspnea or wheezing     July 2021     buPROPion (WELLBUTRIN XL) 150 MG 24 hr tablet Take 150 mg by mouth every morning Take with 300mg tablet for 450mg total daily dose.     Aug 2021     buPROPion (WELLBUTRIN XL) 300 MG 24 hr tablet Take 300 mg by mouth every morning Take with 150mg tablet for 450mg total daily dose.     Aug 2021     FLUoxetine (PROZAC) 40 MG capsule Take 80 mg by mouth daily     Aug 2021     hydrochlorothiazide (HYDRODIURIL) 25 MG tablet Take 25 mg by mouth daily     Aug 2021     mometasone-formoterol (DULERA) 100-5 MCG/ACT inhaler     Inhale 2 puffs into the lungs 2 times daily June 2021     topiramate (TOPAMAX) 50 MG tablet Take 50 mg by mouth 3 times daily     Aug 2021     Cholecalciferol 5000 units TABS Take 5,000 Units by mouth daily     May 2021     hydrOXYzine (ATARAX) 25 MG tablet Take 1-2 tablets (25-50 mg) by mouth 3 times daily as needed for anxiety     Aug 2021     loratadine (CLARITIN) 10 MG tablet Take 1 tablet (10 mg) by mouth daily     June 2021     naltrexone (VIVITROL) 380  Chief Complaint:   Lulú Simpson is a 39 y.o. male who presents for complete physical exam.      ASSESSMENT:   Well Adult, See encounter diagnoses  1. Encounter to establish care  He is here to establish care. He does not have any complaints or issues today. He does discuss his recent emergency room visit but says his symptoms have subsided. He does express that he feels his symptoms may have been related to heavy drinking and increased cigarette smoking while he was on vacation in [place] for 4 days. He says he usually drinks about 32 ounces of water a day. He drinks approximately 4 cups of coffee daily. Discussed with patient decreasing caffeine intake and educated on connection between caffeine and palpitations. Increase water intake. Patient would like to have tetanus booster updated as well as start the Pneumovax series. Blood work drawn in office. Recheck sodium level since this was low during patient's ED visit. No other issues at this time. 2. Screening for hypercholesterolemia    - Lipid Panel    3. Hyponatremia    - Comprehensive Metabolic Panel    4. Screening for deficiency anemia    - CBC Auto Differential    5. Need for tetanus, diphtheria, and acellular pertussis (Tdap) vaccine    - Tdap (age 6y and older) IM (Boostrix)    6. Need for pneumococcal vaccination    - PNEUMOVAX 23 subcutaneous/IM (Pneumococcal polysaccharide vaccine 23-valent >= 1yo)          Plan:   See orders and medications filed with this encounter. The patient is advised to continue current medications, continue current healthy lifestyle patterns and return for routine annual checkups. Encouraged healthy diet, regular exercise and multivitamin daily. Labs checked per orders. Optho visit q 1-2 years. Watch for skin mole changes. Vaccines ordered today per orders. Return if symptoms worsen or fail to improve. HPI:      He is here to establish care.   He was recently seen in the emergency department for MG SUSR Inject 380 mg into the muscle every 28 days [facility] clinic administered     June 2021       Date completed: 12/13/21    Medication history completed by:   Miranda Williamson, Pharm.D., [facility]  Behavioral Health Inpatient Pharmacist  [facility] ([facility]) Emergency Department  Phone: *[phone] ([employer]) or [phone]         palpitations. He reports that he was in [place] from Sunday through Thursday and on Friday he began having some palpitations and shortness of breath. These occurred approximately once every hour. He denies any shortness of breath or chest pain or palpitations today. He states that since he was seen in the ER he has felt better. Last tetanus- unsure of last tetanus shot. He is still smokingapproximately half pack a day. He is not ready to quit. PHYSICAL EXAMINATION:    Vitals:    06/16/21 1050   BP: 122/82   Pulse: 100   Temp: 97.2 °F (36.2 °C)   SpO2: 98%   Weight: 190 lb (86.2 kg)   Height: 5' 10\" (1.778 m)        Review of Systems   Constitutional: Negative for appetite change, fatigue and fever. HENT: Negative for congestion, ear discharge, ear pain, rhinorrhea, sneezing, sore throat and trouble swallowing. Eyes: Negative for photophobia, redness and visual disturbance. Respiratory: Negative for cough and shortness of breath. Cardiovascular: Negative for chest pain, palpitations and leg swelling. Gastrointestinal: Negative for abdominal pain, constipation, diarrhea and nausea. Endocrine: Negative for polydipsia, polyphagia and polyuria. Genitourinary: Negative for decreased urine volume, difficulty urinating, discharge, dysuria, frequency, hematuria, scrotal swelling and urgency. Musculoskeletal: Negative for arthralgias, back pain, myalgias and neck pain. Skin: Negative for color change, pallor, rash and wound. Allergic/Immunologic: Negative for environmental allergies and food allergies. Neurological: Negative for dizziness, light-headedness and headaches. Hematological: Negative for adenopathy. Does not bruise/bleed easily. Psychiatric/Behavioral: Negative for dysphoric mood and sleep disturbance. The patient is not nervous/anxious. Physical Exam  Vitals reviewed. Constitutional:       General: He is not in acute distress.      Appearance: Normal appearance. He is normal weight. HENT:      Head: Normocephalic and atraumatic. Right Ear: Tympanic membrane, ear canal and external ear normal. There is no impacted cerumen. Left Ear: Tympanic membrane, ear canal and external ear normal. There is no impacted cerumen. Nose: Nose normal. No congestion or rhinorrhea. Mouth/Throat:      Mouth: Mucous membranes are moist.      Pharynx: Oropharynx is clear. No oropharyngeal exudate or posterior oropharyngeal erythema. Eyes:      General:         Right eye: No discharge. Left eye: No discharge. Extraocular Movements: Extraocular movements intact. Conjunctiva/sclera: Conjunctivae normal.      Pupils: Pupils are equal, round, and reactive to light. Cardiovascular:      Rate and Rhythm: Normal rate and regular rhythm. Pulses: Normal pulses. Heart sounds: Normal heart sounds. No murmur heard. No friction rub. No gallop. Pulmonary:      Effort: Pulmonary effort is normal.      Breath sounds: Normal breath sounds. No stridor. No wheezing, rhonchi or rales. Abdominal:      General: Abdomen is flat. Bowel sounds are normal. There is no distension. Palpations: Abdomen is soft. There is no mass. Tenderness: There is no abdominal tenderness. There is no guarding or rebound. Hernia: No hernia is present. Musculoskeletal:         General: Normal range of motion. Cervical back: Normal range of motion and neck supple. No tenderness. Right lower leg: No edema. Left lower leg: No edema. Lymphadenopathy:      Cervical: No cervical adenopathy. Skin:     General: Skin is warm and dry. Capillary Refill: Capillary refill takes less than 2 seconds. Coloration: Skin is not jaundiced or pale. Findings: No erythema. Neurological:      Mental Status: He is alert and oriented to person, place, and time.    Psychiatric:         Mood and Affect: Mood normal.         Behavior: Behavior normal.         Thought Content: Thought content normal.         Judgment: Judgment normal.            There is no problem list on file for this patient. Past Medical History:   Diagnosis Date    Palpitations        History reviewed. No pertinent surgical history. Most Recent Immunizations   Administered Date(s) Administered    Pneumococcal Polysaccharide ([id number]) 06/16/2021    Tdap (Boostrix, Adacel) 06/16/2021        No current outpatient medications on file. No current facility-administered medications for this visit. No Known Allergies    Social History     Socioeconomic History    Marital status: Single     Spouse name: None    Number of children: None    Years of education: None    Highest education level: None   Occupational History    None   Tobacco Use    Smoking status: Light Tobacco Smoker    Smokeless tobacco: Never Used   Substance and Sexual Activity    Alcohol use: Yes     Comment: weekly    Drug use: Never    Sexual activity: None   Other Topics Concern    None   Social History Narrative    None     Social Determinants of Health     Financial Resource Strain:     Difficulty of Paying Living Expenses:    Food Insecurity:     Worried About Running Out of Food in the Last Year:     Ran Out of Food in the Last Year:    Transportation Needs:     Lack of Transportation (Medical):      Lack of Transportation (Non-Medical):    Physical Activity:     Days of Exercise per Week:     Minutes of Exercise per Session:    Stress:     Feeling of Stress :    Social Connections:     Frequency of Communication with Friends and Family:     Frequency of Social Gatherings with Friends and Family:     Attends Rastafarian Services:     Active Member of Clubs or Organizations:     Attends Club or Organization Meetings:     Marital Status:    Intimate Partner Violence:     Fear of Current or Ex-Partner:     Emotionally Abused:     Physically Abused:     Sexually Abused: Family History   Problem Relation Age of Onset    Diabetes Maternal Grandmother     Alzheimer's Disease Maternal Grandfather     Heart Attack Paternal Grandfather           This chart was generated using the [address] [place] [address] [address] dictation system. I created this record but it may contain dictation errors due to the limitation of the software.

## 2021-12-13 NOTE — PROGRESS NOTES
PMPD Note-     Narx Scores  Narcotic  000  Sedative  000  Stimulant  000  Explanation and Guidance  Overdose Risk Score  000  (Range 000-999)  Explanation and Guidance  State Indicators (0)  Details  RX Graph   Narcotic   Buprenorphine   Sedative   Stimulant   Other  Learn how to use graph  All Prescribers  Prescribers  2 - April DMITRI Bill  1 - Anthony Aly  Timeline  12/13  2m  6m  1y  2y  Disclaimer  Morphine Milligram Equivalent Prescribed Over Time  Last 30 Days  Last 60 Days  Last 90 Days  Last 1 Year  Last 2 Years  MME  Timeframe  0  1  2  11/14/21 11/29/21 12/13/21  0  MME per Day Avg.  0  MME per RX  Disclaimer  Lorazepam MgEq (LME) Prescribed Over Time  Last 30 Days  Last 60 Days  Last 90 Days  Last 1 Year  Last 2 Years  LME  Timeframe  0  1  2  11/14/21 11/29/21 12/13/21  0  LME Per Day Avg.  0  LME mg Per Rx  Disclaimer  Buprenorphine (mg) Prescribed Over Time  Last 30 Days  Last 60 Days  Last 90 Days  Last 1 Year  Last 2 Years  mg  Timeframe  0  1  2  11/14/21 11/29/21 12/13/21  0  mg Per Day Avg.  0  Avg mg Per Rx  Disclaimer  RX Summary  Summary  Total Prescriptions 9  Total Private Pay 0  Total Prescribers 2  Total Pharmacies 1  Opioids* (excluding Buprenorphine)  Current Qty 0  Current MME/day 0.00  30 Day Avg MME/day 0.00  Buprenorphine*  Current Qty 0  Current mg/day 0.00  30 Day Avg mg/day 0.00  RX Summary Expanded  Narcotics (excluding Buprenorphine)  30 Day Avg. MME 0.00  90 Day Avg. MME 0.00  Rx Count/12 Months 0  Prescriber #/6 Months 0  Pharmacy #/6 Months 0  Current Quantity 0  Buprenorphine  30 Day Avg. mg/day 0.00  90 Day Avg. mg/day 0.00  Rx Count/12 Months 0  Prescriber #/6 Months 0  Pharmacy #/6 Months 0  Current Quantity 0  Sedatives  30 Day Avg. LME 0.00  90 Day Avg. LME 0.00  Rx Count/12 Months 0  Prescriber #/6 Months 0  Pharmacy #/6 Months 0  Current Quantity 0  Stimulants  30 Day Avg. mg/day 0.00  90 Day Avg. mg/day 0.00  Rx Count/12 Months 0  Prescriber #/6  Months 0  Pharmacy #/6 Months 0  Current Quantity 0  Prescriptions  Total: 9  Private Pay: 0  Showing 1-9 of 9 Items View 15 Items  1 of 1   Filled  Written  Sold  ID  Drug  QTY  Days  Prescriber  RX #  Dispenser  Refill  Daily Dose*  Pymt Type     06/25/2021 06/11/2021 06/25/2021 1   Gabapentin 100 Mg Capsule  90.00 30 Ap Wol [id number] All (3545) 0/1  [place]  06/25/2021 06/11/2021 06/25/2021 1   Gabapentin 600 Mg Tablet  90.00 30 Ap Wol [id number] All (3545) 0/1  [place]  05/30/2021 05/04/2021 05/30/2021 1   Gabapentin 100 Mg Capsule  90.00 30 Ap Wol [id number] All (3545) 1/1  [place]  05/30/2021 05/04/2021 05/30/2021 1   Gabapentin 600 Mg Tablet  90.00 30 Ap Wol [id number] All (3545) 1/1  [place]  05/04/2021 05/04/2021 05/04/2021 1   Gabapentin 100 Mg Capsule  90.00 30 Ap Wol [id number] All (3545) 0/1  [place]  05/04/2021 05/04/2021 05/04/2021 1   Gabapentin 600 Mg Tablet  90.00 30 Ap Wol [id number] All (3545) 0/1  [place]  03/17/2021 09/24/2020  1   Gabapentin 800 Mg Tablet  90.00 30 Ce Ski [id number] All (3545) 4/5  [place]  02/02/2021 09/24/2020  1   Gabapentin 800 Mg Tablet  90.00 30 Ce Ski [id number] All (3545) 3/5  [place]  12/30/2020 09/24/2020  1   Gabapentin 800 Mg Tablet  90.00 30 Ce Ski [id number] All (3545) 2/5  [place]  Disclaimer  Showing 1-9 of 9 Items View 15 Items  1 of 1   Providers  Total: 2  Showing 1-2 of 2 Items View 15 Items  1 of 1   Name  Address  City  State  Zipcode  Phone   Anthony Aly [address] [place] [address] [phone]  April L Bill [address] [place] [address] [phone]  Showing 1-2 of 2 Items View 15 Items  1 of 1   Pharmacies  Total: 1  Showing 1 Item View 15 Items  1 of 1   Name  Address  [facility]  State  Zipcode  Phone   [facility] [phone] [address] [place] [address] [phone]  Showing 1 Item View 15 Items  1 of 1   The report provided is based upon the search  criteria entered and the corresponding data as it has been reported by dispenser(s). If erroneous information is identified or additional information is needed, please contact the dispenser or the prescriber provided on the report. Date Sold signifies the date the prescription was sold (left the pharmacy). The absence of Date Sold does not necessarily indicate the prescription was not dispensed. Fill Date represents the date the medication was filled or prepared by the pharmacy. Note, federal regulation (CFR Title 42: Part 2) requires patient consent prior to releasing certain patient data from federally funded opioid treatment programs (OTPs). As such, controlled substances dispensed from OTPs for medication-assisted treatment may not appear in the [place]  report. Morphine milligram equivalent (MME) conversion factors published by the CDC are used in the MME calculation. Per the CDC, the MME conversion factor is intended only for analytic purposes where prescription data are used to retrospectively calculate daily MME to inform analyses of risks associated with opioid prescribing. This value does not constitute clinical guidance or recommendations for converting patients from one form of opioid analgesic to another. Per the CDC, the conversion factors for drugs prescribed or provided, as part of medication-assisted treatment for opioid use disorder should not be used to benchmark against MME dosage thresholds meant for opioids prescribed for pain. Buprenorphine products listed in the CDC s MME file do not have an associated conversion factor. Lastly, the CDC notes, in clinical practice, calculating MME for methadone often involves a sliding-scale approach, whereby the conversion factor increases with increasing dose. The conversion factor of 3 for methadone presented in this file could underestimate MME for a given patient. This report contains confidential information, including patient identifiers, and is not a  public record. The information on this report must be treated as protected health information and is only to be disclosed to others as authorized by applicable state and Federal regulations.

## 2021-12-13 NOTE — PROGRESS NOTES
Patient has not provided urine sample to complete ordered UA. Patient has not reported any urinary retention this shift.

## 2021-12-13 NOTE — PLAN OF CARE
"SBAR    S = Situation:   Winifred Bashir is a 51 year old year old female voluntarily admitted to detox from alcohol.    B  = Background:   Patient reported drinking 1-1.75 L of alcohol daily. Last drink was at 3pm on 12/12/21. Liver enzymes are elevated. She denied history of seizures or DTs. Patient also reported using crack cocaine and \"large blunts.\" Patient believes that her recent use of cocaine has contributed to: lower abdominal soreness and heaviness as well as difficulty urinating and bruising.     Patient reported being addicted to gambling.    Patient has a history of polysubstance abuse, bipolar disorder, PTSD, KELLEN, asthma and hypertention.    Patient endorsed worsening depression with SI to overdose on her medications. She reported numerous things that contributed to her worsening depression including: Markus Jose L, Covid, divorce, job loss, selling her house and moving, and staying home with her 2 children to assist them with at home learning.      A  =  Assessment:   Patient was cooperative with the admission process. Safety search was completed; no contrabands found.    Patient appeared slow to respond at times.    MSSA=6. She endorsed depression 3/10 and anxiety 8/10. Patient appeared slightly unsteady on her feet. She was placed on fall precautions in addition to withdrawal and suicide precautions. Patient was able to contract for safety while on the unit.     BP (P) 111/83   Pulse (P) 78   Temp (P) 96.9  F (36.1  C) (Temporal)   Resp (P) 18   Ht (P) 1.702 m (5' 7\")   Wt (P) 83.5 kg (184 lb)   LMP  (LMP Unknown)   SpO2 (P) 100%   BMI (P) 28.82 kg/m    R =   Request or Recommendation:   [facility] protocol with valium. Psychiatry and internal medicine to assess. CTC to be assigned. Patient reported not taking PTA medications for over 3 months; no ordered.  "

## 2021-12-13 NOTE — PROGRESS NOTES
12/13/21 0509   Patient Belongings   Did you bring any home meds/supplements to the hospital?  No   Patient Belongings other (see comments)   Patient Belongings Put in Hospital Secure Location (Security or Locker, etc.) other (see comments)   Belongings Search Yes   Clothing Search Yes   Second Staff Mariola and Ada   Comment See Notes     Large blue bin:  - Backpack w/ toiletries, hair/body products,  assorted  clothes  w/strings,shoes, jacket,lighters,cig, keys,   -Small blue bin:   Phone,wall ,wallet  Security Env.    Visa card   2 Master cards  3 EBT  $6 cash   Check #[id number]  [place] D/L  A               Admission:  I am responsible for any personal items that are not sent to the safe or pharmacy.  [place] is not responsible for loss, theft or damage of any property in my possession.    Signature:  _________________________________ Date: _______  Time: _____                                              Staff Signature:  ____________________________ Date: ________  Time: _____      2nd Staff person, if patient is unable/unwilling to sign:    Signature: ________________________________ Date: ________  Time: _____     Discharge:  [place] has returned all of my personal belongings:    Signature: _________________________________ Date: ________  Time: _____                                          Staff Signature:  ____________________________ Date: ________  Time: _____

## 2021-12-13 NOTE — TELEPHONE ENCOUNTER
S: Pt is a 51 yrs old female in the [facility] ED for SI w/ a plan reports by Tricia at 9:10PM.     B: Pt came in for SI w/ a plan to overdose.  Dx of KELLEN, Bipolar, and depression.  Currently relapsed on alcohol and cocaine 3 months ago. Pt has no hx of SA.  Pt was hospitalized back in April 2018 for substance use.  Pt has gone through life changing.  She is going through a divorce, sold her house, lost her job in the last 3 months.  She identified the changes as triggers for SI/ mh symptoms. Pt reports she stopped taking her meds 3 months ago.  She was Wellbutrin, prozac, and gabapentin.  Pt denies HI.  Pt mentioned hearing hallucinations of people randomly calling her name.  Last time was 2 days.  No visual hallucinations.      Pt reports she drinks 1 L of alcohol and 1 gram of cocaine daily.  No current w/d symptoms, but received a couple doses of valium and is sleeping.  Pt reports last use of alcohol and cocaine yesterday.  Denies hx of w/d seizures or DTs.  Pt's alcohol level was 0.  Pt reported that she's never drank this much.     Pt has no chronic medical illness. Pt is ambulating independently.  Pt is medically cleared. COVID negative.     COVID: Negative  CMP: Potassium 3.2, ,   UTOX: Positive for Cannabis and Cocaine.  HCG: Negative  Vitals: stable       A: Vol. Pt has been oriented, coherent, cooperative.     R: 11:30PM- ED MD states that Pt would be appropriate for 3A for MICD admission.     11:52PM- Intake requested for P      Patient cleared and ready for behavioral bed placement: Yes
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
## 2021-12-13 NOTE — PLAN OF CARE
Behavioral Team Discussion: (12/13/2021)    Continued Stay Criteria/Rationale: Patient admitted for Chemical Use Issues.  Plan: The following services will be provided to the patient; psychiatric assessment, medication management, therapeutic milieu, individual and group support, and skills groups.   Participants: 3A Provider: Dr. Meghana Sommer MD; 3A RN: Telma Barbosa RN; 3A CM's: Veronica Zamarripa  and Judi Rankin.  Summary/Recommendation: Providers will assess today for treatment recommendations, discharge planning, and aftercare plans. CM will meet with pt for discharge planning.   Medical/Physical: History of Asthma and Osteoarthritis. Acute concerns of hypertension and elevated liver enzymes.  Precautions:   Behavioral Orders   Procedures     Code 1 - Restrict to Unit     Discontinue 1:1 attendant for suicide risk     Order Specific Question:   I have performed an in person assessment of the patient     Answer:   Based on this assessment the patient no longer requires a one on one attendant at this point in time.     Order Specific Question:   Rationale     Answer:   Patient States able to remain safe in hospital     Fall precautions     Routine Programming     As clinically indicated     Status 15     Every 15 minutes.     Suicide precautions     Patients on Suicide Precautions should have a Combination Diet ordered that includes a Diet selection(s) AND a Behavioral Tray selection for Safe Tray - with utensils, or Safe Tray - NO utensils       Withdrawal precautions     Rationale for change in precautions or plan: N/A  Progress: Initial.
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
## 2021-12-13 NOTE — CONSULTS
[facility]    Internal Medicine Initial Consult       Date of Admission: 12/12/2021  Consult Requested by: Meghana Sommer MD  Reason for Consult: Transaminitis     Assessment & Recommendations  Winifred Bashir is a 51 year old woman with a history of polysubstance abuse, bipolar disorder, KELLEN, and PTSD who is admitted to [facility] for detox from alcohol. She is also depressed and has passive suicidal ideation.         Alcohol Abuse & Withdrawal  Polysubstance Abuse  Depression, SI.   Management per psychiatry team.     Intermittent Suprapubic Pressure and Urinary Hesitancy. Reporting for a couple days at a time over several weeks when using cocaine and marijuana that may also be laced with other substances. Endorses prior UTIs, denies gynecologic or STI concerns. Her symptoms could be related to undiagnosed/untreated UTI, or could be having intermittent urinary retention related to sympathetic effects of cocaine.   - UA/UC ordered.   - If patient reports any urinary hesitancy or retention please obtain bladder scan and notify IM if > 300 ml.     Transaminitis. ,  without recent baseline for comparison and no prior abdominal imaging on file. No acetaminophen use, known hepatitis risk factors, or biliary complaints. Suspect d/t substance abuse.  - Will trend hepatic panel in AM.     Medicine will follow UA/UC and LFTs, please page with any additional concerns.     Maribel Jackson PA-C  Hospitalist Service  Contact information available via [facility] Paging/Directory  ______________________________________________________________________    Reason for Admission  Polysubstance Abuse     Chief Complaint   Polysubstance Abuse    History of Present Illness   History is obtained from the patient and medical record.     Winifred Bashir is a 51 year old year old woman with a history of polysubstance abse admitted to behavioral health for detox. She has been drinking 1-1.75L  of alcohol daily. She has been using cocaine and marijuana, and is concerned that these were laced with other substances.    Internal Medicine service was asked to see patient for a general medical evaluation. Currently, patient is feeling tired. She ate breakfast. She reports that when using cocaine and marijuana she would notice a lower abdominal pressure and soreness, and have trouble emptying her bladder. This would last a couple days after each time. Has a history of UTIs. No f/c. No flank pain. Is post menopausal with no vaginal bleeding or discharge. No STI concerns (currently abstinent). No hepatitis risk factors, including no IVDU. Has also noticed various bruises on her body. Fell 3 weeks ago and was told she briefly loss consciousness. No headaches since. Vision is blurry when trying to see things far away - wears glasses and has not seen eye doctor recently.     Review of Systems   10 point ROS performed and negative unless otherwise noted in HPI     Past Medical History    I have reviewed this patient's medical history and updated it with pertinent information if needed.   Past Medical History:   Diagnosis Date     Bipolar disorder, unspecified (H)      Gambling disorder, persistent, severe 2020     Generalized anxiety disorder 2020     Methamphetamine use disorder, severe (H) 2020     Mild intermittent asthma      Moderate alcohol use disorder (H) 2020     Osteoarthritis      Posttraumatic stress disorder         Past Surgical History   I have reviewed this patient's surgical history and updated it with pertinent information if needed.  Past Surgical History:   Procedure Laterality Date     C/SECTION, LOW TRANSVERSE  2007    , Low Transverse     TUBAL LIGATION  2011        Social History   Social History     Tobacco Use     Smoking status: Former Smoker     Types: Cigarettes, Cigarettes     Smokeless tobacco: Never Used     Tobacco comment: quit     Substance Use Topics     Alcohol use: Yes     Drug use: Yes     Types: Cocaine, Marijuana       Family History   I have reviewed this patient's family history and updated it with pertinent information if needed.   Family History   Problem Relation Age of Onset     Hypertension Mother      Hypertension Maternal Grandmother      Cerebrovascular Disease Maternal Grandmother      Diabetes Maternal Aunt      Musculoskeletal Disorder Maternal Aunt         Lupus       Medications   Medications Prior to Admission   Medication Sig Dispense Refill Last Dose     albuterol (PROAIR HFA/PROVENTIL HFA/VENTOLIN HFA) 108 (90 Base) MCG/ACT inhaler Inhale 2 puffs into the lungs every 4 hours as needed for shortness of breath / dyspnea or wheezing   July 2021     buPROPion (WELLBUTRIN XL) 150 MG 24 hr tablet Take 150 mg by mouth every morning Take with 300mg tablet for 450mg total daily dose.   Aug 2021     buPROPion (WELLBUTRIN XL) 300 MG 24 hr tablet Take 300 mg by mouth every morning Take with 150mg tablet for 450mg total daily dose.   Aug 2021     FLUoxetine (PROZAC) 40 MG capsule Take 80 mg by mouth daily   Aug 2021     hydrochlorothiazide (HYDRODIURIL) 25 MG tablet Take 25 mg by mouth daily   Aug 2021     mometasone-formoterol (DULERA) 100-5 MCG/ACT inhaler Inhale 2 puffs into the lungs 2 times daily   June 2021     topiramate (TOPAMAX) 50 MG tablet Take 50 mg by mouth 3 times daily   Aug 2021     Cholecalciferol 5000 units TABS Take 5,000 Units by mouth daily 30 tablet 1 May 2021     hydrOXYzine (ATARAX) 25 MG tablet Take 1-2 tablets (25-50 mg) by mouth 3 times daily as needed for anxiety 90 tablet 1 Aug 2021     loratadine (CLARITIN) 10 MG tablet Take 1 tablet (10 mg) by mouth daily 30 tablet 1 June 2021     naltrexone (VIVITROL) 380 MG SUSR Inject 380 mg into the muscle every 28 days [facility] administered   June 2021       Allergies   Allergies   Allergen Reactions     Fish-Derived Products Shortness Of Breath and Nausea  "and Vomiting     Nkda [No Known Drug Allergies]        Physical Exam   /70 (BP Location: Left arm)   Pulse 76   Temp 97.1  F (36.2  C) (Temporal)   Resp 16   Ht 1.702 m (5' 7\")   Wt 83.5 kg (184 lb)   LMP  (LMP Unknown)   SpO2 99%   BMI 28.82 kg/m     GENERAL: Alert and oriented x 3. Ambulatory on unit. Appears comfortable.   HEENT: Anicteric sclera. Mucous membranes moist.   CV: RRR. S1, S2. No murmurs appreciated.   RESPIRATORY: Effort normal on room air. Lungs CTAB with no wheezing, rales, rhonchi.   GI: Abdomen soft, non distended, non tender.   NEUROLOGICAL: No focal deficits. Moves all extremities.   EXTREMITIES: No peripheral edema. Warm and well perfused.   SKIN: No jaundice. No rashes.     Data   Data reviewed today: I reviewed all medications, new labs and imaging results over the last 24 hours.     "

## 2021-12-13 NOTE — PROGRESS NOTES
"Met with pt to discuss aftercare plans. Pt reports she is interested in some form of treatment but not sure if she would like outpatient vs residential. Pt states \"If you asked me I would say outpatient but I haven't been making the best decisions lately.\" Pt has not had recent CD assessment and was shown paperwork to complete for assessment and referral. Pt currently medicated for withdrawal and appears quite sedated. Will follow-up with pt in afternoon on 12/13 or morning of 12/14 to follow-up with paperwork. Pt has [employer] for insurance. AVS initiated.   "

## 2021-12-14 LAB
ALBUMIN SERPL-MCNC: 3.2 G/DL (ref 3.4–5)
ALBUMIN UR-MCNC: NEGATIVE MG/DL
ALP SERPL-CCNC: 82 U/L (ref 40–150)
ALT SERPL W P-5'-P-CCNC: 75 U/L (ref 0–50)
APPEARANCE UR: ABNORMAL
AST SERPL W P-5'-P-CCNC: 34 U/L (ref 0–45)
BILIRUB DIRECT SERPL-MCNC: 0.1 MG/DL (ref 0–0.2)
BILIRUB SERPL-MCNC: 0.4 MG/DL (ref 0.2–1.3)
BILIRUB UR QL STRIP: NEGATIVE
COLOR UR AUTO: YELLOW
GLUCOSE UR STRIP-MCNC: NEGATIVE MG/DL
HGB UR QL STRIP: NEGATIVE
KETONES UR STRIP-MCNC: NEGATIVE MG/DL
LEUKOCYTE ESTERASE UR QL STRIP: NEGATIVE
MUCOUS THREADS #/AREA URNS LPF: PRESENT /LPF
NITRATE UR QL: NEGATIVE
PH UR STRIP: 7 [PH] (ref 5–7)
PROT SERPL-MCNC: 7.5 G/DL (ref 6.8–8.8)
RBC URINE: <1 /HPF
SP GR UR STRIP: 1.02 (ref 1–1.03)
SQUAMOUS EPITHELIAL: 6 /HPF
UROBILINOGEN UR STRIP-MCNC: 2 MG/DL
WBC URINE: 1 /HPF

## 2021-12-14 PROCEDURE — 99232 SBSQ HOSP IP/OBS MODERATE 35: CPT | Performed by: PSYCHIATRY & NEUROLOGY

## 2021-12-14 PROCEDURE — 82040 ASSAY OF SERUM ALBUMIN: CPT | Performed by: PHYSICIAN ASSISTANT

## 2021-12-14 PROCEDURE — 36415 COLL VENOUS BLD VENIPUNCTURE: CPT | Performed by: PHYSICIAN ASSISTANT

## 2021-12-14 PROCEDURE — 250N000013 HC RX MED GY IP 250 OP 250 PS 637: Performed by: PSYCHIATRY & NEUROLOGY

## 2021-12-14 PROCEDURE — 81001 URINALYSIS AUTO W/SCOPE: CPT | Performed by: PHYSICIAN ASSISTANT

## 2021-12-14 PROCEDURE — H0001 ALCOHOL AND/OR DRUG ASSESS: HCPCS

## 2021-12-14 PROCEDURE — 250N000013 HC RX MED GY IP 250 OP 250 PS 637: Performed by: PHYSICIAN ASSISTANT

## 2021-12-14 PROCEDURE — 128N000001 HC R&B CD/MH ADULT

## 2021-12-14 PROCEDURE — 250N000011 HC RX IP 250 OP 636: Performed by: PSYCHIATRY & NEUROLOGY

## 2021-12-14 RX ORDER — ACETAMINOPHEN 500 MG
500 TABLET ORAL EVERY 6 HOURS PRN
Status: DISCONTINUED | OUTPATIENT
Start: 2021-12-14 | End: 2021-12-15 | Stop reason: HOSPADM

## 2021-12-14 RX ORDER — CYCLOBENZAPRINE HCL 10 MG
10 TABLET ORAL
Status: COMPLETED | OUTPATIENT
Start: 2021-12-14 | End: 2021-12-14

## 2021-12-14 RX ADMIN — NICOTINE POLACRILEX 4 MG: 4 LOZENGE ORAL at 17:52

## 2021-12-14 RX ADMIN — HYDROCHLOROTHIAZIDE 25 MG: 25 TABLET ORAL at 08:11

## 2021-12-14 RX ADMIN — LORATADINE 10 MG: 10 TABLET ORAL at 08:12

## 2021-12-14 RX ADMIN — FOLIC ACID 1 MG: 1 TABLET ORAL at 08:11

## 2021-12-14 RX ADMIN — TOPIRAMATE 25 MG: 25 TABLET, FILM COATED ORAL at 12:51

## 2021-12-14 RX ADMIN — THIAMINE HCL TAB 100 MG 100 MG: 100 TAB at 08:11

## 2021-12-14 RX ADMIN — HYDROXYZINE HYDROCHLORIDE 25 MG: 25 TABLET, FILM COATED ORAL at 08:12

## 2021-12-14 RX ADMIN — TOPIRAMATE 25 MG: 25 TABLET, FILM COATED ORAL at 17:52

## 2021-12-14 RX ADMIN — CHOLECALCIFEROL TAB 25 MCG (1000 UNIT) 125 MCG: 25 TAB at 08:11

## 2021-12-14 RX ADMIN — TOPIRAMATE 25 MG: 25 TABLET, FILM COATED ORAL at 08:12

## 2021-12-14 RX ADMIN — NICOTINE POLACRILEX 4 MG: 4 LOZENGE ORAL at 04:14

## 2021-12-14 RX ADMIN — FLUTICASONE FUROATE AND VILANTEROL TRIFENATATE 1 PUFF: 100; 25 POWDER RESPIRATORY (INHALATION) at 08:12

## 2021-12-14 RX ADMIN — ACETAMINOPHEN 500 MG: 500 TABLET ORAL at 10:49

## 2021-12-14 RX ADMIN — MULTIPLE VITAMINS W/ MINERALS TAB 1 TABLET: TAB at 08:11

## 2021-12-14 RX ADMIN — CITALOPRAM HYDROBROMIDE 20 MG: 20 TABLET ORAL at 08:11

## 2021-12-14 RX ADMIN — ONDANSETRON 4 MG: 4 TABLET, ORALLY DISINTEGRATING ORAL at 05:45

## 2021-12-14 RX ADMIN — HYDROXYZINE HYDROCHLORIDE 25 MG: 25 TABLET, FILM COATED ORAL at 15:02

## 2021-12-14 RX ADMIN — CYCLOBENZAPRINE 10 MG: 10 TABLET, FILM COATED ORAL at 12:51

## 2021-12-14 ASSESSMENT — ACTIVITIES OF DAILY LIVING (ADL)
HYGIENE/GROOMING: INDEPENDENT
HYGIENE/GROOMING: INDEPENDENT
DRESS: INDEPENDENT
ORAL_HYGIENE: INDEPENDENT
ORAL_HYGIENE: INDEPENDENT
DRESS: INDEPENDENT

## 2021-12-14 NOTE — PROGRESS NOTES
Brief Medicine Note    UA not c/w infection.   LFTs have improved nicely.   Patient requesting Flexeril for pain which is not on her home med list and she was quite sedated when I saw her yesterday. Would prefer that she try Tylenol.   Potential transfer to [facility] this afternoon which IM is in agreement with. Will sign off.     Addendum: Patient continues to request Flexeril for arthritis pain. She does not want NSAIDs. Per chart review she has been on this medication in the past and is awake, alert, ambulatory on unit. Will place 1x order, and if psychiatry wishes to continue it on discharge they will need to prescribe it.     Maribel Jackson PA-C  Hospitalist Service  Contact information available via [facility] Paging/Directory       detailed exam

## 2021-12-14 NOTE — PROGRESS NOTES
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 the effects of alcohol/drug.  Met for Alcohol and Cocaine.  7.) Important social, occupational, or recreational activities are given up or reduced because of alcohol/drug use.  Met for Alcohol and Cocaine.  9.) Alcohol/drug use is continued despite knowledge of having a persistent or recurrent physical or psychological problem that is likely to have been caused or exacerbated by alcohol.  Met for Alcohol and Cocaine.  10.) Tolerance, as defined by either of the following: A need for markedly increased amounts of alcohol/drug to achieve intoxication or desired effect. and A markedly diminished effect with continued use of the same amount of alcohol/drug..  Met for Alcohol and Cocaine.  11.) Withdrawal, as manifested by either of the following: The characteristic withdrawal syndrome for alcohol/drug (refer to Criteria A and B of the criteria set for alcohol/drug withdrawal). and Alcohol/drug (or a closely related substance, such as a benzodiazepine) is taken to relieve or avoid withdrawal symptoms.. Met for Alcohol and Cocaine.       As evidenced by self report and criteria, client meets the following DSM5 Diagnoses:   (Sustained by DSM5 Criteria Listed Above)  Alcohol Use Disorder   303.90 (F10.20) Severe In a controlled  environment  Stimulant Use Disorder:  In a controlled environment, Specify current severity:  Severe  304.20 (F14.20) Severe, Cocaine  Specify if: In a controlled environment, Specify current severity:  305.1 (Z72.0) Mild.    Recommendations:     1. Plan for Safety and Risk Management:  Recommended that patient call 911 or go to the local ED should there be a change in any of these risk factors..      Report to child / adult protection services was NA.     2. MALENA Referrals:   Recommendations:  Residential or IOP w/ [facility] such [facility].  Patient reports they are willing to follow these recommendations. Clinical Substantiation for this recommendation: Risk rating of 4 in dimension 5 (see above for risk rating rationale.)   Patient would like the following family or other support people involved in their treatment: would eventually like her kids involved, whether that be while pt is in treatment or in some form of family therapy down the road. Pt reports she is closest to her aunt and would like her involved. Patient does not have a history of opiate use.    3. Mental Health Referrals: Pt would benefit from establishing care with an individual therapist following residential treatment to address life transitions, depression, coping skills.       4. Patient identified the following cultural concerns that need to be addressed in treatment: pt is Black and would like accommodations for her to use her hair products and skin products.     5. Recommendations for treatment focus:   Depressed Mood - unmanaged depressive sx, isolation, contributing to substance use  Anxiety - unmanaged anxious sx, stress contributes to relapses  Grief / Loss - pt has suspended RN license, pt recently lost job, and underwent divorce  Alcohol / Substance Use - tolerance, withdrawal, cravings, progressive use, loss of control, use despite consequences.   Gambling - hx of recent struggles with gambling, worst in 2018 but gambled as  recently as 1 month ago.     DAANES Assessment ID: [id number]   Provider Name/ Credentials:  Sabine Bella, [facility], [facility]   December 14, 2021
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
## 2021-12-14 NOTE — PLAN OF CARE
Patient appeared to be asleep during safety checks this shift. MSSA scores were 1 and 2. No complaints of urine hesitancy or retention by patient, therefore no bladder scan obtained. Will continue to monitor and update if there are changes.

## 2021-12-14 NOTE — PROGRESS NOTES
Pt refused 1600 nursing assessments and medication.  Informed patient this writer would try to communicate with her again later.  Pt informed of UA specimen container in bathroom.

## 2021-12-14 NOTE — PLAN OF CARE
Problem: Suicidal Behavior  Goal: Suicidal Behavior is Absent or Managed  Outcome: No Change   Pt continues to deny SI, SIB.  She is reluctantly cooperative.  She is isolative, communication is minimal.  Eats small amounts of food from tray but takes considerable amounts of snacks to her room.  She has not required valium since admission.  Her affect is flat, she is sad.  She does not make eye contact.  Withdrawal symptoms are minimal.  Will continue to monitor as able

## 2021-12-14 NOTE — PROGRESS NOTES
Followed up with pt regarding paperwork and treatment. Pt states she is interested in treatment in [facility]. Pt is out-of-detox and bed is available for program today, 12/14. Pt reports she will complete paperwork for assessment shortly. Pt will be assessed by case management in morning/afternoon with plan to admit to [facility] in the afternoon. AVS updated.     Update: Pt will be admitted to [facility] on 12/15/21 so she can have an additional days rest prior to treatment due to symptoms of mental health.

## 2021-12-14 NOTE — PLAN OF CARE
"Pt is currently \"out of detox\". She is pleasant and cooperative. She states she feels better today than yesterday. Her mood has improved. She denies SI/SIB/HI. Plan is to discharge to [facility] tomorrow.  /82 (BP Location: Left arm)   Pulse 70   Temp 97.2  F (36.2  C) (Temporal)   Resp 16   Ht 1.702 m (5' 7\")   Wt 83.5 kg (184 lb)   LMP  (LMP Unknown)   SpO2 98%   BMI 28.82 kg/m     "

## 2021-12-14 NOTE — PROGRESS NOTES
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   The patient's care was discussed with the treatment team during the daily team meeting and/or staff's chart notes were reviewed.  Staff report patient has been visible in the milieu,  no acute eventsovernight.     Patient's mood is better  Energy Level:MODERATE  Sleep:poor  Appetite:fair improving motivation interest   Denied any Suicidal/homicidal ideation/plan intent.  Denied any psychosis  No prior suicde attempts  No access to gun  Pt is in alcohol withdrawal still being monitered every 4 hrs for it,   Pt mssa score are monitered  Tolerating meds and has no side effects.              Medications:     Current Facility-Administered Medications   Medication     acetaminophen (TYLENOL) tablet 500 mg     albuterol (PROVENTIL HFA/VENTOLIN HFA) inhaler     alum & mag hydroxide-simethicone (MAALOX) suspension 30 mL     citalopram (celeXA) tablet 20 mg     cyclobenzaprine (FLEXERIL) tablet 10 mg     diazepam (VALIUM) tablet 5-20 mg     fluticasone-vilanterol (BREO ELLIPTA) 100-25 MCG/INH inhaler 1 puff     folic acid (FOLVITE) tablet 1 mg     hydrochlorothiazide (HYDRODIURIL) tablet 25 mg     hydrOXYzine (ATARAX) tablet 25 mg     ibuprofen (ADVIL/MOTRIN) tablet 600 mg     loperamide (IMODIUM) capsule 2 mg     loratadine (CLARITIN) tablet 10 mg     multivitamin w/minerals (THERA-VIT-M) tablet 1 tablet     nicotine (NICORETTE) lozenge 4 mg     ondansetron (ZOFRAN-ODT) ODT tab 4 mg     senna-docusate  "(SENOKOT-S/PERICOLACE) 8.6-50 MG per tablet 1 tablet     thiamine (B-1) tablet 100 mg     topiramate (TOPAMAX) tablet 25 mg     traZODone (DESYREL) tablet 50 mg     Vitamin D3 (CHOLECALCIFEROL) tablet 125 mcg             Allergies:     Allergies   Allergen Reactions     Fish-Derived Products Shortness Of Breath and Nausea and Vomiting     Nkda [No Known Drug Allergies]             Psychiatric Examination:   Blood pressure 139/82, pulse 70, temperature 97.2  F (36.2  C), temperature source Temporal, resp. rate 16, height 1.702 m (5' 7\"), weight 83.5 kg (184 lb), SpO2 98 %, not currently breastfeeding.  Weight is 184 lbs 0 oz  Body mass index is 28.82 kg/m .    Appearance:  awake, alert and adequately groomed  Attitude:  cooperative  Eye Contact:  good  Mood:  better  Affect:  appropriate and in normal range and mood congruent  Speech:  clear, coherent rate /rhythm are good  Psychomotor Behavior:  no evidence of tardive dyskinesia, dystonia, or tics and intact station, gait and muscle tone  Throught Process:  logical  Associations:  no loose associations  Thought Content:  no evidence of suicidal ideation or homicidal ideation, no evidence of psychotic thought, no auditory hallucinations present and no visual hallucinations present  Insight:  fair  Judgement:  intact  Oriented to:  time, person, and place  Attention Span and Concentration:  intact  Recent and Remote Memory:  intact  Language fund of knowledge are adequate         Labs:     Recent Results (from the past 24 hour(s))   UA reflex to Microscopic and Culture    Collection Time: 12/14/21  8:34 AM    Specimen: Urine, Midstream   Result Value Ref Range    Color Urine Yellow Colorless, Straw, Light Yellow, Yellow    Appearance Urine Slightly Cloudy (A) Clear    Glucose Urine Negative Negative mg/dL    Bilirubin Urine Negative Negative    Ketones Urine Negative Negative mg/dL    Specific Gravity Urine 1.023 1.003 - 1.035    Blood Urine Negative Negative    pH " Urine 7.0 5.0 - 7.0    Protein Albumin Urine Negative Negative mg/dL    Urobilinogen Urine 2.0 Normal, 2.0 mg/dL    Nitrite Urine Negative Negative    Leukocyte Esterase Urine Negative Negative    Mucus Urine Present (A) None Seen /LPF    RBC Urine <1 <=2 /HPF    WBC Urine 1 <=5 /HPF    Squamous Epithelials Urine 6 (H) <=1 /HPF   Hepatic panel    Collection Time: 12/14/21  8:54 AM   Result Value Ref Range    Bilirubin Total 0.4 0.2 - 1.3 mg/dL    Bilirubin Direct 0.1 0.0 - 0.2 mg/dL    Protein Total 7.5 6.8 - 8.8 g/dL    Albumin 3.2 (L) 3.4 - 5.0 g/dL    Alkaline Phosphatase 82 40 - 150 U/L    AST 34 0 - 45 U/L    ALT 75 (H) 0 - 50 U/L         DX 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  Status post suicide attempt 2 weeks ago  Generalized anxiety disorder  Alcohol use disorder severe   Alcohol withdrawal severe  Cocaine use disorder severe  Cocaine withdrawal  Marijuana abuse  Nicotine abuse nicotine use disorder moderate  History of methamphetamine abuse  Gambling disorder     PLAN  Patient is out of alcohol detox    MSSA    Eating Disturbances: ate and enjoyed all of it or not applicable  Tremor: 0 - no tremor  Sleep Disturbance: slept through the night or not applicable  Clouding of Sensorium: no evidence  Hallucinations: 0 - none  Quality of Contact: 0 - awareness of examiner and people around him/her  Agitation: 0 - normal activity  Paroxysmal Sweats: 0 - no observed sweating  Temperature: 99.5 or below  Pulse: 1 - 70 to 79  Total MSSA Score: 2    We will continue Celexa 20 mg  Patient denied any problems with urinary urgency burning micturition  Laboratory/Imaging: reviewed with patient   Consults: internal medicine consult reviewed  Patient will be treated in therapeutic milieu with appropriate individual and group therapies as described.  PDMP CHECKED     Supportive psychotheraoy provided, max talked about recovery enviroment, relapse prevention, triggers to use.  Discussed with patient  many issues of addiction,triggers, relapse, and establishing a solid recovery program.  Asked pt to be med complinat   Medical diagnoses to be addressed this admission:    Plan:  Assessment & Recommendations  Winifred Bashir is a 51 year old woman with a history of polysubstance abuse, bipolar disorder, KELLEN, and PTSD who is admitted to station [facility] for detox from alcohol. She is also depressed and has passive suicidal ideation.         Alcohol Abuse & Withdrawal  Polysubstance Abuse  Depression, SI.   Management per psychiatry team.      Intermittent Suprapubic Pressure and Urinary Hesitancy. Reporting for a couple days at a time over several weeks when using cocaine and marijuana that may also be laced with other substances. Endorses prior UTIs, denies gynecologic or STI concerns. Her symptoms could be related to undiagnosed/untreated UTI, or could be having intermittent urinary retention related to sympathetic effects of cocaine.   - UA/UC ordered.   - If patient reports any urinary hesitancy or retention please obtain bladder scan and notify IM if > 300 ml.      Transaminitis. ,  without recent baseline for comparison and no prior abdominal imaging on file. No acetaminophen use, known hepatitis risk factors, or biliary complaints. Suspect d/t substance abuse.  - Will trend hepatic panel in AM.        Legal Status: voluntary    Safety Assessment:   Checks:  15 min  Precautions: withdrawal precautions  Pt has not required locked seclusion or restraints in the past 24 hours to maintain safety, please refer to RN documentation for further details.  Discussed with patient many issues of addiction,triggers, relapse, and establishing a solid recovery program.  Able to give informed consent:  YES   Discussed Risks/Benefits/Side Effects/Alternatives: YES    After discussion of the indications, risks, benefits, alternatives and consequences of no treatment, the patient elects to be detox and to treatment

## 2021-12-15 ENCOUNTER — HOSPITAL ENCOUNTER (OUTPATIENT)
Dept: BEHAVIORAL HEALTH | Facility: CLINIC | Age: 51
End: 2021-12-15
Attending: FAMILY MEDICINE
Payer: COMMERCIAL

## 2021-12-15 VITALS
WEIGHT: 184 LBS | OXYGEN SATURATION: 98 % | HEIGHT: 67 IN | BODY MASS INDEX: 28.88 KG/M2 | DIASTOLIC BLOOD PRESSURE: 64 MMHG | RESPIRATION RATE: 16 BRPM | HEART RATE: 71 BPM | SYSTOLIC BLOOD PRESSURE: 104 MMHG | TEMPERATURE: 97.3 F

## 2021-12-15 DIAGNOSIS — F19.20 CHEMICAL DEPENDENCY (H): Primary | ICD-10-CM

## 2021-12-15 PROCEDURE — 250N000013 HC RX MED GY IP 250 OP 250 PS 637: Performed by: PSYCHIATRY & NEUROLOGY

## 2021-12-15 PROCEDURE — 1002N00001 HC LODGING PLUS FACILITY CHARGE ADULT

## 2021-12-15 PROCEDURE — 99239 HOSP IP/OBS DSCHRG MGMT >30: CPT | Performed by: PSYCHIATRY & NEUROLOGY

## 2021-12-15 RX ORDER — TOPIRAMATE 25 MG/1
25 TABLET, FILM COATED ORAL
Qty: 90 TABLET | Refills: 0 | Status: SHIPPED | OUTPATIENT
Start: 2021-12-15 | End: 2022-01-10

## 2021-12-15 RX ORDER — TRAZODONE HYDROCHLORIDE 50 MG/1
50 TABLET, FILM COATED ORAL
Qty: 30 TABLET | Refills: 0 | Status: SHIPPED | OUTPATIENT
Start: 2021-12-15 | End: 2022-01-10

## 2021-12-15 RX ORDER — LANOLIN ALCOHOL/MO/W.PET/CERES
3 CREAM (GRAM) TOPICAL
COMMUNITY

## 2021-12-15 RX ORDER — ACETAMINOPHEN 325 MG/1
325-650 TABLET ORAL EVERY 4 HOURS PRN
COMMUNITY
End: 2022-01-04

## 2021-12-15 RX ORDER — HYDROXYZINE HYDROCHLORIDE 25 MG/1
25 TABLET, FILM COATED ORAL EVERY 4 HOURS PRN
Qty: 30 TABLET | Refills: 1 | Status: SHIPPED | OUTPATIENT
Start: 2021-12-15 | End: 2022-01-04

## 2021-12-15 RX ORDER — ALBUTEROL SULFATE 90 UG/1
2 AEROSOL, METERED RESPIRATORY (INHALATION) EVERY 4 HOURS PRN
Qty: 18 G | Refills: 0 | Status: ON HOLD | OUTPATIENT
Start: 2021-12-15 | End: 2022-12-05

## 2021-12-15 RX ORDER — MAGNESIUM HYDROXIDE/ALUMINUM HYDROXICE/SIMETHICONE 120; 1200; 1200 MG/30ML; MG/30ML; MG/30ML
30 SUSPENSION ORAL EVERY 6 HOURS PRN
COMMUNITY
End: 2022-12-02

## 2021-12-15 RX ORDER — HYDROCHLOROTHIAZIDE 25 MG/1
25 TABLET ORAL DAILY
Qty: 30 TABLET | Refills: 0 | Status: ON HOLD | OUTPATIENT
Start: 2021-12-15 | End: 2022-12-05

## 2021-12-15 RX ORDER — LANOLIN ALCOHOL/MO/W.PET/CERES
100 CREAM (GRAM) TOPICAL DAILY
Qty: 30 TABLET | Refills: 0 | Status: SHIPPED | OUTPATIENT
Start: 2021-12-15 | End: 2022-01-27

## 2021-12-15 RX ORDER — BUPROPION HYDROCHLORIDE 150 MG/1
150 TABLET ORAL EVERY MORNING
Qty: 30 TABLET | Refills: 0 | Status: SHIPPED | OUTPATIENT
Start: 2021-12-15 | End: 2022-01-04

## 2021-12-15 RX ORDER — LORATADINE 10 MG/1
10 TABLET ORAL DAILY
Qty: 30 TABLET | Refills: 1 | Status: ON HOLD | OUTPATIENT
Start: 2021-12-15 | End: 2022-12-05

## 2021-12-15 RX ORDER — MULTIPLE VITAMINS W/ MINERALS TAB 9MG-400MCG
1 TAB ORAL DAILY
Qty: 30 TABLET | Refills: 0 | Status: ON HOLD | OUTPATIENT
Start: 2021-12-15 | End: 2022-12-05

## 2021-12-15 RX ORDER — FOLIC ACID 1 MG/1
1 TABLET ORAL DAILY
Qty: 30 TABLET | Refills: 0 | Status: SHIPPED | OUTPATIENT
Start: 2021-12-15 | End: 2022-01-27

## 2021-12-15 RX ORDER — AMOXICILLIN 250 MG
2 CAPSULE ORAL DAILY PRN
COMMUNITY
End: 2022-12-02

## 2021-12-15 RX ORDER — IBUPROFEN 200 MG
400 TABLET ORAL EVERY 6 HOURS PRN
COMMUNITY
End: 2022-12-02

## 2021-12-15 RX ORDER — CITALOPRAM HYDROBROMIDE 20 MG/1
20 TABLET ORAL DAILY
Qty: 30 TABLET | Refills: 0 | Status: SHIPPED | OUTPATIENT
Start: 2021-12-15 | End: 2022-01-27

## 2021-12-15 RX ADMIN — LORATADINE 10 MG: 10 TABLET ORAL at 09:13

## 2021-12-15 RX ADMIN — HYDROXYZINE HYDROCHLORIDE 25 MG: 25 TABLET, FILM COATED ORAL at 10:26

## 2021-12-15 RX ADMIN — MULTIPLE VITAMINS W/ MINERALS TAB 1 TABLET: TAB at 09:12

## 2021-12-15 RX ADMIN — TOPIRAMATE 25 MG: 25 TABLET, FILM COATED ORAL at 09:13

## 2021-12-15 RX ADMIN — THIAMINE HCL TAB 100 MG 100 MG: 100 TAB at 09:13

## 2021-12-15 RX ADMIN — FOLIC ACID 1 MG: 1 TABLET ORAL at 09:13

## 2021-12-15 RX ADMIN — TOPIRAMATE 25 MG: 25 TABLET, FILM COATED ORAL at 12:16

## 2021-12-15 RX ADMIN — HYDROCHLOROTHIAZIDE 25 MG: 25 TABLET ORAL at 09:13

## 2021-12-15 RX ADMIN — CITALOPRAM HYDROBROMIDE 20 MG: 20 TABLET ORAL at 09:13

## 2021-12-15 RX ADMIN — CHOLECALCIFEROL TAB 25 MCG (1000 UNIT) 125 MCG: 25 TAB at 09:13

## 2021-12-15 RX ADMIN — HYDROXYZINE HYDROCHLORIDE 25 MG: 25 TABLET, FILM COATED ORAL at 05:47

## 2021-12-15 RX ADMIN — FLUTICASONE FUROATE AND VILANTEROL TRIFENATATE 1 PUFF: 100; 25 POWDER RESPIRATORY (INHALATION) at 09:21

## 2021-12-15 ASSESSMENT — ANXIETY QUESTIONNAIRES
7. FEELING AFRAID AS IF SOMETHING AWFUL MIGHT HAPPEN: MORE THAN HALF THE DAYS
IF YOU CHECKED OFF ANY PROBLEMS ON THIS QUESTIONNAIRE, HOW DIFFICULT HAVE THESE PROBLEMS MADE IT FOR YOU TO DO YOUR WORK, TAKE CARE OF THINGS AT HOME, OR GET ALONG WITH OTHER PEOPLE: VERY DIFFICULT
4. TROUBLE RELAXING: MORE THAN HALF THE DAYS
GAD7 TOTAL SCORE: 13
5. BEING SO RESTLESS THAT IT IS HARD TO SIT STILL: SEVERAL DAYS
1. FEELING NERVOUS, ANXIOUS, OR ON EDGE: MORE THAN HALF THE DAYS
2. NOT BEING ABLE TO STOP OR CONTROL WORRYING: MORE THAN HALF THE DAYS
6. BECOMING EASILY ANNOYED OR IRRITABLE: MORE THAN HALF THE DAYS
3. WORRYING TOO MUCH ABOUT DIFFERENT THINGS: MORE THAN HALF THE DAYS

## 2021-12-15 ASSESSMENT — PATIENT HEALTH QUESTIONNAIRE - PHQ9: SUM OF ALL RESPONSES TO PHQ QUESTIONS 1-9: 23

## 2021-12-15 NOTE — PROGRESS NOTES
[facility] Health Assessment      Vital signs:       Transfer from     Counselor: Kenzie  Drug of Choice: ETOH and stimulants/ gambling addiction   Last use: 12/12/21  Home clinic/MD:   [facility]    [address]    [place], [place] [id number] [phone]   Enrrique Burroughs MD    [address]    [address]    [place], [place] [id number] [phone] (Fax)         Addiction med provider was Dr Kumar prior to her moving to Recovery clinic writer will reach out to Dr Kumar to see if she will continue with pt    Psychiatrist/therapist: none currently     Medical history/current conditions:  Hypertension, Intermittent Suprapubic Pressure and Urinary Hesitancy, fibromyalgia    H&P Screen:  H&P within the last 90 days: Yes.  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 Pt had recent Ideation with a pan (overdose on pills) pt did not have intent and reports no current thoughts plan or intent. Pt verbalized agreement that she will seek out staff if thoughts return, appropriate staff notified, 2018 serious attempt      When? Prior to detox admit during substance miss use.   Current thought of self-harm? no    Plan? na    Pain assessment:   Pt. Experiencing pain at this time?  Yes.  Rating on 0-10 scale: (1-10 scale): 4.  Location: back and neck  Chronic  Result of: Fibromyalgia.     [facility] Medical Screen for COVID-19    Are you interested in receiving the COVID vaccine or flu vaccine while you are at [facility]?  Yes   interested in flu vaccine      Do you have any of the following NEW or worsening symptoms NOT attributed to pre-existing conditions?    No,     Fever of 100.0  F (37.8 C) or over  Chills  Cough  Shortness of Breath  Loss of taste or smell  Generalized body  aches  Persistent headache  Sore throat (or trouble eating or drinking in young children?)  Nausea, Vomiting, or diarrhea (loose stools)    Did you test positive for COVID-19 in the last 14  days or are you waiting on the test results due to an exposure or symptoms?  No,     Has anyone told you to self-quarantine due to exposure to someone with COVID-19?  No,     Does the above COVID-19 screen need to be reviewed by Infection Prevention? No,     COVID-19 Test completed by [facility] ? Yes on [facility]  COVID-19 - Pt informed of the following while at :    1)Staff will take temperature and O2Sats once daily    2) Practice good hand washing hygiene and avoid touching face    3) If pt has any of the symptoms below, notify staff immediately.    Fever   Cough   Shortness of breath or difficulty breathing   Chills   Repeated shaking with chills  Muscle pain     4) COVID-19 testing may be initiated more than once during your stay.  If COVID results are at anytime positive, the pt will follow exit plan as listed above,  quarantine as recommended per CDC and then may return for [facility] treatment after symptoms have resolved.     5) Per COVID protocol, during your stay at , social distancing is required AND mouth and nose must be covered at all times with facial mask while out in milieu.      6) Patients will not be allowed to go to any outside appointments, all outside appointments will need to be virtual or by phone    RN Assessment of Patient's Ability to Safely Manage and Self-Administer Respiratory Treatments    Has experience in the management of Respiratory (If NA, indicate and move to Integrative Therapies): Yes    Including knowledge and understanding of the importance of:    Does pt have any of the following Respiratory Illness/disorders?   Asthma, COPD, RAD, Bronchitis, Emphysema, Cystic fibrosis, PAYTON Yes    Which Acute or Chronic Respiratory Illness do you have which requires intervention? asthma   Did pt bring all prescribed  respiratory supplies? CPAP, BiPap, Nebulizer, Nebulizer solution, scheduled inhaler, Rescue Inhaler  Yes   Pt understands they must carry  Rescue Inhalers  at all times Yes   Alerting staff with respiratory symptoms?  Wheezing, SOB, Tightness or pain in chest, Excessive Daytime Sleepiness Yes     Does the patient have the physical and mental ability to:     Perform respiratory cares? CPAP, BiPap, Nebulizer tx, scheduled inhaler, Rescue Inhaler tx, respiratory medicines Yes   Determine when and how often to use respiratory treatments? (CPAP, BiPap, Nebulizer tx, scheduled inhaler, Rescue Inhaler tx, respiratory medicines Yes             Therefore does the patient, present a risk of harm to themselves or other clients in the facility if allowed to self-administer Respiratory treatments.  Consider factors above.  No    I have assessed the patient to be able to safely administer respiratory treatments.  Yes    Integrative Therapies: Essential Oils    Patient requesting essential oil inhaler to manage (Mood/Mental Health/Physical/Spiritual symptoms).     Discussed appropriate use of essential oil inhalers and instructed patient not to leave labeled product out on unit.     Patient was screened for kidney disease, asthma/reactive airway disease and rashes and wounds or 1st trimester of pregnancy    List Essential Oils requested by pt reports essential oils do not exacerbate her asthma uses them frequently at home. Pt will let staff knowimmediatly if she is experiencing wheezing or SOB    Patient verbalized and demonstrated understanding of how to use essential oil inhaler correctly and will notify LP RN with any concerns or side effects. Patient agrees not to share their essential oil inhaler with other clients.  Continue to support the patient in safely utilizing integrative therapies as able to manage symptoms during treatment.     Patient tobacco use: 4 cig per day     Are you interested in quitting? At some point    NRT (Nicotine Replacement therapy) ordered? yes  Pt is aware of the dangers of tobacco cessation and in contemplation.    Pt given written education.    Nutritional Assessment:    Have you ever purged, binged or restricted yourself as a way to control your weight?   No     Are you on a special diet?   No     Do you have any concerns regarding your nutritional status?   No     Have you had any appetite changes in the last 3 months?   No   Have you had weight loss or weight gain of more than 10 lbs in the last 3 months?   If patient gained or lost more than 10 lbs, then refer to program RN / attending Physician for assessment.   No   Was the patient informed of BMI?    Above,  General nutrition education   Yes   Have you engaged in any risk-taking behavior that would put you at risk for exposure to blood-borne or sexually transmitted diseases?   No   Do you have any dental problems?   No             Nursing Assessment Summary:  As above    On-going nursing intervention required?   No    Acute care visit recommended: no

## 2021-12-15 NOTE — PLAN OF CARE
Problem: Suicidal Behavior  Goal: Suicidal Behavior is Absent or Managed  Outcome: Improving   Affect brighter, pt more visible and active this evening.  Denies SI, SIB.  Pt reports that she has been eating well today, drinking fluids without difficulty--no diarrhea, nausea.  Pt is voiding.  Pt says that she is not having withdrawal symptoms.  She is looking forward to moving to the next level of care--discharging tomorrow to [facility].

## 2021-12-15 NOTE — PLAN OF CARE
Report called to [facility] on [facility]. Pt verbalized complete understanding of all discharge instructions. Pt discharged to [facility] escorted by staff.

## 2021-12-15 NOTE — DISCHARGE INSTRUCTIONS
Behavioral Discharge Planning and Instructions  THANK YOU FOR CHOOSING THE [facility]  3A  [phone]    Summary: You were admitted to Station [facility] on 12/13/21 for detoxification from alcohol.  A medical exam was performed that included lab work. You have met with a  and opted to attend treatment at [facility].  Please take care and make your recovery a priority!    Main Diagnosis: Per Dr. Meghana Sommer MD;  303.90 (F10.20) Alcohol Use Disorder Severe    Recommendation:  Complete [facility] in [facility]    Disposition: [facility]    Treatment Follow-Up:  [facility]  Admission: Wednesday December 15th at 12:30 pm  [address] [place] [address]  [phone]    Medical Follow-Up:  [facility]    [address]    [place] [address]    [phone]      Addiction medicine:  [facility]  Shahrzad Kumar MD  Addiction Medicine    Major Treatments, Procedures and Findings:  You have withdrawn from alcohol using the [facility] protocol with Valium.  You have met with a  to develop a treatment plan for discharge.  You have had labs drawn and those results have been reviewed with you. Please take a copy of your lab work with you to your next primary care physician appointment.    Symptoms to Report:  If you experience more anxiety, confusion, sleeplessness, deep sadness or thoughts of suicide, notify your treatment team or notify your primary care physician. IF ANY OF THE SYMPTOMS YOU ARE EXPERIENCING ARE A MEDICAL EMERGENCY CALL 911 IMMEDIATELY.     Lifestyle Adjustment: Adjust your lifestyle to get enough sleep, relaxation, exercise and  good nutrition. Continue to develop healthy coping skills to decrease stress and promote a sober living environment. Do not use alcohol, illegal drugs or addictive  "medications other than what is currently prescribed. AA, NA, and  Sponsor are excellent resources for support.     General Medication Instructions:   See your medication sheet(s) for instructions.   Take all medicines as directed.  Make no changes unless your doctor suggests them.     DISCHARGE RESOURCES:  Facts about COVID19 at www.cdc.gov/COVID19 and www.[place].gov/covid19    Keeping hands clean is one of the most important steps we can take to avoid getting sick and spreading germs to others.  Please wash your hands frequently and lather with soap for at least 20 seconds!    Recovery apps for your phone to locate current in person and zoom recovery meetings  [place] - meeting luis  AA  - meeting luis  Meeting guide - meeting luis  Quick NA meeting - meeting luis  [employer]- has various apps    Great Pod casts for nutrition and wellness  Listen on Apple Podcasts  Dishing Up Nutrition   [employer]   Nutrition       Understand the connection between what you eat and how you feel. Hosted by licensed nutritionists and dietitians from [employer] we share practical, real-life solutions for healthier living through nutrition.     Resources:   Resources for on line recovery meetings:  *due to covid-19 AA/NA meetings are being held online*  AA meetings can be found online; search for them at: http://aa-intergroup.org/directory.php  AA meetings via [place] for [place] area can be found online at: https://a[place].org/find-a-meeting/holiday-closings/  NA meetings via ZOOM for [place] area can be found online at: https://sites.google.com/view/[place]regionofnarcoticsanonymous/home?authuser=2  Www.[employer].com  has online resources for meeting and recovery care including Podcast \"Let's Talk:Addiction & Recovery Podcasts  Www.[employer]   -SMART Recovery - self management for addiction recovery:  www.Fidelis.org    -Pathways ~ A Health Crisis Resource & Support Center: " 273.556.7231.  -[facility] [phone]   -[facility] 932-356-4018 or 523-606-8066.  -Suicide Awareness Voices of Education (SAVE) (www.save.org): 964-006-SEOS (2815)  -National Suicide Prevention Line (www.mentalhealthmn.org): 545-325-CMJO (0782)  -National Tarzana on Mental Illness (www.[place].desirae.org): 123.909.2057 or 887-165-2928.  -Nrcb1wnoc: text the word LIFE to 15593 for immediate support and crisis intervention  -Mental Health Consumer/Survivor Network of [place] (www.mhcsn.net): 989.941.4051 or 040-572-8437  -Mental Health Association of [place] (www.mentalhealth.org): 611.742.6431 or 691-500-1606     -Substance Abuse and Mental Health Services (www.samhsa.gov)  -Harm Reduction Coalition (www. Harmreduction.org)  -www.prescribetoprevent.org or http://prescribetoprevent.org/video  -Poison control 5-525-445-0953   **[place] Opioid Prevention Coalition: www.opioidcoalition.org    Sober Support Group Information:  AA/ZOEY & Sponsor/Support  -Alcoholics Anonymous (www.alcoholics-anonymous.org): for local information 24 hours/day  -AA Intergroup service office in [place] (http://www.a[place].org/) 404.211.3542  -AA Intergroup service office in [facility]: 890.768.1437. (http://www.a[place].org/)  -Narcotics Anonymous (www.nam[place].org) (344) 119-9087   **Sober Fun Activities: www.sober-activities.[employer]/[facility]//[facility] Recovery Connection (MRC)  [facility] connects people seeking recovery to resources that help foster and sustain long-term recovery.  Whether you are seeking resources for treatment, transportation, housing, job training, education, health care or other pathways to recovery, [facility] is a great place to start.    Phone: 844.820.1913.  www.[facility].org (Great listing of all types of recovery and non-recovery related resources)    Any follow up concerns:  Nursing questions call the Unit [facility]  [phone]  Medical Record call [phone]  Outpatient Behavioral Intake call [phone]  LP+ Wait List/Bed Availability call [phone]    The entire treatment team has appreciated the opportunity to work with you.  We wish you the best in the future and with your lifelong recovery goals. Please bring this discharge folder with you to all follow up appointments.  It contains your lab results, diagnosis, medication list and discharge recommendations.    THANK YOU FOR CHOOSING THE [facility]

## 2021-12-15 NOTE — PLAN OF CARE
Problem: Adult Inpatient Plan of Care  Goal: Optimal Comfort and Wellbeing  Outcome: No Change     Behavioral  Pt appeared sleeping comfortably overnight; breathing was even and unlabored.      Medical  Pt out of detox from alcohol. Pt given prn hydroxyzine for anxiety.

## 2021-12-15 NOTE — PROGRESS NOTES
Name: Winifred Bashir  Date: 12/15/2021  Medical Record: [id number]  Envelope Number: [id number]    List of Contents (List each item separately in new row):   -Buproprion HCL 300mg  -Loratadine 10mg    Admission:  I am responsible for any personal items that are not sent to the safe or pharmacy.  [facility] is not responsible for loss, theft or damage of any property in my possession.    Patient Signature:  ___________________________________________       Date/Time:__________________________    Staff Signature: __________________________________       Date/Time:__________________________    2nd Staff person, if patient is unable/unwilling to sign:      __________________________________________________________       Date/Time: __________________________  Discharge:  [facility] has returned all of my personal belongings:    Patient Signature: ________________________________________     Date/Time: ____________________________________    Staff Signature: ______________________________________     Date/Time:_____________________________________

## 2021-12-15 NOTE — PROGRESS NOTES
Initial Services Plan        Service Initiation Date: 12/15/2021    Immediate health and/or safety concerns: No    Identify health and safety concern(s) below and include plan to address:    None Identified    Treatment suggestions for client during the time between intake (admit date) and completion of the individual treatment plan:     Look for a sober support network, i.e. 12 step, Smart Recovery, Celebrate Recovery, etc  Tour the treatment center or outpatient clinic  Introduce yourself to your treatment group. Spend time getting to know your peers  Review your patient or client handbook  Begin working on your treatment goal list    Completed by: STEVEN Pressley  Date completed: 12/15/2021 at 1:14 PM
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
## 2021-12-15 NOTE — PROGRESS NOTES
Progress Note    This patient had a Comprehensive Substance Abuse assessment on 11/14/21 completed by STVEEN Strong.  This patient was seen for a face to face update of the Comprehensive Substance Abuse assessment on 12/15/2021 by STEVEN Pressley.  INSIDE: The patient's Comprehensive Substance Abuse assessment completed on 12/14/21 is in the patient's electronic medical record in Epic in the Chart Review section under the Notes/Trans Tab.    Alcohol/Drug use since the last CD evaluation (include date of last use):     No additional substances use since the last CD evaluation     Please note any other clinical changes since the last CD evaluation (such as medication changes, additional legal charges, detoxification admissions, overdoses, etc.)     No significant changes since the last CD evaluation       ASAM Dimensions Original scores Current Scores   I.) Intoxication and Withdrawal: 0 0   II.) Biomedical:  1 1   III.) Emotional and Behavioral:  2 2   IV.) Readiness to Change:  0 0   V.) Relapse Potential: 4 4   VI.) Recovery Environmental: 3 3     Please list clinical justifications for the above ASAM score changes since the original comprehensive assessment:     None of the ASAM scores on the six dimensions had changed since the Comprehensive Substance Abuse assessment was completed on 12/14/21.       Current PETERSON: Current UA:     No PETERSON as the patient was a direct transfer from 3 A IP detoxification unit at [facility] in [place], [place].     No UA screen as the patient was a direct transfer from 3 A IP detoxification unit at [facility] in [place], [place].        PHQ-9, KELLEN-7   PHQ-9 on 12/15/2021 KELLEN-7 on 12/15/2021   The patient's PHQ-9 score was 23 out of 27, indicating severe depression.   The patient's KELLEN-7 score was 13 out of 21, indicating moderate anxiety.       Altus-Suicide Severity Rating Scale Reassessment   Have you ever wished you were dead or that you could go  to sleep and not wake up?  Past Month:  Yes     Have you actually had any thoughts of killing yourself?  Past Month:  Yes     Have you been thinking about how you might do this?     Past Month:  Yes, Describe: taking pills   Lifetime:  Yes, Describe: attempted in 2018, took a lot of pills   Have you had these thoughts and had some intention of acting on them?     Past Month:  Yes, Describe: taking pills   Lifetime:  Yes, Describe: see above   Have you started to work out the details of how to kill yourself?   Past Month:  No   Lifetime:  Yes, Describe: see above   Do you intend to carry out this plan?   No     When you have the thoughts how long do they last?  More than 8 hours/persistent or continuous- a long time   Are there things - anyone or anything (i.e. family, [facility], pain of death) that stopped you from wanting to die or acting on thoughts of suicide?  Protective factors definitely stopped you from attempting suicide       2008  [employer]  Used with permission by Ryann Torres, PhD.       Guide to C-SSRS Risk Ratings   NO IDEATION:  with no active thoughts IDEATION: with a wish to die. IDEATION: with active thoughts. Risk Ratings   If Yes No No 0 - Very Low Risk   If NA Yes No 1 - Low Risk   If NA Yes Yes 2 - Low/moderate risk   IDEATION: associated thoughts of methods without intent or plan INTENT: Intent to follow through on suicide PLAN: Plan to follow through on suicide Risk Ratings cont...   If Yes No No 3 - Moderate Risk   If Yes Yes No 4 - High Risk   If Yes Yes Yes 5 - High Risk   The patient's ADDITIONAL RISK FACTORS and lack of PROTECTIVE FACTORS may increase their overall suicide risk ratings.     Additional Risk Factors:    Significant history of having untreated or poorly treated mental health symptoms     Significant history of untreated or poorly treated chronic pain issues     A recent death of someone close to the patient and/or unresolved grief and  "loss issues     A recent loss that was significant to the patient, i.e. loss of job, loss of home, divorce, break-up, etc.     Significant history of trauma and/or abuse issues     Visiting or calling people to say goodbye   Protective Factors:    Having people in his/her life that would prevent the patient from considering a suicide attempt (i.e. young children, spouse, parents, etc.)     An absence of chronic health problems or stable and well treated chronic health issues     A positive relationship with his/her clinical medical and/or mental health providers     Having easy access to supportive family members     Having a good community support network     Having restricted access to highly lethal means of suicide     Risk Status   1. - Low Risk: Evaluation Counselors:  Document in Epic / SBAR to counselor \"Low Risk\".      Treatment Counselors:  Reassess upon admission as applicable, assess weekly in progress notes under Dimension 3 and summarize in Discharge / Treatment summary under Dimension 3.     Additional information to support suicide risk rating: pt has been actively suicidal in the past month, however does not plan to go through with her plan.        "

## 2021-12-15 NOTE — DISCHARGE SUMMARY
Winifred Bashir MRN# [id number]   Age: 51 year old YOB: 1970     Date of Admission:  12/12/2021  Date of Discharge:  12/15/2021  Admitting Physician:  Meghana Sommer MD  Discharge Physician:  Meghana Sommer MD      DISCHARGE  DX  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  generalized anxiety disorder  Alcohol use disorder severe    Cocaine use disorder severe    Marijuana abuse  Nicotine abuse nicotine use disorder moderate  History of methamphetamine abuse  Gambling disorder           Event Leading to Hospitalization:     See Admission note by admitting provider for patient encounter. for additional details.          Hospital Course:   PATIENT was admitted to [facility] attending  under DR sommer, please review the detailed admit note on 12/13/2021   The patient was placed under status 15 (15 minute checks) to ensure patient safety.   MSSA protocol was initiated due to the patient's history of alcohol abuse and concern for withdrawal symptoms.  CBC, BMP and utox obtained.    All outpatient medications were continued  Patient started on Celexa 20 mg for her depression and anxiety Wellbutrin was decided at 150 mg  PATIENTdid participate in groups and was visible in the milieu.     The patient's symptoms of withdrawal and mood improved.     Patients energy motivation , sleep appetite improved.  Pt completed detox . It was un eventful.  Patient reports he wants to live she has children she feels that she is going to get better she does not have any active suicidal ideation plan or intent    Discussed with patient medications for craving.  Spoke with patient about triggers coping skills relapse prevention.    CONSULTS DONE DURING PATIENTS HOSPITALIZATION.  Patient was seen by medicine on date 12/13/2021    This as per their medical consult        Assessment & Recommendations  Winifred Bashir is a 51 year old woman with a history of polysubstance abuse, bipolar disorder,  KELLEN, and PTSD who is admitted to [facility] for detox from alcohol. She is also depressed and has passive suicidal ideation.         Alcohol Abuse & Withdrawal  Polysubstance Abuse  Depression, SI.   Management per psychiatry team.      Intermittent Suprapubic Pressure and Urinary Hesitancy. Reporting for a couple days at a time over several weeks when using cocaine and marijuana that may also be laced with other substances. Endorses prior UTIs, denies gynecologic or STI concerns. Her symptoms could be related to undiagnosed/untreated UTI, or could be having intermittent urinary retention related to sympathetic effects of cocaine.   - UA/UC ordered.   - If patient reports any urinary hesitancy or retention please obtain bladder scan and notify IM if > 300 ml.      Transaminitis. ,  without recent baseline for comparison and no prior abdominal imaging on file. No acetaminophen use, known hepatitis risk factors, or biliary complaints. Suspect d/t substance abuse.  - Will trend hepatic panel in AM.      Medicine will follow UA/UC and LFTs, please page with any additional concerns.       Pt was seen by cm  As per recommendations from cm  Followed up with pt regarding paperwork and treatment. Pt states she is interested in treatment in [facility]. Pt is out-of-detox and bed is available for program today, 12/14. Pt reports she will complete paperwork for assessment shortly. Pt will be assessed by case management in morning/afternoon with plan to admit to [facility]+ in the afternoon. AVS updated.      Update: Pt will be admitted to [facility] on 12/15/21 so she can have an additional days rest prior to treatment.           Labs:reviewed with patient       Recent Results (from the past 48 hour(s))   GGT    Collection Time: 12/13/21  9:17 AM   Result Value Ref Range    GGT 44 (H) 0 - 40 U/L   Lipid panel    Collection Time: 12/13/21  9:17 AM   Result Value Ref Range    Cholesterol 199 <200 mg/dL    Triglycerides 140 <150  mg/dL    Direct Measure HDL 60 >=50 mg/dL    LDL Cholesterol Calculated 111 (H) <=100 mg/dL    Non HDL Cholesterol 139 (H) <130 mg/dL   TSH with free T4 reflex and/or T3 as indicated    Collection Time: 12/13/21  9:17 AM   Result Value Ref Range    TSH 0.88 0.40 - 4.00 mU/L   Vitamin B12    Collection Time: 12/13/21  9:17 AM   Result Value Ref Range    Vitamin B12 834 193 - 986 pg/mL   Folate    Collection Time: 12/13/21  9:17 AM   Result Value Ref Range    Folic Acid 18.0 >=5.4 ng/mL   UA reflex to Microscopic and Culture    Collection Time: 12/14/21  8:34 AM    Specimen: Urine, Midstream   Result Value Ref Range    Color Urine Yellow Colorless, Straw, Light Yellow, Yellow    Appearance Urine Slightly Cloudy (A) Clear    Glucose Urine Negative Negative mg/dL    Bilirubin Urine Negative Negative    Ketones Urine Negative Negative mg/dL    Specific Gravity Urine 1.023 1.003 - 1.035    Blood Urine Negative Negative    pH Urine 7.0 5.0 - 7.0    Protein Albumin Urine Negative Negative mg/dL    Urobilinogen Urine 2.0 Normal, 2.0 mg/dL    Nitrite Urine Negative Negative    Leukocyte Esterase Urine Negative Negative    Mucus Urine Present (A) None Seen /LPF    RBC Urine <1 <=2 /HPF    WBC Urine 1 <=5 /HPF    Squamous Epithelials Urine 6 (H) <=1 /HPF   Hepatic panel    Collection Time: 12/14/21  8:54 AM   Result Value Ref Range    Bilirubin Total 0.4 0.2 - 1.3 mg/dL    Bilirubin Direct 0.1 0.0 - 0.2 mg/dL    Protein Total 7.5 6.8 - 8.8 g/dL    Albumin 3.2 (L) 3.4 - 5.0 g/dL    Alkaline Phosphatase 82 40 - 150 U/L    AST 34 0 - 45 U/L    ALT 75 (H) 0 - 50 U/L         Recent Results (from the past 240 hour(s))   Alcohol breath test POCT    Collection Time: 12/12/21  5:59 PM   Result Value Ref Range    Alcohol Breath Test 0.000 0.00 - 0.01   Extra Purple Top Tube    Collection Time: 12/12/21  6:12 PM   Result Value Ref Range    Hold Specimen JIC    CBC with platelets and differential    Collection Time: 12/12/21  6:12 PM    Result Value Ref Range    WBC Count 4.9 4.0 - 11.0 10e3/uL    RBC Count 5.57 (H) 3.80 - 5.20 10e6/uL    Hemoglobin 15.5 11.7 - 15.7 g/dL    Hematocrit 47.7 (H) 35.0 - 47.0 %    MCV 86 78 - 100 fL    MCH 27.8 26.5 - 33.0 pg    MCHC 32.5 31.5 - 36.5 g/dL    RDW 14.1 10.0 - 15.0 %    Platelet Count 349 150 - 450 10e3/uL    % Neutrophils 40 %    % Lymphocytes 49 %    % Monocytes 10 %    % Eosinophils 0 %    % Basophils 1 %    % Immature Granulocytes 0 %    NRBCs per 100 WBC 0 <1 /100    Absolute Neutrophils 2.0 1.6 - 8.3 10e3/uL    Absolute Lymphocytes 2.4 0.8 - 5.3 10e3/uL    Absolute Monocytes 0.5 0.0 - 1.3 10e3/uL    Absolute Eosinophils 0.0 0.0 - 0.7 10e3/uL    Absolute Basophils 0.0 0.0 - 0.2 10e3/uL    Absolute Immature Granulocytes 0.0 <=0.4 10e3/uL    Absolute NRBCs 0.0 10e3/uL   Comprehensive metabolic panel    Collection Time: 12/12/21  6:13 PM   Result Value Ref Range    Sodium 139 133 - 144 mmol/L    Potassium 3.5 3.4 - 5.3 mmol/L    Chloride 106 94 - 109 mmol/L    Carbon Dioxide (CO2) 27 20 - 32 mmol/L    Anion Gap 6 3 - 14 mmol/L    Urea Nitrogen 20 7 - 30 mg/dL    Creatinine 0.93 0.52 - 1.04 mg/dL    Calcium 9.7 8.5 - 10.1 mg/dL    Glucose 89 70 - 99 mg/dL    Alkaline Phosphatase 102 40 - 150 U/L     (H) 0 - 45 U/L     (H) 0 - 50 U/L    Protein Total 7.9 6.8 - 8.8 g/dL    Albumin 3.5 3.4 - 5.0 g/dL    Bilirubin Total 0.3 0.2 - 1.3 mg/dL    GFR Estimate 71 >60 mL/min/1.73m2   INR    Collection Time: 12/12/21  6:13 PM   Result Value Ref Range    INR 0.94 0.86 - 1.14   Partial thromboplastin time    Collection Time: 12/12/21  6:13 PM   Result Value Ref Range    aPTT 26 22 - 38 Seconds   Ethyl Alcohol Level    Collection Time: 12/12/21  6:13 PM   Result Value Ref Range    Alcohol ethyl <0.01 <=0.01 g/dL   Asymptomatic COVID-19 Virus (Coronavirus) by PCR Oropharynx    Collection Time: 12/12/21  6:13 PM    Specimen: Oropharynx; Swab   Result Value Ref Range    SARS CoV2 PCR Negative Negative,  Testing sent to reference lab. Results will be returned via unsolicited result   HCG qualitative urine (UPT)    Collection Time: 12/12/21  6:31 PM   Result Value Ref Range    hCG Urine Qualitative Negative Negative   Drug abuse screen 1 urine (ED)    Collection Time: 12/12/21  6:31 PM   Result Value Ref Range    Amphetamines Urine Screen Negative Screen Negative    Barbiturates Urine Screen Negative Screen Negative    Benzodiazepines Urine Screen Negative Screen Negative    Cannabinoids Urine Screen Positive (A) Screen Negative    Cocaine Urine Screen Positive (A) Screen Negative    Opiates Urine Screen Negative Screen Negative   GGT    Collection Time: 12/13/21  9:17 AM   Result Value Ref Range    GGT 44 (H) 0 - 40 U/L   Lipid panel    Collection Time: 12/13/21  9:17 AM   Result Value Ref Range    Cholesterol 199 <200 mg/dL    Triglycerides 140 <150 mg/dL    Direct Measure HDL 60 >=50 mg/dL    LDL Cholesterol Calculated 111 (H) <=100 mg/dL    Non HDL Cholesterol 139 (H) <130 mg/dL   TSH with free T4 reflex and/or T3 as indicated    Collection Time: 12/13/21  9:17 AM   Result Value Ref Range    TSH 0.88 0.40 - 4.00 mU/L   Vitamin B12    Collection Time: 12/13/21  9:17 AM   Result Value Ref Range    Vitamin B12 834 193 - 986 pg/mL   Folate    Collection Time: 12/13/21  9:17 AM   Result Value Ref Range    Folic Acid 18.0 >=5.4 ng/mL   UA reflex to Microscopic and Culture    Collection Time: 12/14/21  8:34 AM    Specimen: Urine, Midstream   Result Value Ref Range    Color Urine Yellow Colorless, Straw, Light Yellow, Yellow    Appearance Urine Slightly Cloudy (A) Clear    Glucose Urine Negative Negative mg/dL    Bilirubin Urine Negative Negative    Ketones Urine Negative Negative mg/dL    Specific Gravity Urine 1.023 1.003 - 1.035    Blood Urine Negative Negative    pH Urine 7.0 5.0 - 7.0    Protein Albumin Urine Negative Negative mg/dL    Urobilinogen Urine 2.0 Normal, 2.0 mg/dL    Nitrite Urine Negative Negative     Leukocyte Esterase Urine Negative Negative    Mucus Urine Present (A) None Seen /LPF    RBC Urine <1 <=2 /HPF    WBC Urine 1 <=5 /HPF    Squamous Epithelials Urine 6 (H) <=1 /HPF   Hepatic panel    Collection Time: 12/14/21  8:54 AM   Result Value Ref Range    Bilirubin Total 0.4 0.2 - 1.3 mg/dL    Bilirubin Direct 0.1 0.0 - 0.2 mg/dL    Protein Total 7.5 6.8 - 8.8 g/dL    Albumin 3.2 (L) 3.4 - 5.0 g/dL    Alkaline Phosphatase 82 40 - 150 U/L    AST 34 0 - 45 U/L    ALT 75 (H) 0 - 50 U/L            Because this patient meets criteria for an Alcohol Use Disorder, I performed the following brief intervention on the date of this note:              1) Expressed concern that the patient is drinking at unhealthy levels known to increase their risk of alcohol related problems              2) Gave feedback linking alcohol use and health, including personalized feedback explaining how alcohol use can interact with their medical and/or psychiatric problems, and with prescribed medications.              3) Advised patient to abstain.    PT counseled on nicotine cessation and nicotine replacement provided    Discussed with patient many issues of addiction,triggers, relapse, and establishing a solid recovery program.    DISCHARGE MENTAL STATUS EXAMINATION:  The patient is alert, oriented x3.  Good fund of knowledge.  Good use of language.  Recent and remote memory, language, fund of knowledge are all adequate.  Euthymic mood congruent affect  Speech normal rate/rhythm linear tp no loose asso,The patient does not have any active suicidal or homicidal ideation.  Does not have any auditory or visual hallucination.  Fair insight/judgment At this time, the patient was stable to be discharged.        Pt was not determined to not be a danger to himself or others. At the current time of discharge, the patient does not meet criteria for involuntary hospitalization. On the day of discharge, the patient reports that they do not have  suicidal or homicidal ideation and would never hurt themselves or others. Steps taken to minimize risk include: assessing patient s behavior and thought process daily during hospital stay, discharging patient with adequate plan for follow up for mental and physical health and discussing safety plan of returning to the hospital should the patient ever have thoughts of harming themselves or others. Therefore, based on all available evidence including the factors cited above, the patient does not appear to be at imminent risk for self-harm, and is appropriate for outpatient level of care.     Educated about side effects/risk vs benefits /alternative including non treatment.Pt consented to be on medication.     .Total time spent on discharge summary more than 35 min  More than  20 min  planning, coordination of care, medication reconciliation and performance of physical exam on day of discharge.Care was coordinated with unit RN and unit therapist         Medication List      Started    citalopram 20 MG tablet  Commonly known as: celeXA  20 mg, Oral, DAILY     fluticasone-vilanterol 100-25 MCG/INH inhaler  Commonly known as: BREO ELLIPTA  1 puff, Inhalation, DAILY     folic acid 1 MG tablet  Commonly known as: FOLVITE  1 mg, Oral, DAILY     multivitamin w/minerals tablet  1 tablet, Oral, DAILY     nicotine 4 MG lozenge  Commonly known as: NICORETTE  4 mg, Buccal, EVERY 1 HOUR PRN     thiamine 100 MG tablet  Commonly known as: B-1  100 mg, Oral, DAILY     traZODone 50 MG tablet  Commonly known as: DESYREL  50 mg, Oral, AT BEDTIME PRN        Modified    buPROPion 150 MG 24 hr tablet  Commonly known as: WELLBUTRIN XL  150 mg, Oral, EVERY MORNING, Take with 300mg tablet for 450mg total daily dose.   What changed: Another medication with the same name was removed. Continue taking this medication, and follow the directions you see here.     hydrOXYzine 25 MG tablet  Commonly known as: ATARAX  25 mg, Oral, EVERY 4 HOURS  "PRN  What changed:     how much to take    when to take this     topiramate 25 MG tablet  Commonly known as: TOPAMAX  25 mg, Oral, 3 TIMES DAILY.  What changed:     medication strength    how much to take        Discontinued    FLUoxetine 40 MG capsule  Commonly known as: PROzac     mometasone-formoterol 100-5 MCG/ACT inhaler  Commonly known as: DULERA             Disposition: lp     Facts about COVID19 at www.cdc.gov/COVID19 and www.[place].gov/covid19     Keeping hands clean is one of the most important steps we can take to avoid getting sick and spreading germs to others.  Please wash your hands frequently and lather with soap for at least 20 seconds!            Treatment Follow-Up:  Treatment Follow-Up:  [facility]  Admission: Wednesday December 15th at 12:30 pm  [address] [place] [address]  [phone]     Medical Follow-Up:  [facility]    [address]    [place] [address]    [phone]       Addiction medicine:  [facility]  Shahrzad Kumar MD  Addiction Medicine  .        \"Much or all of the text in this note was generated through the use of Dragon Dictate voice to text software. Errors in spelling or words which appear to be out of contact are unintentional, may be present due having escaped editing\"     "

## 2021-12-15 NOTE — PROGRESS NOTES
Name: Winifred Bashir  Date: 12/15/2021  Medical Record: [id number]  Envelope Number: [id number]  List of Contents (List each item separately in new row):   -Fluticasone Furoate/Vilanterol 100 MCG    Admission:  I am responsible for any personal items that are not sent to the safe or pharmacy.  [facility] is not responsible for loss, theft or damage of any property in my possession.      Patient Signature:  ___________________________________________       Date/Time:__________________________    Staff Signature: __________________________________       Date/Time:__________________________    2nd Staff person, if patient is unable/unwilling to sign:      __________________________________________________________       Date/Time: __________________________  Discharge:  [facility] has returned all of my personal belongings:    Patient Signature: ________________________________________     Date/Time: ____________________________________    Staff Signature: ______________________________________     Date/Time:_____________________________________

## 2021-12-15 NOTE — PROGRESS NOTES
[facility]  [address] [place] [address] [address]th and [address] [place] [address]        ADULT CD ASSESSMENT ADDENDUM      Patient Name: Winifred Bashir  Cell Phone:   Home: [phone] (home)    Mobile:   Telephone Information:   Mobile [phone]       Email:  [email or web address]  Emergency Contact: Joe Bashir   Tel: [phone]    The patient reported being:      With which race do you identify? / Black    Initial Screening Questions     1. Are you currently having severe withdrawal symptoms that are putting yourself or others in danger?  No    2. Are you currently having severe medical problems that require immediate attention?  No    3. Are you currently having severe emotional or behavioral problems that are putting yourself or others at risk of harm?  No    4. Do you currently participate in community tc activities, such as attending Anabaptist, temple, Spiritism or Orthodox services?  No    5. How does your spirituality impact your recovery?  It enhances it    6. Do you currently self-administer your medications?  Yes    Do you have a valid 's license?    Yes       Mental Health Status   Physical Appearance/Attire: Appears stated age, Attire appropriate to age/situation and Neat   Hygiene: well groomed   Eye Contact: at examiner   Speech Rate:  regular   Speech Volume: regular   Speech Quality: fluid   Cognitive/Perceptual:  reality based   Cognition: memory intact    Judgment: intact and able to concentrate   Insight: intact and able to concentrate   Orientation:  time, place, person and situation   Thought: logical    Hallucinations:  none   General Behavioral Tone: cooperative   Psychomotor Activity: no problem noted   Gait:  no problem   Mood: appropriate and anxious   Affect: blunted/restricted   Counselor Notes: NA     Criteria for Diagnosis: DSM-5 Criteria for Substance Use Disorders      Alcohol Use Disorder Severe - 303.90  (F10.20)  Cannabis Use Disorder Mild - 305.20 (F12.10)  Cocaine Use Disorder Severe - 304.20 (F14.20)  Depression NOS, per patient self-report  Anxiety disorder NOS, per patient self-report    Level of Care   I.) Intoxication and Withdrawal: 0   II.) Biomedical:  1   III.) Emotional and Behavioral:  2   IV.) Readiness to Change:  0   V.) Relapse Potential: 4   VI.) Recovery Environmental: 3     Initial Problem List     The patient is currently living in an unhealthy and/or using environment  The patient lacks relapse prevention skills  The patient has poor coping skills  The patient has poor refusal skills   The patient lacks a sober peer support network  The patient has dual issues of MI and CD  The patient lacks the ability to effectively manage his/her mental health issues  The patient has a significant history of trauma and/or abuse issues  The patient has a significant history of grief and loss issues  The patient has a significant history of guilt and shame issues    Patient/Client is willing to follow treatment recommendations.  Yes    Counselor: STEVEN Pressley

## 2021-12-15 NOTE — PROGRESS NOTES
Name: Winifred Bashir  Date: 12/15/2021  Medical Record: [id number]  Envelope Number: [id number]  List of Contents (List each item separately in new row):   One Cell Phone, One  & Cord.   Admission:  I am responsible for any personal items that are not sent to the safe or pharmacy.  [facility] is not responsible for loss, theft or damage of any property in my possession.    Patient Signature:  ___________________________________________       Date/Time:__________________________    Staff Signature: __________________________________       Date/Time:__________________________    2nd Staff person, if patient is unable/unwilling to sign:      __________________________________________________________       Date/Time: __________________________    Discharge:  [facility] has returned all of my personal belongings:    Patient Signature: ________________________________________     Date/Time: ____________________________________    Staff Signature: ______________________________________     Date/Time:_____________________________________

## 2021-12-16 ENCOUNTER — HOSPITAL ENCOUNTER (OUTPATIENT)
Dept: BEHAVIORAL HEALTH | Facility: CLINIC | Age: 51
End: 2021-12-16
Attending: FAMILY MEDICINE
Payer: COMMERCIAL

## 2021-12-16 VITALS — OXYGEN SATURATION: 98 % | TEMPERATURE: 98.4 F

## 2021-12-16 PROBLEM — F19.20 CHEMICAL DEPENDENCY (H): Status: ACTIVE | Noted: 2021-12-16

## 2021-12-16 PROCEDURE — 1002N00001 HC LODGING PLUS FACILITY CHARGE ADULT

## 2021-12-16 PROCEDURE — H2035 A/D TX PROGRAM, PER HOUR: HCPCS | Mod: HQ

## 2021-12-16 ASSESSMENT — ANXIETY QUESTIONNAIRES: GAD7 TOTAL SCORE: 13

## 2021-12-16 NOTE — GROUP NOTE
Group Therapy Documentation    PATIENT'S NAME: Winifred Bashir  MRN:   [id number]  :   1970  ACCT. NUMBER: [id number]  DATE OF SERVICE: 21  START TIME:  9:00 AM  END TIME: 11:00 AM  FACILITATOR(S): Virginia Vargas LADC  TOPIC: BEH Group Therapy  Number of patients attending the group:  7  Group Length:  2 Hours    Group Therapy Type: Recovery strategies    Summary of Group / Topics Discussed:    Relationship/socialization and Disease of addiction      Group Attendance:  Attended group session    Patient's response to the group topic/interactions:  cooperative with task    Patient appeared to be Engaged.        Client specific details:  Winifred attended her first group therapy session and participated in group introductions. She spoke of how she identified with other patient's stories within the group.

## 2021-12-16 NOTE — GROUP NOTE
Psychoeducation Group Documentation    PATIENT'S NAME: Winifred Bashir  MRN:   [id number]  :   1970  ACCT. NUMBER: [id number]  DATE OF SERVICE: 21  START TIME:  3:00 PM  END TIME:  4:00 PM  FACILITATOR(S): Dale Woodward LADC; Mara Cortes  TOPIC: BEH Pyschoeducation  Number of patients attending the group:  21  Group Length:  1 Hours    Skills Group Therapy Type: Healthy behaviors development    Summary of Group / Topics Discussed:    Relationship/social skills, Balanced lifestyle skills, Coping/DBT skills, and Symptom management skills          Group Attendance:  Attended group session    Patient's response to the group topic/interactions:  cooperative with task    Patient appeared to be Engaged.         Client specific details:  Winifred attended afternoon skills group.  The group consisted of a presentation by Dr. Ventura on Substance Abuse, followed by a discussion.

## 2021-12-16 NOTE — GROUP NOTE
Group Therapy Documentation    PATIENT'S NAME: Winifred Bashir  MRN:   [id number]  :   1970  ACCT. NUMBER: [id number]  DATE OF SERVICE: 21  START TIME: 12:30 PM  END TIME:  2:30 PM  FACILITATOR(S): Teodora López LADC  TOPIC: BEH Group Therapy  Number of patients attending the group: 7  Group Length:  2 Hours    Group Therapy Type: Emotion processing    Summary of Group / Topics Discussed:    Disease of addiction and Emotions/expression      Group Attendance:  Attended group session    Patient's response to the group topic/interactions:  cooperative with task    Patient appeared to be Actively participating, Attentive and Engaged.        Client specific details: Winifred was an active participant in afternoon group therapy session.

## 2021-12-17 ENCOUNTER — HOSPITAL ENCOUNTER (OUTPATIENT)
Dept: BEHAVIORAL HEALTH | Facility: CLINIC | Age: 51
End: 2021-12-17
Attending: FAMILY MEDICINE
Payer: COMMERCIAL

## 2021-12-17 VITALS — TEMPERATURE: 97.4 F | OXYGEN SATURATION: 99 %

## 2021-12-17 PROCEDURE — 1002N00001 HC LODGING PLUS FACILITY CHARGE ADULT

## 2021-12-17 PROCEDURE — H2035 A/D TX PROGRAM, PER HOUR: HCPCS | Mod: HQ

## 2021-12-17 NOTE — GROUP NOTE
Group Therapy Documentation    PATIENT'S NAME: Winifred Bashir  MRN:   [id number]  :   1970  ACCT. NUMBER: [id number]  DATE OF SERVICE: 21  START TIME: 12:30 PM  END TIME:  2:30 PM  FACILITATOR(S): Teodora López LADC  TOPIC: BEH Group Therapy  Number of patients attending the group: 7  Group Length:  2 Hours    Group Therapy Type: Recovery strategies    Summary of Group / Topics Discussed:    Sober coping skills and Leisure explorations/use of leisure time      Group Attendance:  Excused from group session    Patient's response to the group topic/interactions:  cooperative with task    Client specific details: Winifred was excused from group for medical purposes.

## 2021-12-17 NOTE — PROGRESS NOTES
Pt reports having a migraine. Refused PRN medications and reports that she would benefit from resting in low lighting. LP RN OK'd this however encouraged pt to return to group later if possible. Counseling aware of this also. Rachael Finney RN
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
## 2021-12-17 NOTE — GROUP NOTE
Group Therapy Documentation    PATIENT'S NAME: Winifred Bashir  MRN:   [id number]  :   1970  ACCT. NUMBER: [id number]  DATE OF SERVICE: 21  START TIME:  9:00 AM  END TIME: 11:00 AM  FACILITATOR(S): Virginia Vargas LADC  TOPIC: BEH Group Therapy  Number of patients attending the group: 7  Group Length:  2 Hours    Group Therapy Type: Recovery strategies    Summary of Group / Topics Discussed:    Recovery Principles      Group Attendance:  Attended group session    Patient's response to the group topic/interactions:  cooperative with task    Patient appeared to be Engaged.        Client specific details:  Winifred participated in a group exercise on letting go of ACOA guilt and shame.

## 2021-12-18 ENCOUNTER — HOSPITAL ENCOUNTER (OUTPATIENT)
Dept: BEHAVIORAL HEALTH | Facility: CLINIC | Age: 51
End: 2021-12-18
Attending: FAMILY MEDICINE
Payer: COMMERCIAL

## 2021-12-18 VITALS — TEMPERATURE: 97.7 F | OXYGEN SATURATION: 100 %

## 2021-12-18 PROCEDURE — H2035 A/D TX PROGRAM, PER HOUR: HCPCS | Mod: HQ

## 2021-12-18 PROCEDURE — 1002N00001 HC LODGING PLUS FACILITY CHARGE ADULT

## 2021-12-18 NOTE — PROGRESS NOTES
Pt reported to LPRN that she has not had a BM in 6 days, appears she has taken Senna x2 per MAR. PT encouraged to drink water and Prune juice x1 given. Pt asked to follow up.

## 2021-12-18 NOTE — GROUP NOTE
Group Therapy Documentation    PATIENT'S NAME: Winifred Bashir  MRN:   [id number]  :   1970  ACCT. NUMBER: [id number]  DATE OF SERVICE: 21  START TIME: 12:30 PM  END TIME:  2:30 PM  FACILITATOR(S): Stone Portillo LADC; Beverly Patricia LADC; Gali Ng LADC  TOPIC: BEH Group Therapy  Number of patients attending the group:  27  Group Length:  2 Hours    Group Therapy Type: Recovery strategies    Summary of Group / Topics Discussed:    Relationship/socialization, Emotions/expression, and Relapse prevention    Group Attendance:  Attended group session    Patient's response to the group topic/interactions:  cooperative with task    Patient appeared to be Actively participating and Attentive.        Client specific details: Pt was respectful and attentive during a lecture on communication skills & types, and participated in a breakout activity to foster group communication.

## 2021-12-18 NOTE — GROUP NOTE
Group Therapy Documentation    PATIENT'S NAME: Winifred Bashir  MRN:   [id number]  :   1970  ACCT. NUMBER: [id number]  DATE OF SERVICE: 21  START TIME:  9:00 AM  END TIME: 11:00 AM  FACILITATOR(S): Stone Portillo LADC; Beverly Patricia LADC; Gali Ng LADC  TOPIC: BEH Group Therapy  Number of patients attending the group:  27  Group Length:  2 Hours    Group Therapy Type: Recovery strategies    Summary of Group / Topics Discussed:    Recovery Principles, Relapse prevention, and Self-care activities    Group Attendance:  Attended group session    Patient's response to the group topic/interactions:  cooperative with task    Patient appeared to be Attentive and Engaged.        Client specific details: Pt listened respectfully to a relapse prevention lecture.

## 2021-12-19 ENCOUNTER — HOSPITAL ENCOUNTER (OUTPATIENT)
Dept: BEHAVIORAL HEALTH | Facility: CLINIC | Age: 51
End: 2021-12-19
Attending: FAMILY MEDICINE
Payer: COMMERCIAL

## 2021-12-19 VITALS — TEMPERATURE: 98.6 F | OXYGEN SATURATION: 100 %

## 2021-12-19 PROCEDURE — H2035 A/D TX PROGRAM, PER HOUR: HCPCS | Mod: HQ

## 2021-12-19 PROCEDURE — 1002N00001 HC LODGING PLUS FACILITY CHARGE ADULT

## 2021-12-19 NOTE — GROUP NOTE
Psychoeducation Group Documentation    PATIENT'S NAME: Winifred Bashir  MRN:   [id number]  :   1970  ACCT. NUMBER: [id number]  DATE OF SERVICE: 21  START TIME: 12:30 PM  END TIME:  1:30 PM  FACILITATOR(S): Stone Portillo LADC; Gali Ng LADC  TOPIC: BEH Pyschoeducation  Number of patients attending the group:  25  Group Length:  1 Hours    Skills Group Therapy Type: Healthy behaviors development and Relationship skills development    Summary of Group / Topics Discussed:    Relationship/social skills, Balanced lifestyle skills, and Relapse prevention skills    Group Attendance:  Attended group session    Patient's response to the group topic/interactions:  cooperative with task    Patient appeared to be Attentive and Engaged.         Client specific details:  Pt listened respectfully to a lecture on sexual boundaries/sexual intimacy, and how that impacts sobriety/relapse.

## 2021-12-19 NOTE — GROUP NOTE
Psychoeducation Group Documentation    PATIENT'S NAME: Winifred Bashir  MRN:   [id number]  :   1970  ACCT. NUMBER: [id number]  DATE OF SERVICE: 21  START TIME:  8:40 AM  END TIME: 10:30 AM  FACILITATOR(S): Stone Portillo LADC; Gali Ng LADC; Mirela Shaffer RN  TOPIC: BEH Pyschoeducation  Number of patients attending the group:  7  Group Length:  2 Hours    Skills Group Therapy Type: Healthy behaviors development    Summary of Group / Topics Discussed:    Balanced lifestyle skills          Group Attendance:  Attended group session    Patient's response to the group topic/interactions:  cooperative with task    Patient appeared to be Attentive.         Client specific details:  Winifred gave appropriate feedback..

## 2021-12-19 NOTE — PROGRESS NOTES
Pt reported to LPRN that she has not had a BM in 6 days, Pt reports taking senna and drinking prune juice daily. Writer has offered to reach out to pt's PCP tomorrow and request miralax- per pt request. Pt reports sh does not need emergency medica attention for this issue at this time and will alert staff if symptoms worsen.

## 2021-12-20 ENCOUNTER — HOSPITAL ENCOUNTER (OUTPATIENT)
Dept: BEHAVIORAL HEALTH | Facility: CLINIC | Age: 51
End: 2021-12-20
Attending: FAMILY MEDICINE
Payer: COMMERCIAL

## 2021-12-20 VITALS — OXYGEN SATURATION: 98 % | TEMPERATURE: 97.6 F

## 2021-12-20 PROCEDURE — H2035 A/D TX PROGRAM, PER HOUR: HCPCS | Mod: HQ

## 2021-12-20 PROCEDURE — 1002N00001 HC LODGING PLUS FACILITY CHARGE ADULT

## 2021-12-20 NOTE — GROUP NOTE
Group Therapy Documentation    PATIENT'S NAME: Winifred Bashir  MRN:   [id number]  :   1970  ACCT. NUMBER: [id number]  DATE OF SERVICE: 21  START TIME: 12:30 PM  END TIME:  2:30 PM  FACILITATOR(S): Teodora Weiss LADC  TOPIC: BEH Group Therapy  Number of patients attending the group: 6  Group Length:  2 Hours    Group Therapy Type: Emotion processing    Summary of Group / Topics Discussed:    Sober coping skills, Emotions/expression, and Leisure explorations/use of leisure time      Group Attendance:  Attended group session    Patient's response to the group topic/interactions:  cooperative with task    Patient appeared to be Actively participating, Attentive and Engaged.        Client specific details: Winifred was an active participant in afternoon group therapy session.

## 2021-12-20 NOTE — GROUP NOTE
Group Therapy Documentation    PATIENT'S NAME: Winifred Bashir  MRN:   [id number]  :   1970  ACCT. NUMBER: [id number]  DATE OF SERVICE: 21  START TIME:  9:00 AM  END TIME: 11:00 AM  FACILITATOR(S): Virginia Vargas LADC  TOPIC: BEH Group Therapy  Number of patients attending the group:  7  Group Length:  2 Hours    Group Therapy Type: Recovery strategies    Summary of Group / Topics Discussed:    Mindfulness/Relaxation and Disease of addiction      Group Attendance:  Attended group session    Patient's response to the group topic/interactions:  cooperative with task    Patient appeared to be Actively participating.        Client specific details:  Winifred participated in an excursion to the chapel and discussion on coping skills.
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
## 2021-12-21 ENCOUNTER — HOSPITAL ENCOUNTER (OUTPATIENT)
Dept: BEHAVIORAL HEALTH | Facility: CLINIC | Age: 51
End: 2021-12-21
Attending: FAMILY MEDICINE
Payer: COMMERCIAL

## 2021-12-21 VITALS
DIASTOLIC BLOOD PRESSURE: 80 MMHG | OXYGEN SATURATION: 100 % | TEMPERATURE: 97.3 F | HEART RATE: 64 BPM | SYSTOLIC BLOOD PRESSURE: 124 MMHG

## 2021-12-21 PROCEDURE — 99207 PR CDG-CODE CATEGORY CHANGED: CPT | Performed by: PSYCHIATRY & NEUROLOGY

## 2021-12-21 PROCEDURE — 1002N00001 HC LODGING PLUS FACILITY CHARGE ADULT

## 2021-12-21 PROCEDURE — H2035 A/D TX PROGRAM, PER HOUR: HCPCS | Mod: HQ

## 2021-12-21 PROCEDURE — 99214 OFFICE O/P EST MOD 30 MIN: CPT | Performed by: PSYCHIATRY & NEUROLOGY

## 2021-12-21 RX ORDER — NALTREXONE HYDROCHLORIDE 50 MG/1
50 TABLET, FILM COATED ORAL DAILY
Qty: 30 TABLET | Refills: 0 | Status: SHIPPED | OUTPATIENT
Start: 2021-12-21 | End: 2022-01-27

## 2021-12-21 RX ORDER — GABAPENTIN 100 MG/1
100 CAPSULE ORAL 3 TIMES DAILY PRN
Qty: 90 CAPSULE | Refills: 0 | Status: SHIPPED | OUTPATIENT
Start: 2021-12-21 | End: 2022-01-10

## 2021-12-21 RX ORDER — BUPROPION HYDROCHLORIDE 300 MG/1
300 TABLET ORAL EVERY MORNING
Qty: 30 TABLET | Refills: 0 | Status: SHIPPED | OUTPATIENT
Start: 2021-12-21 | End: 2022-01-10

## 2021-12-21 RX ORDER — CITALOPRAM HYDROBROMIDE 10 MG/1
30 TABLET ORAL DAILY
Qty: 90 TABLET | Refills: 0 | Status: SHIPPED | OUTPATIENT
Start: 2021-12-21 | End: 2022-01-10

## 2021-12-21 NOTE — PATIENT INSTRUCTIONS
Please increase your Celexa to 30 mg  Increase your Wellbutrin to 300 mg  Start naltrexone 50 mg  Gabapentin 100 mg 3 times a day as needed

## 2021-12-21 NOTE — GROUP NOTE
Psychoeducation Group Documentation    PATIENT'S NAME: Winifred Bashir  MRN:   [id number]  :   1970  ACCT. NUMBER: [id number]  DATE OF SERVICE: 21  START TIME:  3:00 PM  END TIME:  4:00 PM  FACILITATOR(S): Dale Woodward LADC; Mara Cortes; Gali Ng LADC  TOPIC: BEH Pyschoeducation  Number of patients attending the group:  5  Group Length:  1 Hours    Skills Group Therapy Type: Daily living/independence skills    Summary of Group / Topics Discussed:    Relationship/social skills, Balanced lifestyle skills, and Relapse prevention skills          Group Attendance:  Attended group session    Patient's response to the group topic/interactions:  cooperative with task    Patient appeared to be Attentive and Engaged.         Client specific details:  Winifred participated in the afternoon skills group.  During the group the patients learned about different recovery related groups and reported them to their peers.
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
## 2021-12-21 NOTE — GROUP NOTE
Group Therapy Documentation    PATIENT'S NAME: Winifred Bashir  MRN:   [id number]  :   1970  ACCT. NUMBER: [id number]  DATE OF SERVICE: 21  START TIME:  9:00 AM  END TIME: 11:00 AM  FACILITATOR(S): Virginia Vargas LADC  TOPIC: BEH Group Therapy  Number of patients attending the group:  5  Group Length:  2 Hours    Group Therapy Type: Recovery strategies    Summary of Group / Topics Discussed:    Cognitive behavioral therapy skills and Emotions/expression      Group Attendance:  Attended group session    Patient's response to the group topic/interactions:  cooperative with task    Patient appeared to be Actively participating.        Client specific details:  Winifred participated in a skills exercise on music therapy.

## 2021-12-21 NOTE — GROUP NOTE
Group Therapy Documentation    PATIENT'S NAME: Winifred Bashir  MRN:   [id number]  :   1970  ACCT. NUMBER: [id number]  DATE OF SERVICE: 21  START TIME: 12:30 PM  END TIME:  2:30 PM  FACILITATOR(S): Virginia Vargas LADC  TOPIC: BEH Group Therapy  Number of patients attending the group:  5  Group Length:  2 Hours    Group Therapy Type: Recovery strategies    Summary of Group / Topics Discussed:    Disease of addiction and Leisure explorations/use of leisure time      Group Attendance:  Attended group session    Patient's response to the group topic/interactions:  cooperative with task    Patient appeared to be Actively participating.        Client specific details:  Winifred participated in one hour of group therapy. She participated in the 5 and 5 group activity.

## 2021-12-22 ENCOUNTER — HOSPITAL ENCOUNTER (OUTPATIENT)
Dept: BEHAVIORAL HEALTH | Facility: CLINIC | Age: 51
End: 2021-12-22
Attending: FAMILY MEDICINE
Payer: COMMERCIAL

## 2021-12-22 VITALS — TEMPERATURE: 97.6 F | OXYGEN SATURATION: 97 %

## 2021-12-22 PROCEDURE — 1002N00001 HC LODGING PLUS FACILITY CHARGE ADULT

## 2021-12-22 PROCEDURE — H2035 A/D TX PROGRAM, PER HOUR: HCPCS | Mod: HQ

## 2021-12-22 NOTE — PROGRESS NOTES
Patient:  Winifred GIMENEZ [facility] Weekly Treatment Plan Review      ATTENDANCE for the following date span:  12/15/21-12/22/21     Day Monday Tuesday Wednesday Thursday Friday Saturday Sunday   Group Hours   4 4 4 4 4 4 2   Skills Hours    1 1 1   1   Individual Session (LADC)            Individual Session  (psychotherapy)            Peer-led Recovery Group   1 1 1 1 1 1 1               Adult CD Progress Note and Treatment Plan Review     Attendance  Please refer to OP BEH CD Adult Attendance Record Documentation Flowsheet    Support group attended this week: yes    Reporting sobriety:  yes    Treatment Plan     Treatment Plan Review competed on: 12/22/21     Client preferred learning style: Visual  Hands on  Verbal  Demonstration    Staff Members contributing STEVEN Vann; STEVEN Martinez                         Received Supervision: YES    Client: contributed to goals and plan: Yes    Client received copy of plan/revised plan: Yes    Client agrees with plan/revised plan: Yes    Changes to Treatment Plan: No    New Goals added since last review No additional goals added at this time    Goals worked on since last review: Book of me, spirituality group, relapse prevention workshop, coping skills, CBT.    Strategies effective: yes    Strategies need these changes: No changes needed at this time    1) Care Coordination Activities:  Patient signed KATY for [facility] .  2) Medical, Mental Health and other appointments the client attended: None this week  3) Medication issues: None reported  4) Physical and mental health problems: See Dimension 2 and 3  5) Any changes in Vulnerable Adult Status?  No If yes, add to treatment plan and individual abuse prevention plan.  6) Review and evaluation of the individual abuse prevention plan: Current IAPP for this program is adequate for this client    ASAM Risk Rating:  Dimension 1, 0: Patient reports their last date of use as 11/29/21. Patient  "denies any withdrawal symptoms that would interfere with full participation in treatment programming at this time. Patient will continue to be monitored throughout treatment.   Dimension 2, 1: Patient denies any biomedical concerns that would interfere with full participation in treatment programming at this time. Patient reports that he is currently medication compliant. Patient appears able to access medical aid as needed and will continue to be monitored throughout treatment. Patient attended staff RN lecture on Pregnancy/STI's.  Dimension 3, 2: Patient reports a mental health diagnosis of anxiety and depressoin. Patient reports her mood is improving with continued sobriety and no major changes in stress level.  Patient denies any suicidal thoughts or ideations at this time. Patient will continue to be monitored throughout treatment.   Dimension 4, 1: Patient reports current motivation for treatment is for \"herself\". Patient is participating in all groups and lectures and appears to be gaining internal motivation for change. Patient appears to be in the contemplation stage of change at this time.   Dimension 5, 4: Patient rates urges and cravings this week a \"7 \" on a scale of 1-10(high). Patient continues to learn and practice coping skills and verbalizes journaling, reading, and talking with peers have been helpful.   Dimension 6, 3: Patient is attending sober support meetings and building relationships with peers. Patient's aftercare plan at this time includes: sober support meetings, individual therapy, and outpatient.    Guide to Risk Ratings for Suicidality:   IDEATION: Active thoughts of suicide? INTENT: Intent to follow on suicide? PLAN: Plan to follow through on suicide? Level of Risk:   IF Yes Yes Yes Patient = High Emergent   IF Yes Yes No Patient = High Urgent/Non-Emergent   IF Yes No No Patient = Moderate Non-Urgent   IF No No   No Patient = Low Risk   The patient's ADDITIONAL RISK FACTORS and lack " of PROTECTIVE FACTORS may increase their overall suicide risk ratings.     Patient's/client's current risk rating:  Low Risk    Family Involvement:   none schedule this week    Data:   offered feedback good insight client did participate    Intervention:   Behavior modification  Cognitive Behavioral Therapy  Counselor feedback  Education  Emotional management  Group feedback  Motivational Enhancement Therapy  Relapse prevention  Twelve Step facilitation  Mental health education    Assessment:   Stages of Change Model  Contemplation    Appears/Sounds:  Cooperative  Motivated  Engaged    Plan:  Focus on recovery environment  Monitor emotional/physical health  Continue working through treatment plan goals.     STEVEN Vann

## 2021-12-22 NOTE — GROUP NOTE
Group Therapy Documentation    PATIENT'S NAME: Winifred Bashir  MRN:   [id number]  :   1970  ACCT. NUMBER: [id number]  DATE OF SERVICE: 21  START TIME: 10:00 AM  END TIME: 11:30 AM  FACILITATOR(S): Teodora Weiss LADC  TOPIC: BEH Group Therapy  Number of patients attending the group: 5  Group Length:  2 Hours    Group Therapy Type: Emotion processing    Summary of Group / Topics Discussed:    Relationship/socialization and Emotions/expression      Group Attendance:  Attended group session    Patient's response to the group topic/interactions:  cooperative with task    Patient appeared to be Actively participating, Attentive and Engaged.        Client specific details: Winifred was an active participant in morning group therapy session and group discussion on codependency and unhealthy relationships.

## 2021-12-22 NOTE — GROUP NOTE
Group Therapy Documentation    PATIENT'S NAME: Winifred Bashir  MRN:   [id number]  :   1970  ACCT. NUMBER: [id number]  DATE OF SERVICE: 21  START TIME: 12:30 PM  END TIME:  2:30 PM  FACILITATOR(S): Virginia Vargas LADC; Vicki Arenas  TOPIC: BEH Group Therapy  Number of patients attending the group:  6  Group Length:  2 Hours    Group Therapy Type: Recovery strategies    Summary of Group / Topics Discussed:    Recovery Principles      Group Attendance:  Attended group session    Patient's response to the group topic/interactions:  cooperative with task    Patient appeared to be Engaged.        Client specific details:  Winifred participated in spirituality group facilitated by Vicki Arenas.

## 2021-12-22 NOTE — GROUP NOTE
Group Therapy Documentation    PATIENT'S NAME: Winifred Bashir  MRN:   [id number]  :   1970  ACCT. NUMBER: [id number]  DATE OF SERVICE: 21  START TIME:  8:30 AM  END TIME:  9:30 AM  FACILITATOR(S): Dale Woodward, STEVEN; Mara Cortes; Beverly Patricia LADC  TOPIC: BEH Group Therapy  Number of patients attending the group:  23  Group Length:  1 Hours    Group Therapy Type: Recovery strategies    Summary of Group / Topics Discussed:    Recovery Principles, Coping/DBT informed care, and Disease of addiction    DBT (Dialectical Behavorial Therapy) was discussed. Information was presented ; what is it? Why is is used in therapeutic setting? What are the benefits of using DBT?      Group Attendance:  Attended group session    Patient's response to the group topic/interactions:  cooperative with task    Patient appeared to be Actively participating, Attentive and Engaged.        Client specific details:  Winifred attended and participated in Skills Group. No triggers or concerns reported at this time. Patient remained engaged throughout this group session regarding DBT.

## 2021-12-23 ENCOUNTER — HOSPITAL ENCOUNTER (OUTPATIENT)
Dept: BEHAVIORAL HEALTH | Facility: CLINIC | Age: 51
End: 2021-12-23
Attending: FAMILY MEDICINE
Payer: COMMERCIAL

## 2021-12-23 VITALS — TEMPERATURE: 97.3 F | OXYGEN SATURATION: 98 %

## 2021-12-23 PROCEDURE — H2035 A/D TX PROGRAM, PER HOUR: HCPCS | Mod: HQ

## 2021-12-23 PROCEDURE — 1002N00001 HC LODGING PLUS FACILITY CHARGE ADULT

## 2021-12-23 NOTE — GROUP NOTE
Psychoeducation Group Documentation    PATIENT'S NAME: Winifred Bashir  MRN:   [id number]  :   1970  ACCT. NUMBER: [id number]  DATE OF SERVICE: 21  START TIME:  3:00 PM  END TIME:  4:00 PM  FACILITATOR(S): Gali Ng LADC; Virginia Vargas LADC; Dony Harrison [facility]  TOPIC: BEH Pyschoeducation  Number of patients attending the group:  22  Group Length:  1 Hours    Skills Group Therapy Type: Recovery skills and Emotion regulation skills    Summary of Group / Topics Discussed:    Relationship/social skills, Balanced lifestyle skills, and Coping/DBT skills    Group Attendance:  Attended group session    Patient's response to the group topic/interactions:  cooperative with task    Patient appeared to be Actively participating, Attentive and Engaged.         Client specific details: Pt participated in a group activity with peers: utilizing a game to discuss and implement skills for tolerating distress and coping with anxiety.

## 2021-12-23 NOTE — GROUP NOTE
Group Therapy Documentation    PATIENT'S NAME: Winifred Bashir  MRN:   [id number]  :   1970  ACCT. NUMBER: [id number]  DATE OF SERVICE: 21  START TIME: 12:30 PM  END TIME:  2:30 PM  FACILITATOR(S): Teodora Weiss LADC  TOPIC: BEH Group Therapy  Number of patients attending the group: 5  Group Length:  2 Hours    Group Therapy Type: Emotion processing    Summary of Group / Topics Discussed:    Sober coping skills and Relapse prevention      Group Attendance:  Attended group session    Patient's response to the group topic/interactions:  cooperative with task    Patient appeared to be Actively participating, Attentive and Engaged.        Client specific details: Winifred was an active participant in afternoon group therapy session and peer graduation ceremony.

## 2021-12-23 NOTE — GROUP NOTE
Group Therapy Documentation    PATIENT'S NAME: Winifred Bashir  MRN:   [id number]  :   1970  ACCT. NUMBER: [id number]  DATE OF SERVICE: 21  START TIME:  9:00 AM  END TIME: 11:00 AM  FACILITATOR(S): Virginia Vargas LADC  TOPIC: BEH Group Therapy  Number of patients attending the group:  6  Group Length:  2 Hours    Group Therapy Type: Recovery strategies    Summary of Group / Topics Discussed:    Recovery Principles and Disease of addiction      Group Attendance:  Attended group session    Patient's response to the group topic/interactions:  cooperative with task    Patient appeared to be Engaged.        Client specific details:  Winifred participated in an activity exploring the light and dark aspects of the recovery journey.

## 2021-12-23 NOTE — PROGRESS NOTES
Pt reports constant pain in L knee that is throbbing. Rates pain at 7/10. Pt reports she has gotten a steroid injection about 6 months ago to manage this, however does not recall which clinic she went to. Could not find information on this in Epic. Discussed options regarding reaching out to her PCP and getting a referral to pain Clinic at [address] [place] [address], or going to ED if pain severe and intolerable. Will pass this onto oncoming RN to continue to look into further. Pt verbalized understanding and aware of plan. Rachael Finney RN

## 2021-12-24 ENCOUNTER — HOSPITAL ENCOUNTER (OUTPATIENT)
Dept: BEHAVIORAL HEALTH | Facility: CLINIC | Age: 51
End: 2021-12-24
Attending: FAMILY MEDICINE
Payer: COMMERCIAL

## 2021-12-24 VITALS — TEMPERATURE: 97.8 F | OXYGEN SATURATION: 97 %

## 2021-12-24 PROCEDURE — 1002N00001 HC LODGING PLUS FACILITY CHARGE ADULT

## 2021-12-24 PROCEDURE — H2035 A/D TX PROGRAM, PER HOUR: HCPCS | Mod: HQ

## 2021-12-24 NOTE — GROUP NOTE
Group Therapy Documentation    PATIENT'S NAME: Winifred Bashir  MRN:   [id number]  :   1970  ACCT. NUMBER: [id number]  DATE OF SERVICE: 21  START TIME: 12:30 PM  END TIME:  2:30 PM  FACILITATOR(S): Gali Ng LADC; Mara Cortes; Carolina [facility]  TOPIC: BEH Group Therapy  Number of patients attending the group:  22  Group Length:  2 Hours    Group Therapy Type: Emotion processing and Daily living/independence skills    Summary of Group / Topics Discussed:    Relationship/socialization, Emotions/expression, and Self-care activities    Group Attendance:  Attended group session    Patient's response to the group topic/interactions:  cooperative with task    Patient appeared to be Attentive and Engaged.        Client specific details: Pt watched a film with peers and participated in a discussion identifying recovery themes therein.

## 2021-12-24 NOTE — PROGRESS NOTES
Writer assisted pt with setting up therapy and psychiatry services for after discharge through [facility]. Writer also alerted oncoming staff to observe if pt appeared hypomanic, over the weekend and to alert nursing. Pt has appeared in a significantly improved mood and often speaks rapidly, pt is also in the process of multiple med changes.

## 2021-12-24 NOTE — GROUP NOTE
Group Therapy Documentation    PATIENT'S NAME: Winifred Bashir  MRN:   [id number]  :   1970  ACCT. NUMBER: [id number]  DATE OF SERVICE: 21  START TIME:  9:00 AM  END TIME: 11:00 AM  FACILITATOR(S): Gali Ng LADC  TOPIC: BEH Group Therapy  Number of patients attending the group:  8  Group Length:  2 Hours    Group Therapy Type: Recovery strategies and Emotion processing    Summary of Group / Topics Discussed:    Recovery Principles and Relapse prevention    Group Attendance:  Attended group session    Patient's response to the group topic/interactions:  cooperative with task    Patient appeared to be Actively participating, Attentive and Engaged.        Client specific details: During check-in pt reported feeling numb, and shared that the birth of her daughter is a significant memory for her. She offered appropriate feedback to her peer and participated in a group discussion of strategies for managing anxiety and exploring peer support groups.

## 2021-12-25 ENCOUNTER — HOSPITAL ENCOUNTER (OUTPATIENT)
Dept: BEHAVIORAL HEALTH | Facility: CLINIC | Age: 51
End: 2021-12-25
Attending: FAMILY MEDICINE
Payer: COMMERCIAL

## 2021-12-25 VITALS — OXYGEN SATURATION: 98 % | TEMPERATURE: 97.5 F

## 2021-12-25 PROCEDURE — 1002N00001 HC LODGING PLUS FACILITY CHARGE ADULT

## 2021-12-25 PROCEDURE — H2035 A/D TX PROGRAM, PER HOUR: HCPCS | Mod: HQ

## 2021-12-25 NOTE — GROUP NOTE
Group Therapy Documentation    PATIENT'S NAME: Winifred Bashir  MRN:   [id number]  :   1970  ACCT. NUMBER: [id number]  DATE OF SERVICE: 21  START TIME:  9:00 AM  END TIME: 11:00 AM  FACILITATOR(S): Dale Woodward LADC; Ander Raya LADC  TOPIC: BEH Group Therapy  Number of patients attending the group:  4  Group Length:  2 Hours    Group Therapy Type: Recovery strategies    Summary of Group / Topics Discussed:    Recovery Principles, Trauma informed care, Disease of addiction, and Relapse prevention      Group Attendance:  Attended group session    Patient's response to the group topic/interactions:  cooperative with task    Patient appeared to be Attentive.        Client specific details:  Winifred participated in morning lecture.  The lecture was on the Brain, and how addiction can affect it.

## 2021-12-25 NOTE — GROUP NOTE
Group Therapy Documentation    PATIENT'S NAME: Winifred Bashir  MRN:   [id number]  :   1970  ACCT. NUMBER: [id number]  DATE OF SERVICE: 21  START TIME: 12:30 PM  END TIME:  2:30 PM  FACILITATOR(S): Dael Woodward LADC; Ruben Sauceda LADC  TOPIC: BEH Group Therapy  Number of patients attending the group:  4  Group Length:  2 Hours    Group Therapy Type: Recovery strategies    Summary of Group / Topics Discussed:    Recovery Principles and Relationship/socialization      Group Attendance:  Attended group session    Patient's response to the group topic/interactions:  cooperative with task    Patient appeared to be Attentive.        Client specific details:  Winifred attended the afternoon lecture which was on Relationships.

## 2021-12-26 ENCOUNTER — HOSPITAL ENCOUNTER (OUTPATIENT)
Dept: BEHAVIORAL HEALTH | Facility: CLINIC | Age: 51
End: 2021-12-26
Attending: FAMILY MEDICINE
Payer: COMMERCIAL

## 2021-12-26 VITALS — OXYGEN SATURATION: 98 % | TEMPERATURE: 98 F

## 2021-12-26 PROCEDURE — H2035 A/D TX PROGRAM, PER HOUR: HCPCS | Mod: HQ

## 2021-12-26 PROCEDURE — 1002N00001 HC LODGING PLUS FACILITY CHARGE ADULT

## 2021-12-26 NOTE — GROUP NOTE
Psychoeducation Group Documentation    PATIENT'S NAME: Winifred Bashir  MRN:   [id number]  :   1970  ACCT. NUMBER: [id number]  DATE OF SERVICE: 21  START TIME:  9:00 AM  END TIME: 11:00 AM  FACILITATOR(S): Dale Woodward LADC; Mirela Shaffer RN  TOPIC: BEH Pyschoeducation  Number of patients attending the group:  4  Group Length:  2 Hours    Skills Group Therapy Type: Healthy behaviors development    Summary of Group / Topics Discussed:    Balanced lifestyle skills          Group Attendance:  Attended group session    Patient's response to the group topic/interactions:  cooperative with task    Patient appeared to be Attentive.         Client specific details:  Winifred participated in morning lecture, which was on HIV/ AIDS.

## 2021-12-26 NOTE — GROUP NOTE
Group Therapy Documentation    PATIENT'S NAME: Winifred Bashir  MRN:   [id number]  :   1970  ACCT. NUMBER: [id number]  DATE OF SERVICE: 21  START TIME: 12:30 PM  END TIME:  1:30 PM  FACILITATOR(S): Dale Woodward LADC; Ruben Sauceda LADC  TOPIC: BEH Group Therapy  Number of patients attending the group:  4  Group Length:  1 Hours    Group Therapy Type: Recovery strategies and Emotion processing    Summary of Group / Topics Discussed:    Recovery Principles and Spiritual Care      Group Attendance:  Attended group session    Patient's response to the group topic/interactions:  cooperative with task    Patient appeared to be Attentive.        Client specific details:  Winifred participated in the afternoon lecture, which was on Spirituality.

## 2021-12-27 ENCOUNTER — HOSPITAL ENCOUNTER (OUTPATIENT)
Dept: BEHAVIORAL HEALTH | Facility: CLINIC | Age: 51
End: 2021-12-27
Attending: FAMILY MEDICINE
Payer: COMMERCIAL

## 2021-12-27 ENCOUNTER — HOSPITAL ENCOUNTER (EMERGENCY)
Facility: CLINIC | Age: 51
Discharge: HOME | End: 2021-12-27
Payer: COMMERCIAL

## 2021-12-27 VITALS — TEMPERATURE: 98 F | OXYGEN SATURATION: 99 %

## 2021-12-27 PROCEDURE — H2035 A/D TX PROGRAM, PER HOUR: HCPCS | Mod: HQ

## 2021-12-27 PROCEDURE — 1002N00001 HC LODGING PLUS FACILITY CHARGE ADULT

## 2021-12-27 NOTE — GROUP NOTE
Group Therapy Documentation    PATIENT'S NAME: Winifred Bashir  MRN:   [id number]  :   1970  ACCT. NUMBER: [id number]  DATE OF SERVICE: 21  START TIME: 12:30 PM  END TIME:  2:30 PM  FACILITATOR(S): Gali Ng LADC; Teodora Weiss LADC  TOPIC: BEH Group Therapy  Number of patients attending the group:  8  Group Length:  2 Hours    Group Therapy Type: Recovery strategies and Emotion processing    Summary of Group / Topics Discussed:    Sober coping skills, Emotions/expression, and strength identification    Group Attendance:  Attended group session    Patient's response to the group topic/interactions:  cooperative with task    Patient appeared to be Attentive and Engaged.        Client specific details: Pt reported feeling anxious and shared that gregariousness is an aspect of her womanhood that lends itself to recovery. She participated in a group discussion about boundaries and distorted thinking, and took part in a group exercise to answer questions about recovery/treatment that are hard to ask.

## 2021-12-27 NOTE — GROUP NOTE
Group Therapy Documentation    PATIENT'S NAME: Winifred Bashir  MRN:   [id number]  :   1970  ACCT. NUMBER: [id number]  DATE OF SERVICE: 21  START TIME:  9:00 AM  END TIME: 11:00 AM  FACILITATOR(S): Virginia Vargas LADC  TOPIC: BEH Group Therapy  Number of patients attending the group:  3  Group Length:  2 Hours    Group Therapy Type: Recovery strategies and Emotion processing    Summary of Group / Topics Discussed:    Recovery Principles, Disease of addiction, and Emotions/expression      Group Attendance:  Attended group session    Patient's response to the group topic/interactions:  cooperative with task    Patient appeared to be Actively participating.        Client specific details:  Winifred shared her First Step assignment and was receptive to group feedback. Winifred shared her gratitude list and identified current recovery assets for maintaining sobriety.

## 2021-12-27 NOTE — PROGRESS NOTES
Patient:  Winifred GIMENEZ [facility] Weekly Treatment Plan Review      ATTENDANCE for the following date span:  12/23/21-12/27/21     Day Monday Tuesday Wednesday Thursday Friday Saturday Sunday   Group Hours   4 4 4 4 4 4 2   Skills Hours    1 1 1   1   Individual Session (LADC)            Individual Session  (psychotherapy)            Peer-led Recovery Group   1 1 1 1 1 1 1               Adult CD Progress Note and Treatment Plan Review     Attendance  Please refer to OP BEH CD Adult Attendance Record Documentation Flowsheet    Support group attended this week: yes    Reporting sobriety:  yes    Treatment Plan     Treatment Plan Review competed on: 12/27/21     Client preferred learning style: Visual  Hands on  Verbal  Demonstration    Staff Members contributing STEVEN Vann; STEVEN Martinez                         Received Supervision: YES    Client: contributed to goals and plan: Yes    Client received copy of plan/revised plan: Yes    Client agrees with plan/revised plan: Yes    Changes to Treatment Plan: No    New Goals added since last review No additional goals added at this time    Goals worked on since last review: Spirituality group, relationship workshop, coping skills application, first step assignment, gratitude list, pros and cons of use, and maintaining stabilization.    Strategies effective: yes    Strategies need these changes: No changes needed at this time    1) Care Coordination Activities:  [facility]   2) Medical, Mental Health and other appointments the client attended: Psychiatry appointment with Dr. Sommer on 12/21/21  3) Medication issues: Medication changes per Dr. Sommer appointment on 12/21/21. See MAR  4) Physical and mental health problems: See Dimension 2 and 3  5) Any changes in Vulnerable Adult Status?  No If yes, add to treatment plan and individual abuse prevention plan.  6) Review and evaluation of the individual abuse  "prevention plan: Current IAPP for this program is adequate for this client    ASAM Risk Rating:  Dimension 1, 0: Patient reports their last date of use as 11/29/21. Patient reports current post-acute withdrawal symptoms including anxiety, depression, irritability. Patient denies any withdrawal symptoms that would interfere with full participation in treatment programming at this time. Patient started naltrexone as prescribed by Dr. Sommer this week for urges/cravings management.   Dimension 2, 1: Patient was seen by [facility] staff RN for left knee pain on 12/23/21. Patient is able to seek medical services as needed independently will continue to be monitored throughout treatment.   Patient attended staff RN lecture on Self-Care in recovery.  Dimension 3, 2: Patient reported increased irritability this week and feeling sad about missing her children on Christmas.  Patient stated her mood improved on Valatie Day after engaging with her treatment peers. Patient was seen by psychiatry on 12/21/21 and  began a new psychotropic  medication regime to assist with MH symptoms.  Patient was encouraged to practice \"exchange vocabulary\" to decrease negative self-talk and increase feelings of empowerment in recovery.  Patient denies any suicidal thoughts or ideations at this time. Patient will continue to be monitored throughout treatment.   Dimension 4, 1: Patient appears engaged in the treatment process and is practicing  vulnerability during group processing. Patient is honest about her \"addict\" voice and \"stinking thinking\" patterns. Patient appears to be gaining internal motivation for change as evidenced by her willingness to implement new behavioral changes while in treatment.    Dimension 5, 4: Patient identified substance use triggers this week include interpersonal relationship challenges. Patient continues to learn and practice coping skills and reports that deep breathing and meditation have been " helpful.  Dimension 6, 3: Patient is attending sober support meetings and building relationships with peers. Patient has a [place] sponsor and is looking to establish 12-step sponsorship through [facility]. Patient's aftercare plan at this time includes: individual therapy, sober support meetings, and outpatient programming.    Guide to Risk Ratings for Suicidality:   IDEATION: Active thoughts of suicide? INTENT: Intent to follow on suicide? PLAN: Plan to follow through on suicide? Level of Risk:   IF Yes Yes Yes Patient = High Emergent   IF Yes Yes No Patient = High Urgent/Non-Emergent   IF Yes No No Patient = Moderate Non-Urgent   IF No No   No Patient = Low Risk   The patient's ADDITIONAL RISK FACTORS and lack of PROTECTIVE FACTORS may increase their overall suicide risk ratings.     Patient's/client's current risk rating:  Low Risk    Family Involvement:   none schedule this week    Data:   offered feedback good insight client did participate    Intervention:   Behavior modification  Cognitive Behavioral Therapy  Counselor feedback  Education  Emotional management  Group feedback  Motivational Enhancement Therapy  Relapse prevention  Twelve Step facilitation  Mental health education    Assessment:   Stages of Change Model  Contemplation    Appears/Sounds:  Cooperative  Motivated  Engaged   Anhedonia     Plan:  Focus on recovery environment  Monitor emotional/physical health  Continue working through treatment plan goals.     STEVEN Vann

## 2021-12-27 NOTE — PROGRESS NOTES
Pt came to writer to report pain and blood coming from her vagina. Pt is post menopausal. Pt said she has been experiencing a dull ache on her lower abdomen for the last month. Pt will be escorted to ED for evaluation. Charge RN in ED will receive report.

## 2021-12-28 ENCOUNTER — HOSPITAL ENCOUNTER (OUTPATIENT)
Dept: BEHAVIORAL HEALTH | Facility: CLINIC | Age: 51
End: 2021-12-28
Attending: FAMILY MEDICINE
Payer: COMMERCIAL

## 2021-12-28 VITALS — TEMPERATURE: 97.4 F | OXYGEN SATURATION: 98 %

## 2021-12-28 PROCEDURE — H2035 A/D TX PROGRAM, PER HOUR: HCPCS | Mod: HQ

## 2021-12-28 PROCEDURE — 1002N00001 HC LODGING PLUS FACILITY CHARGE ADULT

## 2021-12-28 NOTE — GROUP NOTE
Group Therapy Documentation    PATIENT'S NAME: Winifred Bashir  MRN:   [id number]  :   1970  ACCT. NUMBER: [id number]  DATE OF SERVICE: 21  START TIME:  3:00 PM  END TIME:  4:00 PM  FACILITATOR(S): Mara Cortes  TOPIC: BEH Group Therapy  Number of patients attending the group:      Group Length:  1 Hour    Group Therapy Type: Recovery strategies, Daily living/independence skills, and Health and wellbeing     Summary of Group / Topics Discussed:    Recovery Principles, Sober coping skills, Relationship/socialization, and Relapse prevention      Group Attendance:  Attended group session    Patient's response to the group topic/interactions:  cooperative with task    Patient appeared to be Actively participating, Attentive and Engaged.        Client specific details:  Patient was attentive and participative during lecture.

## 2021-12-28 NOTE — GROUP NOTE
Group Therapy Documentation    PATIENT'S NAME: Winifred Bashir  MRN:   [id number]  :   1970  ACCT. NUMBER: [id number]  DATE OF SERVICE: 21  START TIME:  9:00 AM  END TIME: 11:00 AM  FACILITATOR(S): Dale Woodward LADC  TOPIC: BEH Group Therapy  Number of patients attending the group:  3  Group Length:  2 Hours    Group Therapy Type: Recovery strategies, Emotion processing, Daily living/independence skills, and Health and wellbeing     Summary of Group / Topics Discussed:    Recovery Principles, Spiritual Care, Sober coping skills, Relationship/socialization, Balanced lifestyle, Trauma informed care, Disease of addiction, Emotions/expression, Relapse prevention, and Self-care activities      Group Attendance:  Attended group session    Patient's response to the group topic/interactions:  cooperative with task    Patient appeared to be Attentive and Engaged.        Client specific details:  Winifred participated in morning group. 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  She identified goals of getting back to work, improving her credit score, and getting a better relationship with her children.  She then listened to her peers present their assignments and gave feedback.

## 2021-12-28 NOTE — GROUP NOTE
Group Therapy Documentation    PATIENT'S NAME: Winifred Bashir  MRN:   [id number]  :   1970  ACCT. NUMBER: [id number]  DATE OF SERVICE: 21  START TIME: 12:30 PM  END TIME:  2:30 PM  FACILITATOR(S): Virginia Vargas LADC; Carolina Martin LADC  TOPIC: BEH Group Therapy  Number of patients attending the group: 8  Group Length:  2 Hours    Group Therapy Type: Recovery strategies    Summary of Group / Topics Discussed:    Relationship/socialization, Disease of addiction, and Emotions/expression      Group Attendance:  Attended group session    Patient's response to the group topic/interactions:  cooperative with task    Patient appeared to be Actively participating.        Client specific details:  Winifred shared two assignments during group therapy. She identified how substance use exacerbates her mental health symptoms and the need to prioritize her emotional/mental health.

## 2021-12-29 ENCOUNTER — HOSPITAL ENCOUNTER (EMERGENCY)
Facility: CLINIC | Age: 51
Discharge: PSYCHIATRIC OR SUBSTANCE USE FACILITY | End: 2021-12-29
Attending: FAMILY MEDICINE | Admitting: FAMILY MEDICINE
Payer: COMMERCIAL

## 2021-12-29 ENCOUNTER — HOSPITAL ENCOUNTER (OUTPATIENT)
Dept: BEHAVIORAL HEALTH | Facility: CLINIC | Age: 51
End: 2021-12-29
Attending: FAMILY MEDICINE
Payer: COMMERCIAL

## 2021-12-29 VITALS
WEIGHT: 180 LBS | OXYGEN SATURATION: 98 % | TEMPERATURE: 98.5 F | HEIGHT: 67 IN | BODY MASS INDEX: 28.25 KG/M2 | RESPIRATION RATE: 18 BRPM | DIASTOLIC BLOOD PRESSURE: 66 MMHG | HEART RATE: 71 BPM | SYSTOLIC BLOOD PRESSURE: 130 MMHG

## 2021-12-29 VITALS
RESPIRATION RATE: 16 BRPM | DIASTOLIC BLOOD PRESSURE: 73 MMHG | HEART RATE: 78 BPM | TEMPERATURE: 98.5 F | OXYGEN SATURATION: 98 % | SYSTOLIC BLOOD PRESSURE: 119 MMHG

## 2021-12-29 DIAGNOSIS — R20.2 RIGHT HAND PARESTHESIA: ICD-10-CM

## 2021-12-29 DIAGNOSIS — R31.9 URINARY TRACT INFECTION WITH HEMATURIA, SITE UNSPECIFIED: ICD-10-CM

## 2021-12-29 DIAGNOSIS — N39.0 URINARY TRACT INFECTION WITH HEMATURIA, SITE UNSPECIFIED: ICD-10-CM

## 2021-12-29 LAB
ALBUMIN SERPL-MCNC: 3.6 G/DL (ref 3.4–5)
ALBUMIN UR-MCNC: NEGATIVE MG/DL
ALP SERPL-CCNC: 79 U/L (ref 40–150)
ALT SERPL W P-5'-P-CCNC: 64 U/L (ref 0–50)
ANION GAP SERPL CALCULATED.3IONS-SCNC: 5 MMOL/L (ref 3–14)
APPEARANCE UR: CLEAR
APTT PPP: 28 SECONDS (ref 22–38)
AST SERPL W P-5'-P-CCNC: 27 U/L (ref 0–45)
BACTERIA #/AREA URNS HPF: ABNORMAL /HPF
BASOPHILS # BLD AUTO: 0 10E3/UL (ref 0–0.2)
BASOPHILS NFR BLD AUTO: 0 %
BILIRUB SERPL-MCNC: 0.3 MG/DL (ref 0.2–1.3)
BILIRUB UR QL STRIP: NEGATIVE
BUN SERPL-MCNC: 15 MG/DL (ref 7–30)
CALCIUM SERPL-MCNC: 9.3 MG/DL (ref 8.5–10.1)
CHLORIDE BLD-SCNC: 109 MMOL/L (ref 94–109)
CO2 SERPL-SCNC: 29 MMOL/L (ref 20–32)
COLOR UR AUTO: YELLOW
CREAT SERPL-MCNC: 1.01 MG/DL (ref 0.52–1.04)
EOSINOPHIL # BLD AUTO: 0.1 10E3/UL (ref 0–0.7)
EOSINOPHIL NFR BLD AUTO: 1 %
ERYTHROCYTE [DISTWIDTH] IN BLOOD BY AUTOMATED COUNT: 13.3 % (ref 10–15)
GFR SERPL CREATININE-BSD FRML MDRD: 67 ML/MIN/1.73M2
GLUCOSE BLD-MCNC: 83 MG/DL (ref 70–99)
GLUCOSE UR STRIP-MCNC: NEGATIVE MG/DL
HCT VFR BLD AUTO: 39.7 % (ref 35–47)
HGB BLD-MCNC: 12.9 G/DL (ref 11.7–15.7)
HGB UR QL STRIP: NEGATIVE
IMM GRANULOCYTES # BLD: 0 10E3/UL
IMM GRANULOCYTES NFR BLD: 0 %
INR PPP: 1 (ref 0.86–1.14)
KETONES UR STRIP-MCNC: NEGATIVE MG/DL
LEUKOCYTE ESTERASE UR QL STRIP: ABNORMAL
LYMPHOCYTES # BLD AUTO: 1.8 10E3/UL (ref 0.8–5.3)
LYMPHOCYTES NFR BLD AUTO: 31 %
MCH RBC QN AUTO: 27.5 PG (ref 26.5–33)
MCHC RBC AUTO-ENTMCNC: 32.5 G/DL (ref 31.5–36.5)
MCV RBC AUTO: 85 FL (ref 78–100)
MONOCYTES # BLD AUTO: 0.5 10E3/UL (ref 0–1.3)
MONOCYTES NFR BLD AUTO: 9 %
MUCOUS THREADS #/AREA URNS LPF: PRESENT /LPF
NEUTROPHILS # BLD AUTO: 3.4 10E3/UL (ref 1.6–8.3)
NEUTROPHILS NFR BLD AUTO: 59 %
NITRATE UR QL: NEGATIVE
NRBC # BLD AUTO: 0 10E3/UL
NRBC BLD AUTO-RTO: 0 /100
PH UR STRIP: 6.5 [PH] (ref 5–7)
PLATELET # BLD AUTO: 372 10E3/UL (ref 150–450)
POTASSIUM BLD-SCNC: 3.7 MMOL/L (ref 3.4–5.3)
PROT SERPL-MCNC: 7.5 G/DL (ref 6.8–8.8)
RBC # BLD AUTO: 4.69 10E6/UL (ref 3.8–5.2)
RBC URINE: 2 /HPF
SODIUM SERPL-SCNC: 143 MMOL/L (ref 133–144)
SP GR UR STRIP: 1.01 (ref 1–1.03)
SQUAMOUS EPITHELIAL: 3 /HPF
UROBILINOGEN UR STRIP-MCNC: NORMAL MG/DL
WBC # BLD AUTO: 5.9 10E3/UL (ref 4–11)
WBC URINE: 6 /HPF

## 2021-12-29 PROCEDURE — 29125 APPL SHORT ARM SPLINT STATIC: CPT | Performed by: FAMILY MEDICINE

## 2021-12-29 PROCEDURE — 85025 COMPLETE CBC W/AUTO DIFF WBC: CPT | Performed by: FAMILY MEDICINE

## 2021-12-29 PROCEDURE — 85610 PROTHROMBIN TIME: CPT | Performed by: FAMILY MEDICINE

## 2021-12-29 PROCEDURE — 99284 EMERGENCY DEPT VISIT MOD MDM: CPT | Performed by: FAMILY MEDICINE

## 2021-12-29 PROCEDURE — 82040 ASSAY OF SERUM ALBUMIN: CPT | Performed by: FAMILY MEDICINE

## 2021-12-29 PROCEDURE — 81001 URINALYSIS AUTO W/SCOPE: CPT | Performed by: FAMILY MEDICINE

## 2021-12-29 PROCEDURE — 85730 THROMBOPLASTIN TIME PARTIAL: CPT | Performed by: FAMILY MEDICINE

## 2021-12-29 PROCEDURE — 1002N00001 HC LODGING PLUS FACILITY CHARGE ADULT

## 2021-12-29 PROCEDURE — H2035 A/D TX PROGRAM, PER HOUR: HCPCS

## 2021-12-29 PROCEDURE — 80053 COMPREHEN METABOLIC PANEL: CPT | Performed by: FAMILY MEDICINE

## 2021-12-29 PROCEDURE — H2035 A/D TX PROGRAM, PER HOUR: HCPCS | Mod: HQ

## 2021-12-29 PROCEDURE — 36415 COLL VENOUS BLD VENIPUNCTURE: CPT | Performed by: FAMILY MEDICINE

## 2021-12-29 RX ORDER — CEFDINIR 300 MG/1
300 CAPSULE ORAL 2 TIMES DAILY
Qty: 10 CAPSULE | Refills: 0 | Status: SHIPPED | OUTPATIENT
Start: 2021-12-29 | End: 2022-01-03

## 2021-12-29 ASSESSMENT — MIFFLIN-ST. JEOR: SCORE: 1464.1

## 2021-12-29 NOTE — PROGRESS NOTES
Pt came back from ED. Per AVS pt has been prescribed an antibiotic for UTI. Will facilitate this med coming from pharmacy. Rachael Finney RN

## 2021-12-29 NOTE — PROGRESS NOTES
Pt approached RN and requesting to go to ED. Checked VS: /73 (BP Location: Left arm)   Pulse 78   Temp 97.3  F (36.3  C) (Temporal)   Resp 16   LMP  (LMP Unknown)   SpO2 98%  Pt reports white streaks in urine that dissipate, pain in right pinky finger, and right hand pain with burning for two days. Pt also reports abnormal vaginal bleeding, a tear in vaginal area, and vaginal pain. Pt is unable to see her outpatient provider and reports these sxs are intolerable and worsening. Discussed with pt that current option for evaluation and treatment would be at the ED and plan will be for her to go there. LP RN called ED to notify them regarding this pt. Rachael Finney RN

## 2021-12-29 NOTE — GROUP NOTE
Group Therapy Documentation    PATIENT'S NAME: Winifred Bashir  MRN:   [id number]  :   1970  ACCT. NUMBER: [id number]  DATE OF SERVICE: 21  START TIME: 12:30 PM  END TIME:  2:30 PM  FACILITATOR(S): Mara Cortes  TOPIC: BEH Group Therapy  Number of patients attending the group:  3    Group Length:  2 Hours    Group Therapy Type: Recovery strategies, Emotion processing, Daily living/independence skills, and Health and wellbeing     Summary of Group / Topics Discussed:    Recovery Principles, Sober coping skills, Relationship/socialization, Balanced lifestyle, and Relapse prevention      Group Attendance:  Attended group session    Patient's response to the group topic/interactions:  cooperative with task    Patient appeared to be Actively participating, Attentive and Engaged.        Client specific details:  Patient was attentive and participative during group session.

## 2021-12-29 NOTE — PROGRESS NOTES
"INDIVIDUAL THERAPY SESSION  D) Met with patient on 12/29/21 from 1:40pm to 2:40pm. Patient reported she was seen in the emergency department earlier today and was feeling relieved to have resolved recent medical symptoms. Patient presented to therapy wearing a wrist splint that was ordered by ED physician for paresthesia. Per ED note, Patient reported her mood feels stable and she's adjusting to her new medication regime.Patient reported overall improved and consistent sleep this past week. Patient discussed having experienced \"using dreams\" over the past few nights. She denies any cravings or urges to engage in addictive behavior at this time. Patient inquired about resources for family therapy and discussed relationship challenges with her mom and ex-. Patient spoke about [facility] and wishes to look into family resources through [facility].   I) Patient self-administered the DSM-5 Level 1 Cross-Cutting Adult Symptom Measure to review with therapist. Encouraged continued skills building by recognizing shame-based cognitions, thought stopping/re-frame, and practicing acts of self-kindness; provided a book on gambling disorder; and tasked patient with contacting three people within her recovery network to re-establish sober connections.  A) Patient's cross-cutting symptom measurement score showed the following domains were positive for mild to moderate psychological symptoms: depression, anxiety, and sleep problems. This appears consistent with the emotional distress symptoms she's reported over the past two weeks at [facility]. At this time, patient verbalizes the desire for sobriety and appears future oriented. Patient seems to be doing well in a structured environment and is focused on her healing process. Patient appears to be reconsidering her values, and recognizes which behaviors are beneficial to achieving her goals. Patient is coping with anxiety symptoms " by utilizing deep breathing and maintaining compliance with prescribed medication. Patient's difficulty with managing anger, stress, and somatic symptoms are likely related to her experience with disrupted attachment to caregivers during childhood/adolescence.  P) Next session scheduled for the week of 01/03/2022. 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

## 2021-12-29 NOTE — GROUP NOTE
Group Therapy Documentation    PATIENT'S NAME: Winifred Bashir  MRN:   [id number]  :   1970  ACCT. NUMBER: [id number]  DATE OF SERVICE: 21  START TIME:  8:30 AM  END TIME:  9:30 AM  FACILITATOR(S): Nathen Reynoso LADC  TOPIC: BEH Group Therapy  Number of patients attending the group:  19  Group Length:  2 Hours    Group Therapy Type: Recovery strategies    Summary of Group / Topics Discussed:    Recovery Principles      Group Attendance:  Attended group session    Patient's response to the group topic/interactions:  cooperative with task    Patient appeared to be Attentive.        Client specific details:  Winifred attended AM lecture and took part in a discussion on mental health, addiction, and effective ways to treat them.

## 2021-12-29 NOTE — ED TRIAGE NOTES
Onset of abnormal vaginal bleeding for the last 3 days post menopause and no menstrual for 12 months until now. Reports she hasn't had sex for 2 years.     Pt coming from lodging [facility] for ETOH use and concerned she blacked out or something and did something and thinks it is torn. Pt also reports dull ache in her lower abd for the past month. Repots when she voided it was all white and at the end of stream it appeared like glue in color but not consistency in the water and it dissipated in the toilet and it was all white in the toilet. Pt reports it burns the vulva with urination and when she wipes her vulva there is blood on the tissue reports her labia also hurt.    Pt also c/o with R hand pain and burning and the R 5th finger. She noted she had purple red color to her fingers yesterday and pruning to her fingers randomly and even after putting on gloves and noted pain to her fingers with touch.

## 2021-12-29 NOTE — GROUP NOTE
Group Therapy Documentation    PATIENT'S NAME: Winifred Bashir  MRN:   [id number]  :   1970  ACCT. NUMBER: [id number]  DATE OF SERVICE: 21  START TIME: 10:00 AM  END TIME: 11:30 AM  FACILITATOR(S): Virginia Vargas LADC  TOPIC: BEH Group Therapy  Number of patients attending the group:  7  Group Length:  2 Hours    Group Therapy Type: Recovery strategies    Summary of Group / Topics Discussed:    Relationship/socialization and Emotions/expression      Group Attendance:  Excused from group session    Patient's response to the group topic/interactions:  Patient was in the ED during group time.Did not attend.

## 2021-12-29 NOTE — ED PROVIDER NOTES
[facility] [place] ([facility]    12/29/21    ED07      History     Chief Complaint   Patient presents with     Vaginal Problem     Hand Pain     Abdominal Pain     The history is provided by the patient and medical records.     Winifred Bashir is a 51 year old female who has a PMH of polysubstance abuse, bipolar disorder, KELLEN, and PTSD who presents from lodging for alcohol use with vaginal bleeding for 3 days.  Patient is postmenopausal and has not had a menstrual period for over 12 months.  Patient also reports she has not had sex for over 2 years since  from her .  She is concerned that she may have blacked out and thinks that she may have torn something that is causing her vaginal bleeding.  She states that when she voided this morning it was white and the end of her stream appeared like glue in color and consistency but dissipated in the toilet.  The toilet was also white. However, when she urinated for the UA her urine was yellow. She endorses burning of the vulva with urination as well as blood when she wipes.  She states that the burning does not start immediately, but starts when her urine touches the area between her urethra and vagina.  She states that the blood is not coming out of her vagina but somewhere between her urethra and vagina.  She also endorses pain of the labia.  The patient also complains of a dull ache in the center of her lower abdomen for the past few months.  She also complains of right hand and R fifth finger pain and burning.  She states that when she wakes up her right hand is numb.  She states that yesterday she noticed her fingers were purple/red in color and were pruning even after putting on gloves.  She denies fever or chills.  She denies riding an exercise bike or trauma to the area.  She denies new neck pain.  She denies sleeping on her right hand.     Past Medical History  Past Medical History:   Diagnosis Date     Bipolar disorder,  unspecified (H)      Gambling disorder, persistent, severe 2020     Generalized anxiety disorder 2020     Methamphetamine use disorder, severe (H) 2020     Mild intermittent asthma      Moderate alcohol use disorder (H) 2020     Osteoarthritis      Posttraumatic stress disorder      Past Surgical History:   Procedure Laterality Date     C/SECTION, LOW TRANSVERSE  2007    , Low Transverse     TUBAL LIGATION  2011     cefdinir (OMNICEF) 300 MG capsule  acetaminophen (TYLENOL) 325 MG tablet  albuterol (PROAIR HFA/PROVENTIL HFA/VENTOLIN HFA) 108 (90 Base) MCG/ACT inhaler  alum & mag hydroxide-simethicone (MAALOX) 200-200-20 MG/5ML SUSP suspension  buPROPion (WELLBUTRIN XL) 150 MG 24 hr tablet  buPROPion (WELLBUTRIN XL) 300 MG 24 hr tablet  citalopram (CELEXA) 10 MG tablet  citalopram (CELEXA) 20 MG tablet  fluticasone-vilanterol (BREO ELLIPTA) 100-25 MCG/INH inhaler  folic acid (FOLVITE) 1 MG tablet  gabapentin (NEURONTIN) 100 MG capsule  guaiFENesin (ROBITUSSIN) 20 mg/mL SOLN solution  hydrochlorothiazide (HYDRODIURIL) 25 MG tablet  hydrOXYzine (ATARAX) 25 MG tablet  ibuprofen (ADVIL/MOTRIN) 200 MG tablet  loratadine (CLARITIN) 10 MG tablet  melatonin 3 MG tablet  multivitamin w/minerals (THERA-VIT-M) tablet  naltrexone (DEPADE/REVIA) 50 MG tablet  naltrexone (VIVITROL) 380 MG SUSR  nicotine (NICORETTE) 4 MG lozenge  phenol-menthol (CEPASTAT) 14.5 MG lozenge  senna-docusate (SENOKOT-S/PERICOLACE) 8.6-50 MG tablet  thiamine (B-1) 100 MG tablet  topiramate (TOPAMAX) 25 MG tablet  traZODone (DESYREL) 50 MG tablet  vitamin D3 (CHOLECALCIFEROL) 125 MCG (5000 UT) tablet      Allergies   Allergen Reactions     Fish-Derived Products Shortness Of Breath and Nausea and Vomiting     Nkda [No Known Drug Allergies]      Family History  Family History   Problem Relation Age of Onset     Hypertension Mother      Post-Traumatic Stress Disorder (PTSD) Mother      Substance Abuse Mother        "  polysubstances- opiates, alcohol. Currently sober.     Substance Abuse Father         Murdered when pt was 4.     Hypertension Maternal Grandmother      Cerebrovascular Disease Maternal Grandmother      Alcoholism Maternal Grandmother      Depression Maternal Grandmother      No Known Problems Maternal Grandfather      No Known Problems Paternal Grandmother      No Known Problems Paternal Grandfather      Depression Daughter      Anxiety Disorder Daughter      Diabetes Maternal Aunt      Alcoholism Maternal Aunt          of complications related to alcoholism     Musculoskeletal Disorder Maternal Aunt         Lupus     Alcoholism Maternal Aunt          of complications related to alcoholism     Alcoholism Maternal Uncle          of complications related to alcoholism     No Known Problems Maternal Great-Grandmother      Asthma No family hx of      C.A.D. No family hx of      Breast Cancer No family hx of      Cancer - colorectal No family hx of      Social History   Social History     Tobacco Use     Smoking status: Former Smoker     Types: Cigarettes, Cigarettes     Smokeless tobacco: Never Used     Tobacco comment: quit    Substance Use Topics     Alcohol use: Not Currently     Comment: Alcoholic Drinks/day: sober since 19     Drug use: Not Currently     Types: Cocaine, Marijuana, Methamphetamines, \"Crack\" cocaine      Past medical history, past surgical history, medications, allergies, family history, and social history were reviewed with the patient. No additional pertinent items.       Review of Systems    ROS: 14 point ROS neg other than the symptoms noted above in the HPI.    Physical Exam   BP: 130/66  Pulse: 71  Temp: 98.5  F (36.9  C)  Resp: 18  Height: 170.2 cm (5' 7\")  Weight: 81.6 kg (180 lb)  SpO2: 98 %  Physical Exam  Vitals and nursing note reviewed. Exam conducted with a chaperone present.   Constitutional:       General: She is not in acute distress.     Appearance: She is " not diaphoretic.   HENT:      Head: Atraumatic.      Mouth/Throat:      Pharynx: No oropharyngeal exudate.   Eyes:      General: No scleral icterus.     Pupils: Pupils are equal, round, and reactive to light.   Cardiovascular:      Heart sounds: Normal heart sounds.   Pulmonary:      Effort: No respiratory distress.      Breath sounds: Normal breath sounds.   Abdominal:      General: Bowel sounds are normal.      Palpations: Abdomen is soft.      Tenderness: There is no abdominal tenderness.   Genitourinary:     General: Normal vulva.      Exam position: Supine.      Vagina: Normal.      Cervix: Normal.      Uterus: Normal.       Adnexa: Right adnexa normal.      Comments: No bleeding, open lesion or other abnormality of external genitalia or perineum, or rectal area  Musculoskeletal:         General: No tenderness.   Skin:     General: Skin is warm.      Findings: No rash.   Neurological:      General: No focal deficit present.      Mental Status: She is alert and oriented to person, place, and time.      Cranial Nerves: Cranial nerves are intact.      Sensory: Sensation is intact.      Motor: Motor function is intact.      Deep Tendon Reflexes:      Reflex Scores:       Tricep reflexes are 2+ on the right side.       Bicep reflexes are 2+ on the right side.       Brachioradialis reflexes are 2+ on the right side.        ED Course     10:05 AM  The patient was seen and examined by Hugh Curry MD in Room ED07.     Procedures       The medical record was reviewed and interpreted.  Current labs reviewed and interpreted.  Previous labs reviewed and interpreted.  Managed outpatient prescription medications.              Results for orders placed or performed during the hospital encounter of 12/29/21   UA with Microscopic reflex to Culture     Status: Abnormal    Specimen: Urine, Clean Catch   Result Value Ref Range    Color Urine Yellow Colorless, Straw, Light Yellow, Yellow    Appearance Urine Clear Clear    Glucose  Urine Negative Negative mg/dL    Bilirubin Urine Negative Negative    Ketones Urine Negative Negative mg/dL    Specific Gravity Urine 1.014 1.003 - 1.035    Blood Urine Negative Negative    pH Urine 6.5 5.0 - 7.0    Protein Albumin Urine Negative Negative mg/dL    Urobilinogen Urine Normal Normal, 2.0 mg/dL    Nitrite Urine Negative Negative    Leukocyte Esterase Urine Small (A) Negative    Bacteria Urine Few (A) None Seen /HPF    Mucus Urine Present (A) None Seen /LPF    RBC Urine 2 <=2 /HPF    WBC Urine 6 (H) <=5 /HPF    Squamous Epithelials Urine 3 (H) <=1 /HPF    Narrative    Urine Culture not indicated   Comprehensive metabolic panel     Status: Abnormal   Result Value Ref Range    Sodium 143 133 - 144 mmol/L    Potassium 3.7 3.4 - 5.3 mmol/L    Chloride 109 94 - 109 mmol/L    Carbon Dioxide (CO2) 29 20 - 32 mmol/L    Anion Gap 5 3 - 14 mmol/L    Urea Nitrogen 15 7 - 30 mg/dL    Creatinine 1.01 0.52 - 1.04 mg/dL    Calcium 9.3 8.5 - 10.1 mg/dL    Glucose 83 70 - 99 mg/dL    Alkaline Phosphatase 79 40 - 150 U/L    AST 27 0 - 45 U/L    ALT 64 (H) 0 - 50 U/L    Protein Total 7.5 6.8 - 8.8 g/dL    Albumin 3.6 3.4 - 5.0 g/dL    Bilirubin Total 0.3 0.2 - 1.3 mg/dL    GFR Estimate 67 >60 mL/min/1.73m2   INR     Status: Normal   Result Value Ref Range    INR 1.00 0.86 - 1.14   Partial thromboplastin time     Status: Normal   Result Value Ref Range    aPTT 28 22 - 38 Seconds   CBC with platelets and differential     Status: None   Result Value Ref Range    WBC Count 5.9 4.0 - 11.0 10e3/uL    RBC Count 4.69 3.80 - 5.20 10e6/uL    Hemoglobin 12.9 11.7 - 15.7 g/dL    Hematocrit 39.7 35.0 - 47.0 %    MCV 85 78 - 100 fL    MCH 27.5 26.5 - 33.0 pg    MCHC 32.5 31.5 - 36.5 g/dL    RDW 13.3 10.0 - 15.0 %    Platelet Count 372 150 - 450 10e3/uL    % Neutrophils 59 %    % Lymphocytes 31 %    % Monocytes 9 %    % Eosinophils 1 %    % Basophils 0 %    % Immature Granulocytes 0 %    NRBCs per 100 WBC 0 <1 /100    Absolute  Neutrophils 3.4 1.6 - 8.3 10e3/uL    Absolute Lymphocytes 1.8 0.8 - 5.3 10e3/uL    Absolute Monocytes 0.5 0.0 - 1.3 10e3/uL    Absolute Eosinophils 0.1 0.0 - 0.7 10e3/uL    Absolute Basophils 0.0 0.0 - 0.2 10e3/uL    Absolute Immature Granulocytes 0.0 <=0.4 10e3/uL    Absolute NRBCs 0.0 10e3/uL   CBC with platelets differential     Status: None    Narrative    The following orders were created for panel order CBC with platelets differential.  Procedure                               Abnormality         Status                     ---------                               -----------         ------                     CBC with platelets and d...[[id number]]                      Final result                 Please view results for these tests on the individual orders.     Medications - No data to display     Assessments & Plan (with Medical Decision Making)   A 51-year-old woman who presents with bleeding, uncertain if bleeding is hematuria or from the vagina.  On exam there is no evidence of vaginal bleeding nor any bleeding lesions on external genitalia, she is hemodynamically stable, her labs are unremarkable.  Urinalysis equivocal for UTI, however based on her symptoms I think treatment for UTI is indicated.  We will place her on cefdinir.  I have encouraged her to follow-up with gynecology and if her symptoms do not resolve to seek further evaluation either in the clinic or the emergency department.  She does not appear systemically ill.  Patient has a secondary complaint of paresthesias in the fingers of the right hand in the median nerve distribution.  Neurovascular examination of her extremity is normal, and her symptoms seem worse at night.  I will provide a Velcro wrist splint, and again encourage outpatient follow-up if her symptoms do not resolve.  She appears stable for discharge and outpatient level of care.  Discussed indications for return.    I have reviewed the nursing notes. I have reviewed the findings,  diagnosis, plan and need for follow up with the patient.    New Prescriptions    CEFDINIR (OMNICEF) 300 MG CAPSULE    Take 1 capsule (300 mg) by mouth 2 times daily for 5 days       Final diagnoses:   Urinary tract infection with hematuria, site unspecified   Right hand paresthesia     Yvonne GORDON, am serving as a trained medical scribe to document services personally performed by Hugh Curry MD based on the provider's statements to me on December 29, 2021.  This document has been checked and approved by the attending provider.    IHugh MD, was physically present and have reviewed and verified the accuracy of this note documented by Yvonne Robles, medical scribe.      Hugh Curry MD        --  Hugh Curry MD  [facility] EMERGENCY DEPARTMENT  12/29/2021     Hugh Curry MD  12/29/21 1133

## 2021-12-29 NOTE — DISCHARGE INSTRUCTIONS
Please make an appointment to follow up with Your Primary Care Provider and OB/Gyn - [facility] (phone: [phone]) as soon as possible.  Wear Velcro wrist splint at night, may use ibuprofen if needed.  Start antibiotics for potential UTI.

## 2021-12-30 ENCOUNTER — HOSPITAL ENCOUNTER (OUTPATIENT)
Dept: BEHAVIORAL HEALTH | Facility: CLINIC | Age: 51
End: 2021-12-30
Attending: FAMILY MEDICINE
Payer: COMMERCIAL

## 2021-12-30 VITALS — TEMPERATURE: 98 F | OXYGEN SATURATION: 100 %

## 2021-12-30 PROCEDURE — H2035 A/D TX PROGRAM, PER HOUR: HCPCS | Mod: HQ

## 2021-12-30 PROCEDURE — 1002N00001 HC LODGING PLUS FACILITY CHARGE ADULT

## 2021-12-30 NOTE — GROUP NOTE
Group Therapy Documentation    PATIENT'S NAME: Winifred Bashir  MRN:   [id number]  :   1970  ACCT. NUMBER: [id number]  DATE OF SERVICE: 21  START TIME: 12:30 PM  END TIME:  2:30 PM  FACILITATOR(S): Teodora Weiss LADC; Gali Ng LADC  TOPIC: BEH Group Therapy  Number of patients attending the group: 8  Group Length:  2 Hours    Group Therapy Type: Recovery strategies    Summary of Group / Topics Discussed:    Sober coping skills, Emotions/expression, and Leisure explorations/use of leisure time      Group Attendance:  Attended group session    Patient's response to the group topic/interactions:  cooperative with task    Patient appeared to be Actively participating, Attentive and Engaged.        Client specific details: Winifred was an active participant in afternoon group therapy session. She participated in a creative writing/expression activity about sense of self.

## 2021-12-30 NOTE — GROUP NOTE
Psychoeducation Group Documentation    PATIENT'S NAME: Winifred Bashir  MRN:   [id number]  :   1970  ACCT. NUMBER: [id number]  DATE OF SERVICE: 21  START TIME:  3:00 PM  END TIME:  4:00 PM  FACILITATOR(S): Dale Woodward LADC; Mara Cortes; Dony Harrison LADC  TOPIC: BEH Pyschoeducation  Number of patients attending the group:  3  Group Length:  1 Hours    Skills Group Therapy Type: Recovery skills    Summary of Group / Topics Discussed:    Relationship/social skills, Balanced lifestyle skills, Medication management skills, and Relapse prevention skills          Group Attendance:  Attended group session    Patient's response to the group topic/interactions:  cooperative with task    Patient appeared to be Attentive.         Client specific details:  Winifred participated in afternoon skills group.  The group consisted of watching a video related to addiction, with a discussion.

## 2021-12-30 NOTE — GROUP NOTE
Group Therapy Documentation    PATIENT'S NAME: Winifred Bashir  MRN:   [id number]  :   1970  ACCT. NUMBER: [id number]  DATE OF SERVICE: 21  START TIME:  9:00 AM  END TIME: 11:00 AM  FACILITATOR(S): Teodora Weiss LADC; Vicki Arenas  TOPIC: BEH Group Therapy  Number of patients attending the group: 3  Group Length:  2 Hours    Group Therapy Type: Emotion processing    Summary of Group / Topics Discussed:    Spiritual Care      Group Attendance:  Attended group session    Patient's response to the group topic/interactions:  cooperative with task    Patient appeared to be Actively participating, Attentive and Engaged.        Client specific details: Winifred was an active participant in spiritual care session facilitated by Vicki Arenas.

## 2021-12-31 ENCOUNTER — HOSPITAL ENCOUNTER (OUTPATIENT)
Dept: BEHAVIORAL HEALTH | Facility: CLINIC | Age: 51
End: 2021-12-31
Attending: FAMILY MEDICINE
Payer: COMMERCIAL

## 2021-12-31 VITALS — OXYGEN SATURATION: 100 % | TEMPERATURE: 97.3 F

## 2021-12-31 PROCEDURE — 1002N00001 HC LODGING PLUS FACILITY CHARGE ADULT

## 2021-12-31 PROCEDURE — H2035 A/D TX PROGRAM, PER HOUR: HCPCS | Mod: HQ

## 2021-12-31 NOTE — GROUP NOTE
"Group Therapy Documentation    PATIENT'S NAME: Winifred Bashir  MRN:   [id number]  :   1970  ACCT. NUMBER: [id number]  DATE OF SERVICE: 21  START TIME:  9:00 AM  END TIME: 11:00 AM  FACILITATOR(S): Virginia Vargas LADC  TOPIC: BEH Group Therapy  Number of patients attending the group:  4  Group Length:  2 Hours    Group Therapy Type: Recovery strategies    Summary of Group / Topics Discussed:    Relationship/socialization, Trauma informed care, and Emotions/expression      Group Attendance:  Attended group session    Patient's response to the group topic/interactions:  cooperative with task    Patient appeared to be Actively participating.        Client specific details:  Winifred shared her reflections on the past year and plans on continuing a sober lifestyle. She presented her \"Overcoming Avoidance\" assignment and identified new triggers of emotional trauma that leads to addictive behavior.      "

## 2021-12-31 NOTE — GROUP NOTE
Group Therapy Documentation    PATIENT'S NAME: Winifred Bashir  MRN:   [id number]  :   1970  ACCT. NUMBER: [id number]  DATE OF SERVICE: 21  START TIME: 12:30 PM  END TIME:  2:30 PM  FACILITATOR(S): Teodora Weiss LADC  TOPIC: BEH Group Therapy  Number of patients attending the group: 4  Group Length:  2 Hours    Group Therapy Type: Recovery strategies    Summary of Group / Topics Discussed:    Disease of addiction and Leisure explorations/use of leisure time      Group Attendance:  Attended group session    Patient's response to the group topic/interactions:  cooperative with task    Patient appeared to be Attentive and Engaged.        Client specific details: Winifred was an active participant in group leisure activity and discussion on the disease of addiction.

## 2022-01-01 ENCOUNTER — HOSPITAL ENCOUNTER (OUTPATIENT)
Dept: BEHAVIORAL HEALTH | Facility: CLINIC | Age: 52
End: 2022-01-01
Attending: FAMILY MEDICINE
Payer: COMMERCIAL

## 2022-01-01 VITALS — TEMPERATURE: 97.4 F | OXYGEN SATURATION: 98 %

## 2022-01-01 PROCEDURE — 1002N00001 HC LODGING PLUS FACILITY CHARGE ADULT

## 2022-01-01 PROCEDURE — H2035 A/D TX PROGRAM, PER HOUR: HCPCS | Mod: HQ

## 2022-01-01 NOTE — GROUP NOTE
Psychoeducation Group Documentation    PATIENT'S NAME: Winifred Bashir  MRN:   [id number]  :   1970  ACCT. NUMBER: [id number]  DATE OF SERVICE: 22  START TIME:  9:00 AM  END TIME: 11:00 AM  FACILITATOR(S): Stone Portillo LADC; Gali Ng LADC; Teodora Weiss LADC  TOPIC: BEH Pyschoeducation  Number of patients attending the group:  5  Group Length:  2 Hours    Skills Group Therapy Type: Recovery skills    Summary of Group / Topics Discussed:    Balanced lifestyle skills          Group Attendance:  Attended group session    Patient's response to the group topic/interactions:  cooperative with task    Patient appeared to be Attentive.         Client specific details:  Winifred gave appropriate feedback..

## 2022-01-01 NOTE — GROUP NOTE
Group Therapy Documentation    PATIENT'S NAME: Winifred Bashir  MRN:   [id number]  :   1970  ACCT. NUMBER: [id number]  DATE OF SERVICE: 22  START TIME: 12:30 PM  END TIME:  2:30 PM  FACILITATOR(S): Teodora Weiss LADC; Stone Portillo LADC; Gali Ng LADC  TOPIC: BEH Group Therapy  Number of patients attending the group: 21  Group Length:  2 Hours    Group Therapy Type: Recovery strategies    Summary of Group / Topics Discussed:    Recovery Principles, Sober coping skills, and Emotions/expression      Group Attendance:  Attended group session    Patient's response to the group topic/interactions:  cooperative with task    Patient appeared to be Attentive and Engaged.        Client specific details: Winifred participated in a sober leisure activity with peers and group discussion.

## 2022-01-02 ENCOUNTER — HOSPITAL ENCOUNTER (OUTPATIENT)
Dept: BEHAVIORAL HEALTH | Facility: CLINIC | Age: 52
End: 2022-01-02
Attending: FAMILY MEDICINE
Payer: COMMERCIAL

## 2022-01-02 VITALS — OXYGEN SATURATION: 100 % | TEMPERATURE: 97.2 F

## 2022-01-02 PROCEDURE — 1002N00001 HC LODGING PLUS FACILITY CHARGE ADULT

## 2022-01-02 PROCEDURE — H2035 A/D TX PROGRAM, PER HOUR: HCPCS | Mod: HQ

## 2022-01-02 NOTE — GROUP NOTE
Group Therapy Documentation    PATIENT'S NAME: Winifred Bashir  MRN:   [id number]  :   1970  ACCT. NUMBER: [id number]  DATE OF SERVICE: 22  START TIME:  9:00 AM  END TIME: 11:00 AM  FACILITATOR(S): Nathen Reynoso LADC; Carolina Martin LADC; Elo Gillespie RN  TOPIC: BEH Group Therapy  Number of patients attending the group:  21  Group Length:  2 Hours    Group Therapy Type: Recovery strategies    Summary of Group / Topics Discussed:    Recovery Principles      Group Attendance:  Attended group session    Patient's response to the group topic/interactions:  cooperative with task    Patient appeared to be Attentive.        Client specific details:  Winifred attended AM group. Patient took part in a discussion on TB, Hep A-C, and practiced laughter yoga in a group exercise.

## 2022-01-02 NOTE — GROUP NOTE
Group Therapy Documentation    PATIENT'S NAME: Winifred Bashir  MRN:   [id number]  :   1970  ACCT. NUMBER: [id number]  DATE OF SERVICE: 22  START TIME: 12:30 PM  END TIME:  1:30 PM  FACILITATOR(S): Nathen Reynoso LADC; Carolina Martin LADC  TOPIC: BEH Group Therapy  Number of patients attending the group:  21  Group Length:  1 Hours    Group Therapy Type: Recovery strategies    Summary of Group / Topics Discussed:    Recovery Principles      Group Attendance:  Attended group session    Patient's response to the group topic/interactions:  cooperative with task    Patient appeared to be Attentive.        Client specific details:  Winifred attended PM skills group. Patient took part in a group exercise on personal strengths and how they can help them in recovery.

## 2022-01-03 ENCOUNTER — HOSPITAL ENCOUNTER (OUTPATIENT)
Dept: BEHAVIORAL HEALTH | Facility: CLINIC | Age: 52
End: 2022-01-03
Attending: FAMILY MEDICINE
Payer: COMMERCIAL

## 2022-01-03 VITALS — OXYGEN SATURATION: 98 % | TEMPERATURE: 96.7 F

## 2022-01-03 PROCEDURE — H2035 A/D TX PROGRAM, PER HOUR: HCPCS | Mod: HQ

## 2022-01-03 PROCEDURE — 1002N00001 HC LODGING PLUS FACILITY CHARGE ADULT

## 2022-01-03 NOTE — PROGRESS NOTES
"Pt is reporting ongoing symptoms of pain during urination and vaginal bleeding. Per ED visit from 12/29 \"Urinalysis equivocal for UTI, however based on her symptoms I think treatment for UTI is indicated.  We will place her on cefdinir.  I have encouraged her to follow-up with gynecology and if her symptoms do not resolve to seek further evaluation either in the clinic or the emergency department\".     Pt has completed course of antibiotics today, she was given the option to call her PCP to discuss situation or or to return to the ED for evaluation.  Pt verbalized understanding.   "

## 2022-01-03 NOTE — GROUP NOTE
Group Therapy Documentation    PATIENT'S NAME: Winifred Bashir  MRN:   [id number]  :   1970  ACCT. NUMBER: [id number]  DATE OF SERVICE: 22  START TIME: 12:30 PM  END TIME:  2:30 PM  FACILITATOR(S): Dale Woodward LADC  TOPIC: BEH Group Therapy  Number of patients attending the group:  5  Group Length:  2 Hours    Group Therapy Type: Recovery strategies    Summary of Group / Topics Discussed:    Recovery Principles, Sober coping skills, Relationship/socialization, Balanced lifestyle, Trauma informed care, Disease of addiction, Emotions/expression, and Leisure explorations/use of leisure time      Group Attendance:  Attended group session    Patient's response to the group topic/interactions:  cooperative with task    Patient appeared to be Attentive and Engaged.        Client specific details:  Winifred participated in afternoon group. He took part in the discussion on communication, and participated in the activity.

## 2022-01-03 NOTE — GROUP NOTE
Group Therapy Documentation    PATIENT'S NAME: Winifred Bashir  MRN:   [id number]  :   1970  ACCT. NUMBER: [id number]  DATE OF SERVICE: 22  START TIME:  9:00 AM  END TIME: 11:00 AM  FACILITATOR(S): Teodora Weiss LADC  TOPIC: BEH Group Therapy  Number of patients attending the group: 5  Group Length:  2 Hours    Group Therapy Type: Emotion processing    Summary of Group / Topics Discussed:    Recovery Principles, Co-occurring illnesses symptom management, and Emotions/expression      Group Attendance:  Attended group session    Patient's response to the group topic/interactions:  cooperative with task    Patient appeared to be Actively participating, Attentive and Engaged.        Client specific details: Winifred was an active participant in morning group therapy session and discussion on family dynamics, anxiety, and goal-setting.

## 2022-01-03 NOTE — PROGRESS NOTES
Patient:  Winifred GIMENEZ [facility] Weekly Treatment Plan Review      ATTENDANCE for the following date span:  12/27/21-1/2/22     Day Monday Tuesday Wednesday Thursday Friday Saturday Sunday   Group Hours   4 4 2 4 4 4 2   Skills Hours    1 1 1   1   Individual Session (LADC)            Individual Session  (psychotherapy)            Peer-led Recovery Group   1 1 1 1 1 1 1               Adult CD Progress Note and Treatment Plan Review     Attendance  Please refer to OP BEH CD Adult Attendance Record Documentation Flowsheet    Support group attended this week: yes    Reporting sobriety:  yes    Treatment Plan     Treatment Plan Review competed on: 1/3/22     Client preferred learning style: Visual  Hands on  Verbal  Demonstration    Staff Members contributing PAULETTE Vann; PAULETTE Martinez ; Jose Woodward, AMANDA-T                     Received Supervision: YES    Client: contributed to goals and plan: Yes    Client received copy of plan/revised plan: Yes    Client agrees with plan/revised plan: Yes    Changes to Treatment Plan: No    New Goals added since last review No additional goals added at this time    Goals worked on since last review: Spirituality group, relationship workshop, coping skills application, first step assignment, gratitude list, pros and cons of use, and maintaining stabilization.    Strategies effective: yes    Strategies need these changes: No changes needed at this time    1) Care Coordination Activities:  [facility]   2) Medical, Mental Health and other appointments the client attended: Patient went to the ED on 12/29/21.   3) Medication issues:  Patient was prescribed antibiotics on 12/29/21.  4) Physical and mental health problems: See Dimension 2 and 3  5) Any changes in Vulnerable Adult Status?  No If yes, add to treatment plan and individual abuse prevention plan.  6) Review and evaluation of the individual abuse prevention plan: Current  "IAPP for this program is adequate for this client    ASAM Risk Rating:  Dimension 1, 0: Patient reports their last date of use as 11/29/21. Patient reports current post-acute withdrawal symptoms including anxiety, depression, irritability. Patient denies any withdrawal symptoms that would interfere with full participation in treatment programming at this time. Patient started naltrexone as prescribed by Dr. Sommer this week for urges/cravings management.   Dimension 2, 1: Patient reported she went to the ED for a bladder infection  on 12/29/21. Patient is able to seek medical services as needed independently will continue to be monitored throughout treatment.   Patient attended staff RN lecture on TB/ Hep -C.  Dimension 3, 2: Patient reported improvement in mood regulation this week.   Patient was encouraged to practice \"exchange vocabulary\" to decrease negative self-talk and increase feelings of empowerment in recovery.  Patient denies any suicidal thoughts or ideations at this time. Patient will continue to be monitored throughout treatment. Patient attended a Spiritual Care group session facilitated by Vicki Arenas.  Dimension 4, 1: Patient appears engaged in the treatment process and is practicing  vulnerability during group processing. Patient is honest about her \"addict\" voice and \"stinking thinking\" patterns. Patient appears to be gaining internal motivation for change as evidenced by her willingness to implement new behavioral changes while in treatment.  Patient completed and presented her \"1st Step\" assignment. Patient  reported that \"being at [facility]\"  is what motivated her to be sober and to stay in treatment this week.  Dimension 5, 4: Patient identified substance use triggers this week include interpersonal relationship challenges. Patient continues to learn and practice coping skills and reports that deep breathing and meditation have been helpful.  Patient reported that she has not had " "significant cravings this past week.    Dimension 6, 3: Patient is attending sober support meetings and building relationships with peers. Patient has a GA sponsor and is looking to establish 12-step sponsorship through [facility]. Patient's aftercare plan at this time includes: individual therapy, sober support meetings, and outpatient programming.  Patient completed and presented her \"Overcoming Avoidance\" assignment.    Guide to Risk Ratings for Suicidality:   IDEATION: Active thoughts of suicide? INTENT: Intent to follow on suicide? PLAN: Plan to follow through on suicide? Level of Risk:   IF Yes Yes Yes Patient = High Emergent   IF Yes Yes No Patient = High Urgent/Non-Emergent   IF Yes No No Patient = Moderate Non-Urgent   IF No No   No Patient = Low Risk   The patient's ADDITIONAL RISK FACTORS and lack of PROTECTIVE FACTORS may increase their overall suicide risk ratings.     Patient's/client's current risk rating:  Low Risk    Family Involvement:   none schedule this week    Data:   offered feedback good insight client did participate    Intervention:   Behavior modification  Cognitive Behavioral Therapy  Counselor feedback  Education  Emotional management  Group feedback  Motivational Enhancement Therapy  Relapse prevention  Twelve Step facilitation  Mental health education    Assessment:   Stages of Change Model  Contemplation    Appears/Sounds:  Cooperative  Motivated  Engaged   Anhedonia     Plan:  Focus on recovery environment  Monitor emotional/physical health  Continue working through treatment plan goals.     STEVEN Vann, Jose Woodward, ADC-T    "

## 2022-01-03 NOTE — PROGRESS NOTES
Name: Winifred Bashir  Date: 1/3/2022  Medical Record: [id number]  Envelope Number: [id number]  List of Contents (List each item separately in new row):     Loratadine 10 mg (Repackaged from Env. # [id number])    Admission:  I am responsible for any personal items that are not sent to the safe or pharmacy.  [facility] is not responsible for loss, theft or damage of any property in my possession.    Patient Signature:  ___________________________________________       Date/Time:__________________________    Staff Signature: __________________________________       Date/Time:__________________________    2nd Staff person, if patient is unable/unwilling to sign:    __________________________________________________________       Date/Time: __________________________    Discharge:  [facility] has returned all of my personal belongings:    Patient Signature: ________________________________________     Date/Time: ____________________________________    Staff Signature: ______________________________________     Date/Time:_____________________________________

## 2022-01-04 ENCOUNTER — VIRTUAL VISIT (OUTPATIENT)
Dept: BEHAVIORAL HEALTH | Facility: CLINIC | Age: 52
End: 2022-01-04
Payer: COMMERCIAL

## 2022-01-04 ENCOUNTER — HOSPITAL ENCOUNTER (OUTPATIENT)
Dept: BEHAVIORAL HEALTH | Facility: CLINIC | Age: 52
End: 2022-01-04
Attending: FAMILY MEDICINE
Payer: COMMERCIAL

## 2022-01-04 VITALS — OXYGEN SATURATION: 98 % | TEMPERATURE: 97.6 F

## 2022-01-04 DIAGNOSIS — F10.20 MODERATE ALCOHOL USE DISORDER (H): ICD-10-CM

## 2022-01-04 DIAGNOSIS — M54.42 CHRONIC BILATERAL LOW BACK PAIN WITH LEFT-SIDED SCIATICA: ICD-10-CM

## 2022-01-04 DIAGNOSIS — F15.90 STIMULANT USE DISORDER: ICD-10-CM

## 2022-01-04 DIAGNOSIS — G89.29 CHRONIC BILATERAL LOW BACK PAIN WITH LEFT-SIDED SCIATICA: ICD-10-CM

## 2022-01-04 DIAGNOSIS — F63.0 GAMBLING DISORDER, PERSISTENT, SEVERE: Primary | ICD-10-CM

## 2022-01-04 PROCEDURE — 1002N00001 HC LODGING PLUS FACILITY CHARGE ADULT

## 2022-01-04 PROCEDURE — H2035 A/D TX PROGRAM, PER HOUR: HCPCS | Mod: HQ

## 2022-01-04 RX ORDER — CYCLOBENZAPRINE HCL 5 MG
5-10 TABLET ORAL 3 TIMES DAILY PRN
Qty: 30 TABLET | Refills: 1 | Status: SHIPPED | OUTPATIENT
Start: 2022-01-04 | End: 2022-01-27

## 2022-01-04 RX ORDER — HYDROXYZINE HYDROCHLORIDE 25 MG/1
50 TABLET, FILM COATED ORAL EVERY 4 HOURS PRN
Qty: 60 TABLET | Refills: 1 | Status: SHIPPED | OUTPATIENT
Start: 2022-01-04 | End: 2022-01-27

## 2022-01-04 ASSESSMENT — ANXIETY QUESTIONNAIRES
1. FEELING NERVOUS, ANXIOUS, OR ON EDGE: MORE THAN HALF THE DAYS
3. WORRYING TOO MUCH ABOUT DIFFERENT THINGS: NEARLY EVERY DAY
5. BEING SO RESTLESS THAT IT IS HARD TO SIT STILL: SEVERAL DAYS
IF YOU CHECKED OFF ANY PROBLEMS ON THIS QUESTIONNAIRE, HOW DIFFICULT HAVE THESE PROBLEMS MADE IT FOR YOU TO DO YOUR WORK, TAKE CARE OF THINGS AT HOME, OR GET ALONG WITH OTHER PEOPLE: SOMEWHAT DIFFICULT
2. NOT BEING ABLE TO STOP OR CONTROL WORRYING: MORE THAN HALF THE DAYS
6. BECOMING EASILY ANNOYED OR IRRITABLE: MORE THAN HALF THE DAYS
4. TROUBLE RELAXING: MORE THAN HALF THE DAYS
7. FEELING AFRAID AS IF SOMETHING AWFUL MIGHT HAPPEN: SEVERAL DAYS
GAD7 TOTAL SCORE: 13

## 2022-01-04 ASSESSMENT — PATIENT HEALTH QUESTIONNAIRE - PHQ9: SUM OF ALL RESPONSES TO PHQ QUESTIONS 1-9: 14

## 2022-01-04 NOTE — NURSING NOTE
This video/telephone visit will be conducted via a call between you and your physician/provider. We have found that certain health care needs can be provided without the need for an in-person physical exam. This service lets us provide the care you need with a video /telephone conversation. If a prescription is necessary we can send it directly to your pharmacy. If lab work is needed we can place an order for that and you can then stop by our lab to have the test done at a later time.    Just as we bill insurance for in-person visits, we also bill insurance for video/telephone visits. If you have questions about your insurance coverage, we recommend that you speak with your insurance company.    Patient has given verbal consent for video/Telephone visit? yes    Patient would like visir visit, please connect :   TEXT LINK -[phone]  [facility] LINE:  [phone]  Reason for visit: Establishing care and restarting Vivitrol.  Needs refill on Vistaril, wants to discuss dose adjustment  Patient verified allergies, medications and pharmacy via phone    PHQ -9 score: 14  KELLEN-7 score: 13    Patient states she  is ready for visit.    Lucila Cheatham January 4, 2022 9:56 AM

## 2022-01-04 NOTE — PROGRESS NOTES
[facility]    A/P                                                    ASSESSMENT/PLAN    1. Gambling disorder, persistent, severe  She will resume extended release Naltrexone, risks and benefits discussed.  We will work to get first dose administered prior to discharge.  She will resume GA meetings and support.      2. Moderate alcohol use disorder (H)  She will resume extended release Naltrexone, risks and benefits discussed.  Plan for first dose prior to discharge.  She will attend AA and begin IOP after discharge.  Continue other medications including gabapentin and Topamax.    - Ethyl Glucuronide Urine; Standing  - Urine Drugs of Abuse Screen Panel 1 - Drug Screen (Full); Standing    3. Stimulant use disorder  She will resume extended release Naltrexone, risks and benefits discussed.  She will continue Wellbutrin.  Plan for IOP after discharge.       4. Chronic bilateral low back pain with left-sided sciatica  She will continue Flexeril as needed.  No red flags.      I discussed with her that I am comfortable managing her mental health medications at this time as there is quite a bit of overlap between medications used for substance use and mental health treatment.  Should we struggle with managing her anxiety and depression we will plan to refer to psychiatry.  She would like to minimize additional appointments especially while she is attending treatment which is reasonable.      RTC  Return in 23 days (on 1/27/2022) for Follow up, with me, using a video visit.      Counseled the patient on the importance of having a recovery program in addition to medication to manage recovery.  Components include avoiding isolating, having willingness to change, avoiding triggers and managing cravings. Encouraged having some type of sober network and practicing honesty with trusted support person(s). Encouraged other services such as counseling, 12 step or other self-help organizations.       Opioid warning reviewed.  Risk of overdose following a period of abstinence due to decrease tolerance was discussed including risk of death.  Strongly recommended abstain from alcohol, benzodiazepines, THC, opioids and other drugs of abuse.  Increased risk of return to opioid use after use of these substances discussed.  Increased risk of overdose/death with use of other substances particularly benzodiazepines/alcohol reviewed.        JAVY Bashir is a 51 year old female with PMHx of HTN, anxiety, depression, gambling disorder, stimulant use disorder, and AUD who presents to clinic today for follow up.  Transferring care from Dr Kumar.      Visit performed Virtual, via video    Video-Visit Details    Type of service:  Video Visit    Video Start Time: 10:26 AM  Video End Time: 10:53 AM    Originating Location (pt. Location):  [facility]    Distant Location (provider location):  [facility]    Platform used for Video Visit: [employer]    Brief history of substance use:  Patient was referred to [facility] from residential treatment at [facility] in January 2020.  While at [facility] she was started on IM naltrexone for gambling disorder, AUD, and stimulant use disorder.  Return to gambling tends to lead to return to substance use.  She had return to alcohol and cocaine use in October 2021 and was hospitalized 12/12/21-12/15/21 for detox and then completed treatment program at [facility].      Plan from most recent office visit (6/22/21 w/ Dr Kumar)  1. Moderate alcohol use disorder (H)  Reschedule vivitrol currently due and proceed with q4 week schedule.   Pt is tolerating first step of slow taper off gabapentin, continue to work closely with psychiatric provider at [facility]  Continue mutual support groups  - gabapentin (NEURONTIN) 600 MG tablet; Take 1 tablet (600 mg total) by mouth 3 (three)  "times a day. +100mg three times a day; pt started slow taper with psych provider at [facility]  - naltrexone microspheres (VIVITROL) 380 mg SERR injection; Inject 380 mg into the gluteal muscle every 28 days.  clinic administered; Refill: 0    2. Methamphetamine use disorder, severe (H)  Continue bupropion 450mg/day and Vivitrol q4 weeks  - naltrexone microspheres (VIVITROL) 380 mg SERR injection; Inject 380 mg into the gluteal muscle every 28 days.  clinic administered; Refill: 0    3. Gambling disorder, severe  Reschedule vivitrol currently due, will proceed with q4 week injections  - naltrexone microspheres (VIVITROL) 380 mg SERR injection; Inject 380 mg into the gluteal muscle every 28 days.  clinic administered; Refill: 0    4. Nicotine use disorder  Pt has NRT, f/u on status next visit    5. Generalized anxiety disorder  - gabapentin (NEURONTIN) 600 MG tablet; Take 1 tablet (600 mg total) by mouth 3 (three) times a day. +100mg three times a day; pt started slow taper with psych provider at [facility]    6. Moderate episode of recurrent major depressive disorder (H)  Continue fluoxetine 80mg/day as prescribed by psychiatric provider at [facility]      TODAY'S VISIT  HPI Jan 4, 2022  - Currently at , discharge on 1/12 is planned - will be going home (kids at home, 11 and 15), planning for IOP three times per week with [facility] in [place], also seeing therapist at [facility]   - \"I've done this before and had some success.\"  This is 4th treatment in 3 years.  Previously had 10 years and 14 years.  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  Also sees her kids' wellbeing as motivation.   - Gambling addiction - planning to go to GA meeting at discharge  - Vivitrol has been really effective for gambling - she did not think this would help and it had an amazing impact, started while at [facility] - stopped her from thinking about it, made it almost 1 year without gambling (along with working " program)  - Gambling is gateway to substance use  - Has been prescribed hydroxyzine 25mg for anxiety, wondering if dose could be higher   - Knee OA has been aggravated after trying to do stairs to increased activity, wants to start walking more   - Also has history of sciatica - flexeril helpful when it flares, no neurologic symptoms      OBJECTIVE                                                    PHYSICAL EXAM:  LMP  (LMP Unknown)     GENERAL: healthy, alert and no distress  EYES: Eyes grossly normal to inspection, conjunctivae and sclerae normal  RESP: No respiratory distress, no coughing or wheezing  MENTAL STATUS EXAM  Appearance/Behavior: No appearant distress  Speech: Normal  Mood/Affect: normal affect and anxiety  Insight: Adequate    LAB  No results found for any visits on 01/04/22.      HISTORY                                                    Problem list reviewed & adjusted, as indicated.  Patient Active Problem List   Diagnosis     Bipolar disorder (H)     Posttraumatic stress disorder     Mild intermittent asthma     Plantar fascial fibromatosis     Abnormal gait     CARDIOVASCULAR SCREENING; LDL GOAL LESS THAN 160     Vitamin D deficiency     Obesity     Suicidal ideation     Depression     Moderate alcohol use disorder (H)     Polysubstance abuse (H)     Alcohol dependence with withdrawal with complication (H)     Chemical dependency (H)     Gambling disorder, persistent, severe       MEDICATION LIST (prior to visit)  albuterol (PROAIR HFA/PROVENTIL HFA/VENTOLIN HFA) 108 (90 Base) MCG/ACT inhaler, Inhale 2 puffs into the lungs every 4 hours as needed for shortness of breath / dyspnea or wheezing  alum & mag hydroxide-simethicone (MAALOX) 200-200-20 MG/5ML SUSP suspension, Take 30 mLs by mouth every 6 hours as needed for indigestion  fluticasone-vilanterol (BREO ELLIPTA) 100-25 MCG/INH inhaler, Inhale 1 puff into the lungs daily  hydrochlorothiazide (HYDRODIURIL) 25 MG tablet, Take 1 tablet (25 mg)  by mouth daily  ibuprofen (ADVIL/MOTRIN) 200 MG tablet, Take 400 mg by mouth every 6 hours as needed for mild pain  loratadine (CLARITIN) 10 MG tablet, Take 1 tablet (10 mg) by mouth daily  melatonin 3 MG tablet, Take 3 mg by mouth nightly as needed for sleep  multivitamin w/minerals (THERA-VIT-M) tablet, Take 1 tablet by mouth daily  senna-docusate (SENOKOT-S/PERICOLACE) 8.6-50 MG tablet, Take 2 tablets by mouth daily as needed for constipation  vitamin D3 (CHOLECALCIFEROL) 125 MCG (5000 UT) tablet, Take 1 tablet (125 mcg) by mouth daily    No current facility-administered medications on file prior to visit.      MEDICATION LIST (after visit)  Current Outpatient Medications   Medication     albuterol (PROAIR HFA/PROVENTIL HFA/VENTOLIN HFA) 108 (90 Base) MCG/ACT inhaler     alum & mag hydroxide-simethicone (MAALOX) 200-200-20 MG/5ML SUSP suspension     fluticasone-vilanterol (BREO ELLIPTA) 100-25 MCG/INH inhaler     hydrochlorothiazide (HYDRODIURIL) 25 MG tablet     ibuprofen (ADVIL/MOTRIN) 200 MG tablet     loratadine (CLARITIN) 10 MG tablet     melatonin 3 MG tablet     multivitamin w/minerals (THERA-VIT-M) tablet     senna-docusate (SENOKOT-S/PERICOLACE) 8.6-50 MG tablet     vitamin D3 (CHOLECALCIFEROL) 125 MCG (5000 UT) tablet     B Complex Vitamins (VITAMIN B COMPLEX PO)     buPROPion (WELLBUTRIN XL) 300 MG 24 hr tablet     citalopram (CELEXA) 10 MG tablet     citalopram (CELEXA) 20 MG tablet     clindamycin (CLEOCIN) 2 % vaginal cream     cyclobenzaprine (FLEXERIL) 5 MG tablet     gabapentin (NEURONTIN) 100 MG capsule     hydrOXYzine (ATARAX) 25 MG tablet     naltrexone (VIVITROL) 380 MG SUSR     topiramate (TOPAMAX) 25 MG tablet     traZODone (DESYREL) 50 MG tablet     No current facility-administered medications for this visit.         Allergies   Allergen Reactions     Fish-Derived Products Shortness Of Breath and Nausea and Vomiting     Nkda [No Known Drug Allergies]      Janneth Herring DO  [facility]  [facility]  [phone]

## 2022-01-04 NOTE — GROUP NOTE
Group Therapy Documentation    PATIENT'S NAME: Winifred Bashir  MRN:   [id number]  :   1970  ACCT. NUMBER: [id number]  DATE OF SERVICE: 22  START TIME:  9:00 AM  END TIME: 11:00 AM  FACILITATOR(S): Teodora Weiss LADC  TOPIC: BEH Group Therapy  Number of patients attending the group: 5  Group Length:  2 Hours    Group Therapy Type: Emotion processing    Summary of Group / Topics Discussed:    Sober coping skills, Relationship/socialization, and Disease of addiction      Group Attendance:  Attended group session    Patient's response to the group topic/interactions:  cooperative with task    Patient appeared to be Actively participating, Attentive and Engaged.        Client specific details: Winifred was an active participant in morning group therapy session and discussion on codependency and anger/resentments. She was excused after one hour to attend a medical appointment.

## 2022-01-04 NOTE — GROUP NOTE
Group Therapy Documentation    PATIENT'S NAME: Winifred Bashir  MRN:   [id number]  :   1970  ACCT. NUMBER: [id number]  DATE OF SERVICE: 22  START TIME:  3:00 PM  END TIME:  4:00 PM  FACILITATOR(S): Virginia Vargas LADC; Gali Ng LADC; Dony Harrison LADC  TOPIC: BEH Group Therapy  Number of patients attending the group:  4  Group Length:  1 Hours    Group Therapy Type: Recovery strategies    Summary of Group / Topics Discussed:    Co-occurring illnesses symptom management and Disease of addiction      Group Attendance:  Attended group session    Patient's response to the group topic/interactions:  cooperative with task    Patient appeared to be Attentive.        Client specific details:  Patient was attentive during a presentation on the neurophysiology of addiction and the brain presented by Dr. Marion

## 2022-01-04 NOTE — GROUP NOTE
Group Therapy Documentation    PATIENT'S NAME: Winifred Bashir  MRN:   [id number]  :   1970  ACCT. NUMBER: [id number]  DATE OF SERVICE: 22  START TIME: 12:30 PM  END TIME:  2:30 PM  FACILITATOR(S): Virginia Vargas LADC  TOPIC: BEH Group Therapy  Number of patients attending the group:  5  Group Length:  2 Hours    Group Therapy Type: Recovery strategies    Summary of Group / Topics Discussed:    Recovery Principles and Relationship/socialization      Group Attendance:  Attended group session    Patient's response to the group topic/interactions:  cooperative with task    Patient appeared to be Actively participating.        Client specific details:  Winifred shared his experiences with healthy and unhealthy relationships and how they were effected by substance use.

## 2022-01-05 ENCOUNTER — HOSPITAL ENCOUNTER (OUTPATIENT)
Dept: BEHAVIORAL HEALTH | Facility: CLINIC | Age: 52
End: 2022-01-05
Attending: FAMILY MEDICINE
Payer: COMMERCIAL

## 2022-01-05 VITALS — OXYGEN SATURATION: 98 % | TEMPERATURE: 98.4 F

## 2022-01-05 PROCEDURE — H2035 A/D TX PROGRAM, PER HOUR: HCPCS | Mod: HQ

## 2022-01-05 PROCEDURE — 1002N00001 HC LODGING PLUS FACILITY CHARGE ADULT

## 2022-01-05 ASSESSMENT — ANXIETY QUESTIONNAIRES: GAD7 TOTAL SCORE: 13

## 2022-01-05 NOTE — GROUP NOTE
Group Therapy Documentation    PATIENT'S NAME: Winifred Bashir  MRN:   [id number]  :   1970  ACCT. NUMBER: [id number]  DATE OF SERVICE: 22  START TIME:  8:30 AM  END TIME:  9:30 AM  FACILITATOR(S): Nathen Reynoso LADC; Dima Ortega, PhD LP; Stone Portillo [facility]  TOPIC: BEH Group Therapy  Number of patients attending the group:  7  Group Length:  1 Hours    Group Therapy Type: Recovery strategies    Summary of Group / Topics Discussed:    Recovery Principles      Group Attendance:  Attended group session    Patient's response to the group topic/interactions:  cooperative with task    Patient appeared to be Attentive.        Client specific details:  Winifred took part in a lecture regarding addiction and gave appropriate feedback.

## 2022-01-05 NOTE — GROUP NOTE
"Group Therapy Documentation    PATIENT'S NAME: Winifred Bashir  MRN:   [id number]  :   1970  ACCT. NUMBER: [id number]  DATE OF SERVICE: 22  START TIME:  9:45 AM  END TIME: 11:30 AM  FACILITATOR(S): Teodora Weiss LADC  TOPIC: BEH Group Therapy  Number of patients attending the group: 7  Group Length:  2 Hours    Group Therapy Type: Emotion processing    Summary of Group / Topics Discussed:    Sober coping skills, Disease of addiction, and Emotions/expression      Group Attendance:  Attended group session    Patient's response to the group topic/interactions:  cooperative with task    Patient appeared to be Actively participating, Attentive and Engaged.        Client specific details: Winifred was an active participant in morning group therapy session. She presented her \"Guilt and Shame\" assignment and was receptive to feedback from her peers.      "

## 2022-01-05 NOTE — GROUP NOTE
Group Therapy Documentation    PATIENT'S NAME: Winifred Bashir  MRN:   [id number]  :   1970  ACCT. NUMBER: [id number]  DATE OF SERVICE: 22  START TIME: 12:30 PM  END TIME:  2:30 PM  FACILITATOR(S): Teodora Weiss LADC; Vicki Arenas  TOPIC: BEH Group Therapy  Number of patients attending the group: 7  Group Length:  2 Hours    Group Therapy Type: Recovery strategies    Summary of Group / Topics Discussed:    Spiritual Care      Group Attendance:  Attended group session    Patient's response to the group topic/interactions:  cooperative with task    Patient appeared to be Actively participating, Attentive and Engaged.        Client specific details: Winifred was an active participant in spiritual care group.

## 2022-01-06 ENCOUNTER — HOSPITAL ENCOUNTER (OUTPATIENT)
Dept: BEHAVIORAL HEALTH | Facility: CLINIC | Age: 52
End: 2022-01-06
Attending: FAMILY MEDICINE
Payer: COMMERCIAL

## 2022-01-06 VITALS — TEMPERATURE: 98.1 F | OXYGEN SATURATION: 100 %

## 2022-01-06 PROCEDURE — 1002N00001 HC LODGING PLUS FACILITY CHARGE ADULT

## 2022-01-06 PROCEDURE — H2035 A/D TX PROGRAM, PER HOUR: HCPCS | Mod: HQ

## 2022-01-06 NOTE — GROUP NOTE
Group Therapy Documentation    PATIENT'S NAME: Winifred Bashir  MRN:   [id number]  :   1970  ACCT. NUMBER: [id number]  DATE OF SERVICE: 22  START TIME:  9:00 AM  END TIME: 11:00 AM  FACILITATOR(S): Virginia Vargas LADC  TOPIC: BEH Group Therapy  Number of patients attending the group:  7  Group Length:  2 Hours    Group Therapy Type: Recovery strategies and Emotion processing    Summary of Group / Topics Discussed:    Sober coping skills, Co-occurring illnesses symptom management, and Emotions/expression      Group Attendance:  Attended group session    Patient's response to the group topic/interactions:  cooperative with task    Patient appeared to be Actively participating.        Client specific details:  Winifred participated in a psycho-educational reading and discussion on depression and substance use.

## 2022-01-06 NOTE — NURSING NOTE
Medications Phoned  to Pharmacy [] yes [x]no  Name of Pharmacist:  List Medications, including dose, quantity and instructions     Medications ordered this visit were e-scribed.  Verified by order class [x] yes  [] no  Vistaril and Flexeril  Medication changes or discontinuations were communicated to patient's pharmacy: [] yes  [x] no     Dictation completed at time of chart check: [] yes  [x] no     I have checked the documentation for today s encounters and the above information has been reviewed and completed.        Lucila Cheatham January 6, 2022 11:11 AM

## 2022-01-06 NOTE — GROUP NOTE
Group Therapy Documentation    PATIENT'S NAME: Winifred Bashir  MRN:   [id number]  :   1970  ACCT. NUMBER: [id number]  DATE OF SERVICE: 22  START TIME:  3:00 PM  END TIME:  4:00 PM  FACILITATOR(S): Gali Ng [facility]; Dony Harrison [facility]; Beverly Patricia [facility]  TOPIC: BEH Group Therapy  Number of patients attending the group:  28  Group Length:  1 Hours    Group Therapy Type: Recovery strategies    Summary of Group / Topics Discussed:    Recovery Principles, Sober coping skills, Relationship/socialization, and Emotions/expression      Group Attendance:  Attended group session    Patient's response to the group topic/interactions:  cooperative with task, did not discuss personal experience, expressed readiness to alter behaviors and expressed understanding of topic    Patient appeared to be Actively participating, Attentive and Engaged.        Client specific details:  .

## 2022-01-06 NOTE — GROUP NOTE
Group Therapy Documentation    PATIENT'S NAME: Winifred Bashir  MRN:   [id number]  :   1970  ACCT. NUMBER: [id number]  DATE OF SERVICE: 22  START TIME: 12:30 PM  END TIME:  2:30 PM  FACILITATOR(S): Teodora Weiss LADC  TOPIC: BEH Group Therapy  Number of patients attending the group: 7  Group Length:  2 Hours    Group Therapy Type: Emotion processing    Summary of Group / Topics Discussed:    Sober coping skills, Mindfulness/Relaxation, and Emotions/expression      Group Attendance:  Attended group session    Patient's response to the group topic/interactions:  cooperative with task    Patient appeared to be Actively participating, Attentive and Engaged.        Client specific details: Winifred was an active participant in afternoon group therapy session. She described the most peaceful place she has ever been and how she can access this sense of calm in times of crisis.

## 2022-01-07 ENCOUNTER — INFUSION THERAPY VISIT (OUTPATIENT)
Dept: INFUSION THERAPY | Facility: CLINIC | Age: 52
End: 2022-01-07
Attending: FAMILY MEDICINE
Payer: COMMERCIAL

## 2022-01-07 ENCOUNTER — HOSPITAL ENCOUNTER (OUTPATIENT)
Dept: BEHAVIORAL HEALTH | Facility: CLINIC | Age: 52
End: 2022-01-07
Attending: FAMILY MEDICINE
Payer: COMMERCIAL

## 2022-01-07 VITALS — OXYGEN SATURATION: 99 % | TEMPERATURE: 97.1 F

## 2022-01-07 DIAGNOSIS — F10.20 MODERATE ALCOHOL USE DISORDER (H): Primary | ICD-10-CM

## 2022-01-07 PROCEDURE — 250N000011 HC RX IP 250 OP 636: Performed by: FAMILY MEDICINE

## 2022-01-07 PROCEDURE — 96372 THER/PROPH/DIAG INJ SC/IM: CPT | Performed by: FAMILY MEDICINE

## 2022-01-07 PROCEDURE — 1002N00001 HC LODGING PLUS FACILITY CHARGE ADULT

## 2022-01-07 PROCEDURE — H2035 A/D TX PROGRAM, PER HOUR: HCPCS | Mod: HQ

## 2022-01-07 RX ORDER — EPINEPHRINE 1 MG/ML
0.3 INJECTION, SOLUTION INTRAMUSCULAR; SUBCUTANEOUS EVERY 5 MIN PRN
Status: CANCELLED | OUTPATIENT
Start: 2022-02-01

## 2022-01-07 RX ORDER — METHYLPREDNISOLONE SODIUM SUCCINATE 125 MG/2ML
125 INJECTION, POWDER, LYOPHILIZED, FOR SOLUTION INTRAMUSCULAR; INTRAVENOUS
Status: CANCELLED
Start: 2022-02-01

## 2022-01-07 RX ORDER — NALOXONE HYDROCHLORIDE 0.4 MG/ML
0.2 INJECTION, SOLUTION INTRAMUSCULAR; INTRAVENOUS; SUBCUTANEOUS
Status: CANCELLED | OUTPATIENT
Start: 2022-02-01

## 2022-01-07 RX ORDER — HEPARIN SODIUM,PORCINE 10 UNIT/ML
5 VIAL (ML) INTRAVENOUS
Status: CANCELLED | OUTPATIENT
Start: 2022-02-01

## 2022-01-07 RX ORDER — ALBUTEROL SULFATE 90 UG/1
1-2 AEROSOL, METERED RESPIRATORY (INHALATION)
Status: CANCELLED
Start: 2022-02-01

## 2022-01-07 RX ORDER — ALBUTEROL SULFATE 0.83 MG/ML
2.5 SOLUTION RESPIRATORY (INHALATION)
Status: CANCELLED | OUTPATIENT
Start: 2022-02-01

## 2022-01-07 RX ORDER — DIPHENHYDRAMINE HYDROCHLORIDE 50 MG/ML
50 INJECTION INTRAMUSCULAR; INTRAVENOUS
Status: CANCELLED
Start: 2022-02-01

## 2022-01-07 RX ORDER — MEPERIDINE HYDROCHLORIDE 25 MG/ML
25 INJECTION INTRAMUSCULAR; INTRAVENOUS; SUBCUTANEOUS EVERY 30 MIN PRN
Status: CANCELLED | OUTPATIENT
Start: 2022-02-01

## 2022-01-07 RX ORDER — HEPARIN SODIUM (PORCINE) LOCK FLUSH IV SOLN 100 UNIT/ML 100 UNIT/ML
5 SOLUTION INTRAVENOUS
Status: CANCELLED | OUTPATIENT
Start: 2022-02-01

## 2022-01-07 RX ADMIN — NALTREXONE 380 MG: KIT at 12:25

## 2022-01-07 NOTE — PROGRESS NOTES
Name: Winifred UGO Bashir  Date: 1/7/2022  Medical Record: [id number]  Envelope Number: [id number]  List of Contents (List each item separately in new row):     Naltrexone HCL 50 mg tabs    Admission:  I am responsible for any personal items that are not sent to the safe or pharmacy.  [facility] is not responsible for loss, theft or damage of any property in my possession.    Patient Signature:  ___________________________________________       Date/Time:__________________________    Staff Signature: __________________________________       Date/Time:__________________________    2nd Staff person, if patient is unable/unwilling to sign:    __________________________________________________________       Date/Time: __________________________    Discharge:  [facility] has returned all of my personal belongings:    Patient Signature: ________________________________________     Date/Time: ____________________________________    Staff Signature: ______________________________________     Date/Time:_____________________________________

## 2022-01-07 NOTE — GROUP NOTE
Group Therapy Documentation    PATIENT'S NAME: Winifred Bashir  MRN:   [id number]  :   1970  ACCT. NUMBER: [id number]  DATE OF SERVICE: 22  START TIME: 12:30 PM  END TIME:  2:30 PM  FACILITATOR(S): Virginia Vargas LADC; Gaston Kelly; Dale Woodawrd LADC  TOPIC: BEH Group Therapy  Number of patients attending the group:  7  Group Length:  2 Hours    Group Therapy Type: Recovery strategies    Summary of Group / Topics Discussed:    Leisure explorations/use of leisure time      Group Attendance:  Attended group session    Patient's response to the group topic/interactions:  cooperative with task    Patient appeared to be Attentive.        Client specific details:  Winifred  engaged in a group sober leisure activity and discussion.

## 2022-01-07 NOTE — PATIENT INSTRUCTIONS
Dear Winifred Bashir    Thank you for choosing [facility] ([facility]) for your injection.  The following information is a summary of our appointment as well as important reminders.      We look forward in seeing you on your next appointment here at [facility] ([facility]).  Please don t hesitate to call us at [phone] to reschedule any of your appointments or to speak with one of the [facility] registered nurses.  It was a pleasure taking care of you today.    Sincerely,    [facility]  [address] [place] [address]  [id number]  Phone:  [phone]  Patient Education     Vivitrol Extended-Release Injection 380 mg  Uses  This medicine is used for the following purposes:    drug addiction    alcohol dependence  Instructions  This medicine will be given to you at the doctor's office.  This medicine is given as an injection into a muscle.  This medicine should only be used by a person who has been trained to recognize when and how it should be used.  Please tell your doctor and pharmacist about all the medicines you take. Include both prescription and over-the-counter medicines. Also tell them about any vitamins, herbal medicines, or anything else you take for your health.  Do not suddenly stop taking this medicine. Check with your doctor before stopping.  It is very important that you follow your doctor's instructions for all blood tests.  It is very important that you keep all appointments for medical exams and tests while on this medicine.  Cautions  Rarely, this medicine may cause serious injury at the site of injection. Call your doctor right away if you notice any pain, swelling, blisters, sores, lumps under the skin, or scabbing at the injection site.  Some patients taking this medicine have experienced serious side effects. Please speak with your doctor to  understand the risks and benefits associated with this medicine.  This medicine is associated with a rare, but serious problem of the liver. Speak to your doctor about the early signs of liver problems and the benefits and risks of using this medicine.  You may become more sensitive to effects of opioid medicines during or after using this medicine. This can increase risk of serious or fatal overdose.  Your ability to stay alert or to react quickly may be impaired by this medicine. Do not drive or operate machinery until you know how this medicine will affect you.  Do not drink beverages with alcohol while on this medicine.  If possible, avoid using with marijuana or other medicines that can cause dizziness or drowsiness. These include allergy/cold products, muscle relaxers, sleep aids, and pain relievers.  Tell the doctor or pharmacist if you are pregnant, planning to be pregnant, or breastfeeding.  Do not breastfeed while on this medicine. This medicine can pass through breast milk to the baby.  Ask your pharmacist if this medicine can interact with any of your other medicines. Be sure to tell them about all the medicines you take.  Contact your doctor immediately if you experience any swelling of your hands, face, lips, eyes, throat or tongue.  Always carry an ID card or wear a medical alert bracelet indicating your medical condition.  Please tell all your doctors and dentists that you are on this medicine before they provide care.  Do not start or stop any other medicines without first speaking to your doctor or pharmacist.  This medicine is associated with increased risk of death in certain patients. Please speak with your doctor about the benefits and risks of using this medicine.  This medicine can cause serious side effects in some patients. Important information from the [place] Food and Drug Administration (FDA) is available from your pharmacist. Please review it carefully with your pharmacist to understand  the risks associated with this medicine.  Side Effects  The following is a list of some common side effects from this medicine. Please speak with your doctor about what you should do if you experience these or other side effects.    agitated feeling or trouble sleeping    decreased appetite    dizziness    drowsiness or sedation    lack of energy and tiredness    headaches    reaction at the area of the injection (pain, redness, swelling)    pain in the joints    muscle pain    nausea    runny nose    stomach upset or abdominal pain  Call your doctor or get medical help right away if you notice any of these more serious side effects:    severe abdominal or pelvic pain    severe allergic reaction    chest pain    confusion    diarrhea    hallucinations (unusual thoughts, seeing or hearing things that are not real)    severe or persistent headache    symptoms of liver damage (such as yellowing of skin or eyes, dark urine, unusual tiredness or weakness; severe stomach or back pain)    nervousness    pain near injection site    shakiness    shortness of breath    light colored stool    severe or persistent vomiting  A few people may have an allergic reactions to this medicine. Symptoms can include difficulty breathing, skin rash, itching, swelling, or severe dizziness. If you notice any of these symptoms, seek medical help quickly.  Extra  Please speak with your doctor, nurse, or pharmacist if you have any questions about this medicine.  [email or web address]  IMPORTANT NOTE: This document tells you briefly how to take your medicine, but it does not tell you all there is to know about it.Your doctor or pharmacist may give you other documents about your medicine. Please talk to them if you have any questions.Always follow their advice. There is a more complete description of this medicine available in English.Scan this code on your smartphone or tablet or use the web address below. You can also  ask your pharmacist for a printout. If you have any questions, please ask your pharmacist.     2021 [employer].

## 2022-01-07 NOTE — GROUP NOTE
Group Therapy Documentation    PATIENT'S NAME: Winifred Bashir  MRN:   [id number]  :   1970  ACCT. NUMBER: [id number]  DATE OF SERVICE: 22  START TIME:  9:00 AM  END TIME: 11:00 AM  FACILITATOR(S): Virginia Vargas LADC; Dale Woodward LADC  TOPIC: BEH Group Therapy  Number of patients attending the group:  6  Group Length:  2 Hours    Group Therapy Type: Recovery strategies    Summary of Group / Topics Discussed:    Recovery Principles      Group Attendance:  Attended group session    Patient's response to the group topic/interactions:  cooperative with task    Patient appeared to be Actively participating.        Client specific details:  Winifred participated in one hour of group therapy.

## 2022-01-07 NOTE — PROGRESS NOTES
Nursing Discharge Planning Meeting    Writer completed discharge planning meeting with patient. Discharge is planned for 1/12    Discussed appropriate follow up care to manage MALENA, MI and medical  and to obtain medication refills. Patient given a copy of their current medications for reference. Questions were answered at this time and the patient verbalized an understanding of the post-discharge follow up plan.    Patient  scheduled an appointment with their PCP   [facility] [address] [place], [place] [id number] [phone]   Enrrique Burroughs MD    [address] [place] [address] [address]    [place] [address] [phone] (Fax)         Karoline and rakel [place]  therapist and psychiatry and addiction med with  Dr Herring at [facility]     Continue to support patient in discharge planning as needed to assure appropriate continuity of care.     Tobacco Cessation  Patient participated in the nicotine replacement therapy for tobacco cessation or reduction during their treatment programming: Yes, Decreased tobacco use with NRT products       The patient was provided with community resources for follow-up to continue tobacco cessation support once in the community. Also the patient was encouraged to discuss their tobacco cessation efforts with the primary care provider.

## 2022-01-07 NOTE — LETTER
Date:January 8, 2022      Provider requested that no letter be sent. Do not send.       [facility]

## 2022-01-07 NOTE — PROGRESS NOTES
"At 13:05 Clinical  observed pt return to unit from an appt with a large bag. Writer and STEVEN Reynoso spoke with pt to determine the contents of the bag/determine where pt had gone. Pt was in her room's restroom with the door shut during the conversation; writer was long term inside pt's room and STEVEN Reynoso was in the 5th floor hallway. Writer asked pt, \"You came back with a bag...?\" and pt stated that she'd \"gone shopping for her kids.\" Writer asked pt where she went and she replied, \"The giftshop.\" Writer reminded pt that the giftshop is off-limits; pt responded \"I know, I know I bent the rules, it's just I'm leaving soon and I missed [place] and I wanted to get them something.\" Writer asked pt if she was comfortable with her bag being searched and pt indicated that she was (there were pajamas, kids' books, and food items inside). Writer informed pt that she could keep the items she'd purchased but there was still the issue of her having entered an off-limits part of campus unescorted. Pt asked, \"What happens next? I'm not on drugs or anything,\" and writer replied, \"I'll consult staff but I'm thinking it might be a good idea for you to do a UA.\" Pt stated, \"Please give me a UA, I'm fine with that.\" Pt reported she had just urinated, but that she would drink water and come to writer's office to take a UA as soon as possible.   "

## 2022-01-07 NOTE — LETTER
1/7/2022         RE: Winifred Bashir  [address]  [place] [address]        Dear Colleague,    Thank you for referring your patient, Winifred Bashir, to the [facility]. Please see a copy of my visit note below.    Infusion Nursing Note:  Winifred Bashir presents today for Vivitrol injection.    Patient seen by provider today: Yes: in clinic   present during visit today: Not Applicable.    Note:   -Patient has received this medication in the past. Information packets offered to patient, but she declined. She did accept the medical bracelet and administration card. No questions from patient today.  -Vivitrol administered in the left ventral gluteal muscle.     Intravenous Access:  No Intravenous access/labs at this visit.    Treatment Conditions:  Not Applicable.    Post Infusion Assessment:  Patient tolerated injection without incident.     Discharge Plan:   AVS to patient via MYCHART.  Patient will return TBD for next appointment- patient prefers to call to schedule additional doses (number given to patient).   Patient discharged in stable condition accompanied by: self.  Departure Mode: Ambulatory.      Analilia Nichols, ROZ  Administrations This Visit     naltrexone (VIVITROL) injection 380 mg     Admin Date  01/07/2022 Action  Given Dose  380 mg Route  Intramuscular Administered By  Analilia Nichols, RN                                  Again, thank you for allowing me to participate in the care of your patient.        Sincerely,        [facility]

## 2022-01-07 NOTE — PROGRESS NOTES
Infusion Nursing Note:  Winifred Bashir presents today for Vivitrol injection.    Patient seen by provider today: Yes: in clinic   present during visit today: Not Applicable.    Note:   -Patient has received this medication in the past. Information packets offered to patient, but she declined. She did accept the medical bracelet and administration card. No questions from patient today.  -Vivitrol administered in the left ventral gluteal muscle.     Intravenous Access:  No Intravenous access/labs at this visit.    Treatment Conditions:  Not Applicable.    Post Infusion Assessment:  Patient tolerated injection without incident.     Discharge Plan:   AVS to patient via MYCHART.  Patient will return TBD for next appointment- patient prefers to call to schedule additional doses (number given to patient).   Patient discharged in stable condition accompanied by: self.  Departure Mode: Ambulatory.      Analilia Nichols, ROZ  Administrations This Visit     naltrexone (VIVITROL) injection 380 mg     Admin Date  01/07/2022 Action  Given Dose  380 mg Route  Intramuscular Administered By  Analilia Nichols, RN

## 2022-01-08 ENCOUNTER — HOSPITAL ENCOUNTER (OUTPATIENT)
Dept: BEHAVIORAL HEALTH | Facility: CLINIC | Age: 52
End: 2022-01-08
Attending: FAMILY MEDICINE
Payer: COMMERCIAL

## 2022-01-08 VITALS — TEMPERATURE: 97.7 F | OXYGEN SATURATION: 99 %

## 2022-01-08 PROCEDURE — 1002N00001 HC LODGING PLUS FACILITY CHARGE ADULT

## 2022-01-08 PROCEDURE — H2035 A/D TX PROGRAM, PER HOUR: HCPCS | Mod: HQ

## 2022-01-08 NOTE — GROUP NOTE
Group Therapy Documentation    PATIENT'S NAME: Winifred Bashir  MRN:   [id number]  :   1970  ACCT. NUMBER: [id number]  DATE OF SERVICE: 22  START TIME:  9:30 AM  END TIME: 11:30 AM  FACILITATOR(S): Ander Raya LADC  TOPIC: BEH Group Therapy  Number of patients attending the group:  28  Group Length:  2 Hours    Group Therapy Type: Recovery strategies    Summary of Group / Topics Discussed:    Recovery Principles, Sober coping skills, and Relapse prevention      Group Attendance:  Attended group session    Patient's response to the group topic/interactions:  cooperative with task    Patient appeared to be Actively participating.        Client specific details:  Patient participated in group focused on relapse prevention.

## 2022-01-08 NOTE — GROUP NOTE
Group Therapy Documentation    PATIENT'S NAME: Winifred Bashir  MRN:   [id number]  :   1970  ACCT. NUMBER: [id number]  DATE OF SERVICE: 22  START TIME: 12:30 PM  END TIME:  2:30 PM  FACILITATOR(S): Ander Raya LADC  TOPIC: BEH Group Therapy  Number of patients attending the group: 27  Group Length:  2 Hours    Group Therapy Type: Recovery strategies    Summary of Group / Topics Discussed:    Recovery Principles, Sober coping skills, and Relapse prevention      Group Attendance:  Attended group session    Patient's response to the group topic/interactions:  cooperative with task    Patient appeared to be Actively participating.        Client specific details:  Patient participated in refusal skilsl development group.

## 2022-01-09 ENCOUNTER — HOSPITAL ENCOUNTER (OUTPATIENT)
Dept: BEHAVIORAL HEALTH | Facility: CLINIC | Age: 52
End: 2022-01-09
Attending: FAMILY MEDICINE
Payer: COMMERCIAL

## 2022-01-09 VITALS — OXYGEN SATURATION: 99 % | TEMPERATURE: 97 F

## 2022-01-09 PROCEDURE — 1002N00001 HC LODGING PLUS FACILITY CHARGE ADULT

## 2022-01-09 PROCEDURE — H2035 A/D TX PROGRAM, PER HOUR: HCPCS | Mod: HQ

## 2022-01-09 NOTE — GROUP NOTE
Group Therapy Documentation    PATIENT'S NAME: Winifred Bashir  MRN:   [id number]  :   1970  ACCT. NUMBER: [id number]  DATE OF SERVICE: 22  START TIME:  8:45 AM  END TIME: 10:30 AM  FACILITATOR(S): Mirela Shaffer, ROZ; Elba Lucero LADC  TOPIC: BEH Group Therapy  Number of patients attending the group:  27  Group Length:  2 Hours    Group Therapy Type:  Self-care    Summary of Group / Topics Discussed:    Self-care activities and balance of nutrition, exercise and sleep.    Group Attendance: Attended group session.     Patients response to the group topic/interactions: cooperative with task, discussed personal experience with topic and/or asked questions.    Patient appeared to be: Actively participating, Attentive, and Engaged.     Client specific details:  Patient attended self-care workshop, presented by [facility] plus nurse, Mirela Shaffer RN. Patient was educated about nutrition, fad diets, eating disorders, exercise, vitamins/supplements, and sleep. Patient appears to have gained knowledge about nutrition, exercise, sleep and their impact on addiction.

## 2022-01-09 NOTE — GROUP NOTE
Group Therapy Documentation    PATIENT'S NAME: Winifred Bashir  MRN:   [id number]  :   1970  ACCT. NUMBER: [id number]  DATE OF SERVICE: 22  START TIME: 12:30 PM  END TIME:  1:30 PM  FACILITATOR(S): Mara Cortes  TOPIC: BEH Group Therapy  Number of patients attending the group:  27    Group Length:  1 Hour    Group Therapy Type: Health and wellbeing     Summary of Group / Topics Discussed:    Self-care activities      Group Attendance:  Attended group session    Patient's response to the group topic/interactions:  cooperative with task    Patient appeared to be Actively participating, Attentive and Engaged.        Client specific details:  Patient was attentive and participative during smoking cessation lecture.

## 2022-01-10 ENCOUNTER — HOSPITAL ENCOUNTER (OUTPATIENT)
Dept: BEHAVIORAL HEALTH | Facility: CLINIC | Age: 52
End: 2022-01-10
Attending: FAMILY MEDICINE
Payer: COMMERCIAL

## 2022-01-10 VITALS — OXYGEN SATURATION: 94 % | TEMPERATURE: 97.7 F

## 2022-01-10 PROCEDURE — H2035 A/D TX PROGRAM, PER HOUR: HCPCS | Mod: HQ

## 2022-01-10 PROCEDURE — 1002N00001 HC LODGING PLUS FACILITY CHARGE ADULT

## 2022-01-10 RX ORDER — BUPROPION HYDROCHLORIDE 300 MG/1
300 TABLET ORAL EVERY MORNING
Qty: 30 TABLET | Refills: 0 | Status: SHIPPED | OUTPATIENT
Start: 2022-01-10 | End: 2022-01-27

## 2022-01-10 RX ORDER — GABAPENTIN 100 MG/1
100 CAPSULE ORAL 3 TIMES DAILY PRN
Qty: 90 CAPSULE | Refills: 0 | Status: SHIPPED | OUTPATIENT
Start: 2022-01-10 | End: 2022-01-27

## 2022-01-10 RX ORDER — TRAZODONE HYDROCHLORIDE 50 MG/1
50 TABLET, FILM COATED ORAL
Qty: 30 TABLET | Refills: 0 | Status: SHIPPED | OUTPATIENT
Start: 2022-01-10 | End: 2022-01-27

## 2022-01-10 RX ORDER — CITALOPRAM HYDROBROMIDE 10 MG/1
30 TABLET ORAL DAILY
Qty: 90 TABLET | Refills: 0 | Status: SHIPPED | OUTPATIENT
Start: 2022-01-10 | End: 2022-01-27

## 2022-01-10 RX ORDER — TOPIRAMATE 25 MG/1
25 TABLET, FILM COATED ORAL
Qty: 90 TABLET | Refills: 0 | Status: SHIPPED | OUTPATIENT
Start: 2022-01-10 | End: 2022-01-27

## 2022-01-10 NOTE — GROUP NOTE
Group Therapy Documentation    PATIENT'S NAME: Winifred Bashir  MRN:   [id number]  :   1970  ACCT. NUMBER: [id number]  DATE OF SERVICE: 1/10/22  START TIME:  9:00 AM  END TIME: 11:00 AM  FACILITATOR(S): Dale Woodward LADC  TOPIC: BEH Group Therapy  Number of patients attending the group: 6  Group Length:  2 Hours    Group Therapy Type: Recovery strategies    Summary of Group / Topics Discussed:    Recovery Principles, Sober coping skills, Relationship/socialization, Balanced lifestyle, Trauma informed care, Disease of addiction, Relapse prevention, and Self-care activities      Group Attendance:  Attended group session    Patient's response to the group topic/interactions:  cooperative with task    Patient appeared to be Attentive and Engaged.        Client specific details:  Winifred participated in morning group. She took part in the reading and discussion and checked in.  She then reported on her weekend goals and listened and gave feedback to a peer process.

## 2022-01-10 NOTE — GROUP NOTE
Group Therapy Documentation    PATIENT'S NAME: Winifred Bashir  MRN:   [id number]  :   1970  ACCT. NUMBER: [id number]  DATE OF SERVICE: 1/10/22  START TIME: 12:30 PM  END TIME:  2:30 PM  FACILITATOR(S): Teodora Weiss LADC  TOPIC: BEH Group Therapy  Number of patients attending the group: 6  Group Length:  2 Hours    Group Therapy Type: Emotion processing    Summary of Group / Topics Discussed:    Trauma informed care, Emotions/expression, and Relapse prevention      Group Attendance:  Attended group session    Patient's response to the group topic/interactions:  cooperative with task    Patient appeared to be Actively participating, Attentive and Engaged.        Client specific details: Winifred was an active participant in afternoon group therapy session. She participated in a discussion on trauma and offered supportive feedback. She also presented her assignment on relapse triggers and cues.

## 2022-01-11 ENCOUNTER — HOSPITAL ENCOUNTER (OUTPATIENT)
Dept: BEHAVIORAL HEALTH | Facility: CLINIC | Age: 52
End: 2022-01-11
Attending: FAMILY MEDICINE
Payer: COMMERCIAL

## 2022-01-11 VITALS — OXYGEN SATURATION: 96 % | TEMPERATURE: 98.2 F

## 2022-01-11 PROCEDURE — H2035 A/D TX PROGRAM, PER HOUR: HCPCS | Mod: HQ

## 2022-01-11 PROCEDURE — 1002N00001 HC LODGING PLUS FACILITY CHARGE ADULT

## 2022-01-11 NOTE — GROUP NOTE
"Group Therapy Documentation    PATIENT'S NAME: Winifred Bashir  MRN:   [id number]  :   1970  ACCT. NUMBER: [id number]  DATE OF SERVICE: 22  START TIME: 12:30 PM  END TIME:  2:30 PM  FACILITATOR(S): Gali Ng LADC  TOPIC: BEH Group Therapy  Number of patients attending the group:  6  Group Length:  2 Hours    Group Therapy Type: Recovery strategies and Emotion processing    Summary of Group / Topics Discussed:    Relationship/socialization and Emotions/expression    Group Attendance:  Attended group session    Patient's response to the group topic/interactions:  cooperative with task    Patient appeared to be Actively participating, Attentive and Engaged.        Client specific details: Pt gave her peer appropriate feedback on her \"attachment\" assignment, and shared that her mother was absent during her adolescence. She participated in a group discussion of how childhood experiences affect adulthood, and had a graduation ceremony.  "

## 2022-01-11 NOTE — GROUP NOTE
Group Therapy Documentation    PATIENT'S NAME: Winifred Bashir  MRN:   [id number]  :   1970  ACCT. NUMBER: [id number]  DATE OF SERVICE: 22  START TIME:  9:00 AM  END TIME: 11:00 AM  FACILITATOR(S): Teodora Weiss LADC  TOPIC: BEH Group Therapy  Number of patients attending the group: 6  Group Length:  2 Hours    Group Therapy Type: Emotion processing    Summary of Group / Topics Discussed:    Sober coping skills, Trauma informed care, and Emotions/expression      Group Attendance:  Attended group session    Patient's response to the group topic/interactions:  cooperative with task    Patient appeared to be Actively participating, Attentive and Engaged.        Client specific details: Winifred was an active participant in morning group therapy session.

## 2022-01-11 NOTE — GROUP NOTE
Psychoeducation Group Documentation    PATIENT'S NAME: Winifred Bashir  MRN:   [id number]  :   1970  ACCT. NUMBER: [id number]  DATE OF SERVICE: 22  START TIME:  3:00 PM  END TIME:  4:00 PM  FACILITATOR(S): Gali Ng LADC; Mara Cortes  TOPIC: BEH Pyschoeducation  Number of patients attending the group:  23  Group Length:  1 Hours    Skills Group Therapy Type: Recovery skills and Healthy behaviors development    Summary of Group / Topics Discussed:    Balanced lifestyle skills and Relapse prevention skills    Group Attendance:  Attended group session    Patient's response to the group topic/interactions:  cooperative with task    Patient appeared to be Attentive and Engaged.         Client specific details:  Pt viewed a video about the escalating effects of addiction and how families can be affected, and participated in a discussion.

## 2022-01-12 NOTE — PROGRESS NOTES
[facility]   Adult Chemical Dependency  Lodging Plus Program   [address] [place] [address]        1/12/2022     Winifred Bashir, 1970, was admitted for evaluation/treatment of chemical dependency at [facility].  This person took part in these program(s):    ______ The Inpatient Program   ______ The Outpatient Program   __X___ The Lodging Plus Program   ______ Lodging Day Outpatient       Date admitted: 12-15-21  Date discharged: 1-12-22    Type of discharge:   ___X__ Satisfactory - completed evaluation / treatment   ______ Discharged without completing   ______ Behavioral discharge   ______ Transferred to another chemical dependency program   ______ Transferred to another type of service   ______ Left against medical advice (AMA) / PAULETTE Garcia

## 2022-01-12 NOTE — PROGRESS NOTES
MICD Discharge Summary/Instructions     Patient: Winifred Bashir   MRN: [id number]  : 1970  Age: 51 year old Sex: female    Focus of Treatment / Discharge Recommendations    Personal Safety/Management of Symptoms    * Follow your safety plan. Report increased symptoms to your care team and/or go to the nearest Emergency Department.    * Call crisis lines as needed    [facility] [phone]               [facility] [phone]  [facility] [phone]               [facility] [phone]  [facility]--[phone]              Crisis Connection [phone]  [facility] [phone]          National Suicide Prevention [phone]  [facility] [phone]            Suicide Prevention [phone]  [facility] [phone]    CALL 911    Abstinence/Relapse Prevention  * Take all medicines as directed.  Carry a current list of medicines with you.   * Use coping skills: nutrition, rest, exercise, spirituality, journal, meditation, engage in activities you enjoy, seek sober support, practice letting go.  * Do not use illicit (street) drugs, controlled substances (narcotics) or alcohol.    Develop/Improve Independent Living/Socialization Skills: Ensure living environment is conducive to sobriety.    Community Resources/Supports and Discharge Planning:  Maintain abstinence from all mood altering substances, attend 2+ 12-step meetings per week, maintain contact with a program sponsor in a 12-step group, enter aftercare at [facility] and follow recommendations, continue with 1.1 therapy, maintain good physical and mental health care, maintain contact with [facility].      [facility] Alcoholics Anonymous:  [phone] ( 24 hours a day)    [place]  Alcoholics Anonymous : [phone]    Narcotics Anonymous : [address] [place] [address] ( [phone])    [place] Recovery Connection: Free recovery resources.  [phone]    Access chats,  online meetings, discussions and healthy check-in activities any day, any time, from anywhere. Participate as anonymously as you like. In order to access this resource,  Go to: [facility]   Click on the In recovery tab. Then click on Social Community. Click on The daily Pledge: people helping people 24/7 to join.     Follow up with psychiatrist / main caregiver: Dr. Herring.     Follow up with your therapist at [facility].    Go to group therapy and / or support groups at: [facility] [phone]        Client Signature:_______________________   Date / Time:___________      Staff Signature:________________________   Date / Time:___________

## 2022-01-13 NOTE — ADDENDUM NOTE
Encounter addended by: Teodora Weiss LADC on: 1/13/2022 12:42 PM   Actions taken: Clinical Note Signed

## 2022-01-18 VITALS
HEART RATE: 76 BPM | TEMPERATURE: 98.8 F | SYSTOLIC BLOOD PRESSURE: 126 MMHG | WEIGHT: 187 LBS | DIASTOLIC BLOOD PRESSURE: 71 MMHG | BODY MASS INDEX: 29.73 KG/M2

## 2022-01-18 VITALS
BODY MASS INDEX: 29.89 KG/M2 | HEART RATE: 80 BPM | WEIGHT: 188 LBS | SYSTOLIC BLOOD PRESSURE: 139 MMHG | DIASTOLIC BLOOD PRESSURE: 85 MMHG

## 2022-01-18 VITALS
DIASTOLIC BLOOD PRESSURE: 75 MMHG | BODY MASS INDEX: 29.35 KG/M2 | HEIGHT: 67 IN | HEART RATE: 85 BPM | RESPIRATION RATE: 18 BRPM | WEIGHT: 187 LBS | TEMPERATURE: 97.9 F | SYSTOLIC BLOOD PRESSURE: 136 MMHG

## 2022-01-18 VITALS
HEART RATE: 68 BPM | DIASTOLIC BLOOD PRESSURE: 67 MMHG | SYSTOLIC BLOOD PRESSURE: 133 MMHG | BODY MASS INDEX: 29.25 KG/M2 | WEIGHT: 184 LBS | TEMPERATURE: 98.3 F

## 2022-01-18 VITALS
WEIGHT: 184 LBS | SYSTOLIC BLOOD PRESSURE: 141 MMHG | BODY MASS INDEX: 29.7 KG/M2 | TEMPERATURE: 98.5 F | HEART RATE: 57 BPM | DIASTOLIC BLOOD PRESSURE: 79 MMHG

## 2022-01-18 VITALS
SYSTOLIC BLOOD PRESSURE: 131 MMHG | WEIGHT: 188 LBS | HEART RATE: 66 BPM | TEMPERATURE: 98.4 F | DIASTOLIC BLOOD PRESSURE: 72 MMHG | BODY MASS INDEX: 29.89 KG/M2

## 2022-01-18 VITALS
SYSTOLIC BLOOD PRESSURE: 111 MMHG | WEIGHT: 180 LBS | DIASTOLIC BLOOD PRESSURE: 74 MMHG | HEART RATE: 62 BPM | TEMPERATURE: 98.3 F | BODY MASS INDEX: 29.05 KG/M2

## 2022-01-18 VITALS
SYSTOLIC BLOOD PRESSURE: 124 MMHG | BODY MASS INDEX: 30.05 KG/M2 | WEIGHT: 187 LBS | DIASTOLIC BLOOD PRESSURE: 82 MMHG | HEIGHT: 66 IN

## 2022-01-18 VITALS
BODY MASS INDEX: 29.57 KG/M2 | DIASTOLIC BLOOD PRESSURE: 73 MMHG | HEART RATE: 72 BPM | WEIGHT: 186 LBS | TEMPERATURE: 98.4 F | SYSTOLIC BLOOD PRESSURE: 144 MMHG

## 2022-01-18 VITALS
RESPIRATION RATE: 18 BRPM | WEIGHT: 186 LBS | HEIGHT: 67 IN | SYSTOLIC BLOOD PRESSURE: 134 MMHG | BODY MASS INDEX: 29.19 KG/M2 | HEART RATE: 81 BPM | DIASTOLIC BLOOD PRESSURE: 64 MMHG

## 2022-01-18 VITALS
TEMPERATURE: 98.8 F | WEIGHT: 185 LBS | SYSTOLIC BLOOD PRESSURE: 111 MMHG | DIASTOLIC BLOOD PRESSURE: 66 MMHG | BODY MASS INDEX: 29.86 KG/M2 | HEART RATE: 80 BPM

## 2022-01-18 VITALS
SYSTOLIC BLOOD PRESSURE: 120 MMHG | BODY MASS INDEX: 29.27 KG/M2 | WEIGHT: 182.12 LBS | DIASTOLIC BLOOD PRESSURE: 71 MMHG | HEIGHT: 66 IN

## 2022-01-18 VITALS
SYSTOLIC BLOOD PRESSURE: 132 MMHG | HEIGHT: 67 IN | WEIGHT: 188 LBS | DIASTOLIC BLOOD PRESSURE: 83 MMHG | HEART RATE: 61 BPM | BODY MASS INDEX: 29.51 KG/M2

## 2022-01-18 VITALS
HEART RATE: 70 BPM | TEMPERATURE: 99 F | SYSTOLIC BLOOD PRESSURE: 132 MMHG | BODY MASS INDEX: 29.89 KG/M2 | DIASTOLIC BLOOD PRESSURE: 75 MMHG | WEIGHT: 188 LBS

## 2022-01-18 VITALS
BODY MASS INDEX: 28.73 KG/M2 | WEIGHT: 178 LBS | SYSTOLIC BLOOD PRESSURE: 115 MMHG | DIASTOLIC BLOOD PRESSURE: 72 MMHG | TEMPERATURE: 98.8 F | HEART RATE: 76 BPM

## 2022-01-18 VITALS
SYSTOLIC BLOOD PRESSURE: 134 MMHG | BODY MASS INDEX: 30.18 KG/M2 | WEIGHT: 187 LBS | HEART RATE: 87 BPM | DIASTOLIC BLOOD PRESSURE: 90 MMHG

## 2022-01-18 VITALS
HEART RATE: 94 BPM | DIASTOLIC BLOOD PRESSURE: 85 MMHG | SYSTOLIC BLOOD PRESSURE: 150 MMHG | WEIGHT: 180 LBS | BODY MASS INDEX: 29.05 KG/M2

## 2022-01-18 VITALS
SYSTOLIC BLOOD PRESSURE: 133 MMHG | WEIGHT: 185 LBS | HEIGHT: 67 IN | DIASTOLIC BLOOD PRESSURE: 94 MMHG | HEART RATE: 77 BPM | BODY MASS INDEX: 29.03 KG/M2

## 2022-01-18 VITALS — SYSTOLIC BLOOD PRESSURE: 123 MMHG | HEART RATE: 68 BPM | DIASTOLIC BLOOD PRESSURE: 64 MMHG

## 2022-01-18 VITALS
BODY MASS INDEX: 30.02 KG/M2 | DIASTOLIC BLOOD PRESSURE: 71 MMHG | SYSTOLIC BLOOD PRESSURE: 124 MMHG | HEART RATE: 68 BPM | WEIGHT: 186 LBS

## 2022-01-18 ASSESSMENT — PATIENT HEALTH QUESTIONNAIRE - PHQ9
SUM OF ALL RESPONSES TO PHQ QUESTIONS 1-9: 5
SUM OF ALL RESPONSES TO PHQ QUESTIONS 1-9: 7
SUM OF ALL RESPONSES TO PHQ QUESTIONS 1-9: 8
SUM OF ALL RESPONSES TO PHQ QUESTIONS 1-9: 7
SUM OF ALL RESPONSES TO PHQ QUESTIONS 1-9: 2
SUM OF ALL RESPONSES TO PHQ QUESTIONS 1-9: 9
SUM OF ALL RESPONSES TO PHQ QUESTIONS 1-9: 3

## 2022-01-22 ENCOUNTER — OFFICE VISIT (OUTPATIENT)
Dept: FAMILY MEDICINE | Facility: CLINIC | Age: 52
End: 2022-01-22
Payer: COMMERCIAL

## 2022-01-22 VITALS
TEMPERATURE: 97.5 F | OXYGEN SATURATION: 99 % | SYSTOLIC BLOOD PRESSURE: 121 MMHG | DIASTOLIC BLOOD PRESSURE: 82 MMHG | HEART RATE: 59 BPM

## 2022-01-22 DIAGNOSIS — N89.8 VAGINAL DISCHARGE: ICD-10-CM

## 2022-01-22 DIAGNOSIS — R30.0 DYSURIA: ICD-10-CM

## 2022-01-22 DIAGNOSIS — B96.89 BACTERIAL VAGINOSIS: Primary | ICD-10-CM

## 2022-01-22 DIAGNOSIS — N76.0 BACTERIAL VAGINOSIS: Primary | ICD-10-CM

## 2022-01-22 DIAGNOSIS — Z11.3 SCREEN FOR STD (SEXUALLY TRANSMITTED DISEASE): ICD-10-CM

## 2022-01-22 LAB
ALBUMIN UR-MCNC: NEGATIVE MG/DL
APPEARANCE UR: CLEAR
BACTERIA #/AREA URNS HPF: ABNORMAL /HPF
BILIRUB UR QL STRIP: NEGATIVE
CLUE CELLS: PRESENT
COLOR UR AUTO: YELLOW
GLUCOSE UR STRIP-MCNC: NEGATIVE MG/DL
HGB UR QL STRIP: NEGATIVE
KETONES UR STRIP-MCNC: ABNORMAL MG/DL
LEUKOCYTE ESTERASE UR QL STRIP: ABNORMAL
NITRATE UR QL: NEGATIVE
PH UR STRIP: 5.5 [PH] (ref 5–8)
RBC #/AREA URNS AUTO: ABNORMAL /HPF
SP GR UR STRIP: 1.02 (ref 1–1.03)
SQUAMOUS #/AREA URNS AUTO: ABNORMAL /LPF
TRICHOMONAS, WET PREP: ABNORMAL
UROBILINOGEN UR STRIP-ACNC: 0.2 E.U./DL
WBC #/AREA URNS AUTO: ABNORMAL /HPF
WBC'S/HIGH POWER FIELD, WET PREP: ABNORMAL
YEAST, WET PREP: ABNORMAL

## 2022-01-22 PROCEDURE — 87591 N.GONORRHOEAE DNA AMP PROB: CPT | Performed by: NURSE PRACTITIONER

## 2022-01-22 PROCEDURE — 87491 CHLMYD TRACH DNA AMP PROBE: CPT | Performed by: NURSE PRACTITIONER

## 2022-01-22 PROCEDURE — 81001 URINALYSIS AUTO W/SCOPE: CPT | Performed by: NURSE PRACTITIONER

## 2022-01-22 PROCEDURE — 87210 SMEAR WET MOUNT SALINE/INK: CPT | Performed by: NURSE PRACTITIONER

## 2022-01-22 PROCEDURE — 99203 OFFICE O/P NEW LOW 30 MIN: CPT | Performed by: NURSE PRACTITIONER

## 2022-01-22 RX ORDER — CLINDAMYCIN PHOSPHATE 20 MG/G
1 CREAM VAGINAL AT BEDTIME
Qty: 40 G | Refills: 0 | Status: SHIPPED | OUTPATIENT
Start: 2022-01-22 | End: 2022-01-29

## 2022-01-22 ASSESSMENT — ENCOUNTER SYMPTOMS
DIFFICULTY URINATING: 1
DYSURIA: 1
FEVER: 0
FREQUENCY: 1
ABDOMINAL PAIN: 0
NAUSEA: 0
CHILLS: 0
VOMITING: 0
FLANK PAIN: 0

## 2022-01-22 NOTE — PATIENT INSTRUCTIONS
Patient Education     Bacterial Vaginosis    You have a vaginal infection called bacterial vaginosis (BV). Both good and bad bacteria are present in a healthy vagina. BV occurs when these bacteria get out of balance. The number of bad bacteria increase. And the number of good bacteria decrease. BV is linked with sexual activity, but it's not a sexually transmitted infection (STI).   BV may or may not cause symptoms. If symptoms do occur, they can include:     Thin, gray, milky-white, or sometimes green discharge    Unpleasant odor or  fishy  smell    Itching, burning, or pain in or around the vagina  It is not known what causes BV, but certain factors can make the problem more likely. These can include:     Douching    Spermicides    Use of antibiotics    Change in hormone levels with pregnancy, breastfeeding, or menopause    Having sex with a new partner    Having sex with more than one partner  BV will sometimes go away on its own. But treatment is often advised. This is because untreated BV can raise the risk of more serious health problems such as:     Pelvic inflammatory disease (PID)     delivery (giving birth to a baby early if you re pregnant)    HIV and some other sexually transmitted infections (STIs)    Infection after surgery on the reproductive organs  Home care  General care    BV is most often treated with medicines called antibiotics. These may be given as pills or as a vaginal cream. If antibiotics are prescribed, be sure to use them exactly as directed. And complete all of the medicine, even if your symptoms go away.    Don't douche or having sex during treatment.    If you have sex with a female partner, ask your healthcare provider if she should also be treated.  Prevention    Don't douche.    Don't have sex. If you do have sex, then take steps to lower your risk:  ? Use condoms when having sex.  ? Limit the number of sex partners you have.    Follow-up care  Follow up with your  healthcare provider, or as advised.   When to get medical advice  Call your healthcare provider right away if:     You have a fever of 100.4 F (38 C) or higher, or as directed by your provider.    Your symptoms get worse, or they don t go away within a few days of starting treatment.    You have new pain in the lower belly or pelvic region.    You have side effects that bother you or a reaction to the pills or cream you re prescribed.    You or any of your sex partners have new symptoms, such as a rash, joint pain, or sores.  nVoq last reviewed this educational content on 6/1/2020 2000-2021 [employer]. All rights reserved. This information is not intended as a substitute for professional medical care. Always follow your healthcare professional's instructions.

## 2022-01-22 NOTE — PROGRESS NOTES
Assessment & Plan     Dysuria    - UA macro with reflex to Microscopic and Culture - Clinc Collect  - Urine Microscopic    Vaginal discharge    - Wet prep - Clinic Collect      Screen for STD (sexually transmitted disease)    - Neisseria gonorrhoeae PCR  - Chlamydia trachomatis PCR  - Chlamydia trachomatis PCR  - Neisseria gonorrhoeae PCR    Bacterial vaginosis    - clindamycin (CLEOCIN) 2 % vaginal cream  Dispense: 40 g; Refill: 0     Winifred is a 51 year old female who has a PMH of polysubstance abuse, bipolar disorder, who was here today for burning, painful urination. Patient was recently ( 12/29/2021)  treated for similar symptoms with cefdinir for presumed minimally positive uti. No culture was done. Patient stated that her symptoms did not fully clear up.     Wet prep positive for BV.     UA negative for pyuria and few bacteria which could be from BV.     Start clindamycin intravaginal cream 1 applicator daily for 7 days due to history of alcohol abuse and relapse. Patient informed that typical treatment of metronidazole would cause serious side effects with use of alcohol.    Patient agreed for the STD screening,but decline empiric treatment as she hasn't been sexually active for the past 2 years.    Patient in agreement with plan. Recheck in 7 days if not better.     No follow-ups on file.    [place], [facility] is a 51 year old female who presents to clinic today for the following health issues:  Chief Complaint   Patient presents with     Urinary Problem     urinary burning and painful urination, urine smell. Pt was recently treated for UTI, symptoms did not completely clear up.      HPI   Winifred is a 51 year old female who has a PMH of polysubstance abuse, bipolar disorder, who was here today for burning, painful urination that started few week ago.     Patient was recently ( 12/29/2021)  treated for similar symptoms with cefdinir for presumed  minimally positive UTI no culture was done. Patient stated that her symptoms did not fully clear up.     Of note patient stated that she has been sober for for few years, but had relapse during [place] time, including EtOH. She also stated no sexual intercourse for the past 2 years.         Review of Systems   Constitutional: Negative for chills and fever.   Gastrointestinal: Negative for abdominal pain, nausea and vomiting.   Genitourinary: Positive for difficulty urinating, dysuria, frequency and urgency. Negative for flank pain, pelvic pain and vaginal bleeding.           Objective    /82 (BP Location: Right arm, Patient Position: Chair, Cuff Size: Adult Regular)   Pulse 59   Temp 97.5  F (36.4  C) (Tympanic)   LMP  (LMP Unknown)   SpO2 99%   Physical Exam  Cardiovascular:      Rate and Rhythm: Normal rate.      Pulses: Normal pulses.   Pulmonary:      Effort: Pulmonary effort is normal.   Abdominal:      Tenderness: There is no right CVA tenderness or left CVA tenderness.   Neurological:      Mental Status: She is alert.   Psychiatric:         Mood and Affect: Mood normal.         Behavior: Behavior normal.         Thought Content: Thought content normal.         Judgment: Judgment normal.          Results for orders placed or performed in visit on 01/22/22   UA macro with reflex to Microscopic and Culture - Clinc Collect     Status: Abnormal    Specimen: Urine, Midstream   Result Value Ref Range    Color Urine Yellow Colorless, Straw, Light Yellow, Yellow    Appearance Urine Clear Clear    Glucose Urine Negative Negative mg/dL    Bilirubin Urine Negative Negative    Ketones Urine Trace (A) Negative mg/dL    Specific Gravity Urine 1.025 1.005 - 1.030    Blood Urine Negative Negative    pH Urine 5.5 5.0 - 8.0    Protein Albumin Urine Negative Negative mg/dL    Urobilinogen Urine 0.2 0.2, 1.0 E.U./dL    Nitrite Urine Negative Negative    Leukocyte Esterase Urine Trace (A) Negative   Urine Microscopic      Status: Abnormal   Result Value Ref Range    Bacteria Urine Few (A) None Seen /HPF    RBC Urine None Seen 0-2 /HPF /HPF    WBC Urine 0-5 0-5 /HPF /HPF    Squamous Epithelials Urine Few (A) None Seen /LPF    Narrative    Urine Culture not indicated   Wet prep - Clinic Collect     Status: Abnormal    Specimen: Vagina; Swab   Result Value Ref Range    Trichomonas Absent Absent    Yeast Absent Absent    Clue Cells Present (A) Absent    WBCs/high power field 1+ (A) None

## 2022-01-23 LAB
C TRACH DNA SPEC QL NAA+PROBE: NEGATIVE
N GONORRHOEA DNA SPEC QL NAA+PROBE: NEGATIVE

## 2022-01-27 ENCOUNTER — TELEPHONE (OUTPATIENT)
Dept: BEHAVIORAL HEALTH | Facility: CLINIC | Age: 52
End: 2022-01-27
Payer: COMMERCIAL

## 2022-01-27 ENCOUNTER — VIRTUAL VISIT (OUTPATIENT)
Dept: BEHAVIORAL HEALTH | Facility: CLINIC | Age: 52
End: 2022-01-27
Payer: COMMERCIAL

## 2022-01-27 DIAGNOSIS — F15.90 STIMULANT USE DISORDER: ICD-10-CM

## 2022-01-27 DIAGNOSIS — G89.29 CHRONIC BILATERAL LOW BACK PAIN WITH LEFT-SIDED SCIATICA: ICD-10-CM

## 2022-01-27 DIAGNOSIS — F33.1 MODERATE EPISODE OF RECURRENT MAJOR DEPRESSIVE DISORDER (H): ICD-10-CM

## 2022-01-27 DIAGNOSIS — F10.20 MODERATE ALCOHOL USE DISORDER (H): ICD-10-CM

## 2022-01-27 DIAGNOSIS — F63.0 GAMBLING DISORDER, PERSISTENT, SEVERE: Primary | ICD-10-CM

## 2022-01-27 DIAGNOSIS — M54.42 CHRONIC BILATERAL LOW BACK PAIN WITH LEFT-SIDED SCIATICA: ICD-10-CM

## 2022-01-27 DIAGNOSIS — F41.1 GENERALIZED ANXIETY DISORDER: ICD-10-CM

## 2022-01-27 PROCEDURE — 99214 OFFICE O/P EST MOD 30 MIN: CPT | Mod: 95 | Performed by: FAMILY MEDICINE

## 2022-01-27 RX ORDER — GABAPENTIN 100 MG/1
100 CAPSULE ORAL 3 TIMES DAILY PRN
Qty: 90 CAPSULE | Refills: 0 | Status: SHIPPED | OUTPATIENT
Start: 2022-01-27 | End: 2022-04-14

## 2022-01-27 RX ORDER — CITALOPRAM HYDROBROMIDE 20 MG/1
20 TABLET ORAL DAILY
Qty: 30 TABLET | Refills: 1 | Status: SHIPPED | OUTPATIENT
Start: 2022-01-27 | End: 2022-04-14

## 2022-01-27 RX ORDER — TRAZODONE HYDROCHLORIDE 50 MG/1
50 TABLET, FILM COATED ORAL
Qty: 30 TABLET | Refills: 1 | Status: SHIPPED | OUTPATIENT
Start: 2022-01-27 | End: 2022-04-14

## 2022-01-27 RX ORDER — NALTREXONE HYDROCHLORIDE 50 MG/1
50 TABLET, FILM COATED ORAL DAILY
Qty: 30 TABLET | Status: CANCELLED | OUTPATIENT
Start: 2022-01-27

## 2022-01-27 RX ORDER — CYCLOBENZAPRINE HCL 5 MG
5-10 TABLET ORAL 3 TIMES DAILY PRN
Qty: 30 TABLET | Refills: 1 | Status: ON HOLD | OUTPATIENT
Start: 2022-01-27 | End: 2022-12-05

## 2022-01-27 RX ORDER — HYDROXYZINE HYDROCHLORIDE 25 MG/1
50 TABLET, FILM COATED ORAL EVERY 4 HOURS PRN
Qty: 60 TABLET | Refills: 1 | Status: SHIPPED | OUTPATIENT
Start: 2022-01-27 | End: 2022-04-14

## 2022-01-27 RX ORDER — CITALOPRAM HYDROBROMIDE 10 MG/1
10 TABLET ORAL DAILY
Qty: 30 TABLET | Refills: 1 | Status: SHIPPED | OUTPATIENT
Start: 2022-01-27 | End: 2022-04-14

## 2022-01-27 RX ORDER — TOPIRAMATE 25 MG/1
25 TABLET, FILM COATED ORAL
Qty: 90 TABLET | Refills: 1 | Status: SHIPPED | OUTPATIENT
Start: 2022-01-27 | End: 2022-04-14

## 2022-01-27 RX ORDER — BUPROPION HYDROCHLORIDE 300 MG/1
300 TABLET ORAL EVERY MORNING
Qty: 30 TABLET | Refills: 1 | Status: SHIPPED | OUTPATIENT
Start: 2022-01-27 | End: 2022-04-14

## 2022-01-27 ASSESSMENT — ANXIETY QUESTIONNAIRES
5. BEING SO RESTLESS THAT IT IS HARD TO SIT STILL: NOT AT ALL
IF YOU CHECKED OFF ANY PROBLEMS ON THIS QUESTIONNAIRE, HOW DIFFICULT HAVE THESE PROBLEMS MADE IT FOR YOU TO DO YOUR WORK, TAKE CARE OF THINGS AT HOME, OR GET ALONG WITH OTHER PEOPLE: SOMEWHAT DIFFICULT
3. WORRYING TOO MUCH ABOUT DIFFERENT THINGS: SEVERAL DAYS
2. NOT BEING ABLE TO STOP OR CONTROL WORRYING: SEVERAL DAYS
GAD7 TOTAL SCORE: 7
6. BECOMING EASILY ANNOYED OR IRRITABLE: SEVERAL DAYS
1. FEELING NERVOUS, ANXIOUS, OR ON EDGE: MORE THAN HALF THE DAYS
7. FEELING AFRAID AS IF SOMETHING AWFUL MIGHT HAPPEN: SEVERAL DAYS
4. TROUBLE RELAXING: SEVERAL DAYS

## 2022-01-27 ASSESSMENT — PATIENT HEALTH QUESTIONNAIRE - PHQ9: SUM OF ALL RESPONSES TO PHQ QUESTIONS 1-9: 5

## 2022-01-27 NOTE — PROGRESS NOTES
This video/telephone visit will be conducted via a call between you and your physician/provider. We have found that certain health care needs can be provided without the need for an in-person physical exam. This service lets us provide the care you need with a video /telephone conversation. If a prescription is necessary we can send it directly to your pharmacy. If lab work is needed we can place an order for that and you can then stop by our lab to have the test done at a later time.    Just as we bill insurance for in-person visits, we also bill insurance for video/telephone visits. If you have questions about your insurance coverage, we recommend that you speak with your insurance company.    Patient has given verbal consent for video/Telephone visit? Yes   Patient would like video visit, please connect: [phone]  Reason for visit: medication follow up  Patient verified allergies, medications and pharmacy via telephone verbally with writer.   Last Vivitrol was at [facility] but due 2/4/2022. Need new orders to start injections here at [facility] clinic.    KELLEN-7 scores:  7 somewhat difficult   KELLEN-7 SCORE 12/15/2021 1/4/2022   Total Score 13 13     PHQ-9 scores:  5 not difficult   PHQ-9 SCORE 12/15/2021 1/4/2022   PHQ-9 Total Score - -   PHQ-9 Total Score 23 14     Patient states she is ready for visit.  [place]  to be reviewed by provider.   Marie Lester January 27, 2022 2:16 PM

## 2022-01-27 NOTE — PATIENT INSTRUCTIONS
Thanks for taking time to chat today.  You have been busy taking steps to maintain your sobriety - amazing.    Continue your current medications, refills sent to the pharmacy.    Reschedule Vivitrol injection to be done at [facility].

## 2022-01-27 NOTE — PROGRESS NOTES
MH&A Post-Appointment Cart -check      Correct pharmacy verified with patient and updated in chart? [x] yes []no    Charge captured ? [] yes  [] no [x] n/a-virtual     Medications ordered this visit were e-scribed.  Verified by order class [x] yes  [] no    List Medications:  Celexa 10 mg & 20 mg; cyclobenzaprine 5 mg; gabapentin 100 mg; hydroxyzine HCl 25 mg; Topamax 25 mg; trazodone 50 mg; Wellbutrin  mg    Medication changes or discontinuations were communicated to patient's pharmacy: [] yes  [x] no    UA collected [] yes  [] no  [x] n/a-virtual     Outside referrals / labs, etc support staff to follow up: [x] yes  [] no   Vivirtol Injection ordered PA needed sent to [facility] per provider.     Future appointment was made: [x] yes  [] no  [] n/a  2/10/2022  Dictation completed at time of chart check: [] yes  [x] no    I have checked the documentation for today s encounters and the above information has been reviewed and completed.      Marie Lester on January 27, 2022 at 4:25 PM

## 2022-01-27 NOTE — PROGRESS NOTES
[facility]    A/P                                                    ASSESSMENT/PLAN    1. Gambling disorder, persistent, severe  No gambling since prior to admission.  Encouraged continued work with GA sponsor and meetings.  She will be establishing with a therapist.  We will continue Vivitrol injections.      2. Moderate alcohol use disorder (H)  Has remained sober.  Continue current medications, including Vivitrol injections.  Beginning IOP.    - citalopram (CELEXA) 20 MG tablet; Take 1 tablet (20 mg) by mouth daily Take in addition to 10 mg tab total dose of 30 mg  Dispense: 30 tablet; Refill: 1  - gabapentin (NEURONTIN) 100 MG capsule; Take 1 capsule (100 mg) by mouth 3 times daily as needed (Anxiety)  Dispense: 90 capsule; Refill: 0  - hydrOXYzine (ATARAX) 25 MG tablet; Take 2 tablets (50 mg) by mouth every 4 hours as needed for anxiety  Dispense: 60 tablet; Refill: 1  - topiramate (TOPAMAX) 25 MG tablet; Take 1 tablet (25 mg) by mouth 3 times daily  Dispense: 90 tablet; Refill: 1    3. Stimulant use disorder  Has remained sober.  Continue current medications, including Vivitrol injections.  Beginning IOP.    - buPROPion (WELLBUTRIN XL) 300 MG 24 hr tablet; Take 1 tablet (300 mg) by mouth every morning  Dispense: 30 tablet; Refill: 1  - gabapentin (NEURONTIN) 100 MG capsule; Take 1 capsule (100 mg) by mouth 3 times daily as needed (Anxiety)  Dispense: 90 capsule; Refill: 0    4. Generalized anxiety disorder  Improved.  Continue current medications and monitor.  Will be starting therapy.  - citalopram (CELEXA) 10 MG tablet; Take 1 tablet (10 mg) by mouth daily Take in addition to 20 mg tab daily Total Dose 30 mg  Dispense: 30 tablet; Refill: 1  - gabapentin (NEURONTIN) 100 MG capsule; Take 1 capsule (100 mg) by mouth 3 times daily as needed (Anxiety)  Dispense: 90 capsule; Refill: 0  - hydrOXYzine (ATARAX) 25 MG tablet; Take 2 tablets (50 mg) by mouth every 4 hours as needed for  anxiety  Dispense: 60 tablet; Refill: 1  - traZODone (DESYREL) 50 MG tablet; Take 1 tablet (50 mg) by mouth nightly as needed for sleep (may repeat after 60 minutes)  Dispense: 30 tablet; Refill: 1    5. Moderate episode of recurrent major depressive disorder (H)  Improved.  Continue current medications and monitor.  Will be starting therapy.  - buPROPion (WELLBUTRIN XL) 300 MG 24 hr tablet; Take 1 tablet (300 mg) by mouth every morning  Dispense: 30 tablet; Refill: 1  - citalopram (CELEXA) 10 MG tablet; Take 1 tablet (10 mg) by mouth daily Take in addition to 20 mg tab daily Total Dose 30 mg  Dispense: 30 tablet; Refill: 1  - traZODone (DESYREL) 50 MG tablet; Take 1 tablet (50 mg) by mouth nightly as needed for sleep (may repeat after 60 minutes)  Dispense: 30 tablet; Refill: 1    6. Chronic bilateral low back pain with left-sided sciatica  Stable, just needs new script sent to current pharmacy.    - cyclobenzaprine (FLEXERIL) 5 MG tablet; Take 1-2 tablets (5-10 mg) by mouth 3 times daily as needed for muscle spasms  Dispense: 30 tablet; Refill: 1      PDMP Review       Value Time User    State PDMP site checked  Yes 1/31/2022 10:46 AM Janneth Herring DO          RTC  Return in 4 weeks (on 2/24/2022) for Follow up, with me, using a video visit - 2/24 @ 4pm video (patient aware).      Counseled the patient on the importance of having a recovery program in addition to medication to manage recovery.  Components include avoiding isolating, having willingness to change, avoiding triggers and managing cravings. Encouraged having some type of sober network and practicing honesty with trusted support person(s). Encouraged other services such as counseling, 12 step or other self-help organizations.          JAVY Bashir is a 51 year old female who presents to clinic today for follow up    Visit performed Virtual, via video    Video-Visit Details    Type of  service:  Video Visit    Video Start Time: 3:37 PM  Video End Time: 4:00 PM    Originating Location (pt. Location): Home    Distant Location (provider location):  [facility]    Platform used for Video Visit: Rose    Brief history of substance use:  Patient was referred to [facility] from residential treatment at [facility] in January 2020.  While at [facility] she was started on IM naltrexone for gambling disorder, AUD, and stimulant use disorder.  Return to gambling tends to lead to return to substance use.  She had return to alcohol and cocaine use in October 2021 and was hospitalized 12/12/21-12/15/21 for detox and then completed treatment program at [facility].       Plan from most recent office visit (1/4/22):  1. Gambling disorder, persistent, severe  She will resume extended release Naltrexone, risks and benefits discussed.  We will work to get first dose administered prior to discharge.  She will resume GA meetings and support.       2. Moderate alcohol use disorder (H)  She will resume extended release Naltrexone, risks and benefits discussed.  Plan for first dose prior to discharge.  She will attend AA and begin IOP after discharge.  Continue other medications including gabapentin and Topamax.    - Ethyl Glucuronide Urine; Standing  - Urine Drugs of Abuse Screen Panel 1 - Drug Screen (Full); Standing     3. Stimulant use disorder  She will resume extended release Naltrexone, risks and benefits discussed.  She will continue Wellbutrin.  Plan for IOP after discharge.        4. Chronic bilateral low back pain with left-sided sciatica  She will continue Flexeril as needed.  No red flags.      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/  - Scheduled for vivitrol on 2/3 - will reschedule for [facility]   - Celexa helping more with anxiety - other's have noted she seems less anxious as well  - Sleep is beginning to improve, getting settled back in her home    PHQ 12/15/2021 1/4/2022 1/27/2022   PHQ-9 Total Score 23 14 5   Q9: Thoughts of better off dead/self-harm past 2 weeks Several days Several days Not at all     KELLEN-7 SCORE 12/15/2021 1/4/2022 1/27/2022   Total Score 13 13 7       OBJECTIVE                                                    PHYSICAL EXAM:  LMP  (LMP Unknown)     GENERAL: healthy, alert and no distress  EYES: Eyes grossly normal to inspection, conjunctivae and sclerae normal  RESP: No respiratory distress, no coughing or wheezing  MENTAL STATUS EXAM  Appearance/Behavior: No appearant distress  Speech: Normal  Mood/Affect: normal affect and anxiety (related to finding drugs at home)  Insight: Adequate     LAB  No results found for any visits on 01/27/22.      HISTORY                                                    Problem list reviewed & adjusted, as indicated.  Patient Active Problem List   Diagnosis     Bipolar disorder (H)     Posttraumatic stress disorder     Mild intermittent asthma     Plantar fascial fibromatosis     Abnormal gait     CARDIOVASCULAR SCREENING; LDL GOAL LESS THAN 160     Vitamin D deficiency     Obesity     Suicidal ideation     Depression     Moderate alcohol use disorder (H)     Polysubstance abuse (H)     Alcohol dependence with withdrawal with complication (H)     Chemical dependency (H)     Gambling disorder, persistent, severe         MEDICATION LIST (prior to visit)  albuterol (PROAIR HFA/PROVENTIL HFA/VENTOLIN HFA) 108 (90 Base) MCG/ACT inhaler, Inhale 2 puffs into the lungs every 4 hours as needed for shortness  of breath / dyspnea or wheezing  alum & mag hydroxide-simethicone (MAALOX) 200-200-20 MG/5ML SUSP suspension, Take 30 mLs by mouth every 6 hours as needed for indigestion  B Complex Vitamins (VITAMIN B COMPLEX PO),   fluticasone-vilanterol (BREO ELLIPTA) 100-25 MCG/INH inhaler, Inhale 1 puff into the lungs daily  hydrochlorothiazide (HYDRODIURIL) 25 MG tablet, Take 1 tablet (25 mg) by mouth daily  ibuprofen (ADVIL/MOTRIN) 200 MG tablet, Take 400 mg by mouth every 6 hours as needed for mild pain  loratadine (CLARITIN) 10 MG tablet, Take 1 tablet (10 mg) by mouth daily  melatonin 3 MG tablet, Take 3 mg by mouth nightly as needed for sleep  multivitamin w/minerals (THERA-VIT-M) tablet, Take 1 tablet by mouth daily  naltrexone (VIVITROL) 380 MG SUSR, Inject 380 mg into the muscle  senna-docusate (SENOKOT-S/PERICOLACE) 8.6-50 MG tablet, Take 2 tablets by mouth daily as needed for constipation  vitamin D3 (CHOLECALCIFEROL) 125 MCG (5000 UT) tablet, Take 1 tablet (125 mcg) by mouth daily    No current facility-administered medications on file prior to visit.      MEDICATION LIST (after visit)  Current Outpatient Medications   Medication     albuterol (PROAIR HFA/PROVENTIL HFA/VENTOLIN HFA) 108 (90 Base) MCG/ACT inhaler     alum & mag hydroxide-simethicone (MAALOX) 200-200-20 MG/5ML SUSP suspension     B Complex Vitamins (VITAMIN B COMPLEX PO)     buPROPion (WELLBUTRIN XL) 300 MG 24 hr tablet     citalopram (CELEXA) 10 MG tablet     citalopram (CELEXA) 20 MG tablet     cyclobenzaprine (FLEXERIL) 5 MG tablet     fluticasone-vilanterol (BREO ELLIPTA) 100-25 MCG/INH inhaler     gabapentin (NEURONTIN) 100 MG capsule     hydrochlorothiazide (HYDRODIURIL) 25 MG tablet     hydrOXYzine (ATARAX) 25 MG tablet     ibuprofen (ADVIL/MOTRIN) 200 MG tablet     loratadine (CLARITIN) 10 MG tablet     melatonin 3 MG tablet     multivitamin w/minerals (THERA-VIT-M) tablet     naltrexone (VIVITROL) 380 MG SUSR     senna-docusate  (SENOKOT-S/PERICOLACE) 8.6-50 MG tablet     topiramate (TOPAMAX) 25 MG tablet     traZODone (DESYREL) 50 MG tablet     vitamin D3 (CHOLECALCIFEROL) 125 MCG (5000 UT) tablet     No current facility-administered medications for this visit.         Allergies   Allergen Reactions     Fish-Derived Products Shortness Of Breath and Nausea and Vomiting     Nkda [No Known Drug Allergies]        Janneth Herring [facility]  [facility]  [phone]

## 2022-01-27 NOTE — TELEPHONE ENCOUNTER
Orders for Vivitrol (via Smart Sets) was placed on 1/4/22.  Can you confirm approved?  I received approval for injection previously but I believe this was specific to infusion center? She will be due for injection next week.      Thanks!    Janneth Herring, DO on 1/27/2022 at 4:16 PM

## 2022-01-27 NOTE — TELEPHONE ENCOUNTER
Writer did leave message for patient asking her to keep current appointment with Infusion center for 2/3/22, it may take 14 days for us to get PA back to start injections here at [facility].   Let patient know that once we get word back about the PA for Vivirtol we will reach out to get her on the schedule.       Dr. Herring is aware as well of possible delay to start Injections here.

## 2022-01-28 PROBLEM — F63.0: Status: ACTIVE | Noted: 2022-01-28

## 2022-01-28 ASSESSMENT — ANXIETY QUESTIONNAIRES: GAD7 TOTAL SCORE: 7

## 2022-01-28 NOTE — TELEPHONE ENCOUNTER
[11:52 AM] Bonita Samaniego  Is it okay to switch her to us for that 2/3/ appt?    [11:52 AM] Bonita Samaniego  You can let me know when you review    [11:55 AM] Karoline Mcdaniels  From pharmacy perspective if smartset orders are in then no issue from us.    [11:55 AM] Bonita Samaniego  thank you.

## 2022-01-28 NOTE — TELEPHONE ENCOUNTER
"Per the PA rep, \"per [facility] website [facility] pmap ins for a  vivitrol injection doesn t need an auth\". Dr. Herring, if there are not orders for the injection, please place orders. RN will inform the clinic Pharmacist to review.   "

## 2022-01-28 NOTE — TELEPHONE ENCOUNTER
[12:30 PM] Karoline Mcdaniels  Back at my desk and see orders already entered. So we can get clinic injection whenever her appointment is scheduled    [12:31 PM] Bonita Samaniego  thank you!        Routing to OBC pool to help with scheduling patient for injection at [facility]

## 2022-02-04 ENCOUNTER — ALLIED HEALTH/NURSE VISIT (OUTPATIENT)
Dept: BEHAVIORAL HEALTH | Facility: CLINIC | Age: 52
End: 2022-02-04
Payer: COMMERCIAL

## 2022-02-04 VITALS
BODY MASS INDEX: 28.19 KG/M2 | WEIGHT: 180 LBS | TEMPERATURE: 98 F | DIASTOLIC BLOOD PRESSURE: 69 MMHG | HEART RATE: 72 BPM | SYSTOLIC BLOOD PRESSURE: 128 MMHG

## 2022-02-04 DIAGNOSIS — F10.20 MODERATE ALCOHOL USE DISORDER (H): ICD-10-CM

## 2022-02-04 DIAGNOSIS — F63.0 GAMBLING DISORDER, PERSISTENT, SEVERE: ICD-10-CM

## 2022-02-04 LAB
AMPHETAMINES UR QL SCN: NORMAL
BARBITURATES UR QL: NORMAL
BENZODIAZ UR QL: NORMAL
CANNABINOIDS UR QL SCN: NORMAL
COCAINE UR QL: NORMAL
CREAT UR-MCNC: 189 MG/DL
OPIATES UR QL SCN: NORMAL
OXYCODONE UR QL: NORMAL
PCP UR QL SCN: NORMAL

## 2022-02-04 PROCEDURE — 999N000103 HC STATISTIC NO CHARGE FACILITY FEE

## 2022-02-04 PROCEDURE — 80307 DRUG TEST PRSMV CHEM ANLYZR: CPT

## 2022-02-04 PROCEDURE — 250N000011 HC RX IP 250 OP 636: Performed by: FAMILY MEDICINE

## 2022-02-04 PROCEDURE — 96372 THER/PROPH/DIAG INJ SC/IM: CPT | Performed by: FAMILY MEDICINE

## 2022-02-04 RX ADMIN — NALTREXONE 380 MG: KIT at 15:18

## 2022-02-04 ASSESSMENT — PAIN SCALES - GENERAL: PAINLEVEL: NO PAIN (0)

## 2022-02-04 NOTE — PATIENT INSTRUCTIONS
Continue Medications as ordered.  No alcohol or unprescribed drug use.  No driving, if sedated.  Come to the Emergency Room if not feeling safe.  Call the clinic with any questions [phone].    When should you contact your healthcare provider?  A fever develops after the injection  There is a lump, pain, swelling, or bruising where the injection was given that does not go away.    You should seek immediate care or call 911 if shortness of breath or mouth, face, or lips swells after the injection is given    YOU SHOULD CARRY OR WEAR MEDICAL ID STATING THAT YOU ARE USING THIS DRUG SO THAT APPROPRIATE TREATMENT CAN BE GIVEN IN A MEDICAL EMERGENCY

## 2022-02-04 NOTE — NURSING NOTE
Pt is here for Vivitrol injection. Working PT as caregiver. No issues or concerns today.  No recent gambling, doing tx and support groups.    Has patient reviewed Vivitrol questions and reminders? yes  Did patient drink alcohol in the past 7 days? no If so, describe    Did patient use any type of opioid meds in the past 10 days? no  Was medical ID provided at appointment ? not needed  Last UDS performed on 12/12/21 and was: positive before tx  Last ETG performed on 12/12/21 and was: neg   reviewed? Unable to review  Urine collected? Yes, sent to lab    Last injection given on 1/7/22.   Site ventrogluteal.    Status of last injection site: normal    The Vivitrol Medication Guide was provided to the patient prior to administration.  Medication Given:Vivitrol 380 mg IM   Site: RUOQ  Tolerated: well  Reaction:none    Next injection appt on: 3/4/22  Next appt with provider on: 2/24/22

## 2022-02-08 LAB
ETHANOL UR QL CFM: NEGATIVE
ETHYL GLUCURONIDE UR QL SCN: NOT DETECTED NG/MG CREAT
ETHYL SULFATE/CREAT UR: NOT DETECTED NG/MG CREAT
LEVEL OF DETECTION (ETHANOL): NORMAL

## 2022-02-13 ENCOUNTER — HEALTH MAINTENANCE LETTER (OUTPATIENT)
Age: 52
End: 2022-02-13

## 2022-02-27 ENCOUNTER — OFFICE VISIT (OUTPATIENT)
Dept: FAMILY MEDICINE | Facility: CLINIC | Age: 52
End: 2022-02-27
Payer: COMMERCIAL

## 2022-02-27 VITALS
BODY MASS INDEX: 28.66 KG/M2 | OXYGEN SATURATION: 98 % | DIASTOLIC BLOOD PRESSURE: 80 MMHG | HEART RATE: 53 BPM | TEMPERATURE: 97.7 F | SYSTOLIC BLOOD PRESSURE: 139 MMHG | WEIGHT: 183 LBS

## 2022-02-27 DIAGNOSIS — M25.562 ARTHRALGIA OF BOTH KNEES: ICD-10-CM

## 2022-02-27 DIAGNOSIS — R30.0 DYSURIA: Primary | ICD-10-CM

## 2022-02-27 DIAGNOSIS — M25.561 ARTHRALGIA OF BOTH KNEES: ICD-10-CM

## 2022-02-27 LAB
ALBUMIN SERPL-MCNC: 4.2 G/DL (ref 3.5–5)
ALBUMIN UR-MCNC: NEGATIVE MG/DL
ALP SERPL-CCNC: 104 U/L (ref 45–120)
ALT SERPL W P-5'-P-CCNC: 94 U/L (ref 0–45)
AMORPH CRY #/AREA URNS HPF: ABNORMAL /HPF
ANION GAP SERPL CALCULATED.3IONS-SCNC: 13 MMOL/L (ref 5–18)
APPEARANCE UR: ABNORMAL
AST SERPL W P-5'-P-CCNC: 51 U/L (ref 0–40)
BACTERIA #/AREA URNS HPF: ABNORMAL /HPF
BILIRUB SERPL-MCNC: 0.3 MG/DL (ref 0–1)
BILIRUB UR QL STRIP: NEGATIVE
BUN SERPL-MCNC: 14 MG/DL (ref 8–22)
C REACTIVE PROTEIN LHE: 0.7 MG/DL (ref 0–0.8)
CALCIUM SERPL-MCNC: 9.8 MG/DL (ref 8.5–10.5)
CHLORIDE BLD-SCNC: 107 MMOL/L (ref 98–107)
CLUE CELLS: ABNORMAL
CO2 SERPL-SCNC: 20 MMOL/L (ref 22–31)
COLOR UR AUTO: YELLOW
CREAT SERPL-MCNC: 0.86 MG/DL (ref 0.6–1.1)
ERYTHROCYTE [SEDIMENTATION RATE] IN BLOOD BY WESTERGREN METHOD: 8 MM/HR (ref 0–20)
GFR SERPL CREATININE-BSD FRML MDRD: 81 ML/MIN/1.73M2
GLUCOSE BLD-MCNC: 85 MG/DL (ref 70–125)
GLUCOSE UR STRIP-MCNC: NEGATIVE MG/DL
HGB UR QL STRIP: NEGATIVE
HYALINE CASTS #/AREA URNS LPF: ABNORMAL /LPF
KETONES UR STRIP-MCNC: ABNORMAL MG/DL
LEUKOCYTE ESTERASE UR QL STRIP: NEGATIVE
NITRATE UR QL: NEGATIVE
PH UR STRIP: 7.5 [PH] (ref 5–8)
POTASSIUM BLD-SCNC: 3.6 MMOL/L (ref 3.5–5)
PROT SERPL-MCNC: 7.7 G/DL (ref 6–8)
RBC #/AREA URNS AUTO: ABNORMAL /HPF
RHEUMATOID FACT SER NEPH-ACNC: <15 IU/ML (ref 0–30)
SODIUM SERPL-SCNC: 140 MMOL/L (ref 136–145)
SP GR UR STRIP: 1.02 (ref 1–1.03)
SQUAMOUS #/AREA URNS AUTO: ABNORMAL /LPF
TRICHOMONAS, WET PREP: ABNORMAL
URATE SERPL-MCNC: 5.3 MG/DL (ref 2–7.5)
UROBILINOGEN UR STRIP-ACNC: 1 E.U./DL
WBC #/AREA URNS AUTO: ABNORMAL /HPF
WBC'S/HIGH POWER FIELD, WET PREP: ABNORMAL
YEAST, WET PREP: ABNORMAL

## 2022-02-27 PROCEDURE — 99214 OFFICE O/P EST MOD 30 MIN: CPT | Performed by: PHYSICIAN ASSISTANT

## 2022-02-27 PROCEDURE — 86431 RHEUMATOID FACTOR QUANT: CPT | Performed by: PHYSICIAN ASSISTANT

## 2022-02-27 PROCEDURE — 84550 ASSAY OF BLOOD/URIC ACID: CPT | Performed by: PHYSICIAN ASSISTANT

## 2022-02-27 PROCEDURE — 36415 COLL VENOUS BLD VENIPUNCTURE: CPT | Performed by: PHYSICIAN ASSISTANT

## 2022-02-27 PROCEDURE — 85652 RBC SED RATE AUTOMATED: CPT | Performed by: PHYSICIAN ASSISTANT

## 2022-02-27 PROCEDURE — 83036 HEMOGLOBIN GLYCOSYLATED A1C: CPT | Performed by: PHYSICIAN ASSISTANT

## 2022-02-27 PROCEDURE — 80053 COMPREHEN METABOLIC PANEL: CPT | Performed by: PHYSICIAN ASSISTANT

## 2022-02-27 PROCEDURE — 87210 SMEAR WET MOUNT SALINE/INK: CPT | Performed by: PHYSICIAN ASSISTANT

## 2022-02-27 PROCEDURE — 86038 ANTINUCLEAR ANTIBODIES: CPT | Performed by: PHYSICIAN ASSISTANT

## 2022-02-27 PROCEDURE — 81001 URINALYSIS AUTO W/SCOPE: CPT | Performed by: PHYSICIAN ASSISTANT

## 2022-02-27 PROCEDURE — 86140 C-REACTIVE PROTEIN: CPT | Performed by: PHYSICIAN ASSISTANT

## 2022-02-27 ASSESSMENT — ENCOUNTER SYMPTOMS
CHILLS: 0
DYSURIA: 1
HEMATURIA: 0
ABDOMINAL PAIN: 0
VOMITING: 0
NAUSEA: 0
ARTHRALGIAS: 1
FLANK PAIN: 0
FEVER: 0
JOINT SWELLING: 1
FREQUENCY: 1

## 2022-02-27 NOTE — PATIENT INSTRUCTIONS
1. Begin using vaginal moisturizer three times per week.     What is a bladder irritant?   A bladder irritant is any food, drink, or medication that causes the bladder to be irritated. Irritation can cause frequency (needing to urinate more often than normal), urgency (the sense of needing to urinate), bladder spasms, and even bladder pain. Bladder spasms can lead to urine leakage if there is a sudden urge, but not enough time to reach a toilet.     What are some examples of bladder irritants?   The following is a list of bladder irritants. The seven MOST IRRITATING are listed first:   *All alcoholic beverages   *Cigarettes/Tobacco   *Cola drinks   *Tea   *Artificial Sweeteners   *Chocolate   *Coffee     Other possible irritants include:   Fruits (and their juices):   cranberries, grapes, oranges, grisel,   peaches, pineapple, plums, apples, and cantaloupe   Vegetables: onions, tomatoes, chilies, peppers   Milk/Dairy: aged cheese, sour cream, yogurt   Grains: rye & sourdough breads   Seasonings: spices & spicy food, especially peppers, acidic foods and beverages, walnuts &   peanuts, vinegar

## 2022-02-27 NOTE — PROGRESS NOTES
Patient presents with:  Urinary Problem: frequency,urgency, and burning sensation when urinate x 10 days       Clinical Decision Making: Patient experiencing dysuria.  She is not currently sexually active.  UA is not indicative of UTI.  Wet prep is negative.  Suspect possible vaginal atrophy causing vaginitis given the patient is postmenopausal.  Recommend trial of vaginal moisturizer.  Patient also admits to eating significant amount of coffee and artificial sweetener.  Recommend decreasing the amount of this in her diet.  Hemoglobin A1c and CMP were drawn today for evaluation and screening of diabetes mellitus and checking kidney function.    Patient also reporting unexplained arthralgias of the knee.  Gout work-up collected today including uric acid, ESR, CRP.  Rheumatologic evaluation including VINH and RF are also drawn given the patient's family history of autoimmune disease.  Patient has a primary care provider that she can follow-up with if symptoms fail to improve.      ICD-10-CM    1. Dysuria  R30.0 UA with Microscopic reflex to Culture - Clinic Collect     Urine Microscopic     Wet prep - Clinic Collect     Comprehensive metabolic panel (BMP + Alb, Alk Phos, ALT, AST, Total. Bili, TP)     Hemoglobin A1c     Vaginal Lubricant (FEMINEASE) CREA     Comprehensive metabolic panel (BMP + Alb, Alk Phos, ALT, AST, Total. Bili, TP)     Hemoglobin A1c   2. Arthralgia of both knees  M25.561 Erythrocyte sedimentation rate auto    M25.562 Uric acid     CRP inflammation     Anti Nuclear Caterina IgG by IFA with Reflex     Rheumatoid factor     Erythrocyte sedimentation rate auto     Uric acid     CRP inflammation     Anti Nuclear Caterina IgG by IFA with Reflex     Rheumatoid factor       Patient Instructions   1. Begin using vaginal moisturizer three times per week.     What is a bladder irritant?   A bladder irritant is any food, drink, or medication that causes the bladder to be irritated. Irritation can cause frequency  (needing to urinate more often than normal), urgency (the sense of needing to urinate), bladder spasms, and even bladder pain. Bladder spasms can lead to urine leakage if there is a sudden urge, but not enough time to reach a toilet.     What are some examples of bladder irritants?   The following is a list of bladder irritants. The seven MOST IRRITATING are listed first:   *All alcoholic beverages   *Cigarettes/Tobacco   *Cola drinks   *Tea   *Artificial Sweeteners   *Chocolate   *Coffee     Other possible irritants include:   Fruits (and their juices):   cranberries, grapes, oranges, grisel,   peaches, pineapple, plums, apples, and cantaloupe   Vegetables: onions, tomatoes, chilies, peppers   Milk/Dairy: aged cheese, sour cream, yogurt   Grains: rye & sourdough breads   Seasonings: spices & spicy food, especially peppers, acidic foods and beverages, walnuts &   peanuts, vinegar           HPI:  Winifred Bashir is a 51 year old female who presents today complaining of urinary frequency, vaginal discharge, urinary urgency, burning sensation with urination over the last 10 days.  Patient does not been sexually active and denies any risk factors for STDs.  Patient is postmenopausal.  She denies any nausea, vomiting, abdominal pain, fever, chills, flank pain, hematuria.    Patient is also been experiencing low back and bilateral knee pain.  No known injury.  Patient has family history of lupus.  No personal history of autoimmune disorders.  She reports that her knees have also been swollen.  She denies any skin changes or warmth to touch that she knows of.         History obtained from the patient.    Problem List:  2022-01: Gambling disorder, persistent, severe  2021-12: Chemical dependency (H)  2021-12: Polysubstance abuse (H)  2021-12: Alcohol dependence with withdrawal with complication (H)  2021-07: Moderate alcohol use disorder (H)  2018-04: Depression  2018-03: Suicidal ideation  2012-06: Obesity  2012-01: Vitamin  D deficiency  2010-10: CARDIOVASCULAR SCREENING; LDL GOAL LESS THAN 160  2010-02: Removal or checking of IUD  2009-09: Abnormal gait  2009-08: Pain in joint, lower leg  2009-08: Primary localized osteoarthrosis, lower leg  2008-12: Plantar fascial fibromatosis  Bipolar disorder (H)  Posttraumatic stress disorder  Mild intermittent asthma  Surveillance of previously prescribed intrauterine contraceptive   device      Past Medical History:   Diagnosis Date     Bipolar disorder, unspecified (H)      Gambling disorder, persistent, severe 01/13/2020     Generalized anxiety disorder 01/13/2020     Methamphetamine use disorder, severe (H) 01/13/2020     Mild intermittent asthma      Moderate alcohol use disorder (H) 01/13/2020     Osteoarthritis      Posttraumatic stress disorder        Social History     Tobacco Use     Smoking status: Former Smoker     Types: Cigarettes, Cigarettes     Smokeless tobacco: Never Used     Tobacco comment: quit 2003   Substance Use Topics     Alcohol use: Not Currently     Comment: Alcoholic Drinks/day: sober since 8/25/19       Review of Systems   Constitutional: Negative for chills and fever.   Gastrointestinal: Negative for abdominal pain, nausea and vomiting.   Genitourinary: Positive for dysuria (burning), frequency, urgency and vaginal discharge. Negative for flank pain, hematuria and vaginal pain.   Musculoskeletal: Positive for arthralgias and joint swelling.       Vitals:    02/27/22 1436   BP: 139/80   BP Location: Right arm   Patient Position: Sitting   Cuff Size: Adult Large   Pulse: 53   Temp: 97.7  F (36.5  C)   TempSrc: Tympanic   SpO2: 98%   Weight: 83 kg (183 lb)       Physical Exam  Vitals and nursing note reviewed.   Constitutional:       General: She is not in acute distress.     Appearance: She is not toxic-appearing or diaphoretic.   HENT:      Head: Normocephalic and atraumatic.      Right Ear: External ear normal.      Left Ear: External ear normal.   Eyes:       Conjunctiva/sclera: Conjunctivae normal.   Pulmonary:      Effort: Pulmonary effort is normal. No respiratory distress.   Neurological:      Mental Status: She is alert.   Psychiatric:         Mood and Affect: Mood normal.         Behavior: Behavior normal.         Thought Content: Thought content normal.         Judgment: Judgment normal.         Results:  Results for orders placed or performed in visit on 02/27/22   UA with Microscopic reflex to Culture - Clinic Collect     Status: Abnormal    Specimen: Urine, Midstream   Result Value Ref Range    Color Urine Yellow Colorless, Straw, Light Yellow, Yellow    Appearance Urine Cloudy (A) Clear    Glucose Urine Negative Negative mg/dL    Bilirubin Urine Negative Negative    Ketones Urine Trace (A) Negative mg/dL    Specific Gravity Urine 1.020 1.005 - 1.030    Blood Urine Negative Negative    pH Urine 7.5 5.0 - 8.0    Protein Albumin Urine Negative Negative mg/dL    Urobilinogen Urine 1.0 0.2, 1.0 E.U./dL    Nitrite Urine Negative Negative    Leukocyte Esterase Urine Negative Negative   Urine Microscopic     Status: Abnormal   Result Value Ref Range    Bacteria Urine Few (A) None Seen /HPF    RBC Urine 0-2 0-2 /HPF /HPF    WBC Urine 0-5 0-5 /HPF /HPF    Squamous Epithelials Urine Moderate (A) None Seen /LPF    Amorphous Crystals Urine Many (A) None Seen /HPF    Hyaline Casts Urine 0-2 (A) None Seen /LPF    Narrative    Urine Culture not indicated   Wet prep - Clinic Collect     Status: Abnormal    Specimen: Vagina; Swab   Result Value Ref Range    Trichomonas Absent Absent    Yeast Absent Absent    Clue Cells Absent Absent    WBCs/high power field 1+ (A) None         At the end of the encounter, I discussed results, diagnosis, medications. Discussed red flags for immediate return to clinic/ER, as well as indications for follow up if no improvement. Patient understood and agreed to plan. Patient was stable for discharge.

## 2022-02-28 LAB — HBA1C MFR BLD: 5.8 %

## 2022-03-01 LAB — ANA SER QL IF: NEGATIVE

## 2022-03-04 ENCOUNTER — ALLIED HEALTH/NURSE VISIT (OUTPATIENT)
Dept: BEHAVIORAL HEALTH | Facility: CLINIC | Age: 52
End: 2022-03-04
Payer: COMMERCIAL

## 2022-03-04 ENCOUNTER — TELEPHONE (OUTPATIENT)
Dept: BEHAVIORAL HEALTH | Facility: CLINIC | Age: 52
End: 2022-03-04
Payer: COMMERCIAL

## 2022-03-04 DIAGNOSIS — F63.0 GAMBLING DISORDER, PERSISTENT, SEVERE: ICD-10-CM

## 2022-03-04 DIAGNOSIS — F10.20 MODERATE ALCOHOL USE DISORDER (H): Primary | ICD-10-CM

## 2022-03-04 DIAGNOSIS — F10.20 MODERATE ALCOHOL USE DISORDER (H): ICD-10-CM

## 2022-03-04 LAB
AMPHETAMINES UR QL SCN: NORMAL
BARBITURATES UR QL: NORMAL
BENZODIAZ UR QL: NORMAL
CANNABINOIDS UR QL SCN: NORMAL
COCAINE UR QL: NORMAL
CREAT UR-MCNC: 159 MG/DL
OPIATES UR QL SCN: NORMAL
OXYCODONE UR QL: NORMAL
PCP UR QL SCN: NORMAL

## 2022-03-04 PROCEDURE — 96372 THER/PROPH/DIAG INJ SC/IM: CPT | Performed by: FAMILY MEDICINE

## 2022-03-04 PROCEDURE — G0463 HOSPITAL OUTPT CLINIC VISIT: HCPCS | Mod: 25

## 2022-03-04 PROCEDURE — 80307 DRUG TEST PRSMV CHEM ANLYZR: CPT

## 2022-03-04 PROCEDURE — 250N000011 HC RX IP 250 OP 636: Performed by: FAMILY MEDICINE

## 2022-03-04 RX ADMIN — NALTREXONE 380 MG: KIT at 13:44

## 2022-03-04 NOTE — PROGRESS NOTES
Pt is here for Vivitrol injection.    Has patient reviewed Vivitrol questions and reminders? YEs  Did patient drink alcohol in the past 7 days? No If so, describe    Did patient use any type of opioid meds in the past 10 days? no  Was medical ID provided at appointment ? Not needed  Last UDS performed on 2/4/22 and was:   Last ETG performed on 2/4/22 and was:   reviewed? Unable to review  Urine collected? Yes    Last injection given on 2/4/22 .   Site: RUOQ.    Status of last injection site: Normal    The Vivitrol Medication Guide was provided to the patient prior to administration.  Medication Given:Vivitrol 380mg Deep IM   Site: Left glute  Tolerated: Well  Reaction:None    Next injection appt on: 4/1/22  Next appt with provider on: None scheduled.    Shannon Cortez CMA on 3/4/2022 at 2:38 PM

## 2022-03-04 NOTE — TELEPHONE ENCOUNTER
Lab has new order for ETG test. Please sign new standing order, freddy'd up. Patient has injection appointment today.  Shannon Cortez CMA on 3/4/2022 at 9:36 AM

## 2022-03-07 LAB — ETHYL GLUCURONIDE UR QL SCN: NEGATIVE NG/ML

## 2022-04-01 ENCOUNTER — ALLIED HEALTH/NURSE VISIT (OUTPATIENT)
Dept: BEHAVIORAL HEALTH | Facility: CLINIC | Age: 52
End: 2022-04-01
Payer: COMMERCIAL

## 2022-04-01 VITALS
SYSTOLIC BLOOD PRESSURE: 125 MMHG | WEIGHT: 188 LBS | DIASTOLIC BLOOD PRESSURE: 67 MMHG | BODY MASS INDEX: 29.44 KG/M2 | HEART RATE: 65 BPM

## 2022-04-01 DIAGNOSIS — F10.20 MODERATE ALCOHOL USE DISORDER (H): ICD-10-CM

## 2022-04-01 DIAGNOSIS — F63.0 GAMBLING DISORDER, PERSISTENT, SEVERE: ICD-10-CM

## 2022-04-01 LAB
AMPHETAMINES UR QL SCN: NORMAL
BARBITURATES UR QL: NORMAL
BENZODIAZ UR QL: NORMAL
CANNABINOIDS UR QL SCN: NORMAL
COCAINE UR QL: NORMAL
CREAT UR-MCNC: 143 MG/DL
OPIATES UR QL SCN: NORMAL
OXYCODONE UR QL: NORMAL
PCP UR QL SCN: NORMAL

## 2022-04-01 PROCEDURE — 80307 DRUG TEST PRSMV CHEM ANLYZR: CPT

## 2022-04-01 PROCEDURE — 250N000011 HC RX IP 250 OP 636: Performed by: FAMILY MEDICINE

## 2022-04-01 PROCEDURE — 96372 THER/PROPH/DIAG INJ SC/IM: CPT | Performed by: FAMILY MEDICINE

## 2022-04-01 RX ADMIN — NALTREXONE 380 MG: KIT at 13:47

## 2022-04-01 ASSESSMENT — PAIN SCALES - GENERAL: PAINLEVEL: NO PAIN (0)

## 2022-04-01 NOTE — PATIENT INSTRUCTIONS
Continue Medications as ordered.  No alcohol or unprescribed drug use.  No driving, if sedated.  Come to the Emergency Room if not feeling safe.  Call the clinic with any questions [phone].    When should you contact your healthcare provider?  A fever develops after the injection  There is a lump, pain, swelling, or bruising where the injection was given that does not go away.    You should seek immediate care or call 911 if shortness of breath or mouth, face, or lips swells after the injection is given    YOU SHOULD CARRY OR WEAR MEDICAL ID STATING THAT YOU ARE USING THIS DRUG SO THAT APPROPRIATE TREATMENT CAN BE GIVEN IN A MEDICAL EMERGENCY

## 2022-04-01 NOTE — PROGRESS NOTES
Pt is here for Vivitrol injection. Pt will be discussing switching back to 3 weeks period with provider at next visit on 4/14/22    Has patient reviewed Vivitrol questions and reminders? YES  Did patient drink alcohol in the past 7 days? NO If so, describe    Did patient use any type of opioid meds in the past 10 days? NO  Was medical ID provided at appointment ? PT DECLINED   Last UDS performed on 3/4/22 and was: Neg  Last ETG performed on 3/4/22 and was: neg   reviewed? No access for this provider  Urine collected? yes    Last injection given on 3/4/22  Site:  Left gluteal  Status of last injection site: no c/o    The Vivitrol Medication Guide was provided to the patient prior to administration.  Medication Given:Vivitrol  mg  Site: Right Gluteal  Tolerated: well  Reaction:none    Next injection appt on: 4/29/22  Next appt with provider on: 4/14/22

## 2022-04-03 LAB — ETHYL GLUCURONIDE UR QL SCN: NEGATIVE NG/ML

## 2022-04-07 ENCOUNTER — MYC MEDICAL ADVICE (OUTPATIENT)
Dept: BEHAVIORAL HEALTH | Facility: CLINIC | Age: 52
End: 2022-04-07
Payer: COMMERCIAL

## 2022-04-07 DIAGNOSIS — F15.90 STIMULANT USE DISORDER: ICD-10-CM

## 2022-04-07 DIAGNOSIS — F10.20 MODERATE ALCOHOL USE DISORDER (H): Primary | ICD-10-CM

## 2022-04-12 NOTE — TELEPHONE ENCOUNTER
Updated orders for UDS and ETG placed.  She can then have [facility] send KATY and request records.    Janneth Herring, DO on 4/12/2022 at 9:40 AM

## 2022-04-14 ENCOUNTER — VIRTUAL VISIT (OUTPATIENT)
Dept: BEHAVIORAL HEALTH | Facility: CLINIC | Age: 52
End: 2022-04-14
Payer: COMMERCIAL

## 2022-04-14 DIAGNOSIS — F63.0 GAMBLING DISORDER, PERSISTENT, SEVERE: Primary | ICD-10-CM

## 2022-04-14 DIAGNOSIS — F15.90 STIMULANT USE DISORDER: ICD-10-CM

## 2022-04-14 DIAGNOSIS — F41.1 GENERALIZED ANXIETY DISORDER: ICD-10-CM

## 2022-04-14 DIAGNOSIS — F33.1 MODERATE EPISODE OF RECURRENT MAJOR DEPRESSIVE DISORDER (H): ICD-10-CM

## 2022-04-14 DIAGNOSIS — F10.20 MODERATE ALCOHOL USE DISORDER (H): ICD-10-CM

## 2022-04-14 PROCEDURE — 99214 OFFICE O/P EST MOD 30 MIN: CPT | Mod: 95 | Performed by: FAMILY MEDICINE

## 2022-04-14 RX ORDER — HYDROXYZINE HYDROCHLORIDE 25 MG/1
50 TABLET, FILM COATED ORAL EVERY 4 HOURS PRN
Qty: 60 TABLET | Refills: 1 | Status: ON HOLD | OUTPATIENT
Start: 2022-04-14 | End: 2022-12-05

## 2022-04-14 RX ORDER — TRAZODONE HYDROCHLORIDE 50 MG/1
50 TABLET, FILM COATED ORAL
Qty: 30 TABLET | Refills: 1 | Status: ON HOLD | OUTPATIENT
Start: 2022-04-14 | End: 2022-12-05

## 2022-04-14 RX ORDER — NALTREXONE HYDROCHLORIDE 50 MG/1
50 TABLET, FILM COATED ORAL DAILY PRN
Qty: 30 TABLET | Refills: 0 | Status: ON HOLD | OUTPATIENT
Start: 2022-04-14 | End: 2022-12-05

## 2022-04-14 RX ORDER — BUPROPION HYDROCHLORIDE 300 MG/1
300 TABLET ORAL EVERY MORNING
Qty: 30 TABLET | Refills: 1 | Status: ON HOLD | OUTPATIENT
Start: 2022-04-14 | End: 2022-12-05

## 2022-04-14 RX ORDER — CITALOPRAM HYDROBROMIDE 20 MG/1
20 TABLET ORAL DAILY
Qty: 30 TABLET | Refills: 1 | Status: ON HOLD | OUTPATIENT
Start: 2022-04-14 | End: 2022-12-05

## 2022-04-14 RX ORDER — GABAPENTIN 100 MG/1
100 CAPSULE ORAL 3 TIMES DAILY PRN
Qty: 90 CAPSULE | Refills: 0 | Status: CANCELLED | OUTPATIENT
Start: 2022-04-14

## 2022-04-14 RX ORDER — CITALOPRAM HYDROBROMIDE 10 MG/1
10 TABLET ORAL DAILY
Qty: 30 TABLET | Refills: 1 | Status: ON HOLD | OUTPATIENT
Start: 2022-04-14 | End: 2022-12-05

## 2022-04-14 RX ORDER — TOPIRAMATE 25 MG/1
25 TABLET, FILM COATED ORAL
Qty: 90 TABLET | Refills: 1 | Status: ON HOLD | OUTPATIENT
Start: 2022-04-14 | End: 2022-12-05

## 2022-04-14 RX ORDER — GABAPENTIN 100 MG/1
100 CAPSULE ORAL 3 TIMES DAILY PRN
Qty: 90 CAPSULE | Refills: 0 | Status: ON HOLD | OUTPATIENT
Start: 2022-04-14 | End: 2022-12-05

## 2022-04-14 ASSESSMENT — PATIENT HEALTH QUESTIONNAIRE - PHQ9
10. IF YOU CHECKED OFF ANY PROBLEMS, HOW DIFFICULT HAVE THESE PROBLEMS MADE IT FOR YOU TO DO YOUR WORK, TAKE CARE OF THINGS AT HOME, OR GET ALONG WITH OTHER PEOPLE: NOT DIFFICULT AT ALL
SUM OF ALL RESPONSES TO PHQ QUESTIONS 1-9: 5
SUM OF ALL RESPONSES TO PHQ QUESTIONS 1-9: 5

## 2022-04-14 NOTE — PROGRESS NOTES
This video/telephone visit will be conducted via a call between you and your physician/provider. We have found that certain health care needs can be provided without the need for an in-person physical exam. This service lets us provide the care you need with a video /telephone conversation. If a prescription is necessary we can send it directly to your pharmacy. If lab work is needed we can place an order for that and you can then stop by our lab to have the test done at a later time.    Just as we bill insurance for in-person visits, we also bill insurance for video/telephone visits. If you have questions about your insurance coverage, we recommend that you speak with your insurance company.    Patient has given verbal consent for video/Telephone visit? Yes    Patient would like telephone visit, please connect : Call [phone]    Reason for visit: Medication management  Patient verified allergies, medications and pharmacy via telephone.     KELLEN-7 scores:    KELLEN-7 SCORE 1/4/2022 1/27/2022   Total Score 13 7     PHQ-9 scores:   PHQ-9 SCORE 1/27/2022 4/14/2022   PHQ-9 Total Score - -   PHQ-9 Total Score MyChart - 5 (Mild depression)   PHQ-9 Total Score 5 5   Medication refill is pended for review and approval by provider.  MN  to be reviewed by provider.   Patient states she  is ready for visit.    Shannon Cortez CMA April 14, 2022 2:27 PM

## 2022-04-14 NOTE — PATIENT INSTRUCTIONS
Congrats on 120 days of sobriety!  Keep up all your efforts.       Continue Vivitrol injections every 4 weeks.  Let me know if you would like to change to every 3 weeks.  I have sent a prescription for oral naltrexone 50mg that you can take daily as needed for the 4th week if needed.  If you are needing to take that it may make sense to move injections to every 3 weeks.      Recheck liver enzymes in a week or two.  Follow-up with primary care as planned.      Have a great birthday!

## 2022-04-14 NOTE — PROGRESS NOTES
Medications Phoned  to Pharmacy [] yes [x]no     Medications ordered this visit were e-scribed.  Verified by order class [x] yes  [] no  List medications sent to pharmacy: Celexa 10mg and 20mg, Wellbutrin 300mg, gabapentin 100mg, hydroxyzine 25mg, Naltrexone 50mg, Topamax 25mg, traodone 50mg    Medication changes or discontinuations were communicated to patient's pharmacy: [] yes  [x] no     Dictation completed at time of chart check: [x] yes  [] no     I have checked the documentation for today s encounters and the above information has been reviewed and completed.        Shannon Cortez, ESTEPHANIE April 14, 2022 4:21 PM

## 2022-04-15 ASSESSMENT — PATIENT HEALTH QUESTIONNAIRE - PHQ9: SUM OF ALL RESPONSES TO PHQ QUESTIONS 1-9: 5

## 2022-04-23 ENCOUNTER — LAB (OUTPATIENT)
Dept: LAB | Facility: HOSPITAL | Age: 52
End: 2022-04-23
Payer: COMMERCIAL

## 2022-04-23 DIAGNOSIS — F10.20 MODERATE ALCOHOL USE DISORDER (H): ICD-10-CM

## 2022-04-23 DIAGNOSIS — F15.90 STIMULANT USE DISORDER: ICD-10-CM

## 2022-04-23 LAB
AMPHETAMINES UR QL SCN: NORMAL
BARBITURATES UR QL: NORMAL
BENZODIAZ UR QL: NORMAL
CANNABINOIDS UR QL SCN: NORMAL
COCAINE UR QL: NORMAL
CREAT UR-MCNC: 18 MG/DL
OPIATES UR QL SCN: NORMAL
OXYCODONE UR QL: NORMAL
PCP UR QL SCN: NORMAL

## 2022-04-23 PROCEDURE — 80307 DRUG TEST PRSMV CHEM ANLYZR: CPT

## 2022-04-24 LAB — ETHYL GLUCURONIDE UR QL SCN: NEGATIVE NG/ML

## 2022-04-29 ENCOUNTER — TELEPHONE (OUTPATIENT)
Dept: BEHAVIORAL HEALTH | Facility: CLINIC | Age: 52
End: 2022-04-29
Payer: COMMERCIAL

## 2022-04-29 ENCOUNTER — MYC MEDICAL ADVICE (OUTPATIENT)
Dept: BEHAVIORAL HEALTH | Facility: CLINIC | Age: 52
End: 2022-04-29
Payer: COMMERCIAL

## 2022-04-29 NOTE — TELEPHONE ENCOUNTER
Rafael message sent to patient since she missed Vivitrol injection today.  MA attempted to reach her but was unable to leave message (see encounter).    Janneth Herring, DO on 4/29/2022 at 4:36 PM

## 2022-04-29 NOTE — TELEPHONE ENCOUNTER
Pt was a no show for the Vivitrol injection today. Attempted to call but she was not answering and her VM box is full.

## 2022-06-03 ENCOUNTER — TELEPHONE (OUTPATIENT)
Dept: BEHAVIORAL HEALTH | Facility: CLINIC | Age: 52
End: 2022-06-03

## 2022-06-03 NOTE — TELEPHONE ENCOUNTER
Please reach out to patient to schedule follow-up.  Thanks!    Janneth Herring, DO on 6/3/2022 at 10:37 AM

## 2022-07-02 ENCOUNTER — APPOINTMENT (OUTPATIENT)
Dept: ULTRASOUND IMAGING | Facility: HOSPITAL | Age: 52
End: 2022-07-02
Attending: FAMILY MEDICINE
Payer: COMMERCIAL

## 2022-07-02 ENCOUNTER — HOSPITAL ENCOUNTER (EMERGENCY)
Facility: HOSPITAL | Age: 52
Discharge: HOME | End: 2022-07-02
Attending: FAMILY MEDICINE | Admitting: FAMILY MEDICINE
Payer: COMMERCIAL

## 2022-07-02 VITALS
OXYGEN SATURATION: 100 % | RESPIRATION RATE: 18 BRPM | HEIGHT: 67 IN | WEIGHT: 171.3 LBS | HEART RATE: 73 BPM | BODY MASS INDEX: 26.89 KG/M2 | DIASTOLIC BLOOD PRESSURE: 84 MMHG | TEMPERATURE: 98.7 F | SYSTOLIC BLOOD PRESSURE: 152 MMHG

## 2022-07-02 DIAGNOSIS — N95.0 POSTMENOPAUSAL VAGINAL BLEEDING: ICD-10-CM

## 2022-07-02 DIAGNOSIS — N83.202 CYST OF LEFT OVARY: ICD-10-CM

## 2022-07-02 LAB
ALBUMIN UR-MCNC: NEGATIVE MG/DL
ANION GAP SERPL CALCULATED.3IONS-SCNC: 14 MMOL/L (ref 5–18)
APPEARANCE UR: CLEAR
BASOPHILS # BLD AUTO: 0 10E3/UL (ref 0–0.2)
BASOPHILS NFR BLD AUTO: 0 %
BILIRUB UR QL STRIP: NEGATIVE
BUN SERPL-MCNC: 8 MG/DL (ref 8–22)
CALCIUM SERPL-MCNC: 9.5 MG/DL (ref 8.5–10.5)
CHLORIDE BLD-SCNC: 107 MMOL/L (ref 98–107)
CO2 SERPL-SCNC: 19 MMOL/L (ref 22–31)
COLOR UR AUTO: ABNORMAL
CREAT SERPL-MCNC: 0.69 MG/DL (ref 0.6–1.1)
EOSINOPHIL # BLD AUTO: 0 10E3/UL (ref 0–0.7)
EOSINOPHIL NFR BLD AUTO: 0 %
ERYTHROCYTE [DISTWIDTH] IN BLOOD BY AUTOMATED COUNT: 13.9 % (ref 10–15)
GFR SERPL CREATININE-BSD FRML MDRD: >90 ML/MIN/1.73M2
GLUCOSE BLD-MCNC: 88 MG/DL (ref 70–125)
GLUCOSE UR STRIP-MCNC: NEGATIVE MG/DL
HCG UR QL: NEGATIVE
HCT VFR BLD AUTO: 43.6 % (ref 35–47)
HGB BLD-MCNC: 14.5 G/DL (ref 11.7–15.7)
HGB UR QL STRIP: NEGATIVE
IMM GRANULOCYTES # BLD: 0 10E3/UL
IMM GRANULOCYTES NFR BLD: 0 %
KETONES UR STRIP-MCNC: 10 MG/DL
LEUKOCYTE ESTERASE UR QL STRIP: NEGATIVE
LYMPHOCYTES # BLD AUTO: 2.5 10E3/UL (ref 0.8–5.3)
LYMPHOCYTES NFR BLD AUTO: 49 %
MCH RBC QN AUTO: 27.6 PG (ref 26.5–33)
MCHC RBC AUTO-ENTMCNC: 33.3 G/DL (ref 31.5–36.5)
MCV RBC AUTO: 83 FL (ref 78–100)
MONOCYTES # BLD AUTO: 0.5 10E3/UL (ref 0–1.3)
MONOCYTES NFR BLD AUTO: 9 %
MUCOUS THREADS #/AREA URNS LPF: PRESENT /LPF
NEUTROPHILS # BLD AUTO: 2.2 10E3/UL (ref 1.6–8.3)
NEUTROPHILS NFR BLD AUTO: 42 %
NITRATE UR QL: NEGATIVE
NRBC # BLD AUTO: 0 10E3/UL
NRBC BLD AUTO-RTO: 0 /100
PH UR STRIP: 6 [PH] (ref 5–7)
PLATELET # BLD AUTO: 325 10E3/UL (ref 150–450)
POTASSIUM BLD-SCNC: 3.3 MMOL/L (ref 3.5–5)
RBC # BLD AUTO: 5.26 10E6/UL (ref 3.8–5.2)
RBC URINE: 1 /HPF
SODIUM SERPL-SCNC: 140 MMOL/L (ref 136–145)
SP GR UR STRIP: 1.02 (ref 1–1.03)
SQUAMOUS EPITHELIAL: <1 /HPF
UROBILINOGEN UR STRIP-MCNC: <2 MG/DL
WBC # BLD AUTO: 5.2 10E3/UL (ref 4–11)
WBC URINE: 1 /HPF

## 2022-07-02 PROCEDURE — 76856 US EXAM PELVIC COMPLETE: CPT

## 2022-07-02 PROCEDURE — 99285 EMERGENCY DEPT VISIT HI MDM: CPT | Mod: 25

## 2022-07-02 PROCEDURE — 81025 URINE PREGNANCY TEST: CPT | Performed by: FAMILY MEDICINE

## 2022-07-02 PROCEDURE — 81001 URINALYSIS AUTO W/SCOPE: CPT | Performed by: FAMILY MEDICINE

## 2022-07-02 PROCEDURE — 36415 COLL VENOUS BLD VENIPUNCTURE: CPT | Performed by: FAMILY MEDICINE

## 2022-07-02 PROCEDURE — 85025 COMPLETE CBC W/AUTO DIFF WBC: CPT | Performed by: FAMILY MEDICINE

## 2022-07-02 PROCEDURE — 80048 BASIC METABOLIC PNL TOTAL CA: CPT | Performed by: FAMILY MEDICINE

## 2022-07-02 ASSESSMENT — ENCOUNTER SYMPTOMS
ABDOMINAL PAIN: 1
BACK PAIN: 1
DIZZINESS: 0
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
## 2022-07-02 NOTE — ED TRIAGE NOTES
Pt states post menopausal of 2 years. States recent vag bleeding last few days. No clots. States bright red blood with cramping pain. Has been changing pad every 3 hours. C/o cramping. No dizziness. States foul odor

## 2022-10-15 ENCOUNTER — HEALTH MAINTENANCE LETTER (OUTPATIENT)
Age: 52
End: 2022-10-15

## 2022-12-01 ENCOUNTER — TELEPHONE (OUTPATIENT)
Dept: BEHAVIORAL HEALTH | Facility: CLINIC | Age: 52
End: 2022-12-01

## 2022-12-01 ENCOUNTER — HOSPITAL ENCOUNTER (INPATIENT)
Facility: CLINIC | Age: 52
LOS: 3 days | Discharge: HOME | End: 2022-12-05
Attending: FAMILY MEDICINE | Admitting: PSYCHIATRY & NEUROLOGY
Payer: COMMERCIAL

## 2022-12-01 DIAGNOSIS — F41.1 GENERALIZED ANXIETY DISORDER: ICD-10-CM

## 2022-12-01 DIAGNOSIS — F10.20 ALCOHOL DEPENDENCE, UNCOMPLICATED (H): ICD-10-CM

## 2022-12-01 DIAGNOSIS — Z11.52 ENCOUNTER FOR SCREENING LABORATORY TESTING FOR SEVERE ACUTE RESPIRATORY SYNDROME CORONAVIRUS 2 (SARS-COV-2): ICD-10-CM

## 2022-12-01 DIAGNOSIS — F10.20 ACUTE ALCOHOLISM (H): ICD-10-CM

## 2022-12-01 DIAGNOSIS — F19.10 POLYSUBSTANCE ABUSE (H): ICD-10-CM

## 2022-12-01 DIAGNOSIS — F33.1 MODERATE EPISODE OF RECURRENT MAJOR DEPRESSIVE DISORDER (H): Primary | ICD-10-CM

## 2022-12-01 DIAGNOSIS — F19.10 ANTIDEPRESSANT TYPE ABUSE, CONTINUOUS (H): ICD-10-CM

## 2022-12-01 DIAGNOSIS — F32.3 CURRENT SEVERE EPISODE OF MAJOR DEPRESSIVE DISORDER WITH PSYCHOTIC FEATURES, UNSPECIFIED WHETHER RECURRENT (H): ICD-10-CM

## 2022-12-01 DIAGNOSIS — F10.20 MODERATE ALCOHOL USE DISORDER (H): ICD-10-CM

## 2022-12-01 DIAGNOSIS — R45.851 SUICIDAL IDEATION: ICD-10-CM

## 2022-12-01 LAB
ALBUMIN SERPL-MCNC: 3.4 G/DL (ref 3.4–5)
ALCOHOL BREATH TEST: 0.02 (ref 0–0.01)
ALP SERPL-CCNC: 104 U/L (ref 40–150)
ALT SERPL W P-5'-P-CCNC: 30 U/L (ref 0–50)
AMPHETAMINES UR QL SCN: ABNORMAL
ANION GAP SERPL CALCULATED.3IONS-SCNC: 8 MMOL/L (ref 3–14)
AST SERPL W P-5'-P-CCNC: 15 U/L (ref 0–45)
BARBITURATES UR QL: ABNORMAL
BASOPHILS # BLD AUTO: 0 10E3/UL (ref 0–0.2)
BASOPHILS NFR BLD AUTO: 1 %
BENZODIAZ UR QL: ABNORMAL
BILIRUB SERPL-MCNC: 0.3 MG/DL (ref 0.2–1.3)
BUN SERPL-MCNC: 16 MG/DL (ref 7–30)
CALCIUM SERPL-MCNC: 8.9 MG/DL (ref 8.5–10.1)
CANNABINOIDS UR QL SCN: ABNORMAL
CHLORIDE BLD-SCNC: 109 MMOL/L (ref 94–109)
CO2 SERPL-SCNC: 26 MMOL/L (ref 20–32)
COCAINE UR QL: ABNORMAL
CREAT SERPL-MCNC: 0.77 MG/DL (ref 0.52–1.04)
EOSINOPHIL # BLD AUTO: 0.1 10E3/UL (ref 0–0.7)
EOSINOPHIL NFR BLD AUTO: 1 %
ERYTHROCYTE [DISTWIDTH] IN BLOOD BY AUTOMATED COUNT: 15.9 % (ref 10–15)
ETHANOL UR QL SCN: ABNORMAL
GFR SERPL CREATININE-BSD FRML MDRD: >90 ML/MIN/1.73M2
GLUCOSE BLD-MCNC: 107 MG/DL (ref 70–99)
HCG UR QL: NEGATIVE
HCT VFR BLD AUTO: 42.5 % (ref 35–47)
HGB BLD-MCNC: 13.6 G/DL (ref 11.7–15.7)
IMM GRANULOCYTES # BLD: 0 10E3/UL
IMM GRANULOCYTES NFR BLD: 0 %
LYMPHOCYTES # BLD AUTO: 2.7 10E3/UL (ref 0.8–5.3)
LYMPHOCYTES NFR BLD AUTO: 50 %
MCH RBC QN AUTO: 27.3 PG (ref 26.5–33)
MCHC RBC AUTO-ENTMCNC: 32 G/DL (ref 31.5–36.5)
MCV RBC AUTO: 85 FL (ref 78–100)
MONOCYTES # BLD AUTO: 0.5 10E3/UL (ref 0–1.3)
MONOCYTES NFR BLD AUTO: 9 %
NEUTROPHILS # BLD AUTO: 2.1 10E3/UL (ref 1.6–8.3)
NEUTROPHILS NFR BLD AUTO: 39 %
NRBC # BLD AUTO: 0 10E3/UL
NRBC BLD AUTO-RTO: 0 /100
OPIATES UR QL SCN: ABNORMAL
PLATELET # BLD AUTO: 270 10E3/UL (ref 150–450)
POTASSIUM BLD-SCNC: 3.1 MMOL/L (ref 3.4–5.3)
PROT SERPL-MCNC: 7.1 G/DL (ref 6.8–8.8)
RBC # BLD AUTO: 4.99 10E6/UL (ref 3.8–5.2)
SARS-COV-2 RNA RESP QL NAA+PROBE: NEGATIVE
SODIUM SERPL-SCNC: 143 MMOL/L (ref 133–144)
TSH SERPL DL<=0.005 MIU/L-ACNC: 0.59 MU/L (ref 0.4–4)
WBC # BLD AUTO: 5.4 10E3/UL (ref 4–11)

## 2022-12-01 PROCEDURE — 36415 COLL VENOUS BLD VENIPUNCTURE: CPT | Performed by: FAMILY MEDICINE

## 2022-12-01 PROCEDURE — 80053 COMPREHEN METABOLIC PANEL: CPT | Performed by: FAMILY MEDICINE

## 2022-12-01 PROCEDURE — HZ2ZZZZ DETOXIFICATION SERVICES FOR SUBSTANCE ABUSE TREATMENT: ICD-10-PCS | Performed by: FAMILY MEDICINE

## 2022-12-01 PROCEDURE — 84443 ASSAY THYROID STIM HORMONE: CPT | Performed by: FAMILY MEDICINE

## 2022-12-01 PROCEDURE — 99285 EMERGENCY DEPT VISIT HI MDM: CPT | Performed by: FAMILY MEDICINE

## 2022-12-01 PROCEDURE — 82075 ASSAY OF BREATH ETHANOL: CPT | Performed by: FAMILY MEDICINE

## 2022-12-01 PROCEDURE — 99285 EMERGENCY DEPT VISIT HI MDM: CPT | Mod: 25 | Performed by: FAMILY MEDICINE

## 2022-12-01 PROCEDURE — 250N000013 HC RX MED GY IP 250 OP 250 PS 637: Performed by: FAMILY MEDICINE

## 2022-12-01 PROCEDURE — C9803 HOPD COVID-19 SPEC COLLECT: HCPCS | Performed by: FAMILY MEDICINE

## 2022-12-01 PROCEDURE — 80307 DRUG TEST PRSMV CHEM ANLYZR: CPT | Performed by: INTERNAL MEDICINE

## 2022-12-01 PROCEDURE — 85025 COMPLETE CBC W/AUTO DIFF WBC: CPT | Performed by: FAMILY MEDICINE

## 2022-12-01 PROCEDURE — U0003 INFECTIOUS AGENT DETECTION BY NUCLEIC ACID (DNA OR RNA); SEVERE ACUTE RESPIRATORY SYNDROME CORONAVIRUS 2 (SARS-COV-2) (CORONAVIRUS DISEASE [COVID-19]), AMPLIFIED PROBE TECHNIQUE, MAKING USE OF HIGH THROUGHPUT TECHNOLOGIES AS DESCRIBED BY CMS-2020-01-R: HCPCS | Performed by: FAMILY MEDICINE

## 2022-12-01 PROCEDURE — 90791 PSYCH DIAGNOSTIC EVALUATION: CPT

## 2022-12-01 PROCEDURE — 82040 ASSAY OF SERUM ALBUMIN: CPT | Performed by: FAMILY MEDICINE

## 2022-12-01 PROCEDURE — 81025 URINE PREGNANCY TEST: CPT | Performed by: FAMILY MEDICINE

## 2022-12-01 RX ORDER — POTASSIUM CHLORIDE 1.5 G/1.58G
20 POWDER, FOR SOLUTION ORAL ONCE
Status: COMPLETED | OUTPATIENT
Start: 2022-12-01 | End: 2022-12-01

## 2022-12-01 RX ORDER — DIPHENHYDRAMINE HYDROCHLORIDE AND LIDOCAINE HYDROCHLORIDE AND ALUMINUM HYDROXIDE AND MAGNESIUM HYDRO
10 KIT EVERY 6 HOURS PRN
Status: DISCONTINUED | OUTPATIENT
Start: 2022-12-01 | End: 2022-12-05

## 2022-12-01 RX ORDER — LORAZEPAM 1 MG/1
1 TABLET ORAL ONCE
Status: COMPLETED | OUTPATIENT
Start: 2022-12-01 | End: 2022-12-01

## 2022-12-01 RX ORDER — LORAZEPAM 1 MG/1
1-4 TABLET ORAL EVERY 30 MIN PRN
Status: DISCONTINUED | OUTPATIENT
Start: 2022-12-01 | End: 2022-12-05

## 2022-12-01 RX ADMIN — LORAZEPAM 1 MG: 1 TABLET ORAL at 14:44

## 2022-12-01 RX ADMIN — LORAZEPAM 2 MG: 1 TABLET ORAL at 22:06

## 2022-12-01 RX ADMIN — POTASSIUM CHLORIDE 20 MEQ: 1.5 FOR SOLUTION ORAL at 19:47

## 2022-12-01 ASSESSMENT — COLUMBIA-SUICIDE SEVERITY RATING SCALE - C-SSRS
1. IN THE PAST MONTH, HAVE YOU WISHED YOU WERE DEAD OR WISHED YOU COULD GO TO SLEEP AND NOT WAKE UP?: YES
LETHALITY/MEDICAL DAMAGE CODE FIRST ACTUAL ATTEMPT: MINOR PHYSICAL DAMAGE
TOTAL  NUMBER OF ABORTED OR SELF INTERRUPTED ATTEMPTS LIFETIME: YES
MOST LETHAL DATE: 66311
1. HAVE YOU WISHED YOU WERE DEAD OR WISHED YOU COULD GO TO SLEEP AND NOT WAKE UP?: YES
LETHALITY/MEDICAL DAMAGE CODE MOST LETHAL ACTUAL ATTEMPT: MODERATE PHYSICAL DAMAGE, MEDICAL ATTENTION NEEDED
2. HAVE YOU ACTUALLY HAD ANY THOUGHTS OF KILLING YOURSELF?: YES
5. HAVE YOU STARTED TO WORK OUT OR WORKED OUT THE DETAILS OF HOW TO KILL YOURSELF? DO YOU INTEND TO CARRY OUT THIS PLAN?: YES
TOTAL  NUMBER OF ACTUAL ATTEMPTS PAST 3 MONTHS: 1
6. HAVE YOU EVER DONE ANYTHING, STARTED TO DO ANYTHING, OR PREPARED TO DO ANYTHING TO END YOUR LIFE?: NO
4. HAVE YOU HAD THESE THOUGHTS AND HAD SOME INTENTION OF ACTING ON THEM?: YES
LETHALITY/MEDICAL DAMAGE CODE MOST RECENT ACTUAL ATTEMPT: MODERATE PHYSICAL DAMAGE, MEDICAL ATTENTION NEEDED
FIRST ATTEMPT DATE: 58239
REASONS FOR IDEATION PAST MONTH: MOSTLY TO END OR STOP THE PAIN (YOU COULDN'T GO ON LIVING WITH THE PAIN OR HOW YOU WERE FEELING)
5. HAVE YOU STARTED TO WORK OUT OR WORKED OUT THE DETAILS OF HOW TO KILL YOURSELF? DO YOU INTEND TO CARRY OUT THIS PLAN?: YES
2. HAVE YOU ACTUALLY HAD ANY THOUGHTS OF KILLING YOURSELF?: YES
TOTAL  NUMBER OF ACTUAL ATTEMPTS LIFETIME: 5
TOTAL  NUMBER OF ABORTED OR SELF INTERRUPTED ATTEMPTS SINCE LAST CONTACT: 1
4. HAVE YOU HAD THESE THOUGHTS AND HAD SOME INTENTION OF ACTING ON THEM?: YES
ATTEMPT LIFETIME: YES
REASONS FOR IDEATION LIFETIME: MOSTLY TO END OR STOP THE PAIN (YOU COULDN'T GO ON LIVING WITH THE PAIN OR HOW YOU WERE FEELING)
TOTAL  NUMBER OF ABORTED OR SELF INTERRUPTED ATTEMPTS LIFETIME: 1
TOTAL  NUMBER OF ABORTED OR SELF INTERRUPTED ATTEMPTS PAST 3 MONTHS: YES
TOTAL  NUMBER OF INTERRUPTED ATTEMPTS LIFETIME: NO
ATTEMPT PAST THREE MONTHS: YES
3. HAVE YOU BEEN THINKING ABOUT HOW YOU MIGHT KILL YOURSELF?: YES
LETHALITY/MEDICAL DAMAGE CODE MOST RECENT POTENTIAL ATTEMPT: BEHAVIOR LIKELY TO RESULT IN INJURY BUT NOT LIKELY TO CAUSE DEATH
MOST RECENT DATE: 66311

## 2022-12-01 ASSESSMENT — ACTIVITIES OF DAILY LIVING (ADL)
ADLS_ACUITY_SCORE: 37

## 2022-12-01 NOTE — ED TRIAGE NOTES
Patient would like to be admitted for both Mental health and Chemical dependency.      Triage Assessment     Row Name 12/01/22 2616       Triage Assessment (Adult)    Airway WDL WDL       Respiratory WDL    Respiratory WDL WDL       Cardiac WDL    Cardiac WDL WDL

## 2022-12-01 NOTE — PLAN OF CARE
Winifred Bashir  December 1, 2022  Plan of Care Hand-off Note     Patient Care Path: Inpatient Mental Health    Plan for Care:     Patient comes into the hospital brought in by herself due to concerns of ongoing substance use and suicidal ideation.  Patient states that she has current thoughts and a plan to cause cardiac arrest by overdosing on cocaine.  Patient reports that she has been using daily cocaine for the last 6 days along with drinking 1/2 pint of tequila.  Patient feels that ending her life would be the easiest strategy for herself at this time.  The patient came into the emergency room with high energy levels and difficulty to calm herself so was given a prn  of Ativan.  During the assessment the patient required several prompts to keep her eyes open as she was frequently dozing off.  While in the ED she continues to endorse an active plan of overdosing on cocaine. Dr. Curry supports the position of admitting the patient into the hospital for further monitoring and evaluation in order to maintain safety.     Critical Safety Issues: pt has been using cocaine for the last six days along with 1/2 pint of tequila. Pt endorses frequent SI with a plan to cause cardiac arrest by overdosing on cocaine.      Overview:  This patient is a child/adolescent: No    This patient has additional special visitor precautions: No    Legal Status: pt Is own guardian    Contacts:   Joe Bashir, Ex-spouse: Contact [phone]    Psychiatry Consult:  Psychiatry Consult not requested because pt will be admitted    Updated Attending Provider regarding plan of care.    Buster Hendricks

## 2022-12-01 NOTE — CONSULTS
Diagnostic Evaluation Consultation  Crisis Assessment    Patient was assessed: I-Pad  Patient location: [place]  Was a release of information signed: No. Reason: pt too sedated      Referral Data and Chief Complaint  Winifred Bashir is a 52 year old, who uses she/her pronouns, and presents to the ED alone. Patient is referred to the ED by self. Patient is presenting to the ED for the following concerns: suicidal ideation/substance use.      Informed Consent and Assessment Methods     Patient is her own guardian. Writer met with patient and explained the crisis assessment process, including applicable information disclosures and limits to confidentiality, assessed understanding of the process, and obtained consent to proceed with the assessment. Patient was observed to be able to participate in the assessment as evidenced by cooperative. Assessment methods included conducting a formal interview with patient, review of medical records, collaboration with medical staff, and obtaining relevant collateral information from family and community providers when available..     Over the course of this crisis assessment provided reassurance, offered validation and engaged patient in problem solving and disposition planning. Patient's response to interventions was receptive     Summary of Patient Situation     Patient is a 52-year-old female with a history of major depressive disorder, generalized anxiety disorder, gambling disorder and polysubstance abuse who comes into the emergency room due to suicidal ideation and concerns of substance use.  Patient states that she is been having thoughts of hurting herself or frequently over the last several weeks.  Patient states that she did attempt suicide prior to her recent admission at [facility] back in July by ingesting a bottle of Topamax.  Patient feels that she was let out of the hospital too early.    Patient reports ongoing depressive symptoms and feelings of worthlessness  "feeling that everyone \"would be happy if I were gone\".  Patient feels that this will be the easiest strategy to help improve things around her. She reports a current suicidal plan of overdosing on cocaine in order to cause cardiac arrest.  Patient reports using cocaine daily along with a half a pint of tequila use.  She states that she has not been sleeping over the last 6 days and has been having more intense thoughts of suicide.         Brief Psychosocial History     Pt lives with her two daughters in a house. Pt is not employed. Pt receives child support and spousal support from her ex . Pt enjoys gardening. Pt states her friends are her support system.     Significant Clinical History    Patient has a history of connections to psychiatry and therapy.  Patient also has attended [facility] [employer] and also had a sponsor at one point.  Patient also has a history of case management but currently has not been connected to providers since earlier in the year.  Patient also has a history of being prescribed medications but reports not being on them since January of this year.  Patient also has a history of inpatient hospitalizations with most recently occurring in July of this year.       Collateral Information  None     Risk Assessment  [place] Suicide Severity Rating Scale Full Clinical Version: Completed on 12/1/2022  Suicidal Ideation  1. Wish to be Dead (Lifetime): Yes  1. Wish to be Dead (Past 1 Month): Yes  2. Non-Specific Active Suicidal Thoughts (Lifetime): Yes  2. Non-Specific Active Suicidal Thoughts (Past 1 Month): Yes  3. Active Suicidal Ideation with any Methods (Not Plan) Without Intent to Act (Lifetime): Yes  3. Active Suicidal Ideation with any Methods (Not Plan) Without Intent to Act (Past 1 Month): Yes  4. Active Suicidal Ideation with Some Intent to Act, Without Specific Plan (Lifetime): Yes  4. Active Suicidal Ideation with Some Intent to Act, Without Specific Plan (Past 1 Month): Yes  5. " Active Suicidal Ideation with Specific Plan and Intent (Lifetime): Yes  5. Active Suicidal Ideation with Specific Plan and Intent (Past 1 Month): Yes  Intensity of Ideation  Most Severe Ideation Rating (Lifetime): 5  Most Severe Ideation Rating (Past 1 Month): 5  Frequency (Lifetime): 2-5 times in week  Frequency (Past 1 Month): Daily or almost daily  Duration (Lifetime): 1-4 hours/a lot of time  Duration (Past 1 Month): 4-8 hours/most of day  Controllability (Lifetime): Can control thoughts with a lot of difficulty  Controllability (Past 1 Month): Can control thoughts with a lot of difficulty  Deterrents (Lifetime): Deterrents probably stopped you  Deterrents (Past 1 Month): Deterrents probably stopped you  Reasons for Ideation (Lifetime): Mostly to end or stop the pain (You couldn't go on living with the pain or how you were feeling)  Reasons for Ideation (Past 1 Month): Mostly to end or stop the pain (You couldn't go on living with the pain or how you were feeling)  Suicidal Behavior  Actual Attempt (Lifetime): Yes  Total Number of Actual Attempts (Lifetime): 5  Actual Attempt (Past 3 Months): Yes  Total Number of Actual Attempts (Past 3 Months): 1  Has subject engaged in non-suicidal self-injurious behavior? (Lifetime): No  Interrupted Attempts (Lifetime): No  Aborted or Self-Interrupted Attempt (Lifetime): Yes  Total Number of Aborted or Self-Interrupted Attempts (Lifetime): 1  Aborted or Self-Interrupted Attempt (Past 3 Months): Yes  Total Number of Aborted or Self-Interrupted Attempts (Past 3 Months): 1  Preparatory Acts or Behavior (Lifetime): No  C-SSRS Risk (Lifetime/Recent)  Calculated C-SSRS Risk Score (Lifetime/Recent): High Risk    Patient was mildly sedated and needed to be frequently prompted to wake up during the assessment of these questions.  Patient feels that her suicidal ideation over the last month has been somewhat worse compared to the historical nature of her suicidal thoughts.  She feels  "that she is been having an active plan to overdosing on cocaine with hopes of causing cardiac arrest.  While she does have a recent history of overdosing on medication, she denies this being an active plan at the moment.     Does the patient have thoughts of harming others? No     Is the patient engaging in sexually inappropriate behavior?  no        Current Substance Abuse     Alcohol--using 1/2 pint of tequila daily; cocaine use daily, reports that she goes on binges       Was a urine drug screen or blood alcohol level obtained: Yes positive for cocaine and alcohol       Mental Status Exam     Affect: Other: sedated   Appearance: Appropriate    Attention Span/Concentration: Inattentive  Eye Contact: Variable   Fund of Knowledge: Appropriate    Language /Speech Content: Fluent   Language /Speech Volume: Normal    Language /Speech Rate/Productions: Normal    Recent Memory: Intact   Remote Memory: Intact   Mood: Depressed    Orientation to Person: Yes    Orientation to Place: Yes   Orientation to Time of Day: Yes    Orientation to Date: Yes    Situation (Do they understand why they are here?): Yes    Psychomotor Behavior: Normal    Thought Content: Suicidal   Thought Form: Intact      History of commitment: No         Medication    Psychotropic medications: pt reports she is prescribed medications, but has not taken them since January 2022       Current Care Team    Primary Care Provider: Yes. Name: Enrrique Burroughs MD . Location: [address] [place] [address]. Date of last visit: unknown. Frequency: varies. Perceived helpfulness: \"she's good\".  Psychiatrist: No  Therapist: No  : No     CTSS or ARMHS: No  ACT Team: No  Other: No      Diagnosis    296.32 (F33.1) Major Depressive Disorder, Recurrent Episode, Moderate _   300.02 (F41.1) Generalized Anxiety Disorder - by history   Substance-Related & Addictive Disorders 292.89 Stimulant Intoxication,  292.89 Cocaine, Without perceptual " disturbances:  (F19.293) Wtih use disordre, moderate or severe - primary      Clinical Summary and Substantiation of Recommendations    Patient comes into the hospital brought in by herself due to concerns of ongoing substance use and suicidal ideation.  Patient states that she has current thoughts and a plan to cause cardiac arrest by overdosing on cocaine.  Patient reports that she has been using daily cocaine for the last 6 days along with drinking 1/2 pint of tequila.  Patient feels that ending her life would be the easiest strategy for herself at this time.  The patient came into the emergency room with high energy levels and difficulty to calm herself so was given a prn  of Ativan.  During the assessment the patient required several prompts to keep her eyes open as she was frequently dozing off.  While in the ED she continues to endorse an active plan of overdosing on cocaine. Dr. Curry supports the position of admitting the patient into the hospital for further monitoring and evaluation in order to maintain safety.     Admission to Inpatient Level of Care is indicated due to:    1. Patient risk of severity of behavioral health disorder is appropriate to proposed level of care as indicated by:    Imminent Risk of Harm: Current plan for suicide or serious harm to self is present  And/or:  Behavioral health disorder is present and appropriate for inpatient care with both of the following:     Severe psychiatric, behavioral or other comorbid conditions are appropriate for management at inpatient mental health as indicated by at least one of the following:   o Depressive symptoms and Anxiety and Comorbid substance use disorder    Severe dysfunction in daily living is present as indicated by at least one of the following:   o Other evidence of severe dysfunction    2. Inpatient mental health services are necessary to meet patient needs and at least one of the following:  Specific condition related to admission  diagnosis is present and judged likely to deteriorate in absence of treatment at proposed level of care    3. Situation and expectations are appropriate for inpatient care, as indicated by one of the following:   Voluntary treatment at lower level of care is not feasible    Disposition    Recommended disposition: Inpatient Mental Health       Reviewed case and recommendations with attending provider. Attending Name: Dr. Curry       Attending concurs with disposition: Yes       Patient concurs with disposition: Yes       Guardian concurs with disposition: NA      Final disposition: Inpatient mental health .     Inpatient Details (if applicable):   Is patient admitted voluntarily:Yes      Patient aware of potential for transfer if there is not appropriate placement? Yes       Patient is willing to travel outside of the [facility] for placement? NA      Behavioral Intake Notified? Yes: Date: 12/1/2022 Time: 3:30 pm.     Outpatient Details (if applicable):     Was lethal means counseling provided as a part of aftercare planning? No;       Assessment Details    Patient interview started at: 2:40 pm and completed at: 3:15 pm.     Total duration spent on the patient case in minutes: 1.50 hrs      CPT code(s) utilized: 10388 - Psychotherapy for Crisis - 60 (30-74*) min       Buster Hendricks MA, GRACIE, Psychotherapist  [facility] - Triage & Transition Services  Callback: [phone]

## 2022-12-01 NOTE — ED NOTES
Pt was safety searched. Pt willingly gave up her crack pipe for her drug use. Security instructed this writer to threw the crack pipe into sharps container.

## 2022-12-01 NOTE — ED PROVIDER NOTES
"ED Provider Note  [facility]      History     Chief Complaint   Patient presents with     Suicidal     SI with plan to overdose, patient recently overdose 2 months      Drug / Alcohol Assessment     Drinks a pint of Tequila daily, last drink prior to arrival  Smokes cocaine daily last used prior to arrival  Smokes occasional marjuana     HPI  Winifred Bashir is a 52 year old female who has a history of polysubstance abuse including alcohol cocaine and marijuana, major depression, psychosis, presenting for mental health and chemical dependency evaluation.  Patient states she was admitted here about a year ago.  She states she was doing very well for around 4 months but then a relative moved in with her who is a \"drug dealer\".  She states this was a poor choice and ultimately led to her relapse in which she is sporadically using cocaine on 3 to 4-day binges.  This is in the form of smoked crack cocaine.  She last used earlier today.  She also drinks alcohol up to a pint per day, usually when she is coming down from cocaine.  She states she recently has been depressed, feeling hopeless, helpless, wanting to die.  She admits to having auditory hallucinations which make derogatory statements about her.  She states \"I need some help\".  She had her children go to stay with their father so that she could get help and came here.    Past Medical History  Past Medical History:   Diagnosis Date     Bipolar disorder, unspecified (H)      Gambling disorder, persistent, severe 2020     Generalized anxiety disorder 2020     Methamphetamine use disorder, severe (H) 2020     Mild intermittent asthma      Moderate alcohol use disorder (H) 2020     Osteoarthritis      Posttraumatic stress disorder      Past Surgical History:   Procedure Laterality Date     C/SECTION, LOW TRANSVERSE  2007    , Low Transverse     TUBAL LIGATION  2011     albuterol (PROAIR " HFA/PROVENTIL HFA/VENTOLIN HFA) 108 (90 Base) MCG/ACT inhaler  alum & mag hydroxide-simethicone (MAALOX) 200-200-20 MG/5ML SUSP suspension  B Complex Vitamins (VITAMIN B COMPLEX PO)  buPROPion (WELLBUTRIN XL) 300 MG 24 hr tablet  citalopram (CELEXA) 10 MG tablet  citalopram (CELEXA) 20 MG tablet  cyclobenzaprine (FLEXERIL) 5 MG tablet  gabapentin (NEURONTIN) 100 MG capsule  hydrochlorothiazide (HYDRODIURIL) 25 MG tablet  hydrOXYzine (ATARAX) 25 MG tablet  ibuprofen (ADVIL/MOTRIN) 200 MG tablet  loratadine (CLARITIN) 10 MG tablet  melatonin 3 MG tablet  multivitamin w/minerals (THERA-VIT-M) tablet  naltrexone (DEPADE/REVIA) 50 MG tablet  naltrexone (VIVITROL) 380 MG SUSR  senna-docusate (SENOKOT-S/PERICOLACE) 8.6-50 MG tablet  topiramate (TOPAMAX) 25 MG tablet  traZODone (DESYREL) 50 MG tablet  vitamin D3 (CHOLECALCIFEROL) 125 MCG (5000 UT) tablet      Allergies   Allergen Reactions     Fish-Derived Products Shortness Of Breath and Nausea and Vomiting     Nkda [No Known Drug Allergies]      Family History  Family History   Problem Relation Age of Onset     Hypertension Mother      Post-Traumatic Stress Disorder (PTSD) Mother      Substance Abuse Mother         polysubstances- opiates, alcohol. Currently sober.     Substance Abuse Father         Murdered when pt was 4.     Hypertension Maternal Grandmother      Cerebrovascular Disease Maternal Grandmother      Alcoholism Maternal Grandmother      Depression Maternal Grandmother      No Known Problems Maternal Grandfather      No Known Problems Paternal Grandmother      No Known Problems Paternal Grandfather      Depression Daughter      Anxiety Disorder Daughter      Diabetes Maternal Aunt      Alcoholism Maternal Aunt          of complications related to alcoholism     Musculoskeletal Disorder Maternal Aunt         Lupus     Alcoholism Maternal Aunt          of complications related to alcoholism     Alcoholism Maternal Uncle          of complications  "related to alcoholism     No Known Problems Maternal Great-Grandmother      Asthma No family hx of      C.A.D. No family hx of      Breast Cancer No family hx of      Cancer - colorectal No family hx of      Social History   Social History     Tobacco Use     Smoking status: Former     Types: Cigarettes     Smokeless tobacco: Never     Tobacco comments:     quit 2003   Substance Use Topics     Alcohol use: Not Currently     Comment: Alcoholic Drinks/day: sober since 8/25/19     Drug use: Yes     Types: Cocaine, Marijuana, \"Crack\" cocaine      Past medical history, past surgical history, medications, allergies, family history, and social history were reviewed with the patient. No additional pertinent items.       Review of Systems  A complete review of systems was performed with pertinent positives and negatives noted in the HPI, and all other systems negative.    Physical Exam   BP: (!) 147/93  Pulse: 98  Temp: 97.7  F (36.5  C)  Resp: 16  Height: 170.2 cm (5' 7\")  Weight: 77.6 kg (171 lb)  SpO2: 96 %  Physical Exam  Vitals and nursing note reviewed.   Constitutional:       General: She is not in acute distress.     Appearance: She is not diaphoretic.   HENT:      Head: Atraumatic.      Mouth/Throat:      Pharynx: No oropharyngeal exudate.     Eyes:      General: No scleral icterus.     Pupils: Pupils are equal, round, and reactive to light.   Cardiovascular:      Heart sounds: Normal heart sounds.   Pulmonary:      Effort: No respiratory distress.      Breath sounds: Normal breath sounds.   Abdominal:      General: Bowel sounds are normal.      Palpations: Abdomen is soft.      Tenderness: There is no abdominal tenderness.   Musculoskeletal:         General: No tenderness.   Skin:     General: Skin is warm.      Findings: No rash.   Neurological:      General: No focal deficit present.      Mental Status: She is oriented to person, place, and time. Mental status is at baseline.   Psychiatric:         Attention and " Perception: Attention normal.         Mood and Affect: Mood is anxious and depressed.         Speech: Speech normal.         Behavior: Behavior is cooperative.         Thought Content: Thought content includes suicidal ideation. Thought content includes suicidal (Overdose) plan.         Cognition and Memory: Cognition normal.         Judgment: Judgment normal.         ED Course      Procedures       The medical record was reviewed and interpreted.  Current labs reviewed and interpreted.  Previous labs reviewed and interpreted.  Mental Health Risk Assessment      PSS-3    Date and Time Over the past 2 weeks have you felt down, depressed, or hopeless? Over the past 2 weeks have you had thoughts of killing yourself? Have you ever attempted to kill yourself? When did this last happen? User   12/01/22 1409 yes yes yes -- MAW      C-SSRS ([place])    Date and Time Q1 Wished to be Dead (Past Month) Q2 Suicidal Thoughts (Past Month) Q3 Suicidal Thought Method Q4 Suicidal Intent without Specific Plan Q5 Suicide Intent with Specific Plan Q6 Suicide Behavior (Lifetime) Within the Past 3 Months? RETIRED: Level of Risk per Screen Screening Not Complete User   12/01/22 1409 yes yes yes yes yes yes -- -- -- MAW              Suicide assessment completed by mental health (D.E.C., LCSW, etc.)       Results for orders placed or performed during the hospital encounter of 12/01/22   Comprehensive metabolic panel     Status: Abnormal   Result Value Ref Range    Sodium 143 133 - 144 mmol/L    Potassium 3.1 (L) 3.4 - 5.3 mmol/L    Chloride 109 94 - 109 mmol/L    Carbon Dioxide (CO2) 26 20 - 32 mmol/L    Anion Gap 8 3 - 14 mmol/L    Urea Nitrogen 16 7 - 30 mg/dL    Creatinine 0.77 0.52 - 1.04 mg/dL    Calcium 8.9 8.5 - 10.1 mg/dL    Glucose 107 (H) 70 - 99 mg/dL    Alkaline Phosphatase 104 40 - 150 U/L    AST 15 0 - 45 U/L    ALT 30 0 - 50 U/L    Protein Total 7.1 6.8 - 8.8 g/dL    Albumin 3.4 3.4 - 5.0 g/dL    Bilirubin Total 0.3 0.2 -  1.3 mg/dL    GFR Estimate >90 >60 mL/min/1.73m2   TSH with free T4 reflex     Status: Normal   Result Value Ref Range    TSH 0.59 0.40 - 4.00 mU/L   Asymptomatic COVID-19 Virus (Coronavirus) by PCR Nasopharyngeal     Status: Normal    Specimen: Nasopharyngeal; Swab   Result Value Ref Range    SARS CoV2 PCR Negative Negative    Narrative    Testing was performed using the Xpert Xpress SARS-CoV-2 Assay on the Cepheid Gene-Xpert Instrument Systems. Additional information about this Emergency Use Authorization (EUA) assay can be found via the Lab Guide. This test should be ordered for the detection of SARS-CoV-2 in individuals who meet SARS-CoV-2 clinical and/or epidemiological criteria as well as from individuals without symptoms or other reasons to suspect COVID-19. Test performance for asymptomatic patients has only been established in anterior nasal swab specimens. This test is for in vitro diagnostic use under the FDA EUA for laboratories certified under CLIA to perform high complexity testing. This test has not been FDA cleared or approved. A negative result does not rule out the presence of PCR inhibitors in the specimen or target RNA concentration below the limit of detection for the assay. The possibility of a false negative should be considered if the patient's recent exposure or clinical presentation suggests COVID-19. This test was validated by the [facility]. This laboratory is certified under the Clinical Laboratory Improvement Amendments (CLIA) as qualified to perform high complexity laboratory testing.     CBC with platelets and differential     Status: Abnormal   Result Value Ref Range    WBC Count 5.4 4.0 - 11.0 10e3/uL    RBC Count 4.99 3.80 - 5.20 10e6/uL    Hemoglobin 13.6 11.7 - 15.7 g/dL    Hematocrit 42.5 35.0 - 47.0 %    MCV 85 78 - 100 fL    MCH 27.3 26.5 - 33.0 pg    MCHC 32.0 31.5 - 36.5 g/dL    RDW 15.9 (H) 10.0 - 15.0 %    Platelet Count 270 150 - 450  10e3/uL    % Neutrophils 39 %    % Lymphocytes 50 %    % Monocytes 9 %    % Eosinophils 1 %    % Basophils 1 %    % Immature Granulocytes 0 %    NRBCs per 100 WBC 0 <1 /100    Absolute Neutrophils 2.1 1.6 - 8.3 10e3/uL    Absolute Lymphocytes 2.7 0.8 - 5.3 10e3/uL    Absolute Monocytes 0.5 0.0 - 1.3 10e3/uL    Absolute Eosinophils 0.1 0.0 - 0.7 10e3/uL    Absolute Basophils 0.0 0.0 - 0.2 10e3/uL    Absolute Immature Granulocytes 0.0 <=0.4 10e3/uL    Absolute NRBCs 0.0 10e3/uL   Alcohol breath test POCT     Status: Abnormal   Result Value Ref Range    Alcohol Breath Test 0.023 (A) 0.00 - 0.01   CBC with platelets differential     Status: Abnormal    Narrative    The following orders were created for panel order CBC with platelets differential.  Procedure                               Abnormality         Status                     ---------                               -----------         ------                     CBC with platelets and d...[[id number]]  Abnormal            Final result                 Please view results for these tests on the individual orders.     Medications   magic mouthwash suspension (diphenhydramine, lidocaine, aluminum-magnesium & simethicone) (has no administration in time range)   LORazepam (ATIVAN) tablet 1-4 mg (has no administration in time range)   LORazepam (ATIVAN) tablet 1 mg (1 mg Oral Given 12/1/22 1444)   potassium chloride (KLOR-CON) Packet 20 mEq (20 mEq Oral Given 12/1/22 1947)        Assessments & Plan (with Medical Decision Making)   Patient with a history of major depression, substance abuse, prior suicide attempt, presenting due to worsening depressive symptoms, relapse substance use including frequent binging on cocaine and frequent use of alcohol, hallucinations, and suicidal thoughts.  The patient was also seen by the [facility] , please refer to their extensive note/evaluation which was reviewed with me and is documented in EPIC on 12/1/2022 for further  details.  Patient presents as very anxious, initially pressured, was given Ativan and then subsequently was very sleepy as she was coming off of cocaine as well.  Continues to complain of suicidal ideation, specific plan for suicide, unable to contract for safety.  We will refer for inpatient mental health admission.  Consents to voluntary admission.  I have reviewed the nursing notes. I have reviewed the findings, diagnosis, plan and need for follow up with the patient.    New Prescriptions    No medications on file       Final diagnoses:   Current severe episode of major depressive disorder with psychotic features, unspecified whether recurrent (H)   Suicidal ideation   Polysubstance abuse (H)   Alcohol dependence, uncomplicated (H)       --  Hugh Curry MD  [facility] EMERGENCY DEPARTMENT  12/1/2022     Hugh Curry MD  12/01/22 2057

## 2022-12-01 NOTE — TELEPHONE ENCOUNTER
S: [facility] ED, DEC  Kenneth calling at 3:30PM.  52 year old/Female presenting with SI with plan as well as substance abuse.      B: Pt arrived via self-transport. Presenting problem: Experiencing ongoing SI as well as cocaine and alcohol use.   Plan to overdose on cocaine.  Daily cocaine use.  Half pint of tequila a day.      Pt affect in ED: Generally flat, depressed, pleasant  Pt Dx: Major Depressive Disorder, KELLEN, gambling DO, Cocaine DO, Alcohol abuse disorder  Previous IPMH hx? Yes: [facility] in July  Hx of suicide attempt? Yes 5 previous suicide attempts: Most recently  in July by ingesting a bottle of Topamax.    Pt endorses SI.   Pt denies SIB.   Pt denies HI.   Pt denies hallucinations.     Hx of aggression/violence, sexual offences, or Epic care plan? Please describe: No  Current concerns for aggression this visit? No    Pt is not prescribed medication.   Pt has been off meds since January  Pt denies OP services: None currently  CD concerns: Alcohol and Cocaine daily.  Occassional marijuana  Acute or chronic medical concerns: No    Does Pt present with any of the following: mobility aids, insulin pump, J/G tube, catheter, CPAP, continuous IV, continuous O2, bariatric needs, ADA needs? No    Does pt have a history of Civil Commitment? No  Is Pt their own guardian? Yes  Pt is ambulatory  Pt is able to perform ADLs independently      A: Pt meets criteria for review for [facility] admission. Patient is voluntary. Preferred placement: [facility]  COVID: In process  Utox: In process  CMP: N/A  CBC: N/A  HCG: In process    R: Patient cleared and ready for behavioral bed placement: Yes  Pt placed on IP worklist? Yes

## 2022-12-02 LAB
ANION GAP SERPL CALCULATED.3IONS-SCNC: 8 MMOL/L (ref 3–14)
BUN SERPL-MCNC: 20 MG/DL (ref 7–30)
CALCIUM SERPL-MCNC: 9.1 MG/DL (ref 8.5–10.1)
CHLORIDE BLD-SCNC: 109 MMOL/L (ref 94–109)
CO2 SERPL-SCNC: 25 MMOL/L (ref 20–32)
CREAT SERPL-MCNC: 0.77 MG/DL (ref 0.52–1.04)
GFR SERPL CREATININE-BSD FRML MDRD: >90 ML/MIN/1.73M2
GLUCOSE BLD-MCNC: 104 MG/DL (ref 70–99)
HOLD SPECIMEN: NORMAL
POTASSIUM BLD-SCNC: 3.4 MMOL/L (ref 3.4–5.3)
SODIUM SERPL-SCNC: 142 MMOL/L (ref 133–144)

## 2022-12-02 PROCEDURE — 124N000002 HC R&B MH UMMC

## 2022-12-02 PROCEDURE — 250N000013 HC RX MED GY IP 250 OP 250 PS 637: Performed by: PSYCHIATRY & NEUROLOGY

## 2022-12-02 PROCEDURE — 250N000013 HC RX MED GY IP 250 OP 250 PS 637: Performed by: FAMILY MEDICINE

## 2022-12-02 PROCEDURE — 80048 BASIC METABOLIC PNL TOTAL CA: CPT | Performed by: PSYCHIATRY & NEUROLOGY

## 2022-12-02 PROCEDURE — 36415 COLL VENOUS BLD VENIPUNCTURE: CPT | Performed by: PSYCHIATRY & NEUROLOGY

## 2022-12-02 RX ORDER — CYCLOBENZAPRINE HCL 5 MG
5-10 TABLET ORAL 3 TIMES DAILY PRN
Status: DISCONTINUED | OUTPATIENT
Start: 2022-12-02 | End: 2022-12-05 | Stop reason: HOSPADM

## 2022-12-02 RX ORDER — ALBUTEROL SULFATE 90 UG/1
2 AEROSOL, METERED RESPIRATORY (INHALATION) EVERY 4 HOURS PRN
Status: DISCONTINUED | OUTPATIENT
Start: 2022-12-02 | End: 2022-12-05 | Stop reason: HOSPADM

## 2022-12-02 RX ORDER — OLANZAPINE 10 MG/2ML
10 INJECTION, POWDER, FOR SOLUTION INTRAMUSCULAR 3 TIMES DAILY PRN
Status: DISCONTINUED | OUTPATIENT
Start: 2022-12-02 | End: 2022-12-05

## 2022-12-02 RX ORDER — TRAZODONE HYDROCHLORIDE 50 MG/1
50 TABLET, FILM COATED ORAL
Status: DISCONTINUED | OUTPATIENT
Start: 2022-12-02 | End: 2022-12-05

## 2022-12-02 RX ORDER — OLANZAPINE 10 MG/1
10 TABLET ORAL 3 TIMES DAILY PRN
Status: DISCONTINUED | OUTPATIENT
Start: 2022-12-02 | End: 2022-12-05

## 2022-12-02 RX ORDER — FOLIC ACID 1 MG/1
1 TABLET ORAL DAILY
Status: CANCELLED | OUTPATIENT
Start: 2022-12-02

## 2022-12-02 RX ORDER — HYDROCHLOROTHIAZIDE 12.5 MG/1
25 TABLET ORAL DAILY
Status: DISCONTINUED | OUTPATIENT
Start: 2022-12-03 | End: 2022-12-05

## 2022-12-02 RX ORDER — MULTIPLE VITAMINS W/ MINERALS TAB 9MG-400MCG
1 TAB ORAL DAILY
Status: DISCONTINUED | OUTPATIENT
Start: 2022-12-03 | End: 2022-12-05 | Stop reason: HOSPADM

## 2022-12-02 RX ORDER — ACETAMINOPHEN 325 MG/1
650 TABLET ORAL EVERY 4 HOURS PRN
Status: DISCONTINUED | OUTPATIENT
Start: 2022-12-02 | End: 2022-12-05 | Stop reason: HOSPADM

## 2022-12-02 RX ORDER — HYDROXYZINE HYDROCHLORIDE 50 MG/1
50 TABLET, FILM COATED ORAL EVERY 4 HOURS PRN
Status: DISCONTINUED | OUTPATIENT
Start: 2022-12-02 | End: 2022-12-05 | Stop reason: HOSPADM

## 2022-12-02 RX ORDER — DIAZEPAM 5 MG
5-20 TABLET ORAL EVERY 30 MIN PRN
Status: CANCELLED | OUTPATIENT
Start: 2022-12-02

## 2022-12-02 RX ORDER — ATENOLOL 50 MG/1
50 TABLET ORAL DAILY PRN
Status: CANCELLED | OUTPATIENT
Start: 2022-12-02

## 2022-12-02 RX ORDER — AMOXICILLIN 250 MG
1 CAPSULE ORAL 2 TIMES DAILY PRN
Status: DISCONTINUED | OUTPATIENT
Start: 2022-12-02 | End: 2022-12-05

## 2022-12-02 RX ORDER — VITAMIN B COMPLEX
125 TABLET ORAL DAILY
Status: DISCONTINUED | OUTPATIENT
Start: 2022-12-03 | End: 2022-12-05 | Stop reason: HOSPADM

## 2022-12-02 RX ORDER — MULTIPLE VITAMINS W/ MINERALS TAB 9MG-400MCG
1 TAB ORAL DAILY
Status: CANCELLED | OUTPATIENT
Start: 2022-12-02

## 2022-12-02 RX ORDER — MAGNESIUM HYDROXIDE/ALUMINUM HYDROXICE/SIMETHICONE 120; 1200; 1200 MG/30ML; MG/30ML; MG/30ML
30 SUSPENSION ORAL EVERY 4 HOURS PRN
Status: DISCONTINUED | OUTPATIENT
Start: 2022-12-02 | End: 2022-12-05 | Stop reason: HOSPADM

## 2022-12-02 RX ORDER — FOLIC ACID 1 MG/1
1 TABLET ORAL DAILY
Status: DISCONTINUED | OUTPATIENT
Start: 2022-12-03 | End: 2022-12-05 | Stop reason: HOSPADM

## 2022-12-02 RX ORDER — GABAPENTIN 100 MG/1
100 CAPSULE ORAL 3 TIMES DAILY PRN
Status: DISCONTINUED | OUTPATIENT
Start: 2022-12-02 | End: 2022-12-03

## 2022-12-02 RX ORDER — LORATADINE 10 MG/1
10 TABLET ORAL DAILY PRN
Status: DISCONTINUED | OUTPATIENT
Start: 2022-12-02 | End: 2022-12-03

## 2022-12-02 RX ADMIN — LORAZEPAM 2 MG: 1 TABLET ORAL at 13:30

## 2022-12-02 RX ADMIN — GABAPENTIN 100 MG: 100 CAPSULE ORAL at 22:42

## 2022-12-02 ASSESSMENT — ACTIVITIES OF DAILY LIVING (ADL)
ADLS_ACUITY_SCORE: 37
DRESSING/BATHING_DIFFICULTY: NO
WEAR_GLASSES_OR_BLIND: YES
ADLS_ACUITY_SCORE: 37
ADLS_ACUITY_SCORE: 37
DIFFICULTY_COMMUNICATING: NO
DOING_ERRANDS_INDEPENDENTLY_DIFFICULTY: NO
CHANGE_IN_FUNCTIONAL_STATUS_SINCE_ONSET_OF_CURRENT_ILLNESS/INJURY: NO
CONCENTRATING,_REMEMBERING_OR_MAKING_DECISIONS_DIFFICULTY: NO
WALKING_OR_CLIMBING_STAIRS_DIFFICULTY: NO
NUMBER_OF_TIMES_PATIENT_HAS_FALLEN_WITHIN_LAST_SIX_MONTHS: 1
ADLS_ACUITY_SCORE: 37
DIFFICULTY_EATING/SWALLOWING: NO
ADLS_ACUITY_SCORE: 37
ADLS_ACUITY_SCORE: 37
ADLS_ACUITY_SCORE: 42
TOILETING_ISSUES: NO
ADLS_ACUITY_SCORE: 37
FALL_HISTORY_WITHIN_LAST_SIX_MONTHS: YES
ADLS_ACUITY_SCORE: 37
ADLS_ACUITY_SCORE: 37

## 2022-12-02 ASSESSMENT — LIFESTYLE VARIABLES
SKIP TO QUESTIONS 9-10: 0
AUDIT-C TOTAL SCORE: 10

## 2022-12-02 NOTE — ED PROVIDER NOTES
[facility] ED Mental Health Handoff Note:       Brief HPI:  This is a 52 year old female signed out to me by Dr. Curry.  See initial ED Provider note for full details of the presentation. Interval history is pertinent for Extended Care DEC involvement due to ED boarding status. Patient continues to feel suicidal and unsafe today.    Home meds reviewed and ordered/administered: Yes    Medically stable for inpatient mental health admission: Yes.    Evaluated by mental health: Yes. The recommendation is for inpatient mental health treatment. Bed search in process    Safety concerns: At the time I received sign out, there were no safety concerns.    Hold Status:  Active Orders   N/A         Exam:   Patient Vitals for the past 24 hrs:   BP Temp Temp src Pulse Resp SpO2   12/02/22 1155 (!) 147/88 98.5  F (36.9  C) Oral 73 16 99 %   12/02/22 0220 (!) 144/78 -- -- 69 16 99 %   12/01/22 2149 (!) 148/88 98.1  F (36.7  C) Oral 89 16 98 %         ED Course:    Medications   magic mouthwash suspension (diphenhydramine, lidocaine, aluminum-magnesium & simethicone) (has no administration in time range)   LORazepam (ATIVAN) tablet 1-4 mg (2 mg Oral Given 12/2/22 1330)   LORazepam (ATIVAN) tablet 1 mg (1 mg Oral Given 12/1/22 1444)   potassium chloride (KLOR-CON) Packet 20 mEq (20 mEq Oral Given 12/1/22 1947)       There were no significant events during my shift.      Impression:    ICD-10-CM    1. Current severe episode of major depressive disorder with psychotic features, unspecified whether recurrent (H)  F32.3       2. Suicidal ideation  R45.851       3. Polysubstance abuse (H)  F19.10       4. Alcohol dependence, uncomplicated (H)  F10.20           Plan:    1. Awaiting inpatient mental health admission/transfer.      RESULTS:   Results for orders placed or performed during the hospital encounter of 12/01/22 (from the past 24 hour(s))   Asymptomatic COVID-19 Virus (Coronavirus) by PCR Nasopharyngeal     Status: Normal     Collection Time: 12/01/22  3:28 PM    Specimen: Nasopharyngeal; Swab   Result Value Ref Range    SARS CoV2 PCR Negative Negative    Narrative    Testing was performed using the Xpert Xpress SARS-CoV-2 Assay on the Cepheid Gene-Xpert Instrument Systems. Additional information about this Emergency Use Authorization (EUA) assay can be found via the Lab Guide. This test should be ordered for the detection of SARS-CoV-2 in individuals who meet SARS-CoV-2 clinical and/or epidemiological criteria as well as from individuals without symptoms or other reasons to suspect COVID-19. Test performance for asymptomatic patients has only been established in anterior nasal swab specimens. This test is for in vitro diagnostic use under the FDA EUA for laboratories certified under CLIA to perform high complexity testing. This test has not been FDA cleared or approved. A negative result does not rule out the presence of PCR inhibitors in the specimen or target RNA concentration below the limit of detection for the assay. The possibility of a false negative should be considered if the patient's recent exposure or clinical presentation suggests COVID-19. This test was validated by the [facility]. This laboratory is certified under the Clinical Laboratory Improvement Amendments (CLIA) as qualified to perform high complexity laboratory testing.     CBC with platelets differential     Status: Abnormal    Collection Time: 12/01/22  3:34 PM    Narrative    The following orders were created for panel order CBC with platelets differential.  Procedure                               Abnormality         Status                     ---------                               -----------         ------                     CBC with platelets and d...[[id number]]  Abnormal            Final result                 Please view results for these tests on the individual orders.   Comprehensive metabolic panel     Status:  Abnormal    Collection Time: 12/01/22  3:34 PM   Result Value Ref Range    Sodium 143 133 - 144 mmol/L    Potassium 3.1 (L) 3.4 - 5.3 mmol/L    Chloride 109 94 - 109 mmol/L    Carbon Dioxide (CO2) 26 20 - 32 mmol/L    Anion Gap 8 3 - 14 mmol/L    Urea Nitrogen 16 7 - 30 mg/dL    Creatinine 0.77 0.52 - 1.04 mg/dL    Calcium 8.9 8.5 - 10.1 mg/dL    Glucose 107 (H) 70 - 99 mg/dL    Alkaline Phosphatase 104 40 - 150 U/L    AST 15 0 - 45 U/L    ALT 30 0 - 50 U/L    Protein Total 7.1 6.8 - 8.8 g/dL    Albumin 3.4 3.4 - 5.0 g/dL    Bilirubin Total 0.3 0.2 - 1.3 mg/dL    GFR Estimate >90 >60 mL/min/1.73m2   TSH with free T4 reflex     Status: Normal    Collection Time: 12/01/22  3:34 PM   Result Value Ref Range    TSH 0.59 0.40 - 4.00 mU/L   CBC with platelets and differential     Status: Abnormal    Collection Time: 12/01/22  3:34 PM   Result Value Ref Range    WBC Count 5.4 4.0 - 11.0 10e3/uL    RBC Count 4.99 3.80 - 5.20 10e6/uL    Hemoglobin 13.6 11.7 - 15.7 g/dL    Hematocrit 42.5 35.0 - 47.0 %    MCV 85 78 - 100 fL    MCH 27.3 26.5 - 33.0 pg    MCHC 32.0 31.5 - 36.5 g/dL    RDW 15.9 (H) 10.0 - 15.0 %    Platelet Count 270 150 - 450 10e3/uL    % Neutrophils 39 %    % Lymphocytes 50 %    % Monocytes 9 %    % Eosinophils 1 %    % Basophils 1 %    % Immature Granulocytes 0 %    NRBCs per 100 WBC 0 <1 /100    Absolute Neutrophils 2.1 1.6 - 8.3 10e3/uL    Absolute Lymphocytes 2.7 0.8 - 5.3 10e3/uL    Absolute Monocytes 0.5 0.0 - 1.3 10e3/uL    Absolute Eosinophils 0.1 0.0 - 0.7 10e3/uL    Absolute Basophils 0.0 0.0 - 0.2 10e3/uL    Absolute Immature Granulocytes 0.0 <=0.4 10e3/uL    Absolute NRBCs 0.0 10e3/uL   Alcohol breath test POCT     Status: Abnormal    Collection Time: 12/01/22  3:37 PM   Result Value Ref Range    Alcohol Breath Test 0.023 (A) 0.00 - 0.01   Drug abuse screen 6 urine (chem dep) ([facility])     Status: Abnormal    Collection Time: 12/01/22  9:41 PM   Result Value Ref Range    Amphetamines Urine Screen  Negative Screen Negative    Barbiturates Urine Screen Negative Screen Negative    Benzodiazepines Urine Screen Negative Screen Negative    Cannabinoids Urine Screen Positive (A) Screen Negative    Cocaine Urine Screen Positive (A) Screen Negative    Ethanol Urine Screen Negative Screen Negative    Opiates Urine Screen Negative Screen Negative   HCG qualitative urine (UPT)     Status: Normal    Collection Time: 12/01/22  9:41 PM   Result Value Ref Range    hCG Urine Qualitative Negative Negative             MD Shadi Hill Dung Hoang, MD  12/02/22 7518

## 2022-12-02 NOTE — ED NOTES
Patient has appeared to be sleeping with eyes closed and respirations WNL when checked on Q 15 minutes. Will continue to monitor

## 2022-12-02 NOTE — TELEPHONE ENCOUNTER
R: [facility] ONLY    [facility] Access Inpatient Bed Call Log 11/25/2022 8:07 PM         Adults:                  [facility] is posting 0 beds.  8:14am Intake called and spoke to Gali, facility at capacity.                  [facility] is posting 0 beds.                  [facility] is posting 0 beds. 8:15am Intake called and spoke to Radha charge RN to call back regarding bed status, only low acuity at this time.                  [facility] is posting 0 beds.                  [facility] is posting 0 beds.                  [facility] is posting 0 beds.                  [facility] is posting 0 beds. 8:19am Intake called and spoke to Ashley, no beds available.                  [facility] is posting 0 beds.                       [facility] is posting 0 beds. Mixed unit 12+. Low acuity only.                   [facility] is positing 0 beds. No aggression.                   [facility] is posting 0 beds.                   [facility] is posting 2 bed. Low acuity only.                  [facility] is posting 0 beds.                  [facility] is posting 0 beds. Low acuity.                   [facility] is posting 1 beds. 72 hr hold required.                   [facility] is posting 0 beds.                   [facility] is posting 3 beds. Vol only, No Hx of aggression, violence or assault. No sexual offenders. No 72 hr holds.                       [facility] is posting 6 beds. (Must have the cognitive ability to do programming. No aggressive or violent behavior or recent HX in the last 2 yrs. MH must be primary.)                  [facility] is posting 0 beds. Low acuity only. Violence and aggression capped.                   [facility] is posting 1 beds. Low acuity, Neg Covid.                   [facility] is posting 0 beds. Covid neg. Vol only. Combined adolescent and adult unit. No aggressive or violent behavior. No  "registered sex offenders.                   [facility] is posting 8 beds. Call for details.                  [facility] is posting 1 beds. Low acuity only.    [facility] at capacity, no appropriate beds available.     11:30a Intake paged Provider (Kemal) for review of pt. Intake awaiting response.     11:32a Intake received call from Provider (Kemal) to review pt. Provider stated they would accept her if she is wanting to go IP for MICD. Intake to page Provider.    11:39a Intake called [facility] Nurse ([facility]) to inquire if pt is willing to go to IP detox for MICD admission. Nurse asked pt, pt reported \"only interested in mental health.\"     11:49a Intake paged Provider (Kemal) informing that pt is \"only interested in mental health\" and denies admission for MICD.   "

## 2022-12-02 NOTE — TELEPHONE ENCOUNTER
23:53 - Called ED. RN reports pt scored a 9 on MSSA d/t shakiness and nausea. No hx of w/d szs.   00:39 - Left message for [facility] provider. Awaiting callback.     01:44 - Dr. Bee accepts for admission. Placed pt in queue for 30NB    30 / Sandra    02:11 - Called unit. CRN on break and will call intake back.   02:46 - Unit CRN called back and expresses concerns admitting pt overnight as pt in shared room is awake and being disruptive.   02:55 - Discussed w/ ANS: If pt already on the unit is awake, admission can occur tonight  02:57 - Called 30 CRN back to relay information. CRN reports that pt would need a P room d/t MSSA protocols and that pt in shared room cannot have a roommate d/t her disruptive behaviors. CRN will block bed  03:03 - Updated ANS. Admission cancelled at this time. Removed pt from unit's queue.    No other appropriate beds available at [facility]. Bed search ([facility]) @ 03:30:    [facility]: @ cap per website      [facility]: @ cap per website      [facility]: @ cap per website      [facility]: @ cap per website      [facility]: @ cap per website      [facility]: @ cap per website  [facility]: @ cap per website    Pt remains on work list until appropriate placement is available

## 2022-12-02 NOTE — ED NOTES
Patient arrived around 2:15am in a wheelchair with ER staff. She appears very tired and walked from the wheelchair to the bed. She agreed to take her BP and pulse/oxygen but did not want her temp taken. She just wanted to go back to bed. She did not answer any assessment question.
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
## 2022-12-02 NOTE — PHARMACY-ADMISSION MEDICATION HISTORY
Admission Medication History Completed by Pharmacy    See [facility] Admission Navigator for allergy information, preferred outpatient pharmacy, prior to admission medications and immunization status.     Medication History Sources:     Patient     Dispense history    Changes made to PTA medication list (reason):    Added: None    Deleted: None    Changed: None    Additional Information:    Patient fell asleep multiple times during interview. She states she has been off her medications for at least 4 months; all medications not filled since 4/2022 or longer. She was able to tell me most of her maintenance medications.The rest are left on her list for informational purposes, as I was unable to complete the interview     Prior to Admission medications    Medication Sig Last Dose Taking? Auth Provider Long Term End Date   albuterol (PROAIR HFA/PROVENTIL HFA/VENTOLIN HFA) 108 (90 Base) MCG/ACT inhaler Inhale 2 puffs into the lungs every 4 hours as needed for shortness of breath / dyspnea or wheezing More than a month Yes Meghana Sommer MD Yes    buPROPion (WELLBUTRIN XL) 300 MG 24 hr tablet Take 1 tablet (300 mg) by mouth every morning More than a month Yes Janneth Herring R, DO Yes    citalopram (CELEXA) 10 MG tablet Take 1 tablet (10 mg) by mouth daily Take in addition to 20 mg tab daily Total Dose 30 mg More than a month Yes Janneth Herring R, DO Yes    citalopram (CELEXA) 20 MG tablet Take 1 tablet (20 mg) by mouth daily Take in addition to 10 mg tab total dose of 30 mg More than a month Yes Janneth Herring, DO Yes    gabapentin (NEURONTIN) 100 MG capsule Take 1 capsule (100 mg) by mouth 3 times daily as needed (Anxiety) More than a month Yes Janneth Herring R, DO Yes    hydrochlorothiazide (HYDRODIURIL) 25 MG tablet Take 1 tablet (25 mg) by mouth daily More than a month Yes Meghana Sommer MD Yes    hydrOXYzine (ATARAX) 25 MG tablet Take 2 tablets (50 mg) by mouth every 4 hours as needed for anxiety  More than a month Yes Janneth Herring R, DO     naltrexone (DEPADE/REVIA) 50 MG tablet Take 1 tablet (50 mg) by mouth daily as needed More than a month Yes Janneth Herring, DO Yes    topiramate (TOPAMAX) 25 MG tablet Take 1 tablet (25 mg) by mouth 3 times daily More than a month Yes Janneth Herring R, DO Yes    traZODone (DESYREL) 50 MG tablet Take 1 tablet (50 mg) by mouth nightly as needed for sleep (may repeat after 60 minutes) More than a month Yes Janneth Herring R, DO Yes    cyclobenzaprine (FLEXERIL) 5 MG tablet Take 1-2 tablets (5-10 mg) by mouth 3 times daily as needed for muscle spasms   Janneth Herring R, DO     loratadine (CLARITIN) 10 MG tablet Take 1 tablet (10 mg) by mouth daily   Meghana Sommer MD     melatonin 3 MG tablet Take 3 mg by mouth nightly as needed for sleep   Reported, Patient     multivitamin w/minerals (THERA-VIT-M) tablet Take 1 tablet by mouth daily   Meghana Sommer MD     vitamin D3 (CHOLECALCIFEROL) 125 MCG (5000 UT) tablet Take 1 tablet (125 mcg) by mouth daily   Meghana Sommer MD         Date completed: 12/02/22    Medication history completed by: ISELA DICKERSON [facility]

## 2022-12-03 PROCEDURE — 124N000002 HC R&B MH UMMC

## 2022-12-03 PROCEDURE — 250N000013 HC RX MED GY IP 250 OP 250 PS 637: Performed by: PSYCHIATRY & NEUROLOGY

## 2022-12-03 PROCEDURE — 99223 1ST HOSP IP/OBS HIGH 75: CPT | Mod: AI | Performed by: NURSE PRACTITIONER

## 2022-12-03 PROCEDURE — 250N000013 HC RX MED GY IP 250 OP 250 PS 637: Performed by: NURSE PRACTITIONER

## 2022-12-03 RX ORDER — CITALOPRAM HYDROBROMIDE 10 MG/1
10 TABLET ORAL DAILY
Status: DISCONTINUED | OUTPATIENT
Start: 2022-12-03 | End: 2022-12-05

## 2022-12-03 RX ORDER — GABAPENTIN 300 MG/1
300 CAPSULE ORAL 3 TIMES DAILY
Status: DISCONTINUED | OUTPATIENT
Start: 2022-12-03 | End: 2022-12-05

## 2022-12-03 RX ORDER — LORATADINE 10 MG/1
10 TABLET ORAL DAILY
Status: DISCONTINUED | OUTPATIENT
Start: 2022-12-03 | End: 2022-12-05 | Stop reason: HOSPADM

## 2022-12-03 RX ADMIN — GABAPENTIN 300 MG: 300 CAPSULE ORAL at 14:27

## 2022-12-03 RX ADMIN — Medication 1 TABLET: at 08:10

## 2022-12-03 RX ADMIN — CITALOPRAM HYDROBROMIDE 10 MG: 10 TABLET ORAL at 11:54

## 2022-12-03 RX ADMIN — FOLIC ACID 1 MG: 1 TABLET ORAL at 08:09

## 2022-12-03 RX ADMIN — HYDROXYZINE HYDROCHLORIDE 50 MG: 50 TABLET, FILM COATED ORAL at 07:39

## 2022-12-03 RX ADMIN — HYDROCHLOROTHIAZIDE 25 MG: 12.5 TABLET ORAL at 08:10

## 2022-12-03 RX ADMIN — Medication 125 MCG: at 08:09

## 2022-12-03 RX ADMIN — THIAMINE HCL TAB 100 MG 100 MG: 100 TAB at 08:10

## 2022-12-03 RX ADMIN — LORATADINE 10 MG: 10 TABLET ORAL at 11:53

## 2022-12-03 RX ADMIN — GABAPENTIN 300 MG: 300 CAPSULE ORAL at 19:28

## 2022-12-03 ASSESSMENT — ACTIVITIES OF DAILY LIVING (ADL)
ADLS_ACUITY_SCORE: 42
ORAL_HYGIENE: INDEPENDENT
DRESS: INDEPENDENT
ADLS_ACUITY_SCORE: 42
HYGIENE/GROOMING: INDEPENDENT;HANDWASHING
ADLS_ACUITY_SCORE: 42

## 2022-12-03 NOTE — PROGRESS NOTES
"51yo female patient admitted here [facility] from [facility] at 2145. Patient presented to ED for evaluation of SI with plan to overdose on cocaine. Patient stated had been using cocaine and drinking 1/2 pint of tequila daily for the last 6 days. MSSA 3 at 2200. Pleasant, cooperative with admission process. Patient denied any SI/HI, contracted for safety. Endorsed depression at 2, denied anxiety but requested PRN Gabapentin. She denied hallucinations or delusions. Mouth (gum) pain rated as \"very mild\" Alert and oriented x4, able to communicate needs and verbalize feelings. Unit orientation done, admission folder given.    "

## 2022-12-03 NOTE — ED NOTES
Patient has spent most of the evening resting in her room and has woken up for assessments and food. Patient scored a 5 on the MSSA scale at 1715 and scored a 3 upon reassessment at 2115. Patient has been calm, pleasant, and cooperative. Patient has been accepted to [facility].

## 2022-12-03 NOTE — PLAN OF CARE
Problem: Suicide Risk  Goal: Absence of Self-Harm  Outcome: Progressing     Problem: Anxiety  Goal: Anxiety Reduction or Resolution  Outcome: Progressing     Problem: Depression  Goal: Improved Mood  Outcome: Progressing   Goal Outcome Evaluation:

## 2022-12-03 NOTE — PLAN OF CARE
Goal Outcome Evaluation:    Plan of Care Reviewed With: patient      Problem: Adult Behavioral Health Plan of Care  Goal: Plan of Care Review  Outcome: Progressing  Flowsheets (Taken 12/3/2022 0900)  Patient Agreement with Plan of Care: agrees  Pt was visible on the unit and social with peers and staff. She was pleasant on approach, was medicated with vistaril at the start of the shift for anxiety with relief stated. MSSA score was 3 and 3 this shift. She denied SI, HI and she contracted for safety. She was medication compliant and she had adequate intake. She spent the day either in her room or watching TV with other peers. No behavioral issues were noted.
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
## 2022-12-03 NOTE — DISCHARGE INSTRUCTIONS
Behavioral Discharge Planning and Instructions    Summary: You were admitted on 12/1/2022  due to  SI .  You were treated by Dr. Garcia and discharged on 12/5/2022 from Station 20 to Home    Main Diagnosis:   Major depressive disorder, severe, recurrent  PTSD  Crack cocaine induced psychosis, resolving  Generalized anxiety disorder  Gambling disorder  Alcohol use disorder, moderate, dependence  Alcohol withdrawal  Stimulant use disorder, moderate   Nicotine use disorder    Health Care Follow-up:      Medication Management/Psychiatry:     Jalen Soto  Appointment Time/Date: Call clinic to schedule your next appointment.   Address: Address: [address], [place] [address]  Phone: [phone]    Primary Care Provider:  Danna Herring DO  [facility]       Resources:  [facility] - Access Team  [phone]    [facility]  Address: [address] [place] [address]  Phone: [phone]    [facility] (IOP with lodging)  Phone: [phone]  Fax: [phone]  [address] [place] [address] [address]         [place] [address]      [facility] (IOP with lodging)  Phone: [phone]  Fax: [phone]  [address]  [place] [address]  [address]  [place] [address]    [facility]:  Phone Number: [phone]  Fax Number: [phone]     Dev Morrison  Low Intensity treatment  Call: [phone]  Fax: [phone]     [facility]   [address]   [place] [address]   Phone: [phone]   [email or web address]      Community Drug & Alcohol Support Resources   Alcoholics Anonymous   24/7 Phone Line: 939.540.7755   https://aam[place].org/   https://a[place].org/   For additional list of online meetings: http://aa-intergroup.org      OUTPATIENT ADMISSIONS:  3 R s [facility]  [phone]  (f)  [phone]  [email or web address]    [address]  [phone]  (f) [phone]  [email or web address]    [address] [facility]  [phone]  (f) [phone]  [email or web address]  [phone]  (f) [phone]  [email or web address]      Attend all scheduled appointments with your outpatient providers. Call at least 24 hours in advance if you need to reschedule an appointment to ensure continued access to your outpatient providers.     Major Treatments, Procedures and Findings:  You were provided with: a psychiatric assessment, assessed for medical stability, medication evaluation and/or management, group therapy, and milieu management    Symptoms to Report: feeling more aggressive, increased confusion, losing more sleep, mood getting worse, or thoughts of suicide    Early warning signs can include: increased depression or anxiety sleep disturbances increased thoughts or behaviors of suicide or self-harm  increased unusual thinking, such as paranoia or hearing voices    Safety and Wellness:  Take all medicines as directed.  Make no changes unless your doctor suggests them.  Follow treatment recommendations.  Refrain from alcohol and non-prescribed drugs.  Ask your support system to help you reduce your access to items that could harm yourself or others. If there is a concern for safety, call 911.    Resources:   Crisis Intervention: 303.271.8298 or 411-484-5638 (TTY: 583.373.7025).  Call anytime for help.  National [place] on Mental Illness (www.[place].desirae.org): 231.998.4051 or 956-527-3574.  Alcoholics Anonymous (www.alcoholics-anonymous.org): Check your phone book for your local chapter.  Suicide Awareness Voices of Education (SAVE) (www.save.org): 154-457-MWLH (1022)  National Suicide Prevention Line (www.mentalhealthmn.org): 664-351-OFLC (0491)  Mental Health Consumer/Survivor Network of [place] (www.mhcsn.net): 218.309.5967  "or 617-194-3144  [place]ental Health Association [place] (www.mentalhealth.org): 764.896.6266 or 895-173-1791  Self- Management and Recovery Training., SMART-- Toll free: 822.594.6267  www.[employer].IR Diagnostyx  [facility] Crisis Response 556-089-7247  [facility] Crisis Response - Adult 660 890-0084  [facility] Rapid Response 260-022-7165  Text 4 Life: txt \"LIFE\" to 91887 for immediate support and crisis intervention    General Medication Instructions:   See your medication sheet(s) for instructions.   Take all medicines as directed.  Make no changes unless your doctor suggests them.   Go to all your doctor visits.  Be sure to have all your required lab tests. This way, your medicines can be refilled on time.  Do not use any drugs not prescribed by your doctor.  Avoid alcohol.    Advance Directives:   Scanned document on file with [facility]? Yes, scanned ACP docmt  Is document scanned? Pt states no documents  Honoring Choices Your Rights Handout: Informed and given  Was more information offered? Pt declined    The Treatment team has appreciated the opportunity to work with you. If you have any questions or concerns about your recent admission, you can contact the unit which can receive your call 24 hours a day, 7 days a week. They will be able to get in touch with a Provider if needed. The unit number is [phone].  "

## 2022-12-03 NOTE — PLAN OF CARE
12/03/22 0431   Patient Belongings   Patient Belongings locker   Patient Belongings Put in Hospital Secure Location (Security or Locker, etc.) clothing;cell phone/electronics;purse/wallet;shoes   Belongings Search Yes   Clothing Search Yes   Second Staff Ludivina HERNANDEZ-only searched belongings   Goal Outcome Evaluation:    In Locker:  Red jacket  A pair of black boots with socks in it  BIG CHEETAH BAG with: 3 chap sticks, deodorant, Iphone, [place] ID,  Hair brush, lighter X2, empty bottle of castor oil, a set of keys, empty black purse  CAMOUFLAGE BACKPACK with: a tub of Aquaphor, pair of Adidas sandals, a pair of blue socks, 3 pairs of underwear, black comb, brown empty wallet, small camo pouch, lip gloss,   Mini deodorant, mini perfume.  Blue jeans, a pair of pink underwear,black bra, black Tshirt.    SENT TO SECURITY (#[id number]):  [place] EBT #4714  [employer] VISA #[id number]  [place] P-EBT #4557  [employer] debit #[id number]  [place] debit #[id number]  [place] visa #[id number]  Mastercard #[id number]    ..A               Admission:  I am responsible for any personal items that are not sent to the safe or pharmacy.  [facility] is not responsible for loss, theft or damage of any property in my possession.    Signature:  _________________________________ Date: _______  Time: _____                                              Staff Signature:  ____________________________ Date: ________  Time: _____      2nd Staff person, if patient is unable/unwilling to sign:    Signature: ________________________________ Date: ________  Time: _____     Discharge:  [facility] has returned all of my personal belongings:    Signature: _________________________________ Date: ________  Time: _____                                          Staff Signature:  ____________________________ Date: ________  Time: _____

## 2022-12-03 NOTE — TELEPHONE ENCOUNTER
R: Patient cleared and ready for behavioral bed placement: Yes    7am Intake paged Dr. Le.     7:08pm Intake received a message from Dr. Le reporting this pt will need a k+ recheck, otherwise she is accepted for admission to [facility].     7:09pm Intake called [facility] and requested the pt's nurse to recheck the pt's potassium.     8:30pm Intake received report that the pt's potassium is back.     8:43pm Intake called [facility] and provided disposition to charge nurse. Nurse report: Anytime.    8:46pm Intake called [facility] and provided placement information to [facility].
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
## 2022-12-03 NOTE — PLAN OF CARE
Pt appears to have slept for 7 hours. No PRNs given or requested. Pt MSSA score at 0200 = 3. Denied pain, denied mental health symptoms. No concerns noted this shift. Will continue to monitor and offer support.     Problem: Sleep Disturbance  Goal: Adequate Sleep/Rest  Outcome: Progressing   Goal Outcome Evaluation:

## 2022-12-04 LAB
CHOLEST SERPL-MCNC: 204 MG/DL
HBA1C MFR BLD: 5.7 % (ref 0–5.6)
HDLC SERPL-MCNC: 49 MG/DL
LDLC SERPL CALC-MCNC: 109 MG/DL
NONHDLC SERPL-MCNC: 155 MG/DL
TRIGL SERPL-MCNC: 229 MG/DL

## 2022-12-04 PROCEDURE — 250N000013 HC RX MED GY IP 250 OP 250 PS 637: Performed by: NURSE PRACTITIONER

## 2022-12-04 PROCEDURE — 250N000013 HC RX MED GY IP 250 OP 250 PS 637: Performed by: PSYCHIATRY & NEUROLOGY

## 2022-12-04 PROCEDURE — 82306 VITAMIN D 25 HYDROXY: CPT | Performed by: NURSE PRACTITIONER

## 2022-12-04 PROCEDURE — 36415 COLL VENOUS BLD VENIPUNCTURE: CPT | Performed by: NURSE PRACTITIONER

## 2022-12-04 PROCEDURE — 80061 LIPID PANEL: CPT | Performed by: NURSE PRACTITIONER

## 2022-12-04 PROCEDURE — 83036 HEMOGLOBIN GLYCOSYLATED A1C: CPT | Performed by: NURSE PRACTITIONER

## 2022-12-04 PROCEDURE — 124N000002 HC R&B MH UMMC

## 2022-12-04 RX ORDER — LOPERAMIDE HCL 2 MG
2 CAPSULE ORAL 4 TIMES DAILY PRN
Status: DISCONTINUED | OUTPATIENT
Start: 2022-12-04 | End: 2022-12-05 | Stop reason: HOSPADM

## 2022-12-04 RX ADMIN — Medication 1 TABLET: at 08:03

## 2022-12-04 RX ADMIN — LORATADINE 10 MG: 10 TABLET ORAL at 08:03

## 2022-12-04 RX ADMIN — LOPERAMIDE HYDROCHLORIDE 2 MG: 2 CAPSULE ORAL at 10:38

## 2022-12-04 RX ADMIN — GABAPENTIN 300 MG: 300 CAPSULE ORAL at 15:03

## 2022-12-04 RX ADMIN — HYDROCHLOROTHIAZIDE 25 MG: 12.5 TABLET ORAL at 08:03

## 2022-12-04 RX ADMIN — FOLIC ACID 1 MG: 1 TABLET ORAL at 08:03

## 2022-12-04 RX ADMIN — GABAPENTIN 300 MG: 300 CAPSULE ORAL at 08:03

## 2022-12-04 RX ADMIN — CITALOPRAM HYDROBROMIDE 10 MG: 10 TABLET ORAL at 08:03

## 2022-12-04 RX ADMIN — NICOTINE POLACRILEX 4 MG: 4 LOZENGE ORAL at 10:38

## 2022-12-04 RX ADMIN — GABAPENTIN 300 MG: 300 CAPSULE ORAL at 19:21

## 2022-12-04 RX ADMIN — HYDROXYZINE HYDROCHLORIDE 50 MG: 50 TABLET, FILM COATED ORAL at 19:21

## 2022-12-04 RX ADMIN — HYDROXYZINE HYDROCHLORIDE 50 MG: 50 TABLET, FILM COATED ORAL at 05:15

## 2022-12-04 RX ADMIN — LORAZEPAM 2 MG: 1 TABLET ORAL at 10:40

## 2022-12-04 RX ADMIN — NICOTINE POLACRILEX 4 MG: 4 GUM, CHEWING BUCCAL at 10:08

## 2022-12-04 RX ADMIN — Medication 125 MCG: at 08:03

## 2022-12-04 RX ADMIN — TRAZODONE HYDROCHLORIDE 50 MG: 50 TABLET ORAL at 19:21

## 2022-12-04 RX ADMIN — THIAMINE HCL TAB 100 MG 100 MG: 100 TAB at 08:03

## 2022-12-04 ASSESSMENT — ACTIVITIES OF DAILY LIVING (ADL)
ADLS_ACUITY_SCORE: 42
LAUNDRY: WITH SUPERVISION
ADLS_ACUITY_SCORE: 42
ADLS_ACUITY_SCORE: 42
HYGIENE/GROOMING: INDEPENDENT
ADLS_ACUITY_SCORE: 42
DRESS: INDEPENDENT
LAUNDRY: WITH SUPERVISION
ORAL_HYGIENE: INDEPENDENT
ADLS_ACUITY_SCORE: 42
ORAL_HYGIENE: INDEPENDENT
HYGIENE/GROOMING: INDEPENDENT
ADLS_ACUITY_SCORE: 42
DRESS: INDEPENDENT
ADLS_ACUITY_SCORE: 42

## 2022-12-04 NOTE — PLAN OF CARE
Pt appeared to sleep for a total of 6 hours overnight. Pt woke around 0445 and appeared slightly anxious and tense. She requested a snack, as well as coffee and puzzles. She was receptive to unit policy that requests for coffee and puzzles are not granted until later in the morning. She interacted with staff calmly and appropriately. Around 0515, pt requested a PRN for anxiety; Hydroxyzine (50mg) was administered at this time. Pt able to return to sleep. MSSA was 2 around 0100. No other concerns noted.     Problem: Sleep Disturbance  Goal: Adequate Sleep/Rest  Outcome: Progressing

## 2022-12-04 NOTE — PLAN OF CARE
Problem: Depression  Goal: Improved Mood  Outcome: Progressing     Problem: Anxiety  Goal: Anxiety Reduction or Resolution  Outcome: Progressing     Problem: Suicide Risk  Goal: Absence of Self-Harm  Outcome: Progressing     Problem: Plan of Care - These are the overarching goals to be used throughout the patient stay.    Goal: Optimal Comfort and Wellbeing  Intervention: Provide Person-Centered Care  Recent Flowsheet Documentation  Taken 12/3/2022 1700 by Randall Mcdermott RN  Trust Relationship/Rapport:    care explained    choices provided    emotional support provided    empathic listening provided    questions answered    questions encouraged    reassurance provided    thoughts/feelings acknowledged   Goal Outcome Evaluation:    Plan of Care Reviewed With: patient        Alert and oriented x4, able to communicate needs and verbalize feelings. Did not remember meeting writer last night nor meeting with doctor this AM. Appeared sad/flat but brightened on approach. Visible in milieu watching movie, socialized with peers and staff members but most time spent in room. MSSA 3, denied pain or discomfort. No anxiety, endorsed depression at 7, essential oil provided per request. Denied SI during assessment but 5 minutes prior assessment. Cooperative and compliant with medications. Adequate PO intake.

## 2022-12-04 NOTE — PLAN OF CARE
Goal Outcome Evaluation:    Plan of Care Reviewed With: patient      Problem: Plan of Care - These are the overarching goals to be used throughout the patient stay.    Goal: Optimal Comfort and Wellbeing  Outcome: Progressing     Problem: Adult Behavioral Health Plan of Care  Goal: Adheres to Safety Considerations for Self and Others  Outcome: Progressing  Intervention: Develop and Maintain Individualized Safety Plan  Recent Flowsheet Documentation  Taken 12/4/2022 1741 by Camila Riggs RN  Safety Measures: safety rounds completed     Patient was sleeping at the beginning of the shift. Was woken up at 1740 for dinner. She woke up and said that she was catching up on sleep because she did not sleep for 6 days before coming here. She was bright and pleasant on approach. Speech was clear. Thought process observed to be organized. Patient denied having another diarrhea this shift. Patient stated that she was worried because her house rent is due tomorrow. Patient stated that she did not want to loose her resident. Patient was reassured and encouraged to talk to  in the morning.     Patient's vital signs were stable. On assessment, MSSA was 1. Patient endorsed anxiety of 3 and depression of 6. Patient denied SI/HI and hallucinations. Patient spent a brief time in the milieu. She retired to her room after taking her night medication. PRN Trazodone and Hydroxyzine were given with her night medication at request. Patient requested for copy of her labs her current medications. No behavior outburst noted.

## 2022-12-04 NOTE — PROGRESS NOTES
Patient was visible in the milieu, did not attend group activity. Affect flat, guarded, and appears paranoia, calm and cooperative with all cares. She was not socializing or engaging with peers. She eat most of her meals both breakfast and lunch, she had good fluids and food intake. Pt reported diarrhea x 7 with in 4 hours, writer contacted the provider and was prescribed Imodium, provider would like to be notified if the diarrhea does not stop with in 24 hr after the pt starts the medication. Pt reported anxiety 9/10 and was given Ativan 2 mg PRN, pt reported effective, Denies SI/HI/SIB and depression, she contracted for safety. Pt was medication compliant denies side effects, Pt also requested lozenge instead of the nicotine gum, order received. Pt received PRN  lozenge 2 mg x 1. No other behavioral concern noted at this time. Currently pt is in the lounge, will continue to monitor and follow the care plan.

## 2022-12-05 VITALS
HEART RATE: 84 BPM | OXYGEN SATURATION: 98 % | WEIGHT: 170.2 LBS | DIASTOLIC BLOOD PRESSURE: 80 MMHG | HEIGHT: 67 IN | BODY MASS INDEX: 26.71 KG/M2 | TEMPERATURE: 97.2 F | RESPIRATION RATE: 16 BRPM | SYSTOLIC BLOOD PRESSURE: 140 MMHG

## 2022-12-05 LAB — DEPRECATED CALCIDIOL+CALCIFEROL SERPL-MC: 32 UG/L (ref 20–75)

## 2022-12-05 PROCEDURE — 250N000013 HC RX MED GY IP 250 OP 250 PS 637: Performed by: PSYCHIATRY & NEUROLOGY

## 2022-12-05 PROCEDURE — G0177 OPPS/PHP; TRAIN & EDUC SERV: HCPCS

## 2022-12-05 PROCEDURE — 250N000013 HC RX MED GY IP 250 OP 250 PS 637: Performed by: NURSE PRACTITIONER

## 2022-12-05 PROCEDURE — 99238 HOSP IP/OBS DSCHRG MGMT 30/<: CPT | Performed by: PSYCHIATRY & NEUROLOGY

## 2022-12-05 RX ORDER — HYDROCHLOROTHIAZIDE 12.5 MG/1
25 TABLET ORAL DAILY
Status: DISCONTINUED | OUTPATIENT
Start: 2022-12-05 | End: 2022-12-05

## 2022-12-05 RX ORDER — GABAPENTIN 400 MG/1
400 CAPSULE ORAL 3 TIMES DAILY
Status: DISCONTINUED | OUTPATIENT
Start: 2022-12-05 | End: 2022-12-05 | Stop reason: HOSPADM

## 2022-12-05 RX ORDER — HYDROXYZINE HYDROCHLORIDE 25 MG/1
50 TABLET, FILM COATED ORAL EVERY 4 HOURS PRN
Qty: 90 TABLET | Refills: 1 | Status: ON HOLD | OUTPATIENT
Start: 2022-12-05 | End: 2023-01-10

## 2022-12-05 RX ORDER — HYDROCHLOROTHIAZIDE 12.5 MG/1
50 TABLET ORAL 3 TIMES DAILY
Status: DISCONTINUED | OUTPATIENT
Start: 2022-12-05 | End: 2022-12-05

## 2022-12-05 RX ORDER — HYDROXYZINE HYDROCHLORIDE 50 MG/1
50 TABLET, FILM COATED ORAL 4 TIMES DAILY PRN
Status: DISCONTINUED | OUTPATIENT
Start: 2022-12-05 | End: 2022-12-05

## 2022-12-05 RX ORDER — BUPROPION HYDROCHLORIDE 150 MG/1
150 TABLET ORAL DAILY
Qty: 30 TABLET | Refills: 0 | Status: ON HOLD | OUTPATIENT
Start: 2022-12-05 | End: 2023-01-10

## 2022-12-05 RX ORDER — HYDROXYZINE HYDROCHLORIDE 25 MG/1
25 TABLET, FILM COATED ORAL EVERY 6 HOURS PRN
Status: DISCONTINUED | OUTPATIENT
Start: 2022-12-05 | End: 2022-12-05

## 2022-12-05 RX ORDER — CITALOPRAM HYDROBROMIDE 10 MG/1
20 TABLET ORAL DAILY
Status: DISCONTINUED | OUTPATIENT
Start: 2022-12-06 | End: 2022-12-05 | Stop reason: HOSPADM

## 2022-12-05 RX ORDER — HYDROCHLOROTHIAZIDE 25 MG/1
25 TABLET ORAL DAILY
Qty: 30 TABLET | Refills: 0 | Status: SHIPPED | OUTPATIENT
Start: 2022-12-05

## 2022-12-05 RX ORDER — GABAPENTIN 400 MG/1
400 CAPSULE ORAL 3 TIMES DAILY
Qty: 90 CAPSULE | Refills: 0 | Status: SHIPPED | OUTPATIENT
Start: 2022-12-05

## 2022-12-05 RX ORDER — CITALOPRAM HYDROBROMIDE 20 MG/1
20 TABLET ORAL DAILY
Qty: 30 TABLET | Refills: 0 | Status: ON HOLD | OUTPATIENT
Start: 2022-12-06 | End: 2023-01-10

## 2022-12-05 RX ORDER — BUPROPION HYDROCHLORIDE 150 MG/1
150 TABLET ORAL DAILY
Status: DISCONTINUED | OUTPATIENT
Start: 2022-12-05 | End: 2022-12-05 | Stop reason: HOSPADM

## 2022-12-05 RX ADMIN — THIAMINE HCL TAB 100 MG 100 MG: 100 TAB at 09:12

## 2022-12-05 RX ADMIN — Medication 125 MCG: at 09:11

## 2022-12-05 RX ADMIN — GABAPENTIN 300 MG: 300 CAPSULE ORAL at 09:12

## 2022-12-05 RX ADMIN — Medication 1 TABLET: at 09:12

## 2022-12-05 RX ADMIN — LORATADINE 10 MG: 10 TABLET ORAL at 09:12

## 2022-12-05 RX ADMIN — HYDROXYZINE HYDROCHLORIDE 50 MG: 50 TABLET, FILM COATED ORAL at 11:22

## 2022-12-05 RX ADMIN — CITALOPRAM HYDROBROMIDE 10 MG: 10 TABLET ORAL at 09:12

## 2022-12-05 RX ADMIN — GABAPENTIN 300 MG: 300 CAPSULE ORAL at 13:17

## 2022-12-05 RX ADMIN — BUPROPION HYDROCHLORIDE 150 MG: 150 TABLET, EXTENDED RELEASE ORAL at 15:43

## 2022-12-05 RX ADMIN — HYDROCHLOROTHIAZIDE 25 MG: 12.5 TABLET ORAL at 09:12

## 2022-12-05 RX ADMIN — FOLIC ACID 1 MG: 1 TABLET ORAL at 09:12

## 2022-12-05 RX ADMIN — NICOTINE POLACRILEX 4 MG: 4 LOZENGE ORAL at 11:22

## 2022-12-05 ASSESSMENT — ACTIVITIES OF DAILY LIVING (ADL)
ADLS_ACUITY_SCORE: 44
ADLS_ACUITY_SCORE: 42
ADLS_ACUITY_SCORE: 42
ADLS_ACUITY_SCORE: 44
LAUNDRY: UNABLE TO COMPLETE
DRESS: INDEPENDENT
ADLS_ACUITY_SCORE: 42
ORAL_HYGIENE: INDEPENDENT
ADLS_ACUITY_SCORE: 42
ADLS_ACUITY_SCORE: 44
HYGIENE/GROOMING: INDEPENDENT
ADLS_ACUITY_SCORE: 42

## 2022-12-05 NOTE — PROGRESS NOTES
Pt was discharged at 1600. Pt's belongings were returned. Writer reviewed AVS with pt and pt verbalized understanding. Pt denies SI/HI and agrees to contract for safety out in the community. Writer escorted pt out of the hospital.

## 2022-12-05 NOTE — PLAN OF CARE
Problem: Plan of Care - These are the overarching goals to be used throughout the patient stay.    Goal: Absence of Hospital-Acquired Illness or Injury  Outcome: Progressing  Intervention: Prevent Skin Injury  Recent Flowsheet Documentation  Taken 12/5/2022 0957 by Мария Sherman RN  Body Position: position changed independently   Goal Outcome Evaluation:    Patient alert and oriented on this shift, MSSA  score was 3,  Patient wants to be discharge today against medical advise. Patient was educated to wait until seen by doctor.  Patient stated if she doesn't go home today she could loose her apartment.  Patient denied anxiety, depression, SI and SIB. Patient visible on unit and  waiting  In patientially  to be discharge. Doctor currently on unit to see patient.
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
## 2022-12-05 NOTE — PLAN OF CARE
Pt appears to have slept for 6.50 hours. No PRNs given or requested . MSSA = 1; No concerns noted this shift. Will continue to monitor and offer support.     Problem: Sleep Disturbance  Goal: Adequate Sleep/Rest  Outcome: Progressing   Goal Outcome Evaluation:

## 2022-12-05 NOTE — PLAN OF CARE
12/05/22 0955   Individualization/Patient Specific Goals   Patient Personal Strengths resilient;realistic evaluation of current/future capabilities;expressive of needs   Patient Vulnerabilities substance abuse/addiction;lacks insight into illness;history of unsuccessful treatment;adverse childhood experience(s);family/relationship conflict   Anxieties, Fears or Concerns Lenght of stay   Individualized Care Needs Will be encouraged to be medication compliant and attend groups   Patient/Family-Specific Goals (Include Timeframe) Return home, follow up with outpatient providers   Interprofessional Rounds   Summary Reason for admission   Participants CTC;psychiatrist;nursing   Behavioral Team Discussion   Participants Dr. Garcia, Miroslava RN, Margarita Lee CTC   Progress New admission   Anticipated length of stay 3-5 days   Continued Stay Criteria/Rationale Admitted for SI and substance use. Needs clinical stabillization and medication management   Medical/Physical No acute concerns   Precautions See below   Plan Psychiatry Team will meet with patient daily to assess psychiatric needs and to discuss medication options/side effects; during hospitalization patient will be encouraged to attend therapy groups and to participate in unit programming. CTC will coordinate disposition and aftercare plan   Rationale for change in precautions or plan None   Safety Plan See below   PRECAUTIONS AND SAFETY    Behavioral Orders   Procedures     Code 1 - Restrict to Unit     Routine Programming     As clinically indicated     Status 15     Every 15 minutes.     Suicide precautions     Patients on Suicide Precautions should have a Combination Diet ordered that includes a Diet selection(s) AND a Behavioral Tray selection for Safe Tray - with utensils, or Safe Tray - NO utensils       Withdrawal precautions       Safety  Safety WDL: WDL  Patient Location: patient room, own  Observed Behavior: calm  Safety Measures: safety rounds  completed  Diversional Activity: television  Suicidality: Status 15  Withdrawal: monitor withdrawal process

## 2022-12-05 NOTE — DISCHARGE SUMMARY
PSYCHIATRY    The patient was seen, her chart reviewed, her case discussed with staff. I added Wellbutrin XL to her regimen, increased Celexa and Gabapentin. 30 day supply of her medications were sent to [facility] pharmacy.  Discharge summary dictated.    Francis Garcia MD

## 2022-12-05 NOTE — PLAN OF CARE
"Initial Occupational Therapy Group Note:     12/05/22 1300   Group Therapy Session   Group Attendance attended group session   Time Session Began 1120   Time Session Ended 1215   Total Time (minutes) 50   Total # Attendees 5-6   Group Type life skill   Group Topic Covered balanced lifestyle;coping skills/lifestyle management;structured socialization   Group Session Detail values discussion   Patient Response/Contribution cooperative with task;discussed personal experience with topic;expressed understanding of topic;expressed readiness to alter behaviors;listened actively   Patient Participation Detail Pt actively participated in a structured occupational therapy group with a focus on identifying and prioritizing personal values. Pt contributed to a group discussion that facilitated self-reflection and application of personal values. Using a list of values, pt identified \"honesty, love, respect, stability, and calmness\" as her most important values. Acknowledged that these were challenging to rank due to her experiences leading up to admission not aligning with her personal values, and briefly became tearful, though quick to calm and re-join the group discussion. Briefly discussed struggling with \"chemical dependency and gambling addiction.\" Identified a goal to become more financially responsible in the future. Pleasant and engaged. Will continue to assess. Initial assessment to be completed upon additional group participation.        "
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
## 2022-12-17 ENCOUNTER — OFFICE VISIT (OUTPATIENT)
Dept: FAMILY MEDICINE | Facility: CLINIC | Age: 52
End: 2022-12-17
Payer: COMMERCIAL

## 2022-12-17 VITALS
RESPIRATION RATE: 16 BRPM | OXYGEN SATURATION: 100 % | SYSTOLIC BLOOD PRESSURE: 138 MMHG | HEART RATE: 90 BPM | TEMPERATURE: 98.6 F | DIASTOLIC BLOOD PRESSURE: 82 MMHG | BODY MASS INDEX: 26.03 KG/M2 | WEIGHT: 166.2 LBS

## 2022-12-17 DIAGNOSIS — J02.0 ACUTE STREPTOCOCCAL PHARYNGITIS: ICD-10-CM

## 2022-12-17 DIAGNOSIS — J02.0 STREPTOCOCCAL SORE THROAT: Primary | ICD-10-CM

## 2022-12-17 DIAGNOSIS — H10.33 ACUTE CONJUNCTIVITIS OF BOTH EYES, UNSPECIFIED ACUTE CONJUNCTIVITIS TYPE: ICD-10-CM

## 2022-12-17 LAB — DEPRECATED S PYO AG THROAT QL EIA: POSITIVE

## 2022-12-17 PROCEDURE — 87880 STREP A ASSAY W/OPTIC: CPT

## 2022-12-17 PROCEDURE — 99213 OFFICE O/P EST LOW 20 MIN: CPT | Performed by: FAMILY MEDICINE

## 2022-12-17 RX ORDER — AMOXICILLIN 500 MG/1
500 CAPSULE ORAL 3 TIMES DAILY
Qty: 30 CAPSULE | Refills: 0 | Status: SHIPPED | OUTPATIENT
Start: 2022-12-17 | End: 2022-12-18

## 2022-12-17 RX ORDER — POLYMYXIN B SULFATE AND TRIMETHOPRIM 1; 10000 MG/ML; [USP'U]/ML
1-2 SOLUTION OPHTHALMIC 4 TIMES DAILY
Qty: 4 ML | Refills: 0 | Status: SHIPPED | OUTPATIENT
Start: 2022-12-17 | End: 2022-12-18

## 2022-12-17 NOTE — PROGRESS NOTES
Winifred Bashir is a 52 year old female who comes in today for sore thoat     Got bad yest     Hurts to swallow    Not eating much     Drinking fluids okay    Not many sick contacts    Runny nose      colored  Discharge from eyes    No fever    No cough    No other symptoms      No change in meds      ROS    Physical Exam  Constitutional:       Appearance: Normal appearance.   HENT:      Head: Normocephalic and atraumatic.      Right Ear: Tympanic membrane, ear canal and external ear normal.      Left Ear: Tympanic membrane, ear canal and external ear normal.      Nose: Nose normal.      Mouth/Throat:      Mouth: Mucous membranes are moist.      Comments: Mild redness  Eyes:      Comments: Some tearing present.  Minimal redness of conj.     Cardiovascular:      Rate and Rhythm: Normal rate and regular rhythm.      Heart sounds: Normal heart sounds.   Pulmonary:      Effort: Pulmonary effort is normal. No respiratory distress.      Breath sounds: Normal breath sounds.   Neurological:      Mental Status: She is alert.       ASSESSMENT / PLAN:  (J02.0) Streptococcal sore throat  (primary encounter diagnosis)  Comment: qs pos   Plan: Streptococcus A Rapid Screen w/Reflex to PCR -         Clinic Collect             (J02.0) Acute streptococcal pharyngitis  Comment: will treat   Plan: amoxicillin (AMOXIL) 500 MG capsule             (H10.33) Acute conjunctivitis of both eyes, unspecified acute conjunctivitis type  Comment: stop the visene type drops.  Use prescription drop instead.    Plan: trimethoprim-polymyxin b (POLYTRIM) 55881-0.1         UNIT/ML-% ophthalmic solution             Follow up as needed based on symptoms       I reviewed the patient's medications, allergies, medical history, family history, and social history.    Michael Duran MD

## 2022-12-17 NOTE — PATIENT INSTRUCTIONS
Take the oral antibiotics    Prescription eye drops     Stop the get the red out type drops    Stay well hydrated    Follow up as needed based on symptoms

## 2022-12-18 ENCOUNTER — NURSE TRIAGE (OUTPATIENT)
Dept: NURSING | Facility: CLINIC | Age: 52
End: 2022-12-18

## 2022-12-18 DIAGNOSIS — J02.0 ACUTE STREPTOCOCCAL PHARYNGITIS: ICD-10-CM

## 2022-12-18 DIAGNOSIS — H10.33 ACUTE CONJUNCTIVITIS OF BOTH EYES, UNSPECIFIED ACUTE CONJUNCTIVITIS TYPE: ICD-10-CM

## 2022-12-18 RX ORDER — AMOXICILLIN 500 MG/1
500 CAPSULE ORAL 3 TIMES DAILY
Qty: 30 CAPSULE | Refills: 0 | Status: SHIPPED | OUTPATIENT
Start: 2022-12-18 | End: 2023-01-06

## 2022-12-18 RX ORDER — POLYMYXIN B SULFATE AND TRIMETHOPRIM 1; 10000 MG/ML; [USP'U]/ML
1-2 SOLUTION OPHTHALMIC 4 TIMES DAILY
Qty: 4 ML | Refills: 0 | Status: ON HOLD | OUTPATIENT
Start: 2022-12-18 | End: 2023-01-10

## 2022-12-18 NOTE — TELEPHONE ENCOUNTER
Pt is phoning stating that she was seen at [facility] last night and dx with strep throat - pt would like Rx moved to a closer pharmacy     No answer at [facility] - pt will try her pharmacy and see if they will script for her     No triage       Marianna Montes RN  [place] Nurse Advisor  10:52 AM 12/18/2022      Reason for Disposition    General information question, no triage required and triager able to answer question    Additional Information    Negative: [1] Caller is not with the adult (patient) AND [2] reporting urgent symptoms    Negative: Lab result questions    Negative: Medication questions    Negative: Caller can't be reached by phone    Negative: Caller has already spoken to PCP or another triager    Negative: RN needs further essential information from caller in order to complete triage    Negative: Requesting regular office appointment    Negative: [1] Caller requesting NON-URGENT health information AND [2] PCP's office is the best resource    Negative: Health Information question, no triage required and triager able to answer question    Protocols used: INFORMATION ONLY CALL - NO TRIAGE-[facility]

## 2022-12-18 NOTE — TELEPHONE ENCOUNTER
Triage Call:    Patient calling to request transfer of prescriptions to a different pharmacy.  Patient was prescribed amoxicillin and polytrim, pharmacy was closed.  Transferred to patient's requested pharmacy, [id number] in [place].    Marine Long RN  12/18/22 5:09 PM  [facility] Nurse Advisor      Reason for Disposition    [1] Prescription prescribed recently is not at pharmacy AND [2] triager has access to patient's EMR AND [3] prescription is recorded in the EMR    Additional Information    Negative: [1] Intentional drug overdose AND [2] suicidal thoughts or ideas    Negative: Drug overdose and triager unable to answer question    Negative: Caller requesting a renewal or refill of a medicine patient is currently taking    Negative: Caller requesting information unrelated to medicine    Negative: Caller requesting information about COVID-19 Vaccine    Negative: Caller requesting information about Emergency Contraception    Negative: Caller requesting information about Combined Birth Control Pills    Negative: Caller requesting information about Progestin Birth Control Pills    Negative: Caller requesting information about Post-Op pain or medicines    Negative: Caller requesting a prescription antibiotic (such as Penicillin) for Strep throat and has a positive culture result    Negative: Caller requesting a prescription anti-viral med (such as Tamiflu) and has influenza (flu) symptoms    Negative: Immunization reaction suspected    Negative: Rash while taking a medicine or within 3 days of stopping it    Negative: [1] Asthma and [2] having symptoms of asthma (cough, wheezing, etc.)    Negative: [1] Symptom of illness (e.g., headache, abdominal pain, earache, vomiting) AND [2] more than mild    Negative: Breastfeeding questions about mother's medicines and diet    Negative: MORE THAN A DOUBLE DOSE of a prescription or over-the-counter (OTC) drug    Negative: [1] DOUBLE DOSE (an extra dose  or lesser amount) of prescription drug AND [2] any symptoms (e.g., dizziness, nausea, pain, sleepiness)    Negative: [1] DOUBLE DOSE (an extra dose or lesser amount) of over-the-counter (OTC) drug AND [2] any symptoms (e.g., dizziness, nausea, pain, sleepiness)    Negative: Took another person's prescription drug    Negative: [1] DOUBLE DOSE (an extra dose or lesser amount) of prescription drug AND [2] NO symptoms (Exception: a double dose of antibiotics)    Negative: Diabetes drug error or overdose (e.g., took wrong type of insulin or took extra dose)    Negative: [1] Prescription not at pharmacy AND [2] was prescribed by PCP recently (Exception: triager has access to EMR and prescription is recorded there. Go to Home Care and confirm for pharmacy.)    Negative: [1] Pharmacy calling with prescription question AND [2] triager unable to answer question    Negative: [1] Caller has URGENT medicine question about med that PCP or specialist prescribed AND [2] triager unable to answer question    Negative: Medicine patch causing local rash or itching    Negative: [1] Caller has medicine question about med NOT prescribed by PCP AND [2] triager unable to answer question (e.g., compatibility with other med, storage)    Negative: Prescription request for new medicine (not a refill)    Negative: [1] Caller has NON-URGENT medicine question about med that PCP prescribed AND [2] triager unable to answer question    Negative: Caller wants to use a complementary or alternative medicine    Protocols used: MEDICATION QUESTION CALL-A-

## 2023-01-06 ENCOUNTER — TELEPHONE (OUTPATIENT)
Dept: BEHAVIORAL HEALTH | Facility: CLINIC | Age: 53
End: 2023-01-06

## 2023-01-06 ENCOUNTER — APPOINTMENT (OUTPATIENT)
Dept: CT IMAGING | Facility: CLINIC | Age: 53
End: 2023-01-06
Attending: EMERGENCY MEDICINE
Payer: COMMERCIAL

## 2023-01-06 ENCOUNTER — HOSPITAL ENCOUNTER (INPATIENT)
Facility: CLINIC | Age: 53
LOS: 3 days | Discharge: PSYCHIATRIC OR SUBSTANCE USE FACILITY | End: 2023-01-10
Attending: EMERGENCY MEDICINE | Admitting: PSYCHIATRY & NEUROLOGY
Payer: COMMERCIAL

## 2023-01-06 DIAGNOSIS — F10.239 ALCOHOL DEPENDENCE WITH WITHDRAWAL WITH COMPLICATION (H): ICD-10-CM

## 2023-01-06 DIAGNOSIS — F31.9 BIPOLAR AFFECTIVE DISORDER, REMISSION STATUS UNSPECIFIED (H): ICD-10-CM

## 2023-01-06 DIAGNOSIS — R45.851 SUICIDAL IDEATION: ICD-10-CM

## 2023-01-06 DIAGNOSIS — F43.10 POSTTRAUMATIC STRESS DISORDER: ICD-10-CM

## 2023-01-06 DIAGNOSIS — F41.1 GENERALIZED ANXIETY DISORDER: ICD-10-CM

## 2023-01-06 DIAGNOSIS — F33.1 MAJOR DEPRESSIVE DISORDER, RECURRENT EPISODE, MODERATE (H): ICD-10-CM

## 2023-01-06 DIAGNOSIS — F32.1 CURRENT MODERATE EPISODE OF MAJOR DEPRESSIVE DISORDER, UNSPECIFIED WHETHER RECURRENT (H): ICD-10-CM

## 2023-01-06 DIAGNOSIS — F15.229: ICD-10-CM

## 2023-01-06 DIAGNOSIS — Z11.52 ENCOUNTER FOR SCREENING LABORATORY TESTING FOR SEVERE ACUTE RESPIRATORY SYNDROME CORONAVIRUS 2 (SARS-COV-2): ICD-10-CM

## 2023-01-06 DIAGNOSIS — R45.851 PLANNING TO COMMIT SUICIDE: ICD-10-CM

## 2023-01-06 DIAGNOSIS — F19.20 CHEMICAL DEPENDENCY (H): Primary | ICD-10-CM

## 2023-01-06 LAB
AMPHETAMINES UR QL SCN: ABNORMAL
ANION GAP SERPL CALCULATED.3IONS-SCNC: 6 MMOL/L (ref 3–14)
APAP SERPL-MCNC: <2 MG/L (ref 10–30)
BARBITURATES UR QL: ABNORMAL
BASOPHILS # BLD AUTO: 0 10E3/UL (ref 0–0.2)
BASOPHILS NFR BLD AUTO: 1 %
BENZODIAZ UR QL: ABNORMAL
BUN SERPL-MCNC: 17 MG/DL (ref 7–30)
CALCIUM SERPL-MCNC: 9 MG/DL (ref 8.5–10.1)
CANNABINOIDS UR QL SCN: ABNORMAL
CHLORIDE BLD-SCNC: 109 MMOL/L (ref 94–109)
CO2 SERPL-SCNC: 26 MMOL/L (ref 20–32)
COCAINE UR QL: ABNORMAL
CREAT SERPL-MCNC: 0.76 MG/DL (ref 0.52–1.04)
EOSINOPHIL # BLD AUTO: 0.1 10E3/UL (ref 0–0.7)
EOSINOPHIL NFR BLD AUTO: 1 %
ERYTHROCYTE [DISTWIDTH] IN BLOOD BY AUTOMATED COUNT: 13.7 % (ref 10–15)
ETHANOL SERPL-MCNC: <0.01 G/DL
FLUAV RNA SPEC QL NAA+PROBE: NEGATIVE
FLUBV RNA RESP QL NAA+PROBE: NEGATIVE
GFR SERPL CREATININE-BSD FRML MDRD: >90 ML/MIN/1.73M2
GLUCOSE BLD-MCNC: 89 MG/DL (ref 70–99)
HCT VFR BLD AUTO: 42.1 % (ref 35–47)
HGB BLD-MCNC: 13.9 G/DL (ref 11.7–15.7)
IMM GRANULOCYTES # BLD: 0 10E3/UL
IMM GRANULOCYTES NFR BLD: 0 %
LYMPHOCYTES # BLD AUTO: 3.1 10E3/UL (ref 0.8–5.3)
LYMPHOCYTES NFR BLD AUTO: 53 %
MCH RBC QN AUTO: 27.5 PG (ref 26.5–33)
MCHC RBC AUTO-ENTMCNC: 33 G/DL (ref 31.5–36.5)
MCV RBC AUTO: 83 FL (ref 78–100)
MONOCYTES # BLD AUTO: 0.4 10E3/UL (ref 0–1.3)
MONOCYTES NFR BLD AUTO: 7 %
NEUTROPHILS # BLD AUTO: 2.2 10E3/UL (ref 1.6–8.3)
NEUTROPHILS NFR BLD AUTO: 38 %
NRBC # BLD AUTO: 0 10E3/UL
NRBC BLD AUTO-RTO: 0 /100
OPIATES UR QL SCN: ABNORMAL
PLATELET # BLD AUTO: 344 10E3/UL (ref 150–450)
POTASSIUM BLD-SCNC: 3.5 MMOL/L (ref 3.4–5.3)
RBC # BLD AUTO: 5.05 10E6/UL (ref 3.8–5.2)
RSV RNA SPEC NAA+PROBE: NEGATIVE
SALICYLATES SERPL-MCNC: <2 MG/DL
SARS-COV-2 RNA RESP QL NAA+PROBE: NEGATIVE
SODIUM SERPL-SCNC: 141 MMOL/L (ref 133–144)
WBC # BLD AUTO: 5.8 10E3/UL (ref 4–11)

## 2023-01-06 PROCEDURE — 99285 EMERGENCY DEPT VISIT HI MDM: CPT | Mod: 25 | Performed by: EMERGENCY MEDICINE

## 2023-01-06 PROCEDURE — 87637 SARSCOV2&INF A&B&RSV AMP PRB: CPT | Performed by: EMERGENCY MEDICINE

## 2023-01-06 PROCEDURE — C9803 HOPD COVID-19 SPEC COLLECT: HCPCS | Performed by: EMERGENCY MEDICINE

## 2023-01-06 PROCEDURE — 80048 BASIC METABOLIC PNL TOTAL CA: CPT | Performed by: EMERGENCY MEDICINE

## 2023-01-06 PROCEDURE — 80143 DRUG ASSAY ACETAMINOPHEN: CPT | Performed by: EMERGENCY MEDICINE

## 2023-01-06 PROCEDURE — 36415 COLL VENOUS BLD VENIPUNCTURE: CPT | Performed by: EMERGENCY MEDICINE

## 2023-01-06 PROCEDURE — 250N000013 HC RX MED GY IP 250 OP 250 PS 637: Performed by: EMERGENCY MEDICINE

## 2023-01-06 PROCEDURE — 70450 CT HEAD/BRAIN W/O DYE: CPT

## 2023-01-06 PROCEDURE — HZ2ZZZZ DETOXIFICATION SERVICES FOR SUBSTANCE ABUSE TREATMENT: ICD-10-PCS | Performed by: PSYCHIATRY & NEUROLOGY

## 2023-01-06 PROCEDURE — 85025 COMPLETE CBC W/AUTO DIFF WBC: CPT | Performed by: EMERGENCY MEDICINE

## 2023-01-06 PROCEDURE — 80179 DRUG ASSAY SALICYLATE: CPT | Performed by: EMERGENCY MEDICINE

## 2023-01-06 PROCEDURE — 90791 PSYCH DIAGNOSTIC EVALUATION: CPT

## 2023-01-06 PROCEDURE — 80307 DRUG TEST PRSMV CHEM ANLYZR: CPT | Performed by: EMERGENCY MEDICINE

## 2023-01-06 PROCEDURE — 82077 ASSAY SPEC XCP UR&BREATH IA: CPT | Performed by: EMERGENCY MEDICINE

## 2023-01-06 RX ORDER — LORAZEPAM 1 MG/1
1 TABLET ORAL ONCE
Status: COMPLETED | OUTPATIENT
Start: 2023-01-06 | End: 2023-01-06

## 2023-01-06 RX ADMIN — LORAZEPAM 1 MG: 1 TABLET ORAL at 17:32

## 2023-01-06 RX ADMIN — LORAZEPAM 1 MG: 1 TABLET ORAL at 23:41

## 2023-01-06 ASSESSMENT — COLUMBIA-SUICIDE SEVERITY RATING SCALE - C-SSRS
ATTEMPT SINCE LAST CONTACT: YES
TOTAL  NUMBER OF ACTUAL ATTEMPTS SINCE LAST CONTACT: 1
REASONS FOR IDEATION SINCE LAST CONTACT: MOSTLY TO END OR STOP THE PAIN (YOU COULDN'T GO ON LIVING WITH THE PAIN OR HOW YOU WERE FEELING)
SUICIDE, SINCE LAST CONTACT: NO
TOTAL  NUMBER OF ABORTED OR SELF INTERRUPTED ATTEMPTS SINCE LAST CONTACT: NO
2. HAVE YOU ACTUALLY HAD ANY THOUGHTS OF KILLING YOURSELF?: YES
6. HAVE YOU EVER DONE ANYTHING, STARTED TO DO ANYTHING, OR PREPARED TO DO ANYTHING TO END YOUR LIFE?: NO
TOTAL  NUMBER OF INTERRUPTED ATTEMPTS SINCE LAST CONTACT: NO
1. SINCE LAST CONTACT, HAVE YOU WISHED YOU WERE DEAD OR WISHED YOU COULD GO TO SLEEP AND NOT WAKE UP?: YES
5. HAVE YOU STARTED TO WORK OUT OR WORKED OUT THE DETAILS OF HOW TO KILL YOURSELF? DO YOU INTEND TO CARRY OUT THIS PLAN?: YES

## 2023-01-06 ASSESSMENT — ACTIVITIES OF DAILY LIVING (ADL)
ADLS_ACUITY_SCORE: 37

## 2023-01-06 NOTE — PLAN OF CARE
Winifred Bashir  January 6, 2023  Plan of Care Hand-off Note     Patient Care Path: Inpatient Mental Health    Plan for Care:     Winifred Bashir presents in the ED alone due to concerns of substance abuse and suicidal ideation. Throughout the assessment the pt was extremely somnolent and repeatedly fell asleep, but was easily woken. The pt reports that she smoked 1/2 gram of cocaine with the belief that the amount might stop her heart. The pt states that once she was able to drive herself to the ED she decided to seek help for her suicidal ideation. While in the ED the pt endorses an active plan of overdosing on cocaine if discharged. Due to the pt endorsing a suicide plan and C-SSRS High Risk inpatient mental health recommended to stabilize the patient.      Overview:  This patient is a child/adolescent: No    This patient has additional special visitor precautions: No    Legal Status: Voluntary      Updated Attending Provider regarding plan of care.    ABBY VINSON
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
## 2023-01-06 NOTE — TELEPHONE ENCOUNTER
S: [facility] [place]  DEC  Gali calling at 10:35am  about a 52 year old/Female presenting with SI.        B: Pt arrived via Self . Presenting problem: Pt presents with SI and polysubstance use. Pt came in ED and reported she smoked 1/2 gram of cocaine in hopes it will stop her heart. Stressors: Substance use, unhoused, and her personal life/supports are in shambles.     Pt affect in ED: Engaged  Pt Dx: Major Depressive Disorder, Generalized Anxiety Disorder, Bipolar Disorder, PTSD and Substance Use Disorder: Alcohol, cocaine, THC,   Previous [facility] hx? Yes: [facility], December 2022.     Pt endorses SI with a plan to overdose   Hx of suicide attempt? Yes: 7/21/2022  Pt denies SIB  Pt denies HI   Pt denies hallucinations .     Hx of aggression/violence, sexual offences, legal concerns, or Epic care plan? describe: No  Current concerns for aggression this visit? No    Does pt have a history of Civil Commitment? No  Is Pt their own guardian? Yes    Pt is prescribed medication. Is patient medication compliant? No  Pt endorses OP services: [facility] Day [facility] and denies OP services   CD concerns: Actively using/consuming Cocaine and THC  Acute or chronic medical concerns: No  Does Pt present with specific needs, assistive devices, or exclusionary criteria? None      Pt is ambulatory  Pt is able to perform ADLs independently      A: Pt to be reviewed for [facility] admission. Pt is Voluntary  Preferred placement: [facility]    COVID:Not yet ordered, Intake to request lab   Utox: Positive for THC and cocaine   CMP: WNL  CBC: WNL  HCG: Not ordered, intake to request lab     R: Patient cleared and ready for behavioral bed placement: Yes  Pt placed on [facility] worklist? Yes
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
## 2023-01-07 LAB
ATRIAL RATE - MUSE: 65 BPM
DIASTOLIC BLOOD PRESSURE - MUSE: NORMAL MMHG
INTERPRETATION ECG - MUSE: NORMAL
P AXIS - MUSE: -19 DEGREES
PR INTERVAL - MUSE: 192 MS
QRS DURATION - MUSE: 86 MS
QT - MUSE: 460 MS
QTC - MUSE: 478 MS
R AXIS - MUSE: 13 DEGREES
SYSTOLIC BLOOD PRESSURE - MUSE: NORMAL MMHG
T AXIS - MUSE: -42 DEGREES
VENTRICULAR RATE- MUSE: 65 BPM

## 2023-01-07 PROCEDURE — 250N000013 HC RX MED GY IP 250 OP 250 PS 637: Performed by: STUDENT IN AN ORGANIZED HEALTH CARE EDUCATION/TRAINING PROGRAM

## 2023-01-07 PROCEDURE — 124N000002 HC R&B MH UMMC

## 2023-01-07 PROCEDURE — 93005 ELECTROCARDIOGRAM TRACING: CPT | Performed by: EMERGENCY MEDICINE

## 2023-01-07 PROCEDURE — 99223 1ST HOSP IP/OBS HIGH 75: CPT | Mod: AI | Performed by: PSYCHIATRY & NEUROLOGY

## 2023-01-07 PROCEDURE — 250N000013 HC RX MED GY IP 250 OP 250 PS 637

## 2023-01-07 RX ORDER — POLYETHYLENE GLYCOL 3350 17 G/17G
17 POWDER, FOR SOLUTION ORAL DAILY PRN
Status: DISCONTINUED | OUTPATIENT
Start: 2023-01-07 | End: 2023-01-10 | Stop reason: HOSPADM

## 2023-01-07 RX ORDER — LORAZEPAM 0.5 MG/1
1-4 TABLET ORAL EVERY 30 MIN PRN
Status: DISCONTINUED | OUTPATIENT
Start: 2023-01-07 | End: 2023-01-10 | Stop reason: HOSPADM

## 2023-01-07 RX ORDER — HYDROCHLOROTHIAZIDE 25 MG/1
25 TABLET ORAL DAILY
Status: DISCONTINUED | OUTPATIENT
Start: 2023-01-07 | End: 2023-01-10 | Stop reason: HOSPADM

## 2023-01-07 RX ORDER — HYDROXYZINE HYDROCHLORIDE 25 MG/1
25 TABLET, FILM COATED ORAL EVERY 4 HOURS PRN
Status: DISCONTINUED | OUTPATIENT
Start: 2023-01-07 | End: 2023-01-08

## 2023-01-07 RX ORDER — MULTIPLE VITAMINS W/ MINERALS TAB 9MG-400MCG
1 TAB ORAL DAILY
Status: DISCONTINUED | OUTPATIENT
Start: 2023-01-07 | End: 2023-01-10 | Stop reason: HOSPADM

## 2023-01-07 RX ORDER — LANOLIN ALCOHOL/MO/W.PET/CERES
3 CREAM (GRAM) TOPICAL
Status: DISCONTINUED | OUTPATIENT
Start: 2023-01-07 | End: 2023-01-10 | Stop reason: HOSPADM

## 2023-01-07 RX ORDER — FOLIC ACID 1 MG/1
1 TABLET ORAL DAILY
Status: DISCONTINUED | OUTPATIENT
Start: 2023-01-07 | End: 2023-01-10 | Stop reason: HOSPADM

## 2023-01-07 RX ORDER — ACETAMINOPHEN 325 MG/1
650 TABLET ORAL EVERY 4 HOURS PRN
Status: DISCONTINUED | OUTPATIENT
Start: 2023-01-07 | End: 2023-01-10 | Stop reason: HOSPADM

## 2023-01-07 RX ORDER — OLANZAPINE 10 MG/2ML
10 INJECTION, POWDER, FOR SOLUTION INTRAMUSCULAR 3 TIMES DAILY PRN
Status: DISCONTINUED | OUTPATIENT
Start: 2023-01-07 | End: 2023-01-10 | Stop reason: HOSPADM

## 2023-01-07 RX ORDER — CITALOPRAM HYDROBROMIDE 10 MG/1
10 TABLET ORAL DAILY
Status: DISCONTINUED | OUTPATIENT
Start: 2023-01-08 | End: 2023-01-09

## 2023-01-07 RX ORDER — POLYMYXIN B SULFATE AND TRIMETHOPRIM 1; 10000 MG/ML; [USP'U]/ML
1-2 SOLUTION OPHTHALMIC 4 TIMES DAILY
Status: DISCONTINUED | OUTPATIENT
Start: 2023-01-07 | End: 2023-01-10

## 2023-01-07 RX ORDER — OLANZAPINE 10 MG/1
10 TABLET ORAL 3 TIMES DAILY PRN
Status: DISCONTINUED | OUTPATIENT
Start: 2023-01-07 | End: 2023-01-10 | Stop reason: HOSPADM

## 2023-01-07 RX ADMIN — HYDROXYZINE HYDROCHLORIDE 25 MG: 25 TABLET ORAL at 06:48

## 2023-01-07 RX ADMIN — HYDROCHLOROTHIAZIDE 25 MG: 25 TABLET ORAL at 13:01

## 2023-01-07 RX ADMIN — LORAZEPAM 1 MG: 0.5 TABLET ORAL at 10:06

## 2023-01-07 RX ADMIN — THIAMINE HCL TAB 100 MG 100 MG: 100 TAB at 10:05

## 2023-01-07 RX ADMIN — HYDROXYZINE HYDROCHLORIDE 25 MG: 25 TABLET ORAL at 10:07

## 2023-01-07 RX ADMIN — POLYMYXIN B SULFATE AND TRIMETHOPRIM SULFATE 1 DROP: 10000; 1 SOLUTION/ DROPS OPHTHALMIC at 12:18

## 2023-01-07 RX ADMIN — POLYMYXIN B SULFATE AND TRIMETHOPRIM SULFATE 2 DROP: 10000; 1 SOLUTION/ DROPS OPHTHALMIC at 17:56

## 2023-01-07 RX ADMIN — FOLIC ACID 1 MG: 1 TABLET ORAL at 10:07

## 2023-01-07 RX ADMIN — MULTIPLE VITAMINS W/ MINERALS TAB 1 TABLET: TAB at 10:06

## 2023-01-07 ASSESSMENT — ACTIVITIES OF DAILY LIVING (ADL)
ADLS_ACUITY_SCORE: 53
ADLS_ACUITY_SCORE: 53
ADLS_ACUITY_SCORE: 37
ADLS_ACUITY_SCORE: 53
ORAL_HYGIENE: INDEPENDENT
HYGIENE/GROOMING: HANDWASHING;INDEPENDENT
ADLS_ACUITY_SCORE: 53
DRESS: STREET CLOTHES;SCRUBS (BEHAVIORAL HEALTH);INDEPENDENT
ADLS_ACUITY_SCORE: 37
ADLS_ACUITY_SCORE: 53
LAUNDRY: WITH SUPERVISION
ADLS_ACUITY_SCORE: 53
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
## 2023-01-07 NOTE — ED NOTES
Patient is calm and cooperative. She denies feelings of SI,HI and has a safety plan. Denies any hallucinations. Did report hallucinations yesterday when she was using drugs.

## 2023-01-07 NOTE — PLAN OF CARE
"  Problem: Anxiety  Goal: Anxiety Reduction or Resolution  Outcome: Progressing     Problem: Depression  Goal: Improved Mood  Outcome: Progressing     Problem: Plan of Care - These are the overarching goals to be used throughout the patient stay.    Goal: Optimal Comfort and Wellbeing  Intervention: Provide Person-Centered Care  Recent Flowsheet Documentation  Taken 1/7/2023 1000 by Aleah Lloyd RN  Trust Relationship/Rapport: care explained   Goal Outcome Evaluation:    Plan of Care Reviewed With: patient       Patient was mostly in her room sleeping was awake for break fast at 0950. Affect was flat and blunted, mood was calm and labile approached appeared tired, sleepy on assessment couldnt say much Writer helped patient endorses anxiety 4/10 and depression 4/10. VS BP (!) 158/105 (BP Location: Right arm)   Pulse 67   Temp 98.5  F (36.9  C) (Temporal)   Resp 18   Ht 1.702 m (5' 7\")   Wt 74.1 kg (163 lb 6.4 oz)   LMP  (LMP Unknown)   SpO2 98%   BMI 25.59 kg/m    rechecked was 163/89 pulse 88 resp 16 02 sat 97.MSSA score was 8 scheduled meds was given along with prn Hydroxyzine 25 mg given for anxiety. 1 mg of lorazepam was given for withdrawal , ate her breakfast 60% with adequate fluid. Writer help patient back to her room  did not attend group, was meds complaint.  At 1331 schedule hydrochlorothiazide 25 mg givenDoctor was  Up dated about Patient BP denies SIB, SI,HI, and hallucination.Patient contracted for safety at this time.                 "

## 2023-01-07 NOTE — PLAN OF CARE
Problem: Anxiety  Goal: Anxiety Reduction or Resolution  Outcome: Progressing     Problem: Depression  Goal: Improved Mood  Outcome: Progressing     Problem: Suicidal Behavior  Goal: Suicidal Behavior is Absent or Managed  Outcome: Progressing     Patient in room sleeping when received this shift. Presented with flat and blunted affect, appeared tired, A/O X 4, no pain reported, endorsed anxiety and depression at 5, denied SI/SIB/HI/hallucinations, minimal interactions with peers and staff, compliant and agreeable to plan of care, patient is elisabet to safety, safety checks and precautions in place, medication compliant. MSSA this shift was 2, no withdrawal symptoms noted. Patient offers no other known concerns at this time. Writer will continue to monitor and offer therapeutic support as needed for the rest of the shift.

## 2023-01-07 NOTE — PLAN OF CARE
"Patient arrived to Unit at 02:45 via wheelchair from [facility], she appeared lethargic but cooperative with search. Had snack, vitals BP (!) 164/81 (BP Location: Left arm, Patient Position: Sitting, Cuff Size: Adult Regular)   Pulse 67   Temp 97.5  F (36.4  C) (Oral)   Resp 18   Ht 1.702 m (5' 7\")   Wt 74.1 kg (163 lb 6.4 oz)   LMP  (LMP Unknown)   SpO2 100%   BMI 25.59 kg/m  . Denies pain and reported feeling numb but could not explained what she meant, patient endorsed SI but stated not planing to follow through with thoughts and is elisabet for safety; she denies HI and Hallucinations. Patient slept for 3.5 hours awake and disoriented to time, MSSA score 3, she reported feeling anxious and PRN Hydroxyzine 25 mg given. Patient is vol with SI precautions, continue to monitor.     Problem: Sleep Disturbance  Goal: Adequate Sleep/Rest  Outcome: Progressing   Goal Outcome Evaluation:         "

## 2023-01-07 NOTE — PLAN OF CARE
01/07/23 1502   Patient Belongings   Patient Belongings none   Patient Belongings Remaining with Patient none   Patient Belongings Put in Hospital Secure Location (Security or Locker, etc.) other (see comments)   Belongings Search Yes     Pt belongings in locker:  Black jacket  Black boots  1x pair of socks  Bracelet  3x lighters  Vape pen  Key ring with 7 keys and a key fob  Phone with cracked screen and black case  [employer] member card  Black bra  Jeans  Tshirt    A               Admission:  I am responsible for any personal items that are not sent to the safe or pharmacy.  [facility] is not responsible for loss, theft or damage of any property in my possession.    Signature:  _________________________________ Date: _______  Time: _____                                              Staff Signature:  ____________________________ Date: ________  Time: _____      2nd Staff person, if patient is unable/unwilling to sign:    Signature: ________________________________ Date: ________  Time: _____     Discharge:  [facility] has returned all of my personal belongings:    Signature: _________________________________ Date: ________  Time: _____                                          Staff Signature:  ____________________________ Date: ________  Time: _____

## 2023-01-07 NOTE — TELEPHONE ENCOUNTER
0013 Resident paged for review.  Awaiting callback.    0045 Resident accepts for 20/Nikolaszer.  Placed in queue and unit notified at 0050.  Unit will call when available for report.  ED Charge notified of disposition via text page at 0053.    0101 Resident called to request pt be check for complaints of chest pain and would like for an EKG to be done, even if she is not currently endorsing chest pain.      0104 ED Charge has been notified.  Chest pain and EKG pending.  0105 Resident notified via text page.

## 2023-01-07 NOTE — H&P
"Psychiatry History and Physical    Winifred Bashir MRN# [id number]   Age: 52 year old YOB: 1970     Date of Admission:  1/6/2023  Admitting Physician:  Dr. Kierra Garcia/Dr. Сергей Martins          Contacts:     Primary Physician: [facility]   Psychiatry: [facility]  Therapist: none         Chief Complaint:     \"Cocaine use disorder and SI\" along with two recent suicide attempts (jump from 2nd story window on 1/5/2023 and crack cocaine overdose intended to stop heart 1/6/2023) following eviction due to failure to pay rent (after  failed to pay alimony).         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     Per ED Note:   \"Winifred Bashir is a 52 year old female who presents with suicidal ideation.  To me patient reports that she feels suicidal and she attempted to kill herself by smoking half a gram of cocaine in hopes that this would stop her heart.  She reports when it did not stop her heart she came into the ER for evaluation.  She reports previous attempts of suicide with overdose of her prescription medications though denies any intentional ingestions other than the cocaine prior to arrival tonight.     When I reviewed the triage note about jumping from the window I went back and reinterviewed the patient she reported this event happened 2 nights ago.  She denies sustaining any injury from this.  She reports that she landed on her " "feet but then slid onto her back.  Specifically denies heel pain, foot pain, or low back pain.  Patient is unsure if she hit her head, unsure if she had LOC. \"    She was medically cleared for admission to inpatient psychiatric unit.    Per [facility] assessment  \" Winifred Bashir presents in the ED alone due to concerns of substance abuse and suicidal ideation. Throughout the assessment the pt was extremely somnolent and repeatedly fell asleep, but was easily woken. The pt reports that she smoked 1/2 gram of cocaine with the belief that the amount might stop her heart. The pt states that once she was able to drive herself to the ED she decided to seek help for her suicidal ideation. Pt reports that if she is leaves the ED she will likely try to overdose on cocaine again.The pt reports she feels apathetic, hopeless, helpless, and is experiencing suicidal thoughts. The pt denies hallucinations, delusions, and self-injurious behavior.     The pt has a hx of bipolar disorder, major depressive disorder, generalized anxiety disorder, bipolar disorder, PTSD, gambling addiction. And polysubstance abuse disorder. The pt has a hx of chemical dependency tx and inpatient mental health admissions, most recent MH inpatient was at [facility] from 12/1-12/5/2022\"    Per patient report:    The story was limited given patient drowsiness. Winifred Bashir reports that she was struggling to pay her rent. She was supposed to receive money from her ex- for alimonies. Given that she didn't receive it and couldn't afford her rent, she became homeless. She thought about ending her life. She jumped from the window, and land on her feet.  She slid onto her back, and hit her head.     During the interview, She didn't have full orientation. She knows that she at [place], but in [facility]. She knows the month, year, but not the date. She also knows her name. She reported that she used crack cocaine to end her life, but didn't hurt her. She mentioned they " "drove her to the ED. She reports using cocaine daily for months, and experience mood disturbances when she stop using it. She also reports hallucinations. \" I laugh when they move\" she said.     Patient had intermittent participation in the interview because she was drowsy. She was able to provide some answers but not to every questions. She reported a history of withdrawal from alcohol, but denied any seizure history.  Of note, inpatient nursing staff administered lorazepam according to [facility] protocol 1-2 hours prior to this interview (score was 8 for hypertension and anxiety).      She continues to endorse suicidal thought. When asked which means she would use, she replied \"anyway\".  She wants to be back on her meds, and receive more social support. She felt safe in the inpatient unit, though believed that she would not be safe if discharged at this time.    Winifred reported that the jump from second story window two days ago resulted in her landing on her feet, falling on to her side and then hitting her head - followed by standing up almost immediately. Sounded that there was not loss of consciousness, though that is not for certain. She reported that her right foot hurt, though subsequently when trying to clarify denied that it hurt and denied any other part of her body hurting.  She denied any head pain.  Head CT without contrast in ED 1/6/2023 interpreted by ROSE MARIE Garcia MD, yielded impression of, \"1.  No acute intracranial abnormality.\"        ED/Hospital Course   In the ED, basic lab was ordered. Head CT was done - revealed no acute changes. She was cleared to go to the psych unit.     The risks, benefits, alternatives and side effects have been discussed and are understood by the patient and other caregivers.         Psychiatric Review of Systems:     Depression:   Reports: reports depressed mood, low energy and motivation, hopelessness, ongoing SI.  Ligia: didn't report.   Psychosis: possibly reporting " "visual hallucinations (and per record has reported visual hallucination of seeing someone in a car that was not there) - told us that she laughs when \"they move\" - unclear what that was in reference to.   Anxiety: report anxiety.   PTSD: didn't report.          Medical Review of Systems:     The Review of Systems is negative other than what is noted in the HPI         Psychiatric History:     Prior diagnoses: previous psychiatric diagnoses include MDD, possibly BMD, KELLEN, AUD, PTSD and gambling disorder.     Hospitalizations: patient reported that she was previously hospitalized for the \"same situation\"     Per chart review ( Yashira Hunter, [facility]) : Last hospitalization was 12/1-12/5/2022 at [facility]   7/21/22 - 7/23/22 (2 days) [facility] due to similar presentation and dispo to home. She was scheduled for psychiatry with Heike Osuna MD at [facility].  6/7/18 - 6/14/18 (7 days) [facility]. Due to suicide attempt, dispo to [facility]  4/8/18 - 4/13/18 (5 days) [facility] due to SI and substance use. Dispo to [facility]  3/27/18 - 3/29/18 (2 days) [facility] due to SI Dispo home.    Court Committments: didn't report    Suicide attempts: had a previous attempt with Topamax ingestion, and the last one was with cocaine and jumping from the window.    Self-injurious behavior: drug overdose, jumping from the window    Guns: didn't report    Psychiatry Medication Trials:  Current: Bupropion XL 150mg/day and citalopram 20mg po qday - though reported she has not taken these medications since 4/2022.  History of: Prozac, Seroquel, Trazodone, Neurontin, Topamax, Hydroxyzine         Substance Use History:     Alcohol: reported active daily alcohol use (1 pint/day tequila - last use 1/6/2023) with history of withdrawal, though denied any history of withdrawal seizure activity    Nicotine: didn't report    Illicit Substances: Reports crack cocaine (recently active - reported daily for months, last use 1/6/2023 - " "in overdose intended to stop her heart) and THC (active). Per record, stimulant (methamphetamine) use disorder.    Per chart review: \"She began using cannabis at age 12 and currently reports occasional use.  She has a history of meth use and denies recent use.  She first consumed alcohol at age 7.  She was consuming 1/2 to 1 pint of tequila daily prior to admission.  No history of seizures or DTs.  She began using cocaine as a teenager.  She had been using crack cocaine daily x 6 days prior to admission.  She smokes cigarettes.  She denies any history of IV drug use.  Longest period of sobriety is 14 years.  She has a history of treatment at [facility] x 2 and [facility] x 3.  She went to [facility] in 2021.  She went to outpatient treatment at [place] in 2022\"           Social History:       Family/Relationships:      Living Situation: currently homeless - evicted from her apartment 2022(?) due to failure to pay rent (following ex-'s failure to pay alimony).    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     Occupation: unemployed            Past Medical History:       Past Medical History:   Diagnosis Date     Bipolar disorder, unspecified (H)      Gambling disorder, persistent, severe 2020     Generalized anxiety disorder 2020     Methamphetamine use disorder, severe (H) 2020     Mild intermittent asthma      Moderate alcohol use disorder (H) 2020     Osteoarthritis      Posttraumatic stress disorder      Past Surgical History:   Procedure Laterality Date     C/SECTION, LOW TRANSVERSE  2007    , Low Transverse     TUBAL LIGATION  2011     *[facility] reported two years since LMP.     *Winifred reported and then denied right foot pain since jump from 2nd floor window and landing on her feet 2023, prior to falling on her side and then hitting her head. " Denied head ache. See ER note and Head CT without contrast 2023 - no acute intracranial abnormality per MD Radha's review.     *History of hypertension managed with hydrochlorothiazide 25mg po qday (hasn't received it in hospital 2023) - ordered by Sascha Guy and Сергей on 2023 with first dose 12:30pm 2023.          Allergies:      Allergies   Allergen Reactions     Fish-Derived Products Shortness Of Breath and Nausea and Vomiting     Nkda [No Known Drug Allergies]           Medications:     No current outpatient medications on file.      Per 2023 [facility] ED Note by LUCIA Burnette MD:  buPROPion (WELLBUTRIN XL) 150 MG 24 hr tablet  citalopram (CELEXA) 20 MG tablet  gabapentin (NEURONTIN) 400 MG capsule  hydrochlorothiazide (HYDRODIURIL) 25 MG tablet  hydrOXYzine (ATARAX) 25 MG tablet  melatonin 3 MG tablet  trimethoprim-polymyxin b (POLYTRIM) 65151-7.1 UNIT/ML-% ophthalmic solution            Family History:       Family History   Problem Relation Age of Onset     Hypertension Mother      Post-Traumatic Stress Disorder (PTSD) Mother      Substance Abuse Mother         polysubstances- opiates, alcohol. Currently sober.     Substance Abuse Father         Murdered when pt was 4.     Hypertension Maternal Grandmother      Cerebrovascular Disease Maternal Grandmother      Alcoholism Maternal Grandmother      Depression Maternal Grandmother      No Known Problems Maternal Grandfather      No Known Problems Paternal Grandmother      No Known Problems Paternal Grandfather      Depression Daughter      Anxiety Disorder Daughter      Diabetes Maternal Aunt      Alcoholism Maternal Aunt          of complications related to alcoholism     Musculoskeletal Disorder Maternal Aunt         Lupus     Alcoholism Maternal Aunt          of complications related to alcoholism     Alcoholism Maternal Uncle          of complications related to alcoholism     No Known Problems Maternal  Great-Grandmother      Asthma No family hx of      C.A.D. No family hx of      Breast Cancer No family hx of      Cancer - colorectal No family hx of             Labs:     Recent Results (from the past 24 hour(s))   Drug abuse screen 1 urine (ED)    Collection Time: 01/06/23  9:50 AM   Result Value Ref Range    Amphetamines Urine Screen Negative Screen Negative    Barbiturates Urine Screen Negative Screen Negative    Benzodiazepines Urine Screen Negative Screen Negative    Cannabinoids Urine Screen Positive (A) Screen Negative    Cocaine Urine Screen Positive (A) Screen Negative    Opiates Urine Screen Negative Screen Negative   Basic metabolic panel    Collection Time: 01/06/23  9:57 AM   Result Value Ref Range    Sodium 141 133 - 144 mmol/L    Potassium 3.5 3.4 - 5.3 mmol/L    Chloride 109 94 - 109 mmol/L    Carbon Dioxide (CO2) 26 20 - 32 mmol/L    Anion Gap 6 3 - 14 mmol/L    Urea Nitrogen 17 7 - 30 mg/dL    Creatinine 0.76 0.52 - 1.04 mg/dL    Calcium 9.0 8.5 - 10.1 mg/dL    Glucose 89 70 - 99 mg/dL    GFR Estimate >90 >60 mL/min/1.73m2   Acetaminophen level    Collection Time: 01/06/23  9:57 AM   Result Value Ref Range    Acetaminophen <2 (L) 10 - 30 mg/L   Salicylate level    Collection Time: 01/06/23  9:57 AM   Result Value Ref Range    Salicylate <2 <20 mg/dL   CBC with platelets and differential    Collection Time: 01/06/23  9:57 AM   Result Value Ref Range    WBC Count 5.8 4.0 - 11.0 10e3/uL    RBC Count 5.05 3.80 - 5.20 10e6/uL    Hemoglobin 13.9 11.7 - 15.7 g/dL    Hematocrit 42.1 35.0 - 47.0 %    MCV 83 78 - 100 fL    MCH 27.5 26.5 - 33.0 pg    MCHC 33.0 31.5 - 36.5 g/dL    RDW 13.7 10.0 - 15.0 %    Platelet Count 344 150 - 450 10e3/uL    % Neutrophils 38 %    % Lymphocytes 53 %    % Monocytes 7 %    % Eosinophils 1 %    % Basophils 1 %    % Immature Granulocytes 0 %    NRBCs per 100 WBC 0 <1 /100    Absolute Neutrophils 2.2 1.6 - 8.3 10e3/uL    Absolute Lymphocytes 3.1 0.8 - 5.3 10e3/uL    Absolute  "Monocytes 0.4 0.0 - 1.3 10e3/uL    Absolute Eosinophils 0.1 0.0 - 0.7 10e3/uL    Absolute Basophils 0.0 0.0 - 0.2 10e3/uL    Absolute Immature Granulocytes 0.0 <=0.4 10e3/uL    Absolute NRBCs 0.0 10e3/uL   Alcohol level blood    Collection Time: 01/06/23  9:57 AM   Result Value Ref Range    Alcohol ethyl <0.01 <=0.01 g/dL   Asymptomatic Influenza A/B & SARS-CoV2 (COVID-19) Virus PCR Multiplex Nose    Collection Time: 01/06/23  8:30 PM    Specimen: Nose; Swab   Result Value Ref Range    Influenza A PCR Negative Negative    Influenza B PCR Negative Negative    RSV PCR Negative Negative    SARS CoV2 PCR Negative Negative   EKG 12 lead    Collection Time: 01/07/23  1:30 AM   Result Value Ref Range    Systolic Blood Pressure  mmHg    Diastolic Blood Pressure  mmHg    Ventricular Rate 65 BPM    Atrial Rate 65 BPM    GA Interval 192 ms    QRS Duration 86 ms     ms    QTc 478 ms    P Axis -19 degrees    R AXIS 13 degrees    T Axis -42 degrees    Interpretation ECG       Sinus rhythm  Minimal voltage criteria for LVH, may be normal variant  T wave abnormality, consider anterolateral ischemia  Prolonged QT  Abnormal ECG  Unconfirmed report - interpretation of this ECG is computer generated - see medical record for final interpretation  Confirmed by - EMERGENCY ROOM, PHYSICIAN ([id number]),  SUKHJINDER MENDEZ ([id number]) on 1/7/2023 6:47:07 AM            Psychiatric Examination:   BP (!) 164/81 (BP Location: Left arm, Patient Position: Sitting, Cuff Size: Adult Regular)   Pulse 67   Temp 97.5  F (36.4  C) (Oral)   Resp 18   Ht 1.702 m (5' 7\")   Wt 74.1 kg (163 lb 6.4 oz)   LMP  (LMP Unknown)   SpO2 100%   BMI 25.59 kg/m      Appearance:  somnolent, easy to wake up, but drowsy and unattentive  Attitude:  cooperative and evasive  Eye Contact:  poor   Mood:  depressed  Affect:  mood congruent, restricted range and nonreactive  Speech: clear sometimes and  mumbling at times.  Psychomotor Behavior:  no evidence of tardive " dyskinesia, dystonia, or tics  Thought Process:  Logical, linear with slow rate  Associations:  no loose associations  Thought Content:  active suicidal ideation present and endorsed hallucinations. Denied any history of thought to hurt or kill anyone else.  Insight:  limited  Judgment:  limited  Oriented to:  Time (month and year), person, and place, not to day or date  Attention Span and Concentration:  limited  Recent and Remote Memory:  limited  Language:  english with appropriate syntax and vocabulary  Fund of Knowledge: low-normal  Muscle Strength and Tone: normal  Gait and Station: Normal - stable, steady gait.         Physical Exam:     See ED assessment note by ED physician on 1/06/2023        Assessment   Winifred Bashir is a 52 year old female with a past psychiatric history of MDD (possibly BMD), PTSD, KELLEN, stimulant use disorder (crack cocaine and methamphetamine), cocaine-induced psychosis, and AUD with history of withdrawal (denied any history of w/d seizure activity) who presented to the ED with SI and s/p suicide attempts (jump from 2nd story window 1/5/2023 and overdose of crack cocaine 1/6/2023 with intention to stop her heart) in the context of medication non adherence, substance use (daily alcohol, daily cocaine and THC) and psychosocial stressors such as homelessness, and financial strain, and unemployment. Significant symptoms include ongoing SI, SIB, depressed and substance use. Her last psychiatric hospitalization was in  12/1-12/5/2022 at [facility].  Current admission looks similar to previous ones for SI, depression and active MALENA. In addition, patient is dealing with several psychosocial stressors. 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  Her diagnosis is still in evolution, and needs to include:  -MDD (rule out BMD)  -Substance-induced mood disorder  -Stimulant (Cocaine and methamphetamine) use disorder  -Alcohol use disorder with history of withdrawal    -Cannabis use disorder  -PTSD    Additionally, she reported and then denied having right ankle pain since jump from window on 1/5/2023, and acknowledged hitting her head - though sounds to have not lost consciousness; and CT scan in ED showed no intracranial abnormality.     She received a dose of lorazepam approximately 1-2 hours prior to interview by Sascha Guy and Сергей (due to [facility] protocol that was positive for anxiety and hypertension), and was somnolent throughout the interview - though awoke easily to spoken voice, arose from her bed and demonstrated stable gait on walk to the day room for interview, ate yogurt, crackers, juice and fruit during the interview. She had not received her outpatient hydrochlorothiazide 25mg po qday on 1/7/2023 - so Sascha Guy and Сергей ordered that medication and placed referral for Internal Medicine consult to follow-up on hypertension in context of possible alcohol withdrawal, along with request for them to evaluate right foot pain and follow-up on possible head injury from 2nd floor jump 1/5/2023.     Given that she currently has ongoing SI, patient warrants inpatient psychiatric hospitalization to maintain her safety. Disposition pending clinical stabilization, medication optimization and development of an appropriate discharge plan.    Risk for harm is low to moderate on inpatient unit, though would be high if discharged from hospital at this time.  Risk factors: SI, maladaptive coping, substance use and past behaviors  Protective factors: engaged in treatment         Diagnoses   Suicidal Ideation    Major Depressive Disorder, recurrent, severe with psychotic features  Rule out BMD    Alcohol Use Disorder, severe, dependence, active  Rule out alcohol withdrawal    Stimulant Use Disorder, severe, active            Plan     Admit to Unit 20 with Attending Physician Dr. Kierra M.D.    Medications:   Outpatient medications held:     Wellbutrin 150 mg daily  Citalopram 20  mg daily  Gabapentin 400 mg TID    Outpatient medications continued:   hydrochlorothiazide 25 mg daily    New medications initiated:   will restart citalopram at a lowe dose of 10mg po qam on 1/08/2023    Hospital PRNs as ordered:  acetaminophen, hydrOXYzine, LORazepam, melatonin, OLANZapine **OR** OLANZapine, polyethylene glycol      Medications: risks/benefits discussed with patient    Patient will be treated in therapeutic milieu with appropriate individual and group therapies.    Laboratory/Imaging:  - UDS + for cannabis and cocaine and COMP wnl    Legal Status:   Orders Placed This Encounter      Voluntary      Safety Assessment:    Behavioral Orders   Procedures     Code 1 - Restrict to Unit     Discontinue 1:1 attendant for suicide risk     Order Specific Question:   I have performed an in person assessment of the patient     Answer:   Based on this assessment the patient no longer requires a one on one attendant at this point in time.     Order Specific Question:   Rationale     Answer:   Patient States able to remain safe in hospital     Order Specific Question:   Rationale     Answer:   Modifications to care environment made to mitigate safety risk     Order Specific Question:   Rationale     Answer:   Routine observations are sufficient to monitor safety.     Routine Programming     As clinically indicated     Seizure precautions     Self Injury Precaution     Status 15     Every 15 minutes.     Suicide precautions     Patients on Suicide Precautions should have a Combination Diet ordered that includes a Diet selection(s) AND a Behavioral Tray selection for Safe Tray - with utensils, or Safe Tray - NO utensils       Withdrawal precautions      Pt has not required locked seclusion or restraints in the past 24 hours to maintain safety, please refer to RN documentation for further details.    Consults:  - medicine for right foot pain and possible head injury from 2nd floor jump on 1/5/2023 (see above for  details and ED nots 1/6/2023 along with Heat CT without contrast 1/6/2023) and BP management    Medical diagnoses to be addressed this admission:     #. HTA  -hydrochlorothiazide 25 mg daily  -    Dispo: unknown pending medication management and clinical stabilization    -------------------------------------------------------  Sascha Guy MD MPH  PGY-2 Psychiatry Resident    Psychiatry Attending Attestation:    Patient seen with the attending physician Dr Сергей Martins  Attestation:  I, Eliezer Rushing MD, have personally performed an examination of Winifred Bashir, along with Dr. Guy on 1/7/2023 - I participated in the full interview, including mental status exam, discussion of treatment options and creating the treatment plan with Dr. Guy and Winifred Bashir.  I have reviewed the resident's documentation.  I have edited the note to reflect all relevant changes.   I agree with resident findings and plan in this resident H&P.  I have reviewed all vitals and laboratory findings.  Dr. Guy and I spoke with hospital RN that is caring for Winifred Bashri before our interview with her.   Eliezer Rushing MD

## 2023-01-08 LAB
ALBUMIN SERPL-MCNC: 3.2 G/DL (ref 3.4–5)
ALP SERPL-CCNC: 81 U/L (ref 40–150)
ALT SERPL W P-5'-P-CCNC: 16 U/L (ref 0–50)
ANION GAP SERPL CALCULATED.3IONS-SCNC: 2 MMOL/L (ref 3–14)
AST SERPL W P-5'-P-CCNC: 10 U/L (ref 0–45)
BILIRUB SERPL-MCNC: 0.2 MG/DL (ref 0.2–1.3)
BUN SERPL-MCNC: 14 MG/DL (ref 7–30)
CALCIUM SERPL-MCNC: 9.1 MG/DL (ref 8.5–10.1)
CHLORIDE BLD-SCNC: 106 MMOL/L (ref 94–109)
CO2 SERPL-SCNC: 30 MMOL/L (ref 20–32)
CREAT SERPL-MCNC: 0.79 MG/DL (ref 0.52–1.04)
FOLATE SERPL-MCNC: >40 NG/ML (ref 4.6–34.8)
GFR SERPL CREATININE-BSD FRML MDRD: 90 ML/MIN/1.73M2
GLUCOSE BLD-MCNC: 172 MG/DL (ref 70–99)
POTASSIUM BLD-SCNC: 3.4 MMOL/L (ref 3.4–5.3)
PROT SERPL-MCNC: 7.2 G/DL (ref 6.8–8.8)
SODIUM SERPL-SCNC: 138 MMOL/L (ref 133–144)
VIT B12 SERPL-MCNC: 491 PG/ML (ref 232–1245)

## 2023-01-08 PROCEDURE — 250N000013 HC RX MED GY IP 250 OP 250 PS 637: Performed by: STUDENT IN AN ORGANIZED HEALTH CARE EDUCATION/TRAINING PROGRAM

## 2023-01-08 PROCEDURE — 99207 PR NO CHARGE LOS: CPT | Performed by: PSYCHIATRY & NEUROLOGY

## 2023-01-08 PROCEDURE — 250N000013 HC RX MED GY IP 250 OP 250 PS 637

## 2023-01-08 PROCEDURE — 82746 ASSAY OF FOLIC ACID SERUM: CPT | Performed by: PSYCHIATRY & NEUROLOGY

## 2023-01-08 PROCEDURE — 80053 COMPREHEN METABOLIC PANEL: CPT | Performed by: PSYCHIATRY & NEUROLOGY

## 2023-01-08 PROCEDURE — 82607 VITAMIN B-12: CPT | Performed by: PSYCHIATRY & NEUROLOGY

## 2023-01-08 PROCEDURE — 99221 1ST HOSP IP/OBS SF/LOW 40: CPT | Performed by: PHYSICIAN ASSISTANT

## 2023-01-08 PROCEDURE — 124N000002 HC R&B MH UMMC

## 2023-01-08 PROCEDURE — 36415 COLL VENOUS BLD VENIPUNCTURE: CPT | Performed by: PSYCHIATRY & NEUROLOGY

## 2023-01-08 RX ORDER — POLYETHYLENE GLYCOL 3350 17 G
4 POWDER IN PACKET (EA) ORAL
Status: DISCONTINUED | OUTPATIENT
Start: 2023-01-08 | End: 2023-01-10 | Stop reason: HOSPADM

## 2023-01-08 RX ORDER — CLONIDINE HYDROCHLORIDE 0.1 MG/1
0.1 TABLET ORAL 4 TIMES DAILY
Status: DISCONTINUED | OUTPATIENT
Start: 2023-01-08 | End: 2023-01-10 | Stop reason: HOSPADM

## 2023-01-08 RX ORDER — GABAPENTIN 100 MG/1
100 CAPSULE ORAL 3 TIMES DAILY PRN
Status: DISCONTINUED | OUTPATIENT
Start: 2023-01-08 | End: 2023-01-09

## 2023-01-08 RX ADMIN — CLONIDINE HYDROCHLORIDE 0.1 MG: 0.1 TABLET ORAL at 21:51

## 2023-01-08 RX ADMIN — NICOTINE POLACRILEX 4 MG: 2 LOZENGE ORAL at 18:26

## 2023-01-08 RX ADMIN — HYDROXYZINE HYDROCHLORIDE 25 MG: 25 TABLET ORAL at 08:45

## 2023-01-08 RX ADMIN — MULTIPLE VITAMINS W/ MINERALS TAB 1 TABLET: TAB at 08:45

## 2023-01-08 RX ADMIN — LORAZEPAM 1 MG: 0.5 TABLET ORAL at 05:17

## 2023-01-08 RX ADMIN — GABAPENTIN 100 MG: 100 CAPSULE ORAL at 17:40

## 2023-01-08 RX ADMIN — CLONIDINE HYDROCHLORIDE 0.1 MG: 0.1 TABLET ORAL at 13:06

## 2023-01-08 RX ADMIN — FOLIC ACID 1 MG: 1 TABLET ORAL at 08:44

## 2023-01-08 RX ADMIN — POLYMYXIN B SULFATE AND TRIMETHOPRIM SULFATE 2 DROP: 10000; 1 SOLUTION/ DROPS OPHTHALMIC at 08:47

## 2023-01-08 RX ADMIN — POLYMYXIN B SULFATE AND TRIMETHOPRIM SULFATE 2 DROP: 10000; 1 SOLUTION/ DROPS OPHTHALMIC at 13:08

## 2023-01-08 RX ADMIN — CLONIDINE HYDROCHLORIDE 0.1 MG: 0.1 TABLET ORAL at 17:41

## 2023-01-08 RX ADMIN — POLYMYXIN B SULFATE AND TRIMETHOPRIM SULFATE 1 DROP: 10000; 1 SOLUTION/ DROPS OPHTHALMIC at 17:41

## 2023-01-08 RX ADMIN — THIAMINE HCL TAB 100 MG 100 MG: 100 TAB at 08:44

## 2023-01-08 RX ADMIN — POLYMYXIN B SULFATE AND TRIMETHOPRIM SULFATE 1 DROP: 10000; 1 SOLUTION/ DROPS OPHTHALMIC at 21:51

## 2023-01-08 RX ADMIN — HYDROCHLOROTHIAZIDE 25 MG: 25 TABLET ORAL at 08:45

## 2023-01-08 RX ADMIN — CITALOPRAM HYDROBROMIDE 10 MG: 10 TABLET ORAL at 08:45

## 2023-01-08 ASSESSMENT — ACTIVITIES OF DAILY LIVING (ADL)
ADLS_ACUITY_SCORE: 53
ORAL_HYGIENE: INDEPENDENT
ADLS_ACUITY_SCORE: 53
LAUNDRY: WITH SUPERVISION
ADLS_ACUITY_SCORE: 53
HYGIENE/GROOMING: INDEPENDENT
ADLS_ACUITY_SCORE: 53
DRESS: INDEPENDENT
ADLS_ACUITY_SCORE: 53

## 2023-01-08 NOTE — PLAN OF CARE
"Patient endorsing high anxiety this shift 7-9/10 relating it to her nightmare/dreams, she was unable to maintain fast for AM blood draw and had some food before breakfast, Lapid panel reschedule for Monday 1/9. Patient vitals BP (!) 153/100 (BP Location: Right arm, Patient Position: Sitting, Cuff Size: Adult Regular)   Pulse 68   Temp 98.6  F (37  C) (Oral)   Resp 18   Ht 1.702 m (5' 7\")   Wt 74.8 kg (164 lb 12.8 oz)   LMP  (LMP Unknown)   SpO2 100%   BMI 25.81 kg/m , she is requesting for Ativan frequently as her drug of choice for anxiety, her medications were reviewed and education done on medication safety, MSSA scored 2 at 0800, patient in agreement with plan of care and states understanding.  PRN Hydroxyzine 25 mg given at 08:45, patient seeing sitting in lounge area but no social interactions with peers, observed sleeping between meal times. Awake at lunch time and requesting for Ativan, anxiety 9/10, MSSA 7, vitals BP (!)  171/114 and recheck 163/116 (BP Location: Left arm, Patient Position: Sitting, Cuff Size: Adult Regular)   Pulse 68   Temp 98.6  F resp 16,  SpO2 99%. Writer updated on call Physician and updated on patient status, order placed for Clonidine and  Gabapentin, writer educated patient again on medication and updated on change in plan of care, patient in agreement with POC and was appreciative.   BP reassessed at 14:30 was 131/78, HR 87, patient resting in room and reported feeling much better relief from anxiety. Continue to monitor      Problem: Plan of Care - These are the overarching goals to be used throughout the patient stay.    Goal: Optimal Comfort and Wellbeing  Intervention: Monitor Pain and Promote Comfort  Recent Flowsheet Documentation  Taken 1/8/2023 0500 by Elsy Pino RN  Pain Management Interventions: medication offered but refused     Problem: Anxiety  Goal: Anxiety Reduction or Resolution  Outcome: Not Progressing   Goal Outcome Evaluation:    "
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
## 2023-01-09 LAB
CHOLEST SERPL-MCNC: 195 MG/DL
HDLC SERPL-MCNC: 47 MG/DL
LDLC SERPL CALC-MCNC: 96 MG/DL
NONHDLC SERPL-MCNC: 148 MG/DL
TRIGL SERPL-MCNC: 261 MG/DL

## 2023-01-09 PROCEDURE — 250N000013 HC RX MED GY IP 250 OP 250 PS 637

## 2023-01-09 PROCEDURE — H0001 ALCOHOL AND/OR DRUG ASSESS: HCPCS

## 2023-01-09 PROCEDURE — 124N000002 HC R&B MH UMMC

## 2023-01-09 PROCEDURE — 99232 SBSQ HOSP IP/OBS MODERATE 35: CPT | Mod: GC | Performed by: PSYCHIATRY & NEUROLOGY

## 2023-01-09 PROCEDURE — 93010 ELECTROCARDIOGRAM REPORT: CPT | Performed by: INTERNAL MEDICINE

## 2023-01-09 PROCEDURE — 80061 LIPID PANEL: CPT | Performed by: PSYCHIATRY & NEUROLOGY

## 2023-01-09 PROCEDURE — 36415 COLL VENOUS BLD VENIPUNCTURE: CPT | Performed by: PSYCHIATRY & NEUROLOGY

## 2023-01-09 PROCEDURE — 250N000013 HC RX MED GY IP 250 OP 250 PS 637: Performed by: STUDENT IN AN ORGANIZED HEALTH CARE EDUCATION/TRAINING PROGRAM

## 2023-01-09 PROCEDURE — 93005 ELECTROCARDIOGRAM TRACING: CPT

## 2023-01-09 RX ORDER — GABAPENTIN 400 MG/1
400 CAPSULE ORAL 3 TIMES DAILY PRN
Status: DISCONTINUED | OUTPATIENT
Start: 2023-01-09 | End: 2023-01-10 | Stop reason: HOSPADM

## 2023-01-09 RX ORDER — DULOXETIN HYDROCHLORIDE 30 MG/1
30 CAPSULE, DELAYED RELEASE ORAL DAILY
Status: DISCONTINUED | OUTPATIENT
Start: 2023-01-10 | End: 2023-01-10 | Stop reason: HOSPADM

## 2023-01-09 RX ADMIN — THIAMINE HCL TAB 100 MG 100 MG: 100 TAB at 08:26

## 2023-01-09 RX ADMIN — HYDROCHLOROTHIAZIDE 25 MG: 25 TABLET ORAL at 08:26

## 2023-01-09 RX ADMIN — MELATONIN TAB 3 MG 3 MG: 3 TAB at 18:20

## 2023-01-09 RX ADMIN — MULTIPLE VITAMINS W/ MINERALS TAB 1 TABLET: TAB at 08:26

## 2023-01-09 RX ADMIN — GABAPENTIN 400 MG: 400 CAPSULE ORAL at 16:09

## 2023-01-09 RX ADMIN — FOLIC ACID 1 MG: 1 TABLET ORAL at 08:26

## 2023-01-09 RX ADMIN — POLYMYXIN B SULFATE AND TRIMETHOPRIM SULFATE 1 DROP: 10000; 1 SOLUTION/ DROPS OPHTHALMIC at 13:08

## 2023-01-09 RX ADMIN — POLYMYXIN B SULFATE AND TRIMETHOPRIM SULFATE 2 DROP: 10000; 1 SOLUTION/ DROPS OPHTHALMIC at 17:27

## 2023-01-09 RX ADMIN — CLONIDINE HYDROCHLORIDE 0.1 MG: 0.1 TABLET ORAL at 18:19

## 2023-01-09 RX ADMIN — GABAPENTIN 400 MG: 400 CAPSULE ORAL at 11:47

## 2023-01-09 RX ADMIN — GABAPENTIN 100 MG: 100 CAPSULE ORAL at 06:06

## 2023-01-09 RX ADMIN — NICOTINE POLACRILEX 4 MG: 2 LOZENGE ORAL at 16:09

## 2023-01-09 RX ADMIN — POLYMYXIN B SULFATE AND TRIMETHOPRIM SULFATE 2 DROP: 10000; 1 SOLUTION/ DROPS OPHTHALMIC at 08:25

## 2023-01-09 RX ADMIN — CLONIDINE HYDROCHLORIDE 0.1 MG: 0.1 TABLET ORAL at 08:26

## 2023-01-09 RX ADMIN — CITALOPRAM HYDROBROMIDE 10 MG: 10 TABLET ORAL at 08:26

## 2023-01-09 ASSESSMENT — ACTIVITIES OF DAILY LIVING (ADL)
ADLS_ACUITY_SCORE: 53
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
## 2023-01-09 NOTE — PROGRESS NOTES
"Psychiatry Cross Cover Note    Subjective: Notified by staff that patient has been having high amount of anxiety and has been asking for Ativan for that.     Objective: /71   Pulse 60   Temp 97.8  F (36.6  C) (Temporal)   Resp 16   Ht 1.702 m (5' 7\")   Wt 74.8 kg (164 lb 12.8 oz)   LMP  (LMP Unknown)   SpO2 99%   BMI 25.81 kg/m      Assessment/Plan:  Winifred is a 52 year-old woman with history of polysubstance use and AUD who came in following SI and s/p suicide attempts in the context of medication non-adherence, substance use and psychosocial stressors. She is on MSSA and has been scoring between 2 to 7 in the past 24 hours. Her last use of alcohol has been 48 hours before writing this note. Given the high level of anxiety and sympathetic symptoms of shakiness and high blood pressure Clonidine appears to be a favorable choice. It would be advised to limit the Ativan as much as possible and not to administer any Ativan if not warranted per MSSA protocol. Hydroxyzine would be an inferior choice in controlling substance withdrawal anxiety and Gabapentin could be used to control the breakthrough anxiety as a PRN.     Plan:  1- Start Clonidine 0.1 mg tab QID  2- Stop hydroxyzine 50 mg PRN  3- Start Gabapentin 100 mg TID PRN for anxiety    Chepe Chan MD  PGY-2 Psychiatry Resident    Attestation:  I did not see Winifred Bashir directly on 1/8/2023. I have reviewed the documentation and I agree with the resident's plan of care.   Eliezer Rushing MD      "

## 2023-01-09 NOTE — CONSULTS
1/9/2023  I called the unit to complete a MALENA CA with pt, and was informed pt was still sleeping. The RN requested I call back in 30 minutes. Due to my schedule, pt may not be seen today for her MALENA CA. The MALENA Consult team was informed that pt would be ready to see later on today.    If pt discharged without completing this MALENA CA, she can call [phone], to schedule a MALENA CA with [facility].    Jerri Bagley MA [facility]  [phone]

## 2023-01-09 NOTE — PLAN OF CARE
01/09/23 0946   Individualization/Patient Specific Goals   Patient Personal Strengths resilient;realistic evaluation of current/future capabilities;expressive of needs;ability to maintain sobriety;expressive of emotions   Patient Vulnerabilities substance abuse/addiction;lacks insight into illness;history of unsuccessful treatment;adverse childhood experience(s);family/relationship conflict;limited support system   Individualized Care Needs Will be encouraged to be medication compliant and attend groups   Patient/Family-Specific Goals (Include Timeframe) 1. Evaluation/stabilization of mood disorder sx's  2.  Safe with self- absence of SI  3. Medication mgmt per MD's  4. Substance use addressed 5. Housing addressed 6. CD assessment completed with tx in place  7. Psych f/u care in place   Interprofessional Rounds   Participants CTC;psychiatrist;nursing;patient   Behavioral Team Discussion   Participants Dr. Lozada, Dr. Lucas, Dr. Villalba, Ryann Kay RN, Shahrzad Kirkpatrick MA.LP , Med student   Progress Initial assessment   Anticipated length of stay 7-10 days   Continued Stay Criteria/Rationale S/P Suicde attempt   Medical/Physical None   Plan Patient will be seen daily by Psychiatry.  Patient will be monitored closely for substance withdrawal.   Meds will be reviewed/adjusted per MD's. Patient agreeable to complete CD assessment for MI/CD residential treatment. Patient will be encouraged to attend unit groups.  CTC will assist with transfer to CD treatment.  CTC will continue to assess needs and ensure appropriate follow up care is in place.   Rationale for change in precautions or plan No change in plan/precautions   Anticipated Discharge Disposition substance use treatment     Goal Outcome Evaluation:

## 2023-01-09 NOTE — PROGRESS NOTES
"    ----------------------------------------------------------------------------------------------------------  [facility]  Psychiatric Progress Note  Hospital Day #2    Winifred Bashir MRN# [id number]   Age: 52 year old YOB: 1970   Date of Admission: 1/6/2023     Subjective   The patient was discussed with the treatment team and chart notes were reviewed.      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      Sleep:  7 hours (01/09/23 0600)  Prescribed Medications: Taken as prescribed   PRN Psychiatric Medications: acetaminophen, gabapentin, LORazepam, melatonin, nicotine, OLANZapine **OR** OLANZapine, polyethylene glycol     Gabapentin 100 mg PO on 1/8 at 1740, anxiety, and today at 0606  somewhat helpful     Lorazepam 1 mg PO once daily over past 3 days, per [facility] protocol     Nicotine replacement therapy     Overnight Nursing Notes/Staff Report:  Per Nursing notes, patient arrived to the unit on 01/07 and  over the weekend patient has been exhibiting flat/blunted affect and reporting heightened anxiety and depressive symptoms. No acute events. Patient denies SI/SIB/HI/AH/VH and has been mostly isolating to her room/sleeping.  Recent MSSA scores:   -1/7 MSSA score 2   -1/8 MSSA score 4, 2, 7     Patient interview:  Winifred Bashir states that they feel \"better now\" about being in the hospital today. She confirmed information from her H&P such as events leading to hospitalization. She reports her main goal for this " "hospitalization is getting help with stabilization of mood symptoms and going to \"detox\" upon discharge. 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 Over the past months she has had multiple psychosocial stressors including discord over child support with her ex-, recently being evicted from her house due to not being able to pay her rent.   She also reports worsening depressive symptoms including depressed mood, low energy, suicidal ideations with recent attempt which led to current inpatient hospitalization. She also reports struggling with heightened anxiety levels.   We discussed recent medications and she mentions Celexa has not been hepful with depression in the past and she would like to try Cymbalta since it can help target pain as well. We also discussed increasing Gabapentin to the PTA dose of 400 mg TID to help with anxiety. She also would like smaller portions/meals throughout the day as she reports history of gastric bypass. Will place nutrition consult. She is interested in pursuing CD treatment and agrees with recommendation for CD assessment. Denies withdrawal symptoms except for heightened anxiety at the moment. She reported experienced some VH 2 days ago, one episode. No other symptoms. Denies history of complicated withdrawal. Will continue MSSA protocol.     She denies any side effects from her current medications and has no other questions or concerns at this time.  She also reports     Patient was noted being alert and oriented, she is cooperative and open with treatment team, exhibiting a blunted affect although appears has a more wide range appropriate for topics discussed. Thought process is logical/linear and content without evidence of active SI or plan which is improved, no gross evidence of perceptual " "disturbances and no delusions were elicited.       ROS   Negative other than HPI.     Objective   Vitals:  Temp: 99.9  F (37.7  C) (Temp  Min: 97.8  F (36.6  C)  Max: 99.9  F (37.7  C))  Resp: 16 (Resp  Min: 16  Max: 16)  SpO2: 99 % (SpO2  Min: 99 %  Max: 99 %)  Pulse: 57 (Pulse  Min: 57  Max: 87)  BP: 115/78  Systolic (24hrs), Av , Min:107 , Max:171   Diastolic (24hrs), Av, Min:71, Max:116    Mental Status Examination:  Oriented to:  Alert and fully oriented to self/person, place, date and situation.   General: Awake and Alert  Appearance:  appears stated age, Grooming is adequate and Dressed in hospital scrubs  Behavior:  cooperative and engaged, somewhat anxious   Eye Contact:  adequate, appropriate   Psychomotor:  no abnormal motor symptoms appreciated; no catatonia present  Speech:  appropriate volume/tone, spontaneous and with good articulation  Language: Fluent in English with appropriate syntax and vocabulary.  Mood:  \"better now\"  Affect:  appropriate, congruent with mood, blunted and tearful at times, appropriate for topics discussed.   Thought Process:  linear, logical and goal directed  Thought Content: suicidal ideation (passive), No HI, No VH and No AH; No apparent delusions. She is future-oriented as evidenced by wanting to pursue CD treatment upon discharge.  Associations:  intact  Insight:  good  Insight into mental illness and nature of substance use playing a role in presentation.  Judgment:  fair due to seeking help when needed and being in the hospital voluntarily wanting to pursue CD treatment upon discharge.   Attention Span: grossly intact and adequate for conversation  Concentration:  grossly intact, adequate for conversation   Recent and Remote Memory:  not formally assessed and grossly intact  Fund of Knowledge: estimated average for formal level of education        Allergies     Allergies   Allergen Reactions     Fish-Derived Products Shortness Of Breath and Nausea and Vomiting "     Nkda [No Known Drug Allergies]         Labs & Imaging     Results for orders placed or performed during the hospital encounter of 01/06/23 (from the past 24 hour(s))   Lipid panel   Result Value Ref Range    Cholesterol 195 <200 mg/dL    Triglycerides 261 (H) <150 mg/dL    Direct Measure HDL 47 (L) >=50 mg/dL    LDL Cholesterol Calculated 96 <=100 mg/dL    Non HDL Cholesterol 148 (H) <130 mg/dL    Narrative    Cholesterol  Desirable:  <200 mg/dL    Triglycerides  Normal:  Less than 150 mg/dL  Borderline High:  150-199 mg/dL  High:  200-499 mg/dL  Very High:  Greater than or equal to 500 mg/dL    Direct Measure HDL  Female:  Greater than or equal to 50 mg/dL   Male:  Greater than or equal to 40 mg/dL    LDL Cholesterol  Desirable:  <100mg/dL  Above Desirable:  100-129 mg/dL   Borderline High:  130-159 mg/dL   High:  160-189 mg/dL   Very High:  >= 190 mg/dL    Non HDL Cholesterol  Desirable:  130 mg/dL  Above Desirable:  130-159 mg/dL  Borderline High:  160-189 mg/dL  High:  190-219 mg/dL  Very High:  Greater than or equal to 220 mg/dL   EKG 12-lead, complete   Result Value Ref Range    Systolic Blood Pressure  mmHg    Diastolic Blood Pressure  mmHg    Ventricular Rate 61 BPM    Atrial Rate 61 BPM    SC Interval 198 ms    QRS Duration 84 ms     ms    QTc 453 ms    P Axis -22 degrees    R AXIS 2 degrees    T Axis -6 degrees    Interpretation ECG       Sinus rhythm  Moderate voltage criteria for LVH, may be normal variant  Nonspecific T wave abnormality  Abnormal ECG  When compared with ECG of 07-JAN-2023 01:30,  Nonspecific T wave abnormality has replaced inverted T waves in Anterior leads          Assessment   Diagnostic Impression:  Winifred Bashir is a 52 year old female with a past psychiatric diagnosis of MDD, PTSD, KELLEN, stimulant use disorder (crack cocaine and methamphetamine), cocaine-induced psychosis, and AUD with history of withdrawal (denied any history of w/d seizure activity) who presented to the  ED with SI and s/p suicide attempts (jump from 2nd story window 1/5/2023 and overdose of crack cocaine 1/6/2023 with intention to stop her heart) in the context of medication non adherence, substance use (daily alcohol, daily cocaine and THC) and psychosocial stressors such as homelessness, and financial strain, and unemployment. Significant symptoms include ongoing SI, SIB, depressed and substance use. Her last psychiatric hospitalization was in  12/1-12/5/2022 at [facility].  Current admission looks similar to previous ones for SI, depression in the context of active MALENA. In addition, patient is dealing with several psychosocial stressors. 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  Her diagnosis is still in evolution, and needs to include MDD vs. substance-induced mood disorder. Given no clear hx of manic episodes in the past, BAD is less likely although cannot be ruled out at this time.     Given that she currently has ongoing SI, patient warrants inpatient psychiatric hospitalization to maintain her safety. Disposition pending clinical stabilization, medication optimization and development of an appropriate discharge plan.     Risk for harm is low to moderate on inpatient unit, though would be high if discharged from hospital at this time.  Risk factors: SI, maladaptive coping, substance use and past behaviors  Protective factors: engaged in treatment     Principal Diagnosis:     MDD vs Substance-induced depressive disorder    Secondary psychiatric diagnoses of concern this admission:     Stimulant (Cocaine and methamphetamine) use disorder    Alcohol use disorder without hx of complicated withdrawal    Cannabis use disorder    PTSD, historical     Psychiatric course:  Wiinfred Bashir was admitted to [facility] as a voluntary patient. Her PTA medications were reviewed including Wellbutrin, and Gabapentin that were continued. PTA citalopram was initially re-started at 10 mg Qday, Gabapentin was re-started at 100 mg PO  TID.   On 1/09 Gabapentin was increased to her PTA dose of 400 mg PO TID PRN. Nutrition consult was placed due to patient with previous hx of bypass to assess nutrition needs including smaller portions and snacks per patient request. CD assessment consult was placed as patient was  interested in pursuing CD treatment upon discharge. Celexa 10 mg PO Qday was discontinued due to patient not perceiving benefit after apparently adequate trial and not taking medication over prior months. Duloxetine 30 mg PO Qday was started to target anxiety and depression. MSSA protocol was continued.      Winifred Bashir continued to meet criteria for inpatient hospitalization medication optimization, inpatient stabilization, and appropriate discharge planning including CD referral upon discharge.     Medical course:   Winifred Bashir was physically examined by the ED prior to being transferred to the unit and was found to be medically stable and appropriate for admission.  Initially he reported and then denied having right ankle pain since jump from window on 1/5/2023, and acknowledged hitting her head - though sounds to have not lost consciousness; and CT scan in ED showed no intracranial abnormality.   When initially assessed on the unit, She received a dose of lorazepam approximately 1-2 hours prior to interview by Sascha Guy and Сергей (due to MSSA protocol that was positive for anxiety and hypertension), and was somnolent throughout the interview - though awoke easily to spoken voice, arose from her bed and demonstrated stable gait on walk to the day room for interview, ate yogurt, crackers, juice and fruit during the interview. She had not received her outpatient hydrochlorothiazide 25mg po qday on 1/7/2023 - so Sascha Guy and Сергей ordered that medication and placed referral for Internal Medicine consult to follow-up on hypertension in context of possible alcohol withdrawal, along with request for them to evaluate right foot  pain and follow-up on possible head injury from 2nd floor jump 1/5/2023.      Plan   Today's Changes:     Increase Gabapentin to her PTA dose of 400 mg PO TID PRN and monitor/change to scheduled if needed.     Nutrition consult due to patient with previous hx of bypass, assess for nutrition needs including smaller partions and snacks per patient request.    CD assessment consult as patient interested in pursuing CD treatment upon discharge.     Discontinue Celexa 10 mg PO Qday due to patient not perceiving benefit after apparently adequate trial.     Start Duloxetine 30 mg PO Qday to target anxiety and depression. First dose will be on 01/10 AM.     Continue MSSA protocol.     Repeat EKG today (WNL)  _______________________________________________________________________  Psychiatric diagnoses and management:  Principal Diagnosis:     MDD vs Substance-induced depressive disorder severe, recurrent, with moderate or severe use disorder   -Start Duloxetine 30 mg PO Qday to target anxiety and depression. First dose will be on 01/10 AM.   -Discontinue Celexa 10 mg PO Qday due to patient not perceiving benefit after apparently adequate trial.     Secondary Psychiatric Diagnoses:     Stimulant (Cocaine and methamphetamine) use disorder    Alcohol use disorder without hx of complicated withdrawal    Cannabis use disorder    PTSD, historical   -Continue MSSA protocol and reassess tomorrow.  -Increase Gabapentin to her PTA dose of 400 mg PO TID PRN and monitor/change to scheduled if needed.   -CD assessment consult placed as patient interested in pursuing CD treatment upon discharge.     Additional Planning:    Continue to monitor for and stabilize: SI, SIB, depressed and substance use    Patient will be treated in therapeutic milieu with appropriate individual and group therapies as described.    CTC to assist with CD treatment referral upon discharge     Scheduled Medications (summary):    cloNIDine  0.1 mg Oral 4x Daily      [START ON 1/10/2023] DULoxetine  30 mg Oral Daily     folic acid  1 mg Oral Daily     hydrochlorothiazide  25 mg Oral Daily     multivitamin w/minerals  1 tablet Oral Daily     thiamine  100 mg Oral Daily     trimethoprim-polymyxin b  1-2 drop Both Eyes 4x Daily       PRN Medications (summary):  acetaminophen, gabapentin, LORazepam, melatonin, nicotine, OLANZapine **OR** OLANZapine, polyethylene glycol    Discontinued Medications (& Rationale):  . Celexa 10 mg PO Qday was discontinued due to patient not perceiving benefit after apparently adequate trial and not taking medication over prior months.     Consults:    1/08 IM consult to evaluate HTN management and her foot that was felt to be injured after she had fallen out of the window. Appreciate recs. See IM note on 1/08 for further details.    1/09 CD screening consult completed, appreciate recs. Please see note on 01/09 for further details.     Legal Status:    Voluntary     SIO:    None has not required one previously this admission    Pt has not required locked seclusion or restraints in the past 24 hours to maintain safety, please refer to RN documentation for further details.    Disposition:    TBD, pending clinical stabilization, medication optimization, and formulation of a safe discharge plan including CD treatment referral upon discharge    Safety Assessment:   Behavioral Orders   Procedures     Code 1 - Restrict to Unit     Discontinue 1:1 attendant for suicide risk     Order Specific Question:   I have performed an in person assessment of the patient     Answer:   Based on this assessment the patient no longer requires a one on one attendant at this point in time.     Order Specific Question:   Rationale     Answer:   Patient States able to remain safe in hospital     Order Specific Question:   Rationale     Answer:   Modifications to care environment made to mitigate safety risk     Order Specific Question:   Rationale     Answer:   Routine  observations are sufficient to monitor safety.     Routine Programming     As clinically indicated     Seizure precautions     Self Injury Precaution     Status 15     Every 15 minutes.     Suicide precautions     Patients on Suicide Precautions should have a Combination Diet ordered that includes a Diet selection(s) AND a Behavioral Tray selection for Safe Tray - with utensils, or Safe Tray - NO utensils       Withdrawal precautions      ____________________________________________________________________  Pertinent Medical diagnoses and management:    Per IM consult, appreciate recommendations:   #Query foot pain after jumping out of a window  If Ms. Bashir ever had foot pain it certainly is not bothering her currently.  She has full range of motion and independent use of her lower extremities bilaterally.  She has no bony tenderness no skin breakdown or evidence of any injury after jumping out of a window.  No further work-up required at this time.  #Hypertension  -Continue hydrochlorothiazide 25mg once daily   -Stop using cocaine and substances that cause rebound hypertension  -Consider use of Hydralazine prn if SBP is consistently >160 or patient develops symptoms  -Follow up with Primary Care provider within two weeks of discharge.    Per CD assessment, appreciate recs as follow:   1. Abstain from all non-prescribed mood-altering substances  2. Take all medications as prescribed  3. Enter and complete a residential or inpatient treatment program  4. Follow all recommendations upon discharge from treatment. Recommendations may include, but are not limited to: extended treatment, outpatient treatment and/or sober housing.  5. Increase sober support, attend support meetings at least one time weekly          6.   Follow all recommendations of your medical providers    ____________________________________________________________________  Patient seen and discussed with attending physician, Dr. Radha Lozada MD who  is in agreement with my assessment and plan.    Augie Castillo MD  PGY-1 Psychiatry Resident    Attestation:  This patient has been seen and evaluated by me, Radha Lozada MD.  I have discussed this patient with the house staff team including the resident and medical student and I agree with the findings and plan in this note.    I have reviewed today's vital signs, medications, labs and imaging. Radha Lozada MD , PhD.

## 2023-01-09 NOTE — PLAN OF CARE
"Pt denied SI/hallucinations.  Pt sitting out in lounge by herself drinking coffee and watching TV.  Ate supper sat at a table with other female patients.  Pt wanted something else for supper.  Called kitchen pt had not filled out menu for today nor for tomorrow.  Got menu tubed up for tomorrow.  Had pt fill out menu..  Pt wanted to read off the items she wanted and have staff Campo them.  Encouraged pt tp Campo items she wanted which she did.  Shortly after supper pt layed down in bed which she has remained to rest of shift.    Problem: Suicidal Behavior  Goal: Suicidal Behavior is Absent or Managed  1/8/2023 2208 by Ryann Norman RN  Outcome: Not Progressing  1/8/2023 2208 by Ryann Norman RN  Outcome: Not Progressing     Problem: Plan of Care - These are the overarching goals to be used throughout the patient stay.    Goal: Plan of Care Review  Description: The Plan of Care Review/Shift note should be completed every shift.  The Outcome Evaluation is a brief statement about your assessment that the patient is improving, declining, or no change.  This information will be displayed automatically on your shift note.  1/8/2023 2208 by Ryann Norman RN  Outcome: Not Progressing  1/8/2023 2208 by Ryann Norman RN  Outcome: Not Progressing  Goal: Patient-Specific Goal (Individualized)  Description: You can add care plan individualizations to a care plan. Examples of Individualization might be:  \"Parent requests to be called daily at 9am for status\", \"I have a hard time hearing out of my right ear\", or \"Do not touch me to wake me up as it startles me\".  1/8/2023 2208 by Ryann Norman RN  Outcome: Not Progressing  1/8/2023 2208 by Ryann Norman RN  Outcome: Not Progressing  Goal: Absence of Hospital-Acquired Illness or Injury  1/8/2023 2208 by Ryann Norman RN  Outcome: Not Progressing  1/8/2023 2208 by Ryann Norman, RN  Outcome: Not Progressing  Goal: Optimal Comfort and Wellbeing  1/8/2023 2208 by Emerson, " ROZ Garzon  Outcome: Not Progressing  1/8/2023 2208 by Ryann Norman RN  Outcome: Not Progressing  Goal: Readiness for Transition of Care  1/8/2023 2208 by Ryann Norman RN  Outcome: Not Progressing  1/8/2023 2208 by Ryann Norman RN  Outcome: Not Progressing   Goal Outcome Evaluation:

## 2023-01-09 NOTE — PLAN OF CARE
Patient observed sleeping during this shift, reported anxiety 8/10 PRN med gabapentin  given around 0600, no snack given this shift, patient had 7 hours of sleep, continue to rest till morning, Fasting labs this AM, patient aware.  Problem: Sleep Disturbance  Goal: Adequate Sleep/Rest  Outcome: Progressing   Goal Outcome Evaluation:

## 2023-01-09 NOTE — PLAN OF CARE
Goal Outcome Evaluation:      Problem: Plan of Care - These are the overarching goals to be used throughout the patient stay.    Goal: Plan of Care Review  Description: The Plan of Care Review/Shift note should be completed every shift.  The Outcome Evaluation is a brief statement about your assessment that the patient is improving, declining, or no change.  This information will be displayed automatically on your shift note.  Outcome: Progressing     Problem: Anxiety  Goal: Anxiety Reduction or Resolution  Outcome: Progressing     Problem: Depression  Goal: Improved Mood  Outcome: Progressing       D: Patient visible in the milieu/start of shift. She was minimally social with peers and staff. She remained isolative and withdrawn. She appeared depressed and anxious. She did not endorse SI/SIB/HI. She did not endorse auditory or visual hallucinations. She is on MSSA but denied physical withdrawals. Vital signs monitored (see flowsheet).  She requested PRNs for anxiety. PRN Hydroxyzine administered with improved symptoms. Patient on SIB, Seizure, suicide and MSSA precautions.     A: Patient given gabapentin 400 mg PO for anxiety along with Nicotine Lozenges. Was given scheduled Clonidine. Staff engaged in therapeutic communication. Monitored patient for safety every 15 minutes. Medications administered as offered and assessed for effectiveness and side effects. Patient encouraged to follow unit protocol.    R: Pt evaluation continues. Patient is medication compliant. Assessed mood, anxiety, thoughts and behavior. Is progressing toward goals. Encourage participation in groups and developing healthy coping skills. Will continue current plan of care.

## 2023-01-09 NOTE — PLAN OF CARE
Patient intermittently visible in Milieu, limited interactions with staff and peers, requesting frequently for PRN medications and education reinforced on med frequency. Patient taking schedule Clonidine, med was held at noon for low BP at 93/52, patient took PRN gabapentin x 2 for anxiety rating at 7/10, medications changes as follow increase dose of gabapentin from 100 mg to 400 mg, Celexa was discontinued and started on Cymbalta, patient was updated about changes and she is in agreement with current POC. Patient is well groom and kept, excellent oral intake of food and fluids. Patient MSSA score 3 this shift, medication effective and patient observed sleeping between meal times. Patient continue to endorse SI but no plan, endorses anxiety and depressions, denies SIB, HI and hallucinations, continue to monitor     Problem: Plan of Care - These are the overarching goals to be used throughout the patient stay.    Goal: Plan of Care Review  Description: The Plan of Care Review/Shift note should be completed every shift.  The Outcome Evaluation is a brief statement about your assessment that the patient is improving, declining, or no change.  This information will be displayed automatically on your shift note.  Outcome: Progressing   Goal Outcome Evaluation:

## 2023-01-09 NOTE — CONSULTS
1/9/2023      Pt has been interviewed via J[employer] and CD Consult has been completed. Email has been sent to unit  with KATY's for pt to sign. Referrals have been sent to treatment facilities for review.    Recommendations:      1. Abstain from all non-prescribed mood-altering substances  2. Take all medications as prescribed  3. Enter and complete a residential or inpatient treatment program  4. Follow all recommendations upon discharge from treatment. Recommendations may include, but are not limited to: extended treatment, outpatient treatment and/or sober housing.  5. Increase sober support, attend support meetings at least one time weekly        6.   Follow all recommendations of your medical providers        Clinical Substantiation:       Pt reports she has had long periods of sobriety in the past-10 years and 14 years. Pt reports she attended CD treatment last year at [facility]. Pt reports she was able to remain sober for about 4 months until she relapsed. Pt reports cravings, withdrawal, and an inability to control her use. 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 Pt reports she would like to attend a MICD treatment program. Pt lacks a sober support network and coping skills.      Referrals/ Alternatives:     [facility]  [address], [place] [address]  Email Address: [email or web address]   Main number: [phone]   Fax number: [phone]     [facility] Team for Referrals  Phone: [phone]  Main office: [phone]  Fax: [phone]     [facility]  Phone: [phone]  Fax: [phone] ATTN: INTAKE            MALENA consult completed by: Marianna Huang [facility].  Phone Number: [phone]  E-mail Address: [email or web address] Department  [address] [place] [address]  [place] [address]

## 2023-01-09 NOTE — PROGRESS NOTES
"CLINICAL NUTRITION SERVICES - ASSESSMENT NOTE     Nutrition Prescription    RECOMMENDATIONS FOR MDs/PROVIDERS TO ORDER:  None at this time.    Malnutrition Status:    Patient does not meet two of the established criteria necessary for diagnosing malnutrition but is at risk for malnutrition    Recommendations already ordered by Registered Dietitian (RD):  Will order snacks of  10 am: Ham sandwich and single HB egg  2 pm: Sun Butter and crackers OR tuna w/ crackers  HS snack: Nutrigrain bar + mini bagels with cream cheese    Ensure Enlive in Chocolate     Future/Additional Recommendations:  Monitor intake.     REASON FOR ASSESSMENT  Winifred Bashir is a/an 52 year old female assessed by the dietitian for Provider Order - \"Patient with previous hx of bypass, assess for nutrition needs including smaller partions and snacks per patient request.\"    NUTRITION HISTORY  Spoke with patient in room (roommate present). Patient reports having had a gastric bypass back in 2013. Patient reports taking a multivitamin as well as other additional vitamins/minerals at home but is unable to recall off the top of her head which ones specifically. Due to bypass being so long ago there are currently no restrictions to texture or meal size, but patient reports that the meals here are too large for her and that she is unable to eat everything and then finds herself hungry again shortly after the meal times. Patient looked through my approved snack list and picked a list of options, will place snack order. When offered a protein supplement, patient also stated that it would be beneficial and picked Ensure Enlive in Chocolate.     PMH   Per physician assistant note: \"past medical history of recreational drug abuse including cocaine, major depression with 2 recent suicide attempts and (jump from a second story window on January 5, 2023 and a cocaine overdose January 6, 2023) related to her recent eviction\"    CURRENT NUTRITION ORDERS  Diet: " "Regular  Intake/Tolerance: Inadequate due to patient unable to eat large portions at once     LABS  Labs reviewed   01/08/23 10:04 01/09/23 08:04   Folate >40.0 (H)    HDL Cholesterol  47 (L)   LDL Cholesterol Calculated  96   Non HDL Cholesterol  148 (H)   Triglycerides  261 (H)   Vitamin B12 491    Glucose 172 (H)      MEDICATIONS  Medications reviewed  Folic acid, Thera-Vit-M, Thiamine.     ANTHROPOMETRICS  Height: 170.2 cm (5' 7\")  Most Recent Weight: 74.8 kg (164 lb 12.8 oz)    IBW: 61.4 kg  BMI: 25.81 - Overweight BMI 25-29.9  Weight History: 9% weight loss in 4.5 months, though unsure of accuracy, but minimal weight data available.   Wt Readings:   01/08/23 74.8 kg (164 lb 12.8 oz)   12/17/22 75.4 kg (166 lb 3.2 oz)   12/02/22 77.2 kg (170 lb 3.2 oz)   08/24/22 - CE 82.2 kg (181 lb 3.2 oz)   07/02/22 77.7 kg (171 lb 4.8 oz)   02/27/22 83 kg (183 lb)     Dosing Weight: 75 kg    ASSESSED NUTRITION NEEDS  Estimated Energy Needs: 0214-8856 kcals/day (25 - 30 kcals/kg)  Justification: Maintenance  Estimated Protein Needs: 75-90 grams protein/day (1 - 1.2 grams of pro/kg)  Justification: Maintenance  Estimated Fluid Needs: 2494-5499 mL/day (25 - 30 mL/kg)   Justification: Maintenance    PHYSICAL FINDINGS  See malnutrition section below.    MALNUTRITION  % Intake: Decreased intake does not meet criteria  % Weight Loss: > 7.5% in 3 months (severe) - unsure of accuracy.   Subcutaneous Fat Loss: None observed  Muscle Loss: None observed  Fluid Accumulation/Edema: None noted  Malnutrition Diagnosis: Patient does not meet two of the established criteria necessary for diagnosing malnutrition but is at risk for malnutrition    NUTRITION DIAGNOSIS  Inadequate protein-energy intake related to Hx of gastric bypass and unable to consume large portions as evidenced by patient self report of need for smaller frequent meal set up.       INTERVENTIONS  Implementation  Will order snacks of  10 am: Ham sandwich and single HB egg  2 " pm: Sun Butter and crackers OR tuna w/ crackers  HS snack: Nutrigrain bar + mini bagels with cream cheese    Ensure Enlive in Chocolate     Goals  Total avg nutritional intake to meet a minimum of 25 kcal/kg and 1.0 g PRO/kg daily (per dosing wt 75 kg).     Monitoring/Evaluation  Progress toward goals will be monitored and evaluated per protocol.    Stacy Amanda RD, LD   Mental Health Pager (M-F): [phone]  On Call Pager (weekends only): [phone]

## 2023-01-10 VITALS
SYSTOLIC BLOOD PRESSURE: 107 MMHG | OXYGEN SATURATION: 100 % | DIASTOLIC BLOOD PRESSURE: 57 MMHG | HEIGHT: 67 IN | WEIGHT: 164.8 LBS | TEMPERATURE: 98 F | HEART RATE: 63 BPM | RESPIRATION RATE: 16 BRPM | BODY MASS INDEX: 25.87 KG/M2

## 2023-01-10 LAB
ATRIAL RATE - MUSE: 61 BPM
DIASTOLIC BLOOD PRESSURE - MUSE: NORMAL MMHG
INTERPRETATION ECG - MUSE: NORMAL
P AXIS - MUSE: -22 DEGREES
PR INTERVAL - MUSE: 198 MS
QRS DURATION - MUSE: 84 MS
QT - MUSE: 450 MS
QTC - MUSE: 453 MS
R AXIS - MUSE: 2 DEGREES
SYSTOLIC BLOOD PRESSURE - MUSE: NORMAL MMHG
T AXIS - MUSE: -6 DEGREES
VENTRICULAR RATE- MUSE: 61 BPM

## 2023-01-10 PROCEDURE — 250N000013 HC RX MED GY IP 250 OP 250 PS 637: Performed by: STUDENT IN AN ORGANIZED HEALTH CARE EDUCATION/TRAINING PROGRAM

## 2023-01-10 PROCEDURE — 250N000013 HC RX MED GY IP 250 OP 250 PS 637

## 2023-01-10 PROCEDURE — 99238 HOSP IP/OBS DSCHRG MGMT 30/<: CPT | Mod: GC | Performed by: PSYCHIATRY & NEUROLOGY

## 2023-01-10 RX ORDER — DULOXETIN HYDROCHLORIDE 30 MG/1
30 CAPSULE, DELAYED RELEASE ORAL DAILY
Qty: 30 CAPSULE | Refills: 0 | Status: SHIPPED | OUTPATIENT
Start: 2023-01-11 | End: 2023-02-10

## 2023-01-10 RX ORDER — CLONIDINE HYDROCHLORIDE 0.1 MG/1
0.1 TABLET ORAL 4 TIMES DAILY
Qty: 120 TABLET | Refills: 0 | Status: SHIPPED | OUTPATIENT
Start: 2023-01-10 | End: 2023-02-09

## 2023-01-10 RX ORDER — LANOLIN ALCOHOL/MO/W.PET/CERES
100 CREAM (GRAM) TOPICAL DAILY
Qty: 30 TABLET | Refills: 0 | Status: SHIPPED | OUTPATIENT
Start: 2023-01-11 | End: 2023-02-10

## 2023-01-10 RX ORDER — MULTIPLE VITAMINS W/ MINERALS TAB 9MG-400MCG
1 TAB ORAL DAILY
Qty: 30 TABLET | Refills: 0 | Status: SHIPPED | OUTPATIENT
Start: 2023-01-11 | End: 2023-02-10

## 2023-01-10 RX ORDER — FOLIC ACID 1 MG/1
1 TABLET ORAL DAILY
Qty: 30 TABLET | Refills: 0 | Status: SHIPPED | OUTPATIENT
Start: 2023-01-11 | End: 2023-02-10

## 2023-01-10 RX ORDER — POLYETHYLENE GLYCOL 3350 17 G
4 POWDER IN PACKET (EA) ORAL
Qty: 72 LOZENGE | Refills: 0 | Status: SHIPPED | OUTPATIENT
Start: 2023-01-10

## 2023-01-10 RX ADMIN — GABAPENTIN 400 MG: 400 CAPSULE ORAL at 04:17

## 2023-01-10 RX ADMIN — HYDROCHLOROTHIAZIDE 25 MG: 25 TABLET ORAL at 09:23

## 2023-01-10 RX ADMIN — FOLIC ACID 1 MG: 1 TABLET ORAL at 09:24

## 2023-01-10 RX ADMIN — MULTIPLE VITAMINS W/ MINERALS TAB 1 TABLET: TAB at 09:24

## 2023-01-10 RX ADMIN — CLONIDINE HYDROCHLORIDE 0.1 MG: 0.1 TABLET ORAL at 09:24

## 2023-01-10 RX ADMIN — THIAMINE HCL TAB 100 MG 100 MG: 100 TAB at 09:24

## 2023-01-10 RX ADMIN — DULOXETINE HYDROCHLORIDE 30 MG: 30 CAPSULE, DELAYED RELEASE ORAL at 09:23

## 2023-01-10 ASSESSMENT — ACTIVITIES OF DAILY LIVING (ADL)
ADLS_ACUITY_SCORE: 53

## 2023-01-10 NOTE — DISCHARGE INSTRUCTIONS
Behavioral Discharge Planning and Instructions    Summary:   You were admitted to [facility] on 1/7/23 with worsening depression, suicidal ideation in the context of inability to maintain sobriety      under the care of Dr. Lozada.  You met with Dr. Lozada and his team daily for ongoing psychiatric assessment and medication management.  You had opportunities to participate in therapeutic groups on the unit.   You expressed desire to enter a women's chemical dependency treatment and completed a CD assessment. You elected to go to [facility].  At this time you report your mood is stabilizing and you report you are not having thoughts or intent to harm yourself or others.   You will be discharged to [facility] for extended CD treatment    Disposition:  [facility]    Main Diagnosis:   Polysubstance Use Disorder  Major Depressive Disorder    Health Care Follow-up:   You will be [facility] for medication management.    Major Treatments, Procedures and Findings:   Medications were  managed throughout your stay. An internal medicine consult was completed during your stay. You had the opportunity to participate in treatment programming while on the unit including occupational therapy, mental health support and education and spiritual services.     Symptoms to Report:   Please report if you are experiencing increased aggression and/or confusion, problematic loss of sleep, worsening mood, or thoughts of suicide to your treatment team or notify your primary provider.   IF THE SYMPTOMS YOU ARE EXPERIENCING ARE A MEDICAL EMERGENCY, CALL 911 IMMEDIATELY    Lifestyle Adjustment:   1. Adjust your lifestyle to get enough sleep, relaxation, exercise and good nutrition.  Continue to develop healthy coping skills to decrease stress and promote a healthy and sober lifestyle.  2. Abstain from all substances of abuse.  3. Take medications as prescribed.  Please work with your  "doctor to discuss any concerns you have with your medications or side effects you may be experiencing.  4. Follow up with appointments as scheduled.      Resources:   *[facility] Crisis: [phone]    24/7 crisis hotline for adults who are in the midst of a mental health crisis  *[facility]: [address] [place]  [address]    Walk-in services include access to an onsite team of psychiatrists, nurses, social workers and trained peer support staff that provide person-centered, recovery-focused care. Urgent Care for Adult Mental Health offers an alternative to visiting the emergency room during a mental health crisis.   Monday through Friday, 8 a.m. - 5:30 p.m.  Closed on Saturday, Sunday and holidays   *[facility] Crisis: COPE: ([phone]) 24 hour mobile crisis support for people having a mental health crisis in [facility].   *Acute Psychiatric Services ([phone]). 24-hour walk-in crisis psychiatric support at [facility]; Emergency Medications Clinic available 7:30am - 2:00pm  *[facility]: Text  \"life\"  to [address]   A trained crisis counselor will respond immediately  *[facility] Emergency Room: [phone]  *[place] [facility] ([facility]) : [facility] connects people seeking recovery to resources that help foster and sustain long-term recovery. Whether you are seeking resources for treatment, transportation, housing, job training, education, health or other pathways to recovery, [facility] is a great place to start.    [phone]      [email or web address]     General Medication Instructions:   See your medication sheet(s) for instructions.   Take all medicines as directed.  Make no changes unless your doctor suggests them.   Go to all your doctor visits.  Be sure to have all your required lab tests. This way, your medicines can be refilled on time.  Do not use any drugs not prescribed by your doctor.    Advance " Directives:   Scanned document on file with [place]? No scanned doc  Is document scanned? Pt states no documents  Honoring Choices Your Rights Handout: Informed and given  Was more information offered? Materials given    The Treatment team has appreciated the opportunity to work with you. If you have any questions or concerns about your recent admission, you can contact the unit which can receive your call 24 hours a day, 7 days a week. They will be able to get in touch with a Provider if needed. The unit number is [phone]

## 2023-01-10 NOTE — PROGRESS NOTES
Pt discharged as per order.  Reviewed discharge instructions with pt.  Pt pleasant and cooperative.  Pt denied SI.  Pt discharged with all belongings and discharge paperwork.  Pt picked up via staff from [facility] facility.

## 2023-01-10 NOTE — DISCHARGE SUMMARY
"    Psychiatric Discharge Summary    Hospital Day #3    Winifred Bashir MRN# [id number]   Age: 52 year old YOB: 1970     Date of Admission:  1/6/2023  Date of Discharge:  1/10/2023  Admitting Physician:  Radha Lozada MD  Discharge Physician:  Radha Lozada MD         Event Leading to Hospitalization:   \"History obtained from patient and electronic chart     Winifred Bashir is a 52 year old female with a past psychiatric history of MDD, PTSD, KELLEN, cocaine use disorder, cocaine-induced psychosis, and AUD with history of withdrawal admitted from the  ER on 01/07/2023 due to concern for SI and s/p suicide attempt in the context of medication non-adherence, active substance use (daily alcohol, crack cocaine daily, and cannabis) and psychosocial stressors including financial, and homelessness and 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      Per ED Note:   \"Winifred Bashir is a 52 year old female who presents with suicidal ideation.  To me patient reports that she feels suicidal and she attempted to kill herself by smoking half a gram of cocaine in hopes that this would stop her heart.  She reports when it did not stop her heart she came into the ER for evaluation.  She reports previous attempts of suicide with overdose of her prescription medications though denies any intentional ingestions other than the cocaine prior to arrival tonight.     When I reviewed the triage note about jumping from the window I went back and reinterviewed the patient she reported this event happened 2 nights ago.  She denies sustaining any injury from this.  She reports that she landed on her feet but then slid onto her back.  Specifically denies heel pain, foot pain, or low back pain.  Patient is unsure if she hit her head, unsure if she had LOC. \"     She was medically cleared for admission to inpatient psychiatric unit.     Per [facility] assessment  \" Winifred Bashir presents in the ED alone due to concerns of substance abuse " "and suicidal ideation. Throughout the assessment the pt was extremely somnolent and repeatedly fell asleep, but was easily woken. The pt reports that she smoked 1/2 gram of cocaine with the belief that the amount might stop her heart. The pt states that once she was able to drive herself to the ED she decided to seek help for her suicidal ideation. Pt reports that if she is leaves the ED she will likely try to overdose on cocaine again.The pt reports she feels apathetic, hopeless, helpless, and is experiencing suicidal thoughts. The pt denies hallucinations, delusions, and self-injurious behavior.     The pt has a hx of bipolar disorder, major depressive disorder, generalized anxiety disorder, bipolar disorder, PTSD, gambling addiction. And polysubstance abuse disorder. The pt has a hx of chemical dependency tx and inpatient mental health admissions, most recent  inpatient was at [facility] from 12/1-12/5/2022\"     Per patient report:    The story was limited given patient drowsiness. Winifred Bashir reports that she was struggling to pay her rent. She was supposed to receive money from her ex- for alimonies. Given that she didn't receive it and couldn't afford her rent, she became homeless. She thought about ending her life. She jumped from the window, and land on her feet.  She slid onto her back, and hit her head.      During the interview, She didn't have full orientation. She knows that she at [place], but in [facility]. She knows the month, year, but not the date. She also knows her name. She reported that she used crack cocaine to end her life, but didn't hurt her. She mentioned they drove her to the ED. She reports using cocaine daily for months, and experience mood disturbances when she stop using it. She also reports hallucinations. \" I laugh when they move\" she said.      Patient had intermittent participation in the interview because she was drowsy. She was able to provide some answers but not to every " "questions. She reported a history of withdrawal from alcohol, but denied any seizure history.  Of note, inpatient nursing staff administered lorazepam according to [facility] protocol 1-2 hours prior to this interview (score was 8 for hypertension and anxiety).       She continues to endorse suicidal thought. When asked which means she would use, she replied \"anyway\".  She wants to be back on her meds, and receive more social support. She felt safe in the inpatient unit, though believed that she would not be safe if discharged at this time.     Winifred reported that the jump from second story window two days ago resulted in her landing on her feet, falling on to her side and then hitting her head - followed by standing up almost immediately. Sounded that there was not loss of consciousness, though that is not for certain. She reported that her right foot hurt, though subsequently when trying to clarify denied that it hurt and denied any other part of her body hurting.  She denied any head pain.  Head CT without contrast in ED 1/6/2023 interpreted by ROSE MARIE Garcia MD, yielded impression of, \"1.  No acute intracranial abnormality.\"        ED/Hospital Course   In the ED, basic lab was ordered. Head CT was done - revealed no acute changes. She was cleared to go to the psych unit.      The risks, benefits, alternatives and side effects have been discussed and are understood by the patient and other caregivers.\"       See Admission note by Dr Сергей MD  And Dr. Malena MD on 1/07/2023 for additional details.   ==================================================================================    Per encounter with patient and staff report today 01/10/2023:   Overnight Nursing Notes/Staff Report:  Per Nursing notes, patient was minimally social with peers, staff yesterday and remained isolated and withdrawn. She did appear depressed and anxious. Denied SI/SIB/HI/AH/VH. She slept through the night without distress, 6.5 hours. No " "reported pain, discomfort or other concerns per patient. No behavioral concerns.   Recent MSSA scores:   -1/7 MSSA score 2   -1/8 MSSA score 4, 2, 7  -1/9 MSSA score 3, OK to discontinue today (score < 8 x 48 hours)    Patient interview:  Winifred Bashir states she feels \"much better and more clear\" today. CD treatment consult done yesterday and CD treatment was recommended. She got in touch with an MI/CD treatment facility, [facility], and they apparently have a last minute opening and would be able to accept her as early as today. Winifred wishes to discharge to this facility today. She endorses looking forward to the journey to sobriety and states her 2 children and ex- are supportive of this. 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 States she has had a hard time following through with things in the past, feels as though she has the support to start and continue this journey. She states her mother is supportive of her when \"things are good\", and her aunt is supportive regardless of Winifred's situation, and she states both will be supportive of her journey to recovery. Upon discussion of the 10 and 14-year periods of time when she had been sober in the past, Winifred stated the support and trust of her  at the time helped her maintain sobriety. She became tearful when disclosing that her  betrayed her \"when things got bad\" and he ended up maliciously posting personal information about her on Facebook, which led to an array of emotional and social turmoils, contributing to continued/worsening substance use. She explains this betrayal is still permeating through and affecting their relationship, though she reiterates that he is supportive of her going to CD treatment. Her 2 children are currently staying with her ex- and will continue to stay with him while she is in treatment.      She was in the process of moving out of her town home when she was admitted to the " hospital, and she states her things were left in boxes on the steps of the home. She is hoping her stuff is still boxed in the garage, but she is not sure. She states needing to call her landlord to coordinate finding her things.     She reports continued heightened anxiety, PRN gabapentin 400 mg has been helpful. She denies suicidal ideations since her admission. No AH/VH since last VH x 3 days ago. Denies symptoms of withdrawal. No other symptoms. She received first dose of Cymbalta 30 mg this morning, discussed will take several days before this will begin to take effect. She denies side effects to any of her medications. She has no other questions or concerns today, is hoping to discharge to [facility], medications were sent via e-prescribe to [facility] in [place].      Patient was alert and oriented, she is cooperative and open with treatment team, exhibiting a noticeably more full affect today. Thought process is logical/linear and content is future-oriented without evidence of active SI or plan. No gross evidence of perceptual disturbances and no delusions were elicited, thus she is appropriate to continue care at [facility] facility.          Diagnoses:   #Primary Psychiatric Diagnosis    MDD vs Substance-induced depressive disorder    #Secondary Psychiatric Diagnosis    Stimulant (Cocaine and methamphetamine) use disorder    Alcohol use disorder without hx of complicated withdrawal    Cannabis use disorder    PTSD, historical     Diagnostic Impression:   Winifred Bashir is a 52 year old female with a past psychiatric diagnosis of MDD, PTSD, KELLEN, stimulant use disorder (crack cocaine and methamphetamine), cocaine-induced psychosis, and AUD with history of withdrawal (denied any history of w/d seizure activity) who presented to the ED with SI and s/p suicide attempts (jump from 2nd story window 1/5/2023 and overdose of crack cocaine 1/6/2023 with intention to stop her heart) in the context of medication non  adherence, substance use (daily alcohol, daily cocaine and THC) and psychosocial stressors such as homelessness, financial strain, and unemployment. Significant presenting symptoms included ongoing SI, SIB, depressed and substance use. Her last psychiatric hospitalization was in  12/1-12/5/2022 at [facility].  Current admission looks similar to previous ones for SI, depression in the context of active MALENA. In addition, patient is dealing with several psychosocial stressors. 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  Her diagnosis is still in evolution, and needs to include MDD vs. substance-induced mood disorder. Given no clear hx of manic episodes in the past, BAD is less likely although cannot be ruled out at this time.   Overall she has exhibited improvement in initial presenting symptoms including SI, have ressolved/improved. Patient was seen today as alert and oriented, she is cooperative and open with treatment team, exhibiting a noticeably more full affect today. Thought process is logical/linear and content is future-oriented without evidence of active SI or plan. No gross evidence of perceptual disturbances and no delusions were elicited, thus she is appropriate to continue care at [facility] facility.          Consults:   Consults:    1/08 IM consult to evaluate HTN management and her foot that was felt to be injured after she had fallen out of the window. Appreciate recs. See IM note on 1/08 for further details.    1/09 CD screening consult completed, appreciate recs. Please see note on 01/09 for further details.          Hospital Course:   Psychiatric course:  Winifred Bashir was admitted to [facility] as a voluntary patient, MSSA protocol was started. Her PTA medications were reviewed including Wellbutrin, and Gabapentin that were continued. PTA citalopram was initially re-started at 10 mg Qday, Gabapentin was re-started at 100 mg PO TID. On 1/09 Gabapentin was increased to her PTA dose of 400 mg PO TID PRN.  Nutrition consult was placed due to patient with previous hx of bypass to assess nutrition needs including smaller portions and snacks per patient request. CD assessment consult was placed as patient was  interested in pursuing CD treatment upon discharge. Celexa 10 mg PO Qday was discontinued due to patient not perceiving benefit after apparently adequate trial and not taking medication over prior months. Duloxetine 30 mg PO Qday was started to target anxiety and depression. MSSA protocol was discontinued on 1/10 due to low scoring (<8 over 48 hours)     Winifred Bashir continued to meet criteria for inpatient hospitalization medication optimization, inpatient stabilization, and appropriate discharge planning including CD referral upon discharge.      Medical course:   Winifred Bashir was physically examined by the ED prior to being transferred to the unit and was found to be medically stable and appropriate for admission.  Initially he reported and then denied having right ankle pain since jump from window on 1/5/2023, and acknowledged hitting her head - though sounds to have not lost consciousness; and CT scan in ED showed no intracranial abnormality.   When initially assessed on the unit, She received a dose of lorazepam approximately 1-2 hours prior to interview by Sascha Dickinson (due to MSSA protocol that was positive for anxiety and hypertension), and was somnolent throughout the interview - though awoke easily to spoken voice, arose from her bed and demonstrated stable gait on walk to the day room for interview, ate yogurt, crackers, juice and fruit during the interview. She had not received her outpatient hydrochlorothiazide 25mg po qday on 1/7/2023 - so Sascha Guy and Сергей ordered that medication and placed referral for Internal Medicine consult to follow-up on hypertension in context of possible alcohol withdrawal, along with request for them to evaluate right foot pain and follow-up on possible  head injury from 2nd floor jump 1/5/2023.     Risk Assessment:      Today Winifred Bashir denies SI, SIB and HI. No overt evidence of psychosis or yamilet observed. Patient grossly appears to be cognitively intact. Insight and judgement have improved since admission. Patient is aware of consequences of MH illness and MALENA . Patient expresses understanding of this and willingness to pursuing CD treatment and continue working with outpatient team. Patient has not exhibited aggressive or violence behaviors since prior to this admission. Throughout patient's stay they were cooperative and friendly.  she has notable risk factors for self-harm, including anxiety, substance abuse and previous suicide attempts. However, risk is mitigated by commitment to family, sobriety in a controlled setting and successful remission of substance use for up to 14 years in the past, ability to volunteer a safety plan and history of seeking help when needed. Patient does not have immediate access to firearms. Therefore, based on all available evidence including the factors cited above, she does not appear to be at imminent risk for self-harm, does not meet criteria for a 72-hr hold, and therefore remains appropriate for ongoing outpatient level of care. Patient requested MICD referral and this was obtained and was strongly recommended. Patient agreed to further reduce risk of self-harm by not using substances and agreed to remain medication adherent. Additional steps taken to minimize risk include: medication optimization, close psychiatric follow up and provision of crisis resources. Patient expressed understanding of risk associated with mental illness/substance use disorder  including increased risk of harm to self or others.     Winifred was transfered to [facility] treatment. During this admission, she did participate in groups and was visible in the milieu, and her presenting symptoms improved/resolved. At the time of discharge she was  determined to not be a danger to herself or others.     This document serves as a transfer of care to Winifred Bashir's outpatient providers.         Discharge Medications:     Current Discharge Medication List      START taking these medications    Details   cloNIDine (CATAPRES) 0.1 MG tablet Take 1 tablet (0.1 mg) by mouth 4 times daily for 30 days  Qty: 120 tablet, Refills: 0    Associated Diagnoses: Alcohol dependence with withdrawal with complication (H)      DULoxetine (CYMBALTA) 30 MG capsule Take 1 capsule (30 mg) by mouth daily for 30 days  Qty: 30 capsule, Refills: 0    Associated Diagnoses: Current moderate episode of major depressive disorder, unspecified whether recurrent (H)      folic acid (FOLVITE) 1 MG tablet Take 1 tablet (1 mg) by mouth daily for 30 days  Qty: 30 tablet, Refills: 0    Associated Diagnoses: Alcohol dependence with withdrawal with complication (H)      multivitamin w/minerals (THERA-VIT-M) tablet Take 1 tablet by mouth daily for 30 days  Qty: 30 tablet, Refills: 0    Associated Diagnoses: Alcohol dependence with withdrawal with complication (H)      nicotine (COMMIT) 2 MG lozenge Place 2 lozenges (4 mg) inside cheek every hour as needed for smoking cessation  Qty: 72 lozenge, Refills: 0    Associated Diagnoses: Chemical dependency (H)      thiamine (B-1) 100 MG tablet Take 1 tablet (100 mg) by mouth daily for 30 days  Qty: 30 tablet, Refills: 0    Associated Diagnoses: Alcohol dependence with withdrawal with complication (H)         CONTINUE these medications which have NOT CHANGED    Details   gabapentin (NEURONTIN) 400 MG capsule Take 1 capsule (400 mg) by mouth 3 times daily  Qty: 90 capsule, Refills: 0    Associated Diagnoses: Generalized anxiety disorder      hydrochlorothiazide (HYDRODIURIL) 25 MG tablet Take 1 tablet (25 mg) by mouth daily  Qty: 30 tablet, Refills: 0    Associated Diagnoses: Moderate alcohol use disorder (H)      melatonin 3 MG tablet Take 3 mg by mouth nightly  "as needed for sleep         STOP taking these medications       buPROPion (WELLBUTRIN XL) 150 MG 24 hr tablet Comments:   Reason for Stopping:         citalopram (CELEXA) 20 MG tablet Comments:   Reason for Stopping:         hydrOXYzine (ATARAX) 25 MG tablet Comments:   Reason for Stopping:         trimethoprim-polymyxin b (POLYTRIM) 68987-4.1 UNIT/ML-% ophthalmic solution Comments:   Reason for Stopping:                    Psychiatric Examination:   Appearance:  no apparent distress, normal posture, normal gait, appears stated age, good hygiene and appropriately dressed  Attitude:  cooperative, engaged and friendly  Psychomotor:  normal and no evidence of tics, dystonia, or tardive dyskinesia  Eye Contact: appropriate  Speech:  fluent English, normal tone, normal rate and normal prosody  Mood: \"much better\"  Affect:  congruent and appropriate  Thought Content: denies suicidal ideation, denies homicidal ideation and no  Delusions were elicited  Thought Process: linear, coherent and goal directed  Sensorium: awake and alert  Cognition: memory grossly intact  Impulse control: good, improved  Insight: good, improved   Judgment: good, improved          Discharge Plan:   Disposition:  [facility]     Main Diagnosis:   Polysubstance Use Disorder  Major Depressive Disorder     Health Care Follow-up:   You will be [facility] by [facility] for [facility] management.      Pt seen and discussed with my attending physician, Dr. Radha Lozada MD. Who agrees with plan    Augie Castillo MD  PGY-1 Psychiatry Resident  Attestation:   The patient has been seen and evaluated by me,  Radha Lozada MD. I have examined the patient today and reviewed the discharge plan with the resident and medical student. I agree with the final assessment and plan, as noted in the discharge summary. I have reviewed today's vital signs, medications, labs and imaging.  Total time discharge plannin " minutes  Radha Lozada MD ,Ph.D.            Appendix A: All Labs This Admission:     Results for orders placed or performed during the hospital encounter of 01/06/23   Head CT w/o contrast     Status: None    Narrative    CT OF THE HEAD WITHOUT CONTRAST   1/6/2023 1:13 PM     COMPARISON: Head CT 4/8/2018.    HISTORY: Fall. Altered mental status.    TECHNIQUE: Axial CT images of the head from the skull base to the  vertex were acquired without IV contrast. Dose reduction techniques  were used.     FINDINGS:   INTRACRANIAL CONTENTS: No intracranial hemorrhage, extraaxial  collection, or mass effect.  No CT evidence of acute infarct.   Normal  parenchymal density for age. The ventricles and sulci are normal for  age.    VISUALIZED ORBITS/SINUSES/MASTOIDS: No significant orbital  abnormality.  No significant paranasal sinus mucosal disease. No  significant middle ear or mastoid effusion.    OSSEOUS STRUCTURES/SOFT TISSUES: No significant abnormality.      Impression    IMPRESSION:  1.  No acute intracranial abnormality.    ANNABEL MENA MD         SYSTEM ID:  [id number]   Basic metabolic panel     Status: Normal   Result Value Ref Range    Sodium 141 133 - 144 mmol/L    Potassium 3.5 3.4 - 5.3 mmol/L    Chloride 109 94 - 109 mmol/L    Carbon Dioxide (CO2) 26 20 - 32 mmol/L    Anion Gap 6 3 - 14 mmol/L    Urea Nitrogen 17 7 - 30 mg/dL    Creatinine 0.76 0.52 - 1.04 mg/dL    Calcium 9.0 8.5 - 10.1 mg/dL    Glucose 89 70 - 99 mg/dL    GFR Estimate >90 >60 mL/min/1.73m2   Acetaminophen level     Status: Abnormal   Result Value Ref Range    Acetaminophen <2 (L) 10 - 30 mg/L   Salicylate level     Status: Normal   Result Value Ref Range    Salicylate <2 <20 mg/dL   Drug abuse screen 1 urine (ED)     Status: Abnormal   Result Value Ref Range    Amphetamines Urine Screen Negative Screen Negative    Barbiturates Urine Screen Negative Screen Negative    Benzodiazepines Urine Screen Negative Screen Negative    Cannabinoids  Urine Screen Positive (A) Screen Negative    Cocaine Urine Screen Positive (A) Screen Negative    Opiates Urine Screen Negative Screen Negative   CBC with platelets and differential     Status: None   Result Value Ref Range    WBC Count 5.8 4.0 - 11.0 10e3/uL    RBC Count 5.05 3.80 - 5.20 10e6/uL    Hemoglobin 13.9 11.7 - 15.7 g/dL    Hematocrit 42.1 35.0 - 47.0 %    MCV 83 78 - 100 fL    MCH 27.5 26.5 - 33.0 pg    MCHC 33.0 31.5 - 36.5 g/dL    RDW 13.7 10.0 - 15.0 %    Platelet Count 344 150 - 450 10e3/uL    % Neutrophils 38 %    % Lymphocytes 53 %    % Monocytes 7 %    % Eosinophils 1 %    % Basophils 1 %    % Immature Granulocytes 0 %    NRBCs per 100 WBC 0 <1 /100    Absolute Neutrophils 2.2 1.6 - 8.3 10e3/uL    Absolute Lymphocytes 3.1 0.8 - 5.3 10e3/uL    Absolute Monocytes 0.4 0.0 - 1.3 10e3/uL    Absolute Eosinophils 0.1 0.0 - 0.7 10e3/uL    Absolute Basophils 0.0 0.0 - 0.2 10e3/uL    Absolute Immature Granulocytes 0.0 <=0.4 10e3/uL    Absolute NRBCs 0.0 10e3/uL   Alcohol level blood     Status: Normal   Result Value Ref Range    Alcohol ethyl <0.01 <=0.01 g/dL   Asymptomatic Influenza A/B & SARS-CoV2 (COVID-19) Virus PCR Multiplex Nose     Status: Normal    Specimen: Nose; Swab   Result Value Ref Range    Influenza A PCR Negative Negative    Influenza B PCR Negative Negative    RSV PCR Negative Negative    SARS CoV2 PCR Negative Negative    Narrative    Testing was performed using the Xpert Xpress CoV2/Flu/RSV Assay on the [employer] GeneXpert Instrument. This test should be ordered for the detection of SARS-CoV-2 and influenza viruses in individuals who meet clinical and/or epidemiological criteria. Test performance is unknown in asymptomatic patients. This test is for in vitro diagnostic use under the FDA EUA for laboratories certified under CLIA to perform high or moderate complexity testing. This test has not been FDA cleared or approved. A negative result does not rule out the presence of PCR inhibitors  in the specimen or target RNA in concentration below the limit of detection for the assay. If only one viral target is positive but coinfection with multiple targets is suspected, the sample should be re-tested with another FDA cleared, approved, or authorized test, if coinfection would change clinical management. This test was validated by the [facility]. These laboratories are certified under the Clinical Laboratory Improvement Amendments of 1988 (CLIA-88) as qualified to perform high complexity laboratory testing.   Comprehensive metabolic panel     Status: Abnormal   Result Value Ref Range    Sodium 138 133 - 144 mmol/L    Potassium 3.4 3.4 - 5.3 mmol/L    Chloride 106 94 - 109 mmol/L    Carbon Dioxide (CO2) 30 20 - 32 mmol/L    Anion Gap 2 (L) 3 - 14 mmol/L    Urea Nitrogen 14 7 - 30 mg/dL    Creatinine 0.79 0.52 - 1.04 mg/dL    Calcium 9.1 8.5 - 10.1 mg/dL    Glucose 172 (H) 70 - 99 mg/dL    Alkaline Phosphatase 81 40 - 150 U/L    AST 10 0 - 45 U/L    ALT 16 0 - 50 U/L    Protein Total 7.2 6.8 - 8.8 g/dL    Albumin 3.2 (L) 3.4 - 5.0 g/dL    Bilirubin Total 0.2 0.2 - 1.3 mg/dL    GFR Estimate 90 >60 mL/min/1.73m2   Vitamin B12     Status: Normal   Result Value Ref Range    Vitamin B12 491 232 - 1,245 pg/mL   Folate     Status: Abnormal   Result Value Ref Range    Folic Acid >40.0 (H) 4.6 - 34.8 ng/mL   Lipid panel     Status: Abnormal   Result Value Ref Range    Cholesterol 195 <200 mg/dL    Triglycerides 261 (H) <150 mg/dL    Direct Measure HDL 47 (L) >=50 mg/dL    LDL Cholesterol Calculated 96 <=100 mg/dL    Non HDL Cholesterol 148 (H) <130 mg/dL    Narrative    Cholesterol  Desirable:  <200 mg/dL    Triglycerides  Normal:  Less than 150 mg/dL  Borderline High:  150-199 mg/dL  High:  200-499 mg/dL  Very High:  Greater than or equal to 500 mg/dL    Direct Measure HDL  Female:  Greater than or equal to 50 mg/dL   Male:  Greater than or equal to 40 mg/dL    LDL Cholesterol  Desirable:   <100mg/dL  Above Desirable:  100-129 mg/dL   Borderline High:  130-159 mg/dL   High:  160-189 mg/dL   Very High:  >= 190 mg/dL    Non HDL Cholesterol  Desirable:  130 mg/dL  Above Desirable:  130-159 mg/dL  Borderline High:  160-189 mg/dL  High:  190-219 mg/dL  Very High:  Greater than or equal to 220 mg/dL   EKG 12 lead     Status: None   Result Value Ref Range    Systolic Blood Pressure  mmHg    Diastolic Blood Pressure  mmHg    Ventricular Rate 65 BPM    Atrial Rate 65 BPM    OK Interval 192 ms    QRS Duration 86 ms     ms    QTc 478 ms    P Axis -19 degrees    R AXIS 13 degrees    T Axis -42 degrees    Interpretation ECG       Sinus rhythm  Minimal voltage criteria for LVH, may be normal variant  T wave abnormality, consider anterolateral ischemia  Prolonged QT  Abnormal ECG  Unconfirmed report - interpretation of this ECG is computer generated - see medical record for final interpretation  Confirmed by - EMERGENCY ROOM, PHYSICIAN ([id number]),  SUKHJINDER MENDEZ ([id number]) on 1/7/2023 6:47:07 AM     EKG 12-lead, complete     Status: None (Preliminary result)   Result Value Ref Range    Systolic Blood Pressure  mmHg    Diastolic Blood Pressure  mmHg    Ventricular Rate 61 BPM    Atrial Rate 61 BPM    OK Interval 198 ms    QRS Duration 84 ms     ms    QTc 453 ms    P Axis -22 degrees    R AXIS 2 degrees    T Axis -6 degrees    Interpretation ECG       Sinus rhythm  Moderate voltage criteria for LVH, may be normal variant  Nonspecific T wave abnormality  Abnormal ECG  When compared with ECG of 07-JAN-2023 01:30,  Nonspecific T wave abnormality has replaced inverted T waves in Anterior leads     CBC with platelets differential     Status: None    Narrative    The following orders were created for panel order CBC with platelets differential.  Procedure                               Abnormality         Status                     ---------                               -----------         ------                      CBC with platelets and d...[[id number]]                      Final result                 Please view results for these tests on the individual orders.   Urine Drugs of Abuse Screen     Status: Abnormal    Narrative    The following orders were created for panel order Urine Drugs of Abuse Screen.  Procedure                               Abnormality         Status                     ---------                               -----------         ------                     Drug abuse screen 1 urin...[[id number]]  Abnormal            Final result                 Please view results for these tests on the individual orders.

## 2023-01-10 NOTE — PLAN OF CARE
Assessment/Intervention/Current Symtoms and Care Coordination  The patient's care was discussed with the treatment team and chart notes were reviewed.   Patient met with team.  She reports feeling much better today. Affect is bright today. Insight improved  Patient denies any withdrawal symptoms. She reports feeling more anxious because her mind is clearer and she is dealing with reality of her situation.  She spoke to her ex  and children and feels good about this.    Patient completed a CD assessment yesterday. Referrals for women's residential treatment programs [facility]   [facility]  Patient interviewed with [facility] this morning and has been accepted.  They can pick patient up today.  Patient feels stable to discharge and therefore, arrangements will be made to transfer her today.  Discharge meds will be ordered through [facility]    Discharge Plan or Goal  Patient will discharge today to [facility].  Meds will be managed at [facility] with [facility]    Barriers to Discharge   None    Referral Status  [facility]    Legal Status     Voluntary

## 2023-01-10 NOTE — PLAN OF CARE
Problem: Sleep Disturbance  Goal: Adequate Sleep/Rest  Outcome: Progressing     Problem: Suicidal Behavior  Goal: Suicidal Behavior is Absent or Managed  Outcome: Progressing   Goal Outcome Evaluation:    Pt slept through the night without distress. Pt reported no pain or discomfort. No problems with behavior. No concerns reported by pt. Pt slept 6.5 hours.  Staff will continue to monitor.

## 2023-01-10 NOTE — PLAN OF CARE
"Pt denies SI.  Pt appeared happy about going to CD tx.  Pt requested to look up information on intranet.  This staff sat with pt wand looked.  Pt appeared appeared pleased.    Problem: Plan of Care - These are the overarching goals to be used throughout the patient stay.    Goal: Plan of Care Review  Description: The Plan of Care Review/Shift note should be completed every shift.  The Outcome Evaluation is a brief statement about your assessment that the patient is improving, declining, or no change.  This information will be displayed automatically on your shift note.  Outcome: Adequate for Care Transition  Goal: Patient-Specific Goal (Individualized)  Description: You can add care plan individualizations to a care plan. Examples of Individualization might be:  \"Parent requests to be called daily at 9am for status\", \"I have a hard time hearing out of my right ear\", or \"Do not touch me to wake me up as it startles me\".  Outcome: Adequate for Care Transition  Goal: Absence of Hospital-Acquired Illness or Injury  Outcome: Adequate for Care Transition  Goal: Optimal Comfort and Wellbeing  Outcome: Adequate for Care Transition  Goal: Readiness for Transition of Care  Outcome: Adequate for Care Transition     Problem: Sleep Disturbance  Goal: Adequate Sleep/Rest  Outcome: Adequate for Care Transition     Problem: Anxiety  Goal: Anxiety Reduction or Resolution  Outcome: Adequate for Care Transition     Problem: Depression  Goal: Improved Mood  Outcome: Adequate for Care Transition     Problem: Suicidal Behavior  Goal: Suicidal Behavior is Absent or Managed  Outcome: Adequate for Care Transition     Problem: Adult Behavioral Health Plan of Care  Goal: Plan of Care Review  Outcome: Adequate for Care Transition  Goal: Patient-Specific Goal (Individualization)  Description: You can add care plan individualizations to a care plan. Examples of Individualization might be:  \"Parent requests to be called daily at 9am for status\", " "\"I have a hard time hearing out of my right ear\", or \"Do not touch me to wake me up as it startles me\".  Outcome: Adequate for Care Transition  Goal: Individualized Daily Interaction Plan (IDIP)  Outcome: Adequate for Care Transition  Goal: Adheres to Safety Considerations for Self and Others  Outcome: Adequate for Care Transition  Goal: Absence of New-Onset Illness or Injury  Outcome: Adequate for Care Transition  Goal: Optimized Coping Skills in Response to Life Stressors  Outcome: Adequate for Care Transition  Goal: Develops/Participates in Therapeutic Bennett to Support Successful Transition  Outcome: Adequate for Care Transition   Goal Outcome Evaluation:                        "

## 2023-03-26 ENCOUNTER — HEALTH MAINTENANCE LETTER (OUTPATIENT)
Age: 53
End: 2023-03-26

## 2023-05-24 ENCOUNTER — MYC MEDICAL ADVICE (OUTPATIENT)
Dept: BEHAVIORAL HEALTH | Facility: CLINIC | Age: 53
End: 2023-05-24
Payer: COMMERCIAL

## 2024-05-26 ENCOUNTER — HEALTH MAINTENANCE LETTER (OUTPATIENT)
Age: 54
End: 2024-05-26

## 2024-10-27 ENCOUNTER — HOSPITAL ENCOUNTER (EMERGENCY)
Facility: HOSPITAL | Age: 54
Discharge: HOME | End: 2024-10-27
Attending: EMERGENCY MEDICINE | Admitting: EMERGENCY MEDICINE
Payer: COMMERCIAL

## 2024-10-27 VITALS
DIASTOLIC BLOOD PRESSURE: 64 MMHG | HEART RATE: 65 BPM | TEMPERATURE: 98.6 F | BODY MASS INDEX: 25.94 KG/M2 | SYSTOLIC BLOOD PRESSURE: 114 MMHG | WEIGHT: 165.6 LBS | OXYGEN SATURATION: 98 % | RESPIRATION RATE: 16 BRPM

## 2024-10-27 DIAGNOSIS — Z04.9 SUSPECTED CONDITION NOT FOUND: ICD-10-CM

## 2024-10-27 LAB
AMPHETAMINES UR QL SCN: NORMAL
BARBITURATES UR QL SCN: NORMAL
BENZODIAZ UR QL SCN: NORMAL
BZE UR QL SCN: NORMAL
CANNABINOIDS UR QL SCN: NORMAL
COHGB MFR BLD: 0.7 % (ref 0–2)
FENTANYL UR QL: NORMAL
OPIATES UR QL SCN: NORMAL
PCP QUAL URINE (ROCHE): NORMAL

## 2024-10-27 PROCEDURE — 99283 EMERGENCY DEPT VISIT LOW MDM: CPT

## 2024-10-27 PROCEDURE — 80307 DRUG TEST PRSMV CHEM ANLYZR: CPT | Performed by: EMERGENCY MEDICINE

## 2024-10-27 PROCEDURE — 82375 ASSAY CARBOXYHB QUANT: CPT | Performed by: EMERGENCY MEDICINE

## 2024-10-27 PROCEDURE — 36415 COLL VENOUS BLD VENIPUNCTURE: CPT | Performed by: EMERGENCY MEDICINE

## 2024-10-27 ASSESSMENT — ACTIVITIES OF DAILY LIVING (ADL)
ADLS_ACUITY_SCORE: 0

## 2024-10-27 ASSESSMENT — COLUMBIA-SUICIDE SEVERITY RATING SCALE - C-SSRS
6. HAVE YOU EVER DONE ANYTHING, STARTED TO DO ANYTHING, OR PREPARED TO DO ANYTHING TO END YOUR LIFE?: NO
2. HAVE YOU ACTUALLY HAD ANY THOUGHTS OF KILLING YOURSELF IN THE PAST MONTH?: NO
1. IN THE PAST MONTH, HAVE YOU WISHED YOU WERE DEAD OR WISHED YOU COULD GO TO SLEEP AND NOT WAKE UP?: NO

## 2024-10-28 NOTE — ED TRIAGE NOTES
Pt states she and her daughters are in a new apartment.  States exposure to fentanyl in her apartment.  Pt states that she has smelled foul smell now for over a week.  States falling asleep at work.  Pt works for [employer].  Has filed 5 police reports.  Pt states having nausea.  Tested herself for drugs and tested positive for fentanyl.  Pt states smell in apartment is getting worse.  Went to urgent care and sent here     Triage Assessment (Adult)       Row Name 10/27/24 2003          Triage Assessment    Airway WDL WDL        Respiratory WDL    Respiratory WDL WDL        Skin Circulation/Temperature WDL    Skin Circulation/Temperature WDL WDL        Cardiac WDL    Cardiac WDL WDL        Peripheral/Neurovascular WDL    Peripheral Neurovascular WDL WDL        Cognitive/Neuro/Behavioral WDL    Cognitive/Neuro/Behavioral WDL WDL                      High Dose Vitamin A Pregnancy And Lactation Text: High dose vitamin A therapy is contraindicated during pregnancy and breast feeding.

## 2024-10-28 NOTE — DISCHARGE INSTRUCTIONS
Your drug screen did not show any fentanyl or cannabinoids however your 2 daughters screens were positive for cannabinoids which could indicate exposure.  Continue to work with the police and the management at your apartment to resolve the situation.  Consider housing with your mother until this situation is resolved avoid a toxic exposure particularly to fentanyl

## 2024-10-28 NOTE — ED PROVIDER NOTES
EMERGENCY DEPARTMENT NOTE     Name: Winifred Bashir    Age/Sex: 54 year old female   MRN: [id number]   Evaluation Date & Time:  10/27/2024  8:21 PM    PCP:    No Ref-Primary, Physician   ED Provider: Anderson Muir D.O.       CHIEF COMPLAINT    Toxic Inhalation     HISTORY OF PRESENT ILLNESS   Winifred Bashir is a 54 year old year old female with a relevant past history of intestinal malabsorption syndrome following sleeve gastrectomy 2013, hypertension, asthma, prior substance use disorder with methamphetamine and cocaine currently sober, who presents to the ED  for evaluation of possible toxic exposure.  Patient recently moved into a apartment early October.  On October 10 she noticed abnormal smell in their apartment possibly coming from the vents.  Patient had suspicion that neighbors were using illicit drugs.  She contacted the police and  but no formal action have been taken.  Patient works at [employer] and had done a urine drug screen at that facility and reportedly was positive for fentanyl.  Patient has been sober for several years and was concerned about the implications of this for her job but also danger her self and her family.  Patient reported taking a drug test home and one of her daughters tested positive also for fentanyl.  Patient had recurrence of abnormal odor in the apartment and felt somewhat fatigued but otherwise asymptomatic.  Patient tonight is here for formal drug testing of herself and her daughters        DIAGNOSIS & DISPOSITION/MEDICAL DECISION MAKING     1. Suspected condition not found        EMERGENCY DEPARTMENT COURSE   2:28 AM I met with the patient to gather history and to perform my initial exam.  We discussed treatment options and the plan for care while in the Emergency Department.  Triage vital signs: /88 temp 98.6 pulse 86 respiratory 20 SpO2 RA 97%  Differential diagnosis considered included but not limited to:    MDM: CO levels were normal.   Patient's drug screen was negative for fentanyl or other illicit drug.  However her daughters drug test were positive for cannabinoids.  Daughters deny drug use and may represent exposure from the apartment although she will discuss with them any illicit drug use.  I discussed with the patient to continue to follow-up with the police and the  and discussed whether she might consider in the short-term living with her mother until issues with possible illicit drug use in the apartment building can be addressed.     Discharge Vital Signs:/64   Pulse 65   Temp 98.6  F (37  C) (Oral)   Resp 16   Wt 75.1 kg (165 lb 9.6 oz)   LMP  (LMP Unknown)   SpO2 98%   BMI 25.94 kg/m     PROCEDURES:   None  Diagnostic studies:  No orders to display     Labs Ordered and Resulted from Time of ED Arrival to Time of ED Departure   CARBON MONOXIDE - Normal       Result Value    Carbon Monoxide 0.7     URINE DRUG SCREEN PANEL - Normal    Amphetamines Urine Screen Negative      Barbituates Urine Screen Negative      Benzodiazepine Urine Screen Negative      Cannabinoids Urine Screen Negative      Cocaine Urine Screen Negative      Fentanyl Qual Urine Screen Negative      Opiates Urine Screen Negative      PCP Urine Screen Negative       ED INTERVENTIONS   Medications - No data to display  TOTAL CRITICAL CARE TIME (EXCLUDING PROCEDURES): Not applicable      DISCHARGE MEDICATIONS        Review of your medicines        UNREVIEWED medicines. Ask your doctor about these medicines        Dose / Directions   cloNIDine 0.1 MG tablet  Commonly known as: CATAPRES  Used for: Alcohol dependence with withdrawal with complication (H)      Dose: 0.1 mg  Take 1 tablet (0.1 mg) by mouth 4 times daily for 30 days  Quantity: 120 tablet  Refills: 0     DULoxetine 30 MG capsule  Commonly known as: CYMBALTA  Used for: Current moderate episode of major depressive disorder, unspecified whether recurrent (H)      Dose: 30 mg  Take 1  capsule (30 mg) by mouth daily for 30 days  Quantity: 30 capsule  Refills: 0     gabapentin 400 MG capsule  Commonly known as: NEURONTIN  Used for: Generalized anxiety disorder      Dose: 400 mg  Take 1 capsule (400 mg) by mouth 3 times daily  Quantity: 90 capsule  Refills: 0     hydrochlorothiazide 25 MG tablet  Commonly known as: HYDRODIURIL  Used for: Moderate alcohol use disorder (H)      Dose: 25 mg  Take 1 tablet (25 mg) by mouth daily  Quantity: 30 tablet  Refills: 0     nicotine 2 MG lozenge  Commonly known as: COMMIT  Used for: Chemical dependency (H)      Dose: 4 mg  Place 2 lozenges (4 mg) inside cheek every hour as needed for smoking cessation  Quantity: 72 lozenge  Refills: 0            CONTINUE these medicines which have NOT CHANGED        Dose / Directions   melatonin 3 MG tablet      Dose: 3 mg  Take 3 mg by mouth nightly as needed for sleep  Refills: 0            DISPOSITION: Home    Medical Decision Making    At the time of my evaluation, I do not feel the patient s symptoms are caused by sepsis      I obtained additional history from these independent historians:  Patient's daughters  I reviewed these outside records:  Primary care office visit September 8, 2023 where patient was seen for shoulder pain in follow-up for metabolic syndrome  I noted these abnormal vital signs / labs:  ZOEY    Monitor Strip Interpretation:  ZOEY  12-Lead ECG Interpretation:  ZOEY  I independently reviewed the following diagnostic studies:  NA  I spoke to the following clinicians regard the patients care:  NA  My disposition decision is based on the following reasons:  Discharge: I considered admission but discharged the patient after current workup and patient's clinical course in the emergency department.  Prescription medications prescribed:ZOEY  Medications discontinued included:ZOEY      At the conclusion of the encounter I discussed the results of all of the tests and the disposition. The questions were answered. The  patient or family acknowledged understanding and was agreeable with the care plan.            INFORMATION SOURCE AND LIMITATIONS    History/Exam limitations: None  Patient information was obtained from: Patient and her daughters  Use of : N/A          REVIEW OF SYSTEMS:   All other systems reviewed and are negative except as noted above in HPI.    PATIENT HISTORY     Past Medical History:   Diagnosis Date    Bipolar disorder, unspecified (H)     Gambling disorder, persistent, severe 2020    Generalized anxiety disorder 2020    Methamphetamine use disorder, severe (H) 2020    Mild intermittent asthma     Moderate alcohol use disorder (H) 2020    Osteoarthritis     Posttraumatic stress disorder      Patient Active Problem List   Diagnosis    Bipolar disorder (H)    Posttraumatic stress disorder    Mild intermittent asthma    Plantar fascial fibromatosis    Abnormal gait    CARDIOVASCULAR SCREENING; LDL GOAL LESS THAN 160    Vitamin D deficiency    Obesity    Suicidal ideation    Depression    Moderate alcohol use disorder (H)    Polysubstance abuse (H)    Alcohol dependence with withdrawal with complication (H)    Chemical dependency (H)    Gambling disorder, persistent, severe     Past Surgical History:   Procedure Laterality Date    C/SECTION, LOW TRANSVERSE  2007    , Low Transverse    TUBAL LIGATION  2011       Allergies   Allergen Reactions    Fish-Derived Products Shortness Of Breath and Nausea and Vomiting    Nkda [No Known Drug Allergy]        OUTPATIENT MEDICATIONS     Discharge Medication List as of 10/27/2024 11:08 PM         Vitals:    10/27/24 2002 10/27/24 2245   BP: 138/88 114/64   Pulse: 86 65   Resp: 20 16   Temp: 98.6  F (37  C)    TempSrc: Oral    SpO2: 97% 98%   Weight: 75.1 kg (165 lb 9.6 oz)        Physical Exam   Constitutional: Oriented to person, place, and time. Appears well-developed and well-nourished.   Cardiovascular: Normal rate,  regular rhythm and normal heart sounds.    Pulmonary/Chest: Normal effort  and breath sounds normal.   Psychiatric: Normal mood and affect. Behavior is normal. Thought content normal.     DIAGNOSTICS    LABORATORY FINDINGS (REVIEWED AND INTERPRETED):  Labs Ordered and Resulted from Time of ED Arrival to Time of ED Departure   CARBON MONOXIDE - Normal       Result Value    Carbon Monoxide 0.7     URINE DRUG SCREEN PANEL - Normal    Amphetamines Urine Screen Negative      Barbituates Urine Screen Negative      Benzodiazepine Urine Screen Negative      Cannabinoids Urine Screen Negative      Cocaine Urine Screen Negative      Fentanyl Qual Urine Screen Negative      Opiates Urine Screen Negative      PCP Urine Screen Negative           IMAGING (REVIEWED AND INTERPRETED):  No orders to display                   Anderson Muir D.O.  EMERGENCY MEDICINE   10/28/24  [facility] EMERGENCY DEPARTMENT  [employer] [facility] [id number] [phone]  Dept: [phone]     Anderson Muir DO  10/28/24 0310       Anderson Muir DO  10/28/24 0334

## 2025-04-12 ENCOUNTER — HEALTH MAINTENANCE LETTER (OUTPATIENT)
Age: 55
End: 2025-04-12

## 2025-04-21 NOTE — PROGRESS NOTES
[facility]    A/P                                                    ASSESSMENT/PLAN    1. Gambling disorder, persistent, severe  2. Moderate alcohol use disorder (H)  Doing well!  Early remission.  She is actively involved in multiple facets of psychocosial treatment.  Continue current medications.  She will let me know if she would like to change Vivitrol injections to q 3 weeks but would like to continue q 4 weeks for now.  OK to take oral naltrexone the 4th week if needed however if this is required then I would recommend moving up injection.  We discussed need to monitor liver enzymes, ensure not caused by naltrexone.    - citalopram (CELEXA) 20 MG tablet; Take 1 tablet (20 mg) by mouth daily Take in addition to 10 mg tab total dose of 30 mg  Dispense: 30 tablet; Refill: 1  - hydrOXYzine (ATARAX) 25 MG tablet; Take 2 tablets (50 mg) by mouth every 4 hours as needed for anxiety  Dispense: 60 tablet; Refill: 1  - topiramate (TOPAMAX) 25 MG tablet; Take 1 tablet (25 mg) by mouth 3 times daily  Dispense: 90 tablet; Refill: 1  - gabapentin (NEURONTIN) 100 MG capsule; Take 1 capsule (100 mg) by mouth 3 times daily as needed (Anxiety)  Dispense: 90 capsule; Refill: 0  - Comprehensive metabolic panel (BMP + Alb, Alk Phos, ALT, AST, Total. Bili, TP); Future  - naltrexone (DEPADE/REVIA) 50 MG tablet; Take 1 tablet (50 mg) by mouth daily as needed  Dispense: 30 tablet; Refill: 0    3. Stimulant use disorder  Stable.  Continue current medications.  - buPROPion (WELLBUTRIN XL) 300 MG 24 hr tablet; Take 1 tablet (300 mg) by mouth every morning  Dispense: 30 tablet; Refill: 1  - gabapentin (NEURONTIN) 100 MG capsule; Take 1 capsule (100 mg) by mouth 3 times daily as needed (Anxiety)  Dispense: 90 capsule; Refill: 0    4. Moderate episode of recurrent major depressive disorder (H)  Stable.  Continue current medications.    - buPROPion (WELLBUTRIN XL) 300 MG 24 hr tablet; Take 1 tablet (300 mg) by  mouth every morning  Dispense: 30 tablet; Refill: 1  - citalopram (CELEXA) 10 MG tablet; Take 1 tablet (10 mg) by mouth daily Take in addition to 20 mg tab daily Total Dose 30 mg  Dispense: 30 tablet; Refill: 1  - traZODone (DESYREL) 50 MG tablet; Take 1 tablet (50 mg) by mouth nightly as needed for sleep (may repeat after 60 minutes)  Dispense: 30 tablet; Refill: 1    5. Generalized anxiety disorder  Stable.  Continue current medications.    - citalopram (CELEXA) 10 MG tablet; Take 1 tablet (10 mg) by mouth daily Take in addition to 20 mg tab daily Total Dose 30 mg  Dispense: 30 tablet; Refill: 1  - hydrOXYzine (ATARAX) 25 MG tablet; Take 2 tablets (50 mg) by mouth every 4 hours as needed for anxiety  Dispense: 60 tablet; Refill: 1  - traZODone (DESYREL) 50 MG tablet; Take 1 tablet (50 mg) by mouth nightly as needed for sleep (may repeat after 60 minutes)  Dispense: 30 tablet; Refill: 1  - gabapentin (NEURONTIN) 100 MG capsule; Take 1 capsule (100 mg) by mouth 3 times daily as needed (Anxiety)  Dispense: 90 capsule; Refill: 0      PDMP Review       Value Time User    State PDMP site checked  Yes 4/14/2022  3:23 PM Janneth Herring DO            RTC  Return in about 2 months (around 6/14/2022) for Follow up, with me, using a video visit.      Counseled the patient on the importance of having a recovery program in addition to medication to manage recovery.  Components include avoiding isolating, having willingness to change, avoiding triggers and managing cravings. Encouraged having some type of sober network and practicing honesty with trusted support person(s). Encouraged other services such as counseling, 12 step or other self-help organizations.        JAVY Bashir is a 51 year old female with PMHx of HTN, anxiety, depression, gambling disorder, stimulant use disorder, and AUD who presents to clinic today for follow up.     Visit performed  Virtual, via telephone     Phone call duration:  20 min    Brief history of substance use:  Patient was referred to [facility] from residential treatment at [facility] in January 2020.  While at [facility] she was started on IM naltrexone for gambling disorder, AUD, and stimulant use disorder.  Return to gambling tends to lead to return to substance use.  She had return to alcohol and cocaine use in October 2021 and was hospitalized 12/12/21-12/15/21 for detox and then completed treatment program at [facility].       Plan from most recent office visit (1/27/22):  1. Gambling disorder, persistent, severe  No gambling since prior to admission.  Encouraged continued work with [place] sponsor and meetings.  She will be establishing with a therapist.  We will continue Vivitrol injections.       2. Moderate alcohol use disorder (H)  Has remained sober.  Continue current medications, including Vivitrol injections.  Beginning IOP.    - citalopram (CELEXA) 20 MG tablet; Take 1 tablet (20 mg) by mouth daily Take in addition to 10 mg tab total dose of 30 mg  Dispense: 30 tablet; Refill: 1  - gabapentin (NEURONTIN) 100 MG capsule; Take 1 capsule (100 mg) by mouth 3 times daily as needed (Anxiety)  Dispense: 90 capsule; Refill: 0  - hydrOXYzine (ATARAX) 25 MG tablet; Take 2 tablets (50 mg) by mouth every 4 hours as needed for anxiety  Dispense: 60 tablet; Refill: 1  - topiramate (TOPAMAX) 25 MG tablet; Take 1 tablet (25 mg) by mouth 3 times daily  Dispense: 90 tablet; Refill: 1     3. Stimulant use disorder  Has remained sober.  Continue current medications, including Vivitrol injections.  Beginning IOP.    - buPROPion (WELLBUTRIN XL) 300 MG 24 hr tablet; Take 1 tablet (300 mg) by mouth every morning  Dispense: 30 tablet; Refill: 1  - gabapentin (NEURONTIN) 100 MG capsule; Take 1 capsule (100 mg) by mouth 3 times daily as needed (Anxiety)  Dispense: 90 capsule; Refill: 0     4. Generalized anxiety  disorder  Improved.  Continue current medications and monitor.  Will be starting therapy.  - citalopram (CELEXA) 10 MG tablet; Take 1 tablet (10 mg) by mouth daily Take in addition to 20 mg tab daily Total Dose 30 mg  Dispense: 30 tablet; Refill: 1  - gabapentin (NEURONTIN) 100 MG capsule; Take 1 capsule (100 mg) by mouth 3 times daily as needed (Anxiety)  Dispense: 90 capsule; Refill: 0  - hydrOXYzine (ATARAX) 25 MG tablet; Take 2 tablets (50 mg) by mouth every 4 hours as needed for anxiety  Dispense: 60 tablet; Refill: 1  - traZODone (DESYREL) 50 MG tablet; Take 1 tablet (50 mg) by mouth nightly as needed for sleep (may repeat after 60 minutes)  Dispense: 30 tablet; Refill: 1     5. Moderate episode of recurrent major depressive disorder (H)  Improved.  Continue current medications and monitor.  Will be starting therapy.  - buPROPion (WELLBUTRIN XL) 300 MG 24 hr tablet; Take 1 tablet (300 mg) by mouth every morning  Dispense: 30 tablet; Refill: 1  - citalopram (CELEXA) 10 MG tablet; Take 1 tablet (10 mg) by mouth daily Take in addition to 20 mg tab daily Total Dose 30 mg  Dispense: 30 tablet; Refill: 1  - traZODone (DESYREL) 50 MG tablet; Take 1 tablet (50 mg) by mouth nightly as needed for sleep (may repeat after 60 minutes)  Dispense: 30 tablet; Refill: 1     6. Chronic bilateral low back pain with left-sided sciatica  Stable, just needs new script sent to current pharmacy.    - cyclobenzaprine (FLEXERIL) 5 MG tablet; Take 1-2 tablets (5-10 mg) by mouth 3 times daily as needed for muscle spasms  Dispense: 30 tablet; Refill: 1    TODAY'S VISIT  HPI Apr 14, 2022  - Things are going well - today is 120 days of sobriety!  - Good feedback from others which is helpful  - Healthy stress - doing a lot of things that are good for her, working on time management  - Working part time (4 hours, 2 days per week), hoping to get RN license back  - [facility] and [employer] sponsors, weekly phone calls and meetings ([employer] on Wed and Sat, [employer] on Th  and Fri)  - Continues to work on steps  - Also continues IOP M/T/F for 3 hours at [place]  - Sober support network  - Celebrating birthday with sober friends  - Still struggles with waking up in the middle of the night - takes trazodone on occasion and gabapentin on occasion  - No pain or issues with Vivitrol injections - would like to continue q 4 week injections but may consider q 3 weeks again if having increased cravings, she did this in the past  - Had some concerns before most recent injection because she was triggered by gambling ads that she was getting on social media so she has taken a break from social media and this has helped significantly    Noted that she had CMP completed on 2/27 and LFTs were mildly elevated.  AST was 51 and ALT was 94.  Reports no issues with Vivitrol and liver enzymes in past.  She has occasional nausea since December but otherwise no GI complaints.  No yellowing or skin or eyes reported.    PHQ 1/4/2022 1/27/2022 4/14/2022   PHQ-9 Total Score 14 5 5   Q9: Thoughts of better off dead/self-harm past 2 weeks Several days Not at all Not at all     KELLEN-7 SCORE 12/15/2021 1/4/2022 1/27/2022   Total Score 13 13 7       OBJECTIVE                                                    PHYSICAL EXAM:  LMP  (LMP Unknown)     Speech is clear and coherent.  Mood is normal.  Affect appropriate.  Thought process normal.  Speaking in full sentences, no coughing or wheezing.      LAB  No results found for any visits on 04/14/22.      HISTORY                                                    Problem list reviewed & adjusted, as indicated.  Patient Active Problem List   Diagnosis     Bipolar disorder (H)     Posttraumatic stress disorder     Mild intermittent asthma     Plantar fascial fibromatosis     Abnormal gait     CARDIOVASCULAR SCREENING; LDL GOAL LESS THAN 160     Vitamin D deficiency     Obesity     Suicidal ideation     Depression     Moderate alcohol use disorder (H)     Polysubstance  abuse (H)     Alcohol dependence with withdrawal with complication (H)     Chemical dependency (H)     Gambling disorder, persistent, severe         MEDICATION LIST (prior to visit)  albuterol (PROAIR HFA/PROVENTIL HFA/VENTOLIN HFA) 108 (90 Base) MCG/ACT inhaler, Inhale 2 puffs into the lungs every 4 hours as needed for shortness of breath / dyspnea or wheezing  alum & mag hydroxide-simethicone (MAALOX) 200-200-20 MG/5ML SUSP suspension, Take 30 mLs by mouth every 6 hours as needed for indigestion  B Complex Vitamins (VITAMIN B COMPLEX PO),   cyclobenzaprine (FLEXERIL) 5 MG tablet, Take 1-2 tablets (5-10 mg) by mouth 3 times daily as needed for muscle spasms  hydrochlorothiazide (HYDRODIURIL) 25 MG tablet, Take 1 tablet (25 mg) by mouth daily  ibuprofen (ADVIL/MOTRIN) 200 MG tablet, Take 400 mg by mouth every 6 hours as needed for mild pain  loratadine (CLARITIN) 10 MG tablet, Take 1 tablet (10 mg) by mouth daily  melatonin 3 MG tablet, Take 3 mg by mouth nightly as needed for sleep  multivitamin w/minerals (THERA-VIT-M) tablet, Take 1 tablet by mouth daily  naltrexone (VIVITROL) 380 MG SUSR, Inject 380 mg into the muscle  senna-docusate (SENOKOT-S/PERICOLACE) 8.6-50 MG tablet, Take 2 tablets by mouth daily as needed for constipation  vitamin D3 (CHOLECALCIFEROL) 125 MCG (5000 UT) tablet, Take 1 tablet (125 mcg) by mouth daily    No current facility-administered medications on file prior to visit.      MEDICATION LIST (after visit)  Current Outpatient Medications   Medication     albuterol (PROAIR HFA/PROVENTIL HFA/VENTOLIN HFA) 108 (90 Base) MCG/ACT inhaler     alum & mag hydroxide-simethicone (MAALOX) 200-200-20 MG/5ML SUSP suspension     B Complex Vitamins (VITAMIN B COMPLEX PO)     buPROPion (WELLBUTRIN XL) 300 MG 24 hr tablet     citalopram (CELEXA) 10 MG tablet     citalopram (CELEXA) 20 MG tablet     cyclobenzaprine (FLEXERIL) 5 MG tablet     gabapentin (NEURONTIN) 100 MG capsule     hydrochlorothiazide  (HYDRODIURIL) 25 MG tablet     hydrOXYzine (ATARAX) 25 MG tablet     ibuprofen (ADVIL/MOTRIN) 200 MG tablet     loratadine (CLARITIN) 10 MG tablet     melatonin 3 MG tablet     multivitamin w/minerals (THERA-VIT-M) tablet     naltrexone (DEPADE/REVIA) 50 MG tablet     naltrexone (VIVITROL) 380 MG SUSR     senna-docusate (SENOKOT-S/PERICOLACE) 8.6-50 MG tablet     topiramate (TOPAMAX) 25 MG tablet     traZODone (DESYREL) 50 MG tablet     vitamin D3 (CHOLECALCIFEROL) 125 MCG (5000 UT) tablet     No current facility-administered medications for this visit.         Allergies   Allergen Reactions     Fish-Derived Products Shortness Of Breath and Nausea and Vomiting     Nkda [No Known Drug Allergies]        Janneth Herring DO  [facility]  [facility]  [phone]         Opt out

## 2025-06-14 ENCOUNTER — HEALTH MAINTENANCE LETTER (OUTPATIENT)
Age: 55
End: 2025-06-14